# Patient Record
Sex: FEMALE | Race: WHITE | Employment: OTHER | URBAN - METROPOLITAN AREA
[De-identification: names, ages, dates, MRNs, and addresses within clinical notes are randomized per-mention and may not be internally consistent; named-entity substitution may affect disease eponyms.]

---

## 2017-01-03 ENCOUNTER — OP HISTORICAL/CONVERTED ENCOUNTER (OUTPATIENT)
Dept: OTHER | Age: 77
End: 2017-01-03

## 2017-03-06 ENCOUNTER — OP HISTORICAL/CONVERTED ENCOUNTER (OUTPATIENT)
Dept: OTHER | Age: 77
End: 2017-03-06

## 2017-03-07 ENCOUNTER — OP HISTORICAL/CONVERTED ENCOUNTER (OUTPATIENT)
Dept: OTHER | Age: 77
End: 2017-03-07

## 2017-03-08 ENCOUNTER — OP HISTORICAL/CONVERTED ENCOUNTER (OUTPATIENT)
Dept: OTHER | Age: 77
End: 2017-03-08

## 2017-03-14 ENCOUNTER — OP HISTORICAL/CONVERTED ENCOUNTER (OUTPATIENT)
Dept: OTHER | Age: 77
End: 2017-03-14

## 2017-04-02 ENCOUNTER — IP HISTORICAL/CONVERTED ENCOUNTER (OUTPATIENT)
Dept: OTHER | Age: 77
End: 2017-04-02

## 2017-05-19 ENCOUNTER — IP HISTORICAL/CONVERTED ENCOUNTER (OUTPATIENT)
Dept: OTHER | Age: 77
End: 2017-05-19

## 2017-05-31 ENCOUNTER — OP HISTORICAL/CONVERTED ENCOUNTER (OUTPATIENT)
Dept: OTHER | Age: 77
End: 2017-05-31

## 2017-06-05 ENCOUNTER — OP HISTORICAL/CONVERTED ENCOUNTER (OUTPATIENT)
Dept: OTHER | Age: 77
End: 2017-06-05

## 2017-06-06 ENCOUNTER — OP HISTORICAL/CONVERTED ENCOUNTER (OUTPATIENT)
Dept: OTHER | Age: 77
End: 2017-06-06

## 2017-08-17 ENCOUNTER — OP HISTORICAL/CONVERTED ENCOUNTER (OUTPATIENT)
Dept: OTHER | Age: 77
End: 2017-08-17

## 2018-01-12 ENCOUNTER — ED HISTORICAL/CONVERTED ENCOUNTER (OUTPATIENT)
Dept: OTHER | Age: 78
End: 2018-01-12

## 2018-04-13 ENCOUNTER — ED HISTORICAL/CONVERTED ENCOUNTER (OUTPATIENT)
Dept: OTHER | Age: 78
End: 2018-04-13

## 2018-05-26 ENCOUNTER — IP HISTORICAL/CONVERTED ENCOUNTER (OUTPATIENT)
Dept: OTHER | Age: 78
End: 2018-05-26

## 2018-06-22 ENCOUNTER — IP HISTORICAL/CONVERTED ENCOUNTER (OUTPATIENT)
Dept: OTHER | Age: 78
End: 2018-06-22

## 2018-07-22 ENCOUNTER — IP HISTORICAL/CONVERTED ENCOUNTER (OUTPATIENT)
Dept: OTHER | Age: 78
End: 2018-07-22

## 2018-10-01 ENCOUNTER — IP HISTORICAL/CONVERTED ENCOUNTER (OUTPATIENT)
Dept: OTHER | Age: 78
End: 2018-10-01

## 2018-10-21 ENCOUNTER — IP HISTORICAL/CONVERTED ENCOUNTER (OUTPATIENT)
Dept: OTHER | Age: 78
End: 2018-10-21

## 2019-01-19 ENCOUNTER — IP HISTORICAL/CONVERTED ENCOUNTER (OUTPATIENT)
Dept: OTHER | Age: 79
End: 2019-01-19

## 2019-08-14 ENCOUNTER — ED HISTORICAL/CONVERTED ENCOUNTER (OUTPATIENT)
Dept: OTHER | Age: 79
End: 2019-08-14

## 2019-09-03 ENCOUNTER — ED HISTORICAL/CONVERTED ENCOUNTER (OUTPATIENT)
Dept: OTHER | Age: 79
End: 2019-09-03

## 2020-02-15 ENCOUNTER — IP HISTORICAL/CONVERTED ENCOUNTER (OUTPATIENT)
Dept: OTHER | Age: 80
End: 2020-02-15

## 2020-12-04 ENCOUNTER — HOSPITAL ENCOUNTER (EMERGENCY)
Age: 80
Discharge: HOME | End: 2020-12-04
Attending: EMERGENCY MEDICINE
Payer: MEDICARE

## 2020-12-04 ENCOUNTER — APPOINTMENT (OUTPATIENT)
Dept: GENERAL RADIOLOGY | Age: 80
End: 2020-12-04
Attending: EMERGENCY MEDICINE
Payer: MEDICARE

## 2020-12-04 VITALS
HEART RATE: 68 BPM | DIASTOLIC BLOOD PRESSURE: 79 MMHG | OXYGEN SATURATION: 96 % | HEIGHT: 62 IN | SYSTOLIC BLOOD PRESSURE: 173 MMHG | RESPIRATION RATE: 21 BRPM | BODY MASS INDEX: 39.93 KG/M2 | WEIGHT: 217 LBS | TEMPERATURE: 97.8 F

## 2020-12-04 DIAGNOSIS — R07.89 CHEST WALL PAIN: Primary | ICD-10-CM

## 2020-12-04 LAB
ALBUMIN SERPL-MCNC: 3 G/DL (ref 3.5–5)
ALBUMIN/GLOB SERPL: 0.7 {RATIO} (ref 1.1–2.2)
ALP SERPL-CCNC: 148 U/L (ref 45–117)
ALT SERPL-CCNC: 27 U/L (ref 12–78)
ANION GAP SERPL CALC-SCNC: 7 MMOL/L (ref 5–15)
AST SERPL W P-5'-P-CCNC: 16 U/L (ref 15–37)
ATRIAL RATE: 87 BPM
BASOPHILS # BLD: 0 K/UL (ref 0–0.1)
BASOPHILS NFR BLD: 0 % (ref 0–1)
BILIRUB SERPL-MCNC: 0.6 MG/DL (ref 0.2–1)
BUN SERPL-MCNC: 22 MG/DL (ref 6–20)
BUN/CREAT SERPL: 18 (ref 12–20)
CA-I BLD-MCNC: 8.5 MG/DL (ref 8.5–10.1)
CALCULATED P AXIS, ECG09: 58 DEGREES
CALCULATED R AXIS, ECG10: -3 DEGREES
CALCULATED T AXIS, ECG11: 79 DEGREES
CHLORIDE SERPL-SCNC: 109 MMOL/L (ref 97–108)
CK SERPL-CCNC: 74 NG/ML (ref 26–192)
CO2 SERPL-SCNC: 26 MMOL/L (ref 21–32)
CREAT SERPL-MCNC: 1.24 MG/DL (ref 0.55–1.02)
DIAGNOSIS, 93000: NORMAL
DIFFERENTIAL METHOD BLD: ABNORMAL
EOSINOPHIL # BLD: 0.2 K/UL (ref 0–0.4)
EOSINOPHIL NFR BLD: 2 % (ref 0–7)
ERYTHROCYTE [DISTWIDTH] IN BLOOD BY AUTOMATED COUNT: 14.6 % (ref 11.5–14.5)
GLOBULIN SER CALC-MCNC: 4.6 G/DL (ref 2–4)
GLUCOSE SERPL-MCNC: 142 MG/DL (ref 65–100)
HCT VFR BLD AUTO: 32.3 % (ref 35–47)
HGB BLD-MCNC: 10 G/DL (ref 11.5–16)
IMM GRANULOCYTES # BLD AUTO: 0 K/UL (ref 0–0.04)
IMM GRANULOCYTES NFR BLD AUTO: 0 % (ref 0–0.5)
LYMPHOCYTES # BLD: 1 K/UL (ref 0.8–3.5)
LYMPHOCYTES NFR BLD: 10 % (ref 12–49)
MCH RBC QN AUTO: 28.6 PG (ref 26–34)
MCHC RBC AUTO-ENTMCNC: 31 G/DL (ref 30–36.5)
MCV RBC AUTO: 92.3 FL (ref 80–99)
MONOCYTES # BLD: 0.6 K/UL (ref 0–1)
MONOCYTES NFR BLD: 6 % (ref 5–13)
NEUTS SEG # BLD: 8.5 K/UL (ref 1.8–8)
NEUTS SEG NFR BLD: 82 % (ref 32–75)
P-R INTERVAL, ECG05: 212 MS
PLATELET # BLD AUTO: 237 K/UL (ref 150–400)
PMV BLD AUTO: 10.3 FL (ref 8.9–12.9)
POTASSIUM SERPL-SCNC: 4.2 MMOL/L (ref 3.5–5.1)
PROT SERPL-MCNC: 7.6 G/DL (ref 6.4–8.2)
Q-T INTERVAL, ECG07: 392 MS
QRS DURATION, ECG06: 84 MS
QTC CALCULATION (BEZET), ECG08: 471 MS
RBC # BLD AUTO: 3.5 M/UL (ref 3.8–5.2)
SODIUM SERPL-SCNC: 142 MMOL/L (ref 136–145)
TROPONIN I SERPL-MCNC: <0.05 NG/ML
TROPONIN I SERPL-MCNC: <0.05 NG/ML
VENTRICULAR RATE, ECG03: 87 BPM
WBC # BLD AUTO: 10.3 K/UL (ref 3.6–11)

## 2020-12-04 PROCEDURE — 85025 COMPLETE CBC W/AUTO DIFF WBC: CPT

## 2020-12-04 PROCEDURE — 74011250637 HC RX REV CODE- 250/637: Performed by: EMERGENCY MEDICINE

## 2020-12-04 PROCEDURE — 84484 ASSAY OF TROPONIN QUANT: CPT

## 2020-12-04 PROCEDURE — 82550 ASSAY OF CK (CPK): CPT

## 2020-12-04 PROCEDURE — 80053 COMPREHEN METABOLIC PANEL: CPT

## 2020-12-04 PROCEDURE — 99284 EMERGENCY DEPT VISIT MOD MDM: CPT

## 2020-12-04 PROCEDURE — 71045 X-RAY EXAM CHEST 1 VIEW: CPT

## 2020-12-04 PROCEDURE — 36415 COLL VENOUS BLD VENIPUNCTURE: CPT

## 2020-12-04 PROCEDURE — 93005 ELECTROCARDIOGRAM TRACING: CPT

## 2020-12-04 RX ORDER — NAPROXEN 500 MG/1
500 TABLET ORAL
Status: DISCONTINUED | OUTPATIENT
Start: 2020-12-04 | End: 2020-12-04 | Stop reason: HOSPADM

## 2020-12-04 RX ADMIN — NAPROXEN 500 MG: 500 TABLET ORAL at 11:28

## 2020-12-04 NOTE — ED PROVIDER NOTES
EMERGENCY DEPARTMENT HISTORY AND PHYSICAL EXAM      Date: 12/4/2020  Patient Name: Elena Chaparro      History of Presenting Illness     Chief Complaint   Patient presents with    Chest Pain       History Provided By: Patient    HPI: Elena Chaparro, 68 y.o. female with a past medical history significant Congestive heart failure presents to the ED with cc of shortness of breath with activity and laying down over the past 24 to 48 hours. Patient states she has not treated with anything and her symptoms are mild to moderate at this point time. She is not maintained on oxygen at home. She specifically denies fever, chills, nausea, vomiting, chest pain, rash, diarrhea, headache, night sweats, weight loss. There are no other complaints, changes, or physical findings at this time. PCP: Ayala Zhu MD        Past History     Past Medical History:  Past Medical History:   Diagnosis Date    CHF (congestive heart failure) ([facility] [address]     Hypercholesterolemia     Hypertension     Migraine        Past Surgical History:  Past Surgical History:   Procedure Laterality Date    HX HERNIA REPAIR      HX TUBAL LIGATION         Family History:  History reviewed. No pertinent family history. Social History:  Social History     Tobacco Use    Smoking status: Never Smoker    Smokeless tobacco: Never Used   Substance Use Topics    Alcohol use: Not Currently    Drug use: Not Currently       Allergies: Allergies   Allergen Reactions    Antibiotic (Pnqtn-Qqnyr-Wvfdl) [Neomycin-Bacitracnzn-Polymyxnb] Rash and Swelling     Pt states she is allergic to ALL antibiotics     Contrast Agent [Iodine] Swelling         Review of Systems     Review of Systems   Constitutional: Negative. Negative for appetite change, chills, fatigue and fever. HENT: Negative. Negative for congestion and sinus pain. Eyes: Negative. Negative for pain and visual disturbance.    Respiratory: Positive for chest tightness and shortness of breath. Cardiovascular: Negative. Negative for chest pain. Gastrointestinal: Negative. Negative for abdominal pain, diarrhea, nausea and vomiting. Genitourinary: Negative. Negative for difficulty urinating. No discharge   Musculoskeletal: Negative. Negative for arthralgias. Skin: Negative. Negative for rash. Neurological: Negative. Negative for weakness and headaches. Hematological: Negative. Psychiatric/Behavioral: Negative. Negative for agitation. The patient is not nervous/anxious. All other systems reviewed and are negative. Physical Exam     Physical Exam  Vitals signs and nursing note reviewed. Constitutional:       General: She is not in acute distress. Appearance: She is well-developed. HENT:      Head: Normocephalic and atraumatic. Nose: Nose normal.      Mouth/Throat:      Mouth: Mucous membranes are moist.      Pharynx: Oropharynx is clear. No oropharyngeal exudate. Eyes:      General:         Right eye: No discharge. Left eye: No discharge. Conjunctiva/sclera: Conjunctivae normal.      Pupils: Pupils are equal, round, and reactive to light. Neck:      Musculoskeletal: Normal range of motion and neck supple. Cardiovascular:      Rate and Rhythm: Normal rate and regular rhythm. Chest Wall: PMI is not displaced. No thrill. Heart sounds: Normal heart sounds. No murmur. No friction rub. No gallop. Pulmonary:      Effort: Pulmonary effort is normal. Tachypnea present. No respiratory distress. Breath sounds: Normal breath sounds. No wheezing or rales. Chest:      Chest wall: No tenderness. Abdominal:      General: Bowel sounds are normal. There is no distension. Palpations: Abdomen is soft. There is no mass. Tenderness: There is no abdominal tenderness. There is no guarding or rebound. Musculoskeletal: Normal range of motion. Lymphadenopathy:      Cervical: No cervical adenopathy.    Skin:     General: Skin is warm and dry. Capillary Refill: Capillary refill takes less than 2 seconds. Findings: No erythema or rash. Neurological:      Mental Status: She is alert and oriented to person, place, and time. Cranial Nerves: No cranial nerve deficit. Coordination: Coordination normal.   Psychiatric:         Mood and Affect: Mood normal.         Behavior: Behavior normal.         Lab and Diagnostic Study Results     Labs -   No results found for this or any previous visit (from the past 12 hour(s)). Radiologic Studies -   [unfilled]  CT Results  (Last 48 hours)    None        CXR Results  (Last 48 hours)    None          Medical Decision Making and ED Course   - I am the first and primary provider for this patient. - I reviewed the vital signs, available nursing notes, past medical history, past surgical history, family history and social history. - Initial assessment performed. The patients presenting problems have been discussed, and the staff are in agreement with the care plan formulated and outlined with them. I have encouraged them to ask questions as they arise throughout their visit. Vital Signs-Reviewed the patient's vital signs. No data found. EKG interpretation: (Preliminary): Performed at 7:11 AM, and read at 7:17 AM  Ventricular rate 87 bpm, IA interval 212 ms, QRS duration 84 ms, QTC 4 and 71 ms. Interpretation: Sinus rhythm with first-degree AV block. Otherwise normal EKG. Records Reviewed: Nursing Notes and Old Medical Records    The patient presents with shortness of breath with a differential diagnosis of ACS, arrhythmia, congestive heart failure, pleurisy, pleural effusion, pneumonia, Covid. ED Course:   Assess with basic and cardiac labs, EKG and chest x-ray. ED Course as of Dec 07 0834   Fri Dec 04, 2020   1044 10:44 AM  I have just reevaluated the patient.  I have reviewed Her vital signs and determined there is currently no worsening in their condition or physical exam.  Results have been reviewed with them and their questions have been answered. We will continue to review further results as they come available. Will reassess second troponin. Reexamination of the patient reveals she does have anterior chest wall tenderness the replicates the pain she is been having.    [CS]      ED Course User Index  [CS] Rickey Zhou MD         Provider Notes (Medical Decision Making):   Patient second troponin is within normal limits and her symptoms are more symptomatic there would be consistent with anterior chest wall pain. MDM           Consultations:       Consultations: - NONE        Procedures and Critical Care       Performed by: Olive Nash MD  PROCEDURES:  Procedures       Disposition     Disposition: Condition stable, improved and resolved  DC- Adult Discharges: All of the diagnostic tests were reviewed and questions answered. Diagnosis, care plan and treatment options were discussed. The patient understands the instructions and will follow up as directed. The patients results have been reviewed with them. They have been counseled regarding their diagnosis. The patient verbally convey understanding and agreement of the signs, symptoms, diagnosis, treatment and prognosis and additionally agrees to follow up as recommended with their PCP in 24 - 48 hours. They also agree with the care-plan and convey that all of their questions have been answered. I have also put together some discharge instructions for them that include: 1) educational information regarding their diagnosis, 2) how to care for their diagnosis at home, as well a 3) list of reasons why they would want to return to the ED prior to their follow-up appointment, should their condition change. Discharged    Remove if not discharged  DISCHARGE PLAN:  1. There are no discharge medications for this patient.     2.   Follow-up Information     Follow up With Specialties Details Why Brenna Prado MD Family Medicine Call in 2 days  [address]  [phone]          3. Return to ED if worse   4. There are no discharge medications for this patient. Diagnosis     Clinical Impression:   1. Chest wall pain        Attestations:    Saumya Garza MD    Please note that this dictation was completed with [employer], the computer voice recognition software. Quite often unanticipated grammatical, syntax, homophones, and other interpretive errors are inadvertently transcribed by the computer software. Please disregard these errors. Please excuse any errors that have escaped final proofreading. Thank you.

## 2020-12-04 NOTE — ED TRIAGE NOTES
Pt arrived by ambulance with c/o 8/10 Chest pain radiating to left side. 4 aspirins and 2 nitros were given by ems provider. Pt sated at 90% ra and went to 97% on 4 L NC. Pt states her chest pain was unrelieved by asa and nitro.

## 2021-12-04 ENCOUNTER — HOSPITAL ENCOUNTER (INPATIENT)
Age: 81
LOS: 4 days | Discharge: HOME HEALTH | DRG: 291 | End: 2021-12-08
Attending: EMERGENCY MEDICINE | Admitting: INTERNAL MEDICINE
Payer: MEDICARE

## 2021-12-04 ENCOUNTER — APPOINTMENT (OUTPATIENT)
Dept: GENERAL RADIOLOGY | Age: 81
DRG: 291 | End: 2021-12-04
Attending: EMERGENCY MEDICINE
Payer: MEDICARE

## 2021-12-04 DIAGNOSIS — I50.43 ACUTE ON CHRONIC COMBINED SYSTOLIC AND DIASTOLIC CONGESTIVE HEART FAILURE (HCC): ICD-10-CM

## 2021-12-04 DIAGNOSIS — I50.1 PULMONARY EDEMA CARDIAC CAUSE (HCC): Primary | ICD-10-CM

## 2021-12-04 PROBLEM — I50.9 CHF (CONGESTIVE HEART FAILURE) (HCC): Status: ACTIVE | Noted: 2021-12-04

## 2021-12-04 LAB
ALBUMIN SERPL-MCNC: 3.2 G/DL (ref 3.5–5)
ALBUMIN/GLOB SERPL: 0.7 {RATIO} (ref 1.1–2.2)
ALP SERPL-CCNC: 117 U/L (ref 45–117)
ALT SERPL-CCNC: 24 U/L (ref 12–78)
ANION GAP SERPL CALC-SCNC: 6 MMOL/L (ref 5–15)
AST SERPL W P-5'-P-CCNC: 26 U/L (ref 15–37)
ATRIAL RATE: 86 BPM
BILIRUB SERPL-MCNC: 0.6 MG/DL (ref 0.2–1)
BNP SERPL-MCNC: 1072 PG/ML
BUN SERPL-MCNC: 24 MG/DL (ref 6–20)
BUN/CREAT SERPL: 19 (ref 12–20)
CA-I BLD-MCNC: 8.6 MG/DL (ref 8.5–10.1)
CALCULATED P AXIS, ECG09: 68 DEGREES
CALCULATED R AXIS, ECG10: 2 DEGREES
CALCULATED T AXIS, ECG11: 86 DEGREES
CHLORIDE SERPL-SCNC: 111 MMOL/L (ref 97–108)
CO2 SERPL-SCNC: 22 MMOL/L (ref 21–32)
CREAT SERPL-MCNC: 1.24 MG/DL (ref 0.55–1.02)
DIAGNOSIS, 93000: NORMAL
ERYTHROCYTE [DISTWIDTH] IN BLOOD BY AUTOMATED COUNT: 14.2 % (ref 11.5–14.5)
GLOBULIN SER CALC-MCNC: 4.5 G/DL (ref 2–4)
GLUCOSE BLD STRIP.AUTO-MCNC: 311 MG/DL (ref 65–117)
GLUCOSE SERPL-MCNC: 141 MG/DL (ref 65–100)
HCT VFR BLD AUTO: 32 % (ref 35–47)
HGB BLD-MCNC: 9.8 G/DL (ref 11.5–16)
MCH RBC QN AUTO: 28.7 PG (ref 26–34)
MCHC RBC AUTO-ENTMCNC: 30.6 G/DL (ref 30–36.5)
MCV RBC AUTO: 93.6 FL (ref 80–99)
NRBC # BLD: 0 K/UL (ref 0–0.01)
NRBC BLD-RTO: 0 PER 100 WBC
P-R INTERVAL, ECG05: 198 MS
PERFORMED BY, TECHID: ABNORMAL
PLATELET # BLD AUTO: 254 K/UL (ref 150–400)
PMV BLD AUTO: 10.1 FL (ref 8.9–12.9)
POTASSIUM SERPL-SCNC: 4.3 MMOL/L (ref 3.5–5.1)
PROT SERPL-MCNC: 7.7 G/DL (ref 6.4–8.2)
Q-T INTERVAL, ECG07: 400 MS
QRS DURATION, ECG06: 86 MS
QTC CALCULATION (BEZET), ECG08: 478 MS
RBC # BLD AUTO: 3.42 M/UL (ref 3.8–5.2)
SODIUM SERPL-SCNC: 139 MMOL/L (ref 136–145)
TROPONIN-HIGH SENSITIVITY: 21 NG/L (ref 0–51)
TROPONIN-HIGH SENSITIVITY: 23 NG/L (ref 0–51)
VENTRICULAR RATE, ECG03: 86 BPM
WBC # BLD AUTO: 11.1 K/UL (ref 3.6–11)

## 2021-12-04 PROCEDURE — 96375 TX/PRO/DX INJ NEW DRUG ADDON: CPT

## 2021-12-04 PROCEDURE — 83880 ASSAY OF NATRIURETIC PEPTIDE: CPT

## 2021-12-04 PROCEDURE — 99285 EMERGENCY DEPT VISIT HI MDM: CPT

## 2021-12-04 PROCEDURE — 93005 ELECTROCARDIOGRAM TRACING: CPT

## 2021-12-04 PROCEDURE — 84484 ASSAY OF TROPONIN QUANT: CPT

## 2021-12-04 PROCEDURE — 65270000029 HC RM PRIVATE

## 2021-12-04 PROCEDURE — 96374 THER/PROPH/DIAG INJ IV PUSH: CPT

## 2021-12-04 PROCEDURE — 74011250636 HC RX REV CODE- 250/636: Performed by: EMERGENCY MEDICINE

## 2021-12-04 PROCEDURE — 36415 COLL VENOUS BLD VENIPUNCTURE: CPT

## 2021-12-04 PROCEDURE — 71045 X-RAY EXAM CHEST 1 VIEW: CPT

## 2021-12-04 PROCEDURE — 74011250637 HC RX REV CODE- 250/637: Performed by: EMERGENCY MEDICINE

## 2021-12-04 PROCEDURE — 85027 COMPLETE CBC AUTOMATED: CPT

## 2021-12-04 PROCEDURE — 74011250637 HC RX REV CODE- 250/637: Performed by: INTERNAL MEDICINE

## 2021-12-04 PROCEDURE — 82962 GLUCOSE BLOOD TEST: CPT

## 2021-12-04 PROCEDURE — 94761 N-INVAS EAR/PLS OXIMETRY MLT: CPT

## 2021-12-04 PROCEDURE — 74011250636 HC RX REV CODE- 250/636: Performed by: INTERNAL MEDICINE

## 2021-12-04 PROCEDURE — 80053 COMPREHEN METABOLIC PANEL: CPT

## 2021-12-04 RX ORDER — FUROSEMIDE 10 MG/ML
40 INJECTION INTRAMUSCULAR; INTRAVENOUS DAILY
Status: DISCONTINUED | OUTPATIENT
Start: 2021-12-05 | End: 2021-12-07

## 2021-12-04 RX ORDER — HEPARIN SODIUM 5000 [USP'U]/ML
5000 INJECTION, SOLUTION INTRAVENOUS; SUBCUTANEOUS EVERY 12 HOURS
Status: DISCONTINUED | OUTPATIENT
Start: 2021-12-04 | End: 2021-12-08 | Stop reason: HOSPADM

## 2021-12-04 RX ORDER — ACETAMINOPHEN 500 MG
1000 TABLET ORAL ONCE
Status: ACTIVE | OUTPATIENT
Start: 2021-12-04 | End: 2021-12-04

## 2021-12-04 RX ORDER — DOCUSATE SODIUM 100 MG/1
100 CAPSULE, LIQUID FILLED ORAL AS NEEDED
Status: DISCONTINUED | OUTPATIENT
Start: 2021-12-04 | End: 2021-12-08 | Stop reason: HOSPADM

## 2021-12-04 RX ORDER — FUROSEMIDE 10 MG/ML
40 INJECTION INTRAMUSCULAR; INTRAVENOUS
Status: COMPLETED | OUTPATIENT
Start: 2021-12-04 | End: 2021-12-04

## 2021-12-04 RX ORDER — KETOROLAC TROMETHAMINE 15 MG/ML
15 INJECTION, SOLUTION INTRAMUSCULAR; INTRAVENOUS
Status: COMPLETED | OUTPATIENT
Start: 2021-12-04 | End: 2021-12-04

## 2021-12-04 RX ORDER — ONDANSETRON 2 MG/ML
4 INJECTION INTRAMUSCULAR; INTRAVENOUS
Status: DISCONTINUED | OUTPATIENT
Start: 2021-12-04 | End: 2021-12-08 | Stop reason: HOSPADM

## 2021-12-04 RX ORDER — ONDANSETRON 2 MG/ML
4 INJECTION INTRAMUSCULAR; INTRAVENOUS
Status: COMPLETED | OUTPATIENT
Start: 2021-12-04 | End: 2021-12-04

## 2021-12-04 RX ORDER — BUTALBITAL, ACETAMINOPHEN AND CAFFEINE 50; 325; 40 MG/1; MG/1; MG/1
1 TABLET ORAL
Status: DISCONTINUED | OUTPATIENT
Start: 2021-12-04 | End: 2021-12-08 | Stop reason: HOSPADM

## 2021-12-04 RX ORDER — CARVEDILOL 3.12 MG/1
3.12 TABLET ORAL 2 TIMES DAILY WITH MEALS
Status: DISCONTINUED | OUTPATIENT
Start: 2021-12-04 | End: 2021-12-07

## 2021-12-04 RX ORDER — ASPIRIN 81 MG/1
81 TABLET ORAL DAILY
Status: DISCONTINUED | OUTPATIENT
Start: 2021-12-05 | End: 2021-12-08 | Stop reason: HOSPADM

## 2021-12-04 RX ORDER — ACETAMINOPHEN 325 MG/1
650 TABLET ORAL
Status: DISCONTINUED | OUTPATIENT
Start: 2021-12-04 | End: 2021-12-04

## 2021-12-04 RX ADMIN — NITROGLYCERIN 1 INCH: 20 OINTMENT TOPICAL at 03:13

## 2021-12-04 RX ADMIN — KETOROLAC TROMETHAMINE 15 MG: 15 INJECTION, SOLUTION INTRAMUSCULAR; INTRAVENOUS at 16:36

## 2021-12-04 RX ADMIN — BUTALBITAL, ACETAMINOPHEN, AND CAFFEINE 1 TABLET: 50; 325; 40 TABLET ORAL at 20:53

## 2021-12-04 RX ADMIN — ONDANSETRON 4 MG: 2 INJECTION INTRAMUSCULAR; INTRAVENOUS at 03:13

## 2021-12-04 RX ADMIN — CARVEDILOL 3.12 MG: 3.12 TABLET, FILM COATED ORAL at 16:35

## 2021-12-04 RX ADMIN — ONDANSETRON 4 MG: 2 INJECTION INTRAMUSCULAR; INTRAVENOUS at 20:17

## 2021-12-04 RX ADMIN — FUROSEMIDE 40 MG: 40 INJECTION, SOLUTION INTRAMUSCULAR; INTRAVENOUS at 03:13

## 2021-12-04 NOTE — ED NOTES
TRANSFER - OUT REPORT:    Verbal report given to accepting admit nurse Elayne Reed (name) on Gaston Blancas  being transferred to [facility]note 3[facility] [address] (unit) for routine progression of care       Report consisted of patients Situation, Background, Assessment and   Recommendations(SBAR). Information from the following report(s) SBAR and OR Summary was reviewed with the receiving nurse & care transferred. Lines:   Peripheral IV 12/04/21 Anterior; Right Forearm (Active)       Peripheral IV 12/04/21 Right Antecubital (Active)   Site Assessment Clean, dry, & intact 12/04/21 0419   Phlebitis Assessment 0 12/04/21 0419   Infiltration Assessment 0 12/04/21 0419   Dressing Status Clean, dry, & intact 12/04/21 0419   Dressing Type Tape; Transparent 12/04/21 0419   Hub Color/Line Status Pink; Flushed; Patent 12/04/21 0419   Action Taken Blood drawn 12/04/21 0419        Opportunity for questions and clarification was provided. Patient transported to floor by ARISTIDES Hare on hospital bed with:   Monitor  O2 @ 2 liters     All belongings accompanied patient to floor in labeled belongings bag.

## 2021-12-04 NOTE — ED TRIAGE NOTES
Ems was called for difficulty breathing and cp  hx of CHF. Pt took aspirin prior to ems arrival ems gave nitro with relief of  Pain and htn. Pain went from a 8 to a 4. Upon arrival pt pain now 7/10.

## 2021-12-04 NOTE — Clinical Note
Status[de-identified] INPATIENT [101]   Type of Bed: Telemetry [19]   Cardiac Monitoring Required?: Yes   Inpatient Hospitalization Certified Necessary for the Following Reasons: 3.  Patient receiving treatment that can only be provided in an inpatient setting (further clarification in H&P documentation)   Admitting Diagnosis: CHF (congestive heart failure) [facility]) [[id number]]   Admitting Physician: Patric Buerger [[id number]]   Attending Physician: Patric Buerger [[id number]]   Estimated Length of Stay: 2 Midnights   Discharge Plan[de-identified] Home with Office Follow-up

## 2021-12-04 NOTE — ED NOTES
Called 4W to attempt to give report for pt to go to assigned admit Rm [id number]. Accepting admit nurse unable to give report at this time. Awaiting call back.

## 2021-12-04 NOTE — H&P
History and Physical    Patient: Yue Antunez MRN: [id number]  SSN: [id number]    YOB: 1940  Age: 80 y.o. Sex: female      Subjective:      Yue Antunez is a 80 y.o. female who has a history of severe CHF hypertension migraine hypercholesterolemia presented with a complaint of shortness of breath. She denies any cough no chills no fever no malaise department BNP was elevated. Past Medical History:   Diagnosis Date    CHF (congestive heart failure) (HCC)     Hypercholesterolemia     Hypertension     Migraine      Past Surgical History:   Procedure Laterality Date    HX HERNIA REPAIR      HX TUBAL LIGATION        History reviewed. No pertinent family history. Social History     Tobacco Use    Smoking status: Never Smoker    Smokeless tobacco: Never Used   Substance Use Topics    Alcohol use: Not Currently      Prior to Admission medications    Not on File        Allergies   Allergen Reactions    Antibiotic (Oktou-Sqsda-Dqqcc) [Neomycin-Bacitracnzn-Polymyxnb] Rash and Swelling     Pt states she is allergic to ALL antibiotics     Contrast Agent [Iodine] Swelling       Review of Systems:  A comprehensive review of systems was negative except for that written in the History of Present Illness. Objective:     Vitals:    12/04/21 0639 12/04/21 0800 12/04/21 1000 12/04/21 1232   BP: (!) 141/68 (!) 168/75 (!) 156/70 (!) 165/68   Pulse:  79 76 73   Resp:  18 18 22   Temp:       SpO2:  95% 96% 95%   Weight:       Height:            Physical Exam:  General:  Alert, cooperative, no distress, appears stated age. Eyes:  Conjunctivae/corneas clear. PERRL, EOMs intact. Fundi benign   Ears:  Normal TMs and external ear canals both ears. Nose: Nares normal. Septum midline. Mucosa normal. No drainage or sinus tenderness.    Mouth/Throat: Lips, mucosa, and tongue normal. Teeth and gums normal.   Neck: Supple, symmetrical, trachea midline, no adenopathy, thyroid: no enlargment/tenderness/nodules, no carotid bruit and no JVD. Back:   Symmetric, no curvature. ROM normal. No CVA tenderness. Lungs:    Rales and rhonchi to auscultation bilaterally. Heart:  Regular rate and rhythm, S1, S2 normal, no murmur, click, rub or gallop. Abdomen:   Soft, non-tender. Bowel sounds normal. No masses,  No organomegaly. Extremities: Extremities normal, atraumatic, no cyanosis or edema. Pulses: 2+ and symmetric all extremities. Skin: Skin color, texture, turgor normal. No rashes or lesions   Lymph nodes: Cervical, supraclavicular, and axillary nodes normal.   Neurologic: CNII-XII intact. Normal strength, sensation and reflexes throughout. Recent Results (from the past 24 hour(s))   EKG, 12 LEAD, INITIAL    Collection Time: 12/04/21  1:55 AM   Result Value Ref Range    Ventricular Rate 86 BPM    Atrial Rate 86 BPM    P-R Interval 198 ms    QRS Duration 86 ms    Q-T Interval 400 ms    QTC Calculation (Bezet) 478 ms    Calculated P Axis 68 degrees    Calculated R Axis 2 degrees    Calculated T Axis 86 degrees    Diagnosis       Sinus rhythm with Premature supraventricular complexes  Poor R wave progression  Abnormal ECG  No previous ECGs available  Confirmed by Kenneth De Leon ([id number]) on 12/4/2021 0:96:79 PM     METABOLIC PANEL, COMPREHENSIVE    Collection Time: 12/04/21  2:15 AM   Result Value Ref Range    Sodium 139 136 - 145 mmol/L    Potassium 4.3 3.5 - 5.1 mmol/L    Chloride 111 (H) 97 - 108 mmol/L    CO2 22 21 - 32 mmol/L    Anion gap 6 5 - 15 mmol/L    Glucose 141 (H) 65 - 100 mg/dL    BUN 24 (H) 6 - 20 mg/dL    Creatinine 1.24 (H) 0.55 - 1.02 mg/dL    BUN/Creatinine ratio 19 12 - 20      GFR est AA 51 (L) >60 ml/min/1.73m2    GFR est non-AA 42 (L) >60 ml/min/1.73m2    Calcium 8.6 8.5 - 10.1 mg/dL    Bilirubin, total 0.6 0.2 - 1.0 mg/dL    AST (SGOT) 26 15 - 37 U/L    ALT (SGPT) 24 12 - 78 U/L    Alk.  phosphatase 117 45 - 117 U/L    Protein, total 7.7 6.4 - 8.2 g/dL    Albumin 3.2 (L) 3.5 - 5.0 g/dL    Globulin 4.5 (H) 2.0 - 4.0 g/dL    A-G Ratio 0.7 (L) 1.1 - 2.2     TROPONIN-HIGH SENSITIVITY    Collection Time: 12/04/21  2:15 AM   Result Value Ref Range    Troponin-High Sensitivity 21 0 - 51 ng/L   NT-PRO BNP    Collection Time: 12/04/21  2:15 AM   Result Value Ref Range    NT pro-BNP 1,072 (H) <450 pg/mL   CBC W/O DIFF    Collection Time: 12/04/21  4:16 AM   Result Value Ref Range    WBC 11.1 (H) 3.6 - 11.0 K/uL    RBC 3.42 (L) 3.80 - 5.20 M/uL    HGB 9.8 (L) 11.5 - 16.0 g/dL    HCT 32.0 (L) 35.0 - 47.0 %    MCV 93.6 80.0 - 99.0 FL    MCH 28.7 26.0 - 34.0 PG    MCHC 30.6 30.0 - 36.5 g/dL    RDW 14.2 11.5 - 14.5 %    PLATELET 743 181 - 015 K/uL    MPV 10.1 8.9 - 12.9 FL    NRBC 0.0 0.0  WBC    ABSOLUTE NRBC 0.00 0.00 - 0.01 K/uL   TROPONIN-HIGH SENSITIVITY    Collection Time: 12/04/21  4:16 AM   Result Value Ref Range    Troponin-High Sensitivity 23 0 - 51 ng/L         Assessment:     Hospital Problems  Never Reviewed          Codes Class Noted POA    [facility] ICD-10-CM: I50.9  ICD-9-CM: 428.0  12/4/2021 Unknown          Acute hypoxic respiratory failure  Acute severe congestive heart failure  Migraine      Severe headache  Morbid obesity    Plan:   Continue with diuretics ACE inhibitor ARB and beta-blocker consult cardiology      Signed By: Fernando Madrid MD     December 4, 2021

## 2021-12-04 NOTE — ED PROVIDER NOTES
EMERGENCY DEPARTMENT HISTORY AND PHYSICAL EXAM      Date: 12/4/2021  Patient Name: Sandie Quintero      History of Presenting Illness     Chief Complaint   Patient presents with    Shortness of Breath    Chest Pain       History Provided By: Patient    HPI: Sandie Quintero, 80 y.o. female with a past medical history significant CHF, hypertension, migraines presents to the ED with cc of shortness of breath and chest pain. Pain is sharp and midsternal.  Patient states that started acutely this evening while she is sleeping. It woke her from sleep. Of note, she states she has been out of her Lasix for the last few days because her pharmacy was waiting on a authorization. Has been hospitalized for CHF exacerbations in the past and states that it felt like this. She has otherwise been in her normal state of health. No associated cough, runny nose, sore throat or any other infectious symptoms. There are no other complaints, changes, or physical findings at this time. PCP: Mellisa Zacarias MD        Past History     Past Medical History:  Past Medical History:   Diagnosis Date    CHF (congestive heart failure) ([address]     Hypercholesterolemia     Hypertension     Migraine        Past Surgical History:  Past Surgical History:   Procedure Laterality Date    HX HERNIA REPAIR      HX TUBAL LIGATION         Family History:  History reviewed. No pertinent family history. Social History:  Social History     Tobacco Use    Smoking status: Never Smoker    Smokeless tobacco: Never Used   Substance Use Topics    Alcohol use: Not Currently    Drug use: Not Currently       Allergies: Allergies   Allergen Reactions    Antibiotic (Ntrka-Lqltk-Qaove) [Neomycin-Bacitracnzn-Polymyxnb] Rash and Swelling     Pt states she is allergic to ALL antibiotics     Contrast Agent [Iodine] Swelling         Review of Systems     Review of Systems   Constitutional: Negative for activity change and fever.    HENT: Negative for rhinorrhea and sore throat. Eyes: Negative for visual disturbance. Respiratory: Positive for cough. Cardiovascular: Negative for chest pain. Gastrointestinal: Negative for abdominal pain, diarrhea, nausea and vomiting. Genitourinary: Negative for dysuria. Musculoskeletal: Negative for arthralgias and myalgias. Skin: Negative for rash and wound. Neurological: Negative for syncope and headaches. Psychiatric/Behavioral: Negative for confusion. All other systems reviewed and are negative. Physical Exam     Physical Exam  Vitals and nursing note reviewed. Constitutional:       Appearance: Normal appearance. She is normal weight. HENT:      Head: Normocephalic and atraumatic. Nose: Nose normal.      Mouth/Throat:      Mouth: Mucous membranes are moist.   Eyes:      Conjunctiva/sclera: Conjunctivae normal.   Cardiovascular:      Rate and Rhythm: Normal rate. Pulses: Normal pulses. Pulmonary:      Effort: Pulmonary effort is normal. No accessory muscle usage or respiratory distress. Chest:      Chest wall: No tenderness. Abdominal:      Tenderness: There is no abdominal tenderness. Musculoskeletal:         General: No swelling or deformity. Normal range of motion. Right lower leg: Edema present. Left lower leg: Edema present. Skin:     General: Skin is warm and dry. Findings: No rash. Neurological:      General: No focal deficit present. Mental Status: She is alert.    Psychiatric:         Mood and Affect: Mood normal.         Behavior: Behavior normal.         Lab and Diagnostic Study Results     Labs -     Recent Results (from the past 12 hour(s))   METABOLIC PANEL, COMPREHENSIVE    Collection Time: 12/04/21  2:15 AM   Result Value Ref Range    Sodium 139 136 - 145 mmol/L    Potassium 4.3 3.5 - 5.1 mmol/L    Chloride 111 (H) 97 - 108 mmol/L    CO2 22 21 - 32 mmol/L    Anion gap 6 5 - 15 mmol/L    Glucose 141 (H) 65 - 100 mg/dL    BUN 24 (H) 6 - 20 mg/dL    Creatinine 1.24 (H) 0.55 - 1.02 mg/dL    BUN/Creatinine ratio 19 12 - 20      GFR est AA 51 (L) >60 ml/min/1.73m2    GFR est non-AA 42 (L) >60 ml/min/1.73m2    Calcium 8.6 8.5 - 10.1 mg/dL    Bilirubin, total 0.6 0.2 - 1.0 mg/dL    AST (SGOT) 26 15 - 37 U/L    ALT (SGPT) 24 12 - 78 U/L    Alk. phosphatase 117 45 - 117 U/L    Protein, total 7.7 6.4 - 8.2 g/dL    Albumin 3.2 (L) 3.5 - 5.0 g/dL    Globulin 4.5 (H) 2.0 - 4.0 g/dL    A-G Ratio 0.7 (L) 1.1 - 2.2     TROPONIN-HIGH SENSITIVITY    Collection Time: 12/04/21  2:15 AM   Result Value Ref Range    Troponin-High Sensitivity 21 0 - 51 ng/L   NT-PRO BNP    Collection Time: 12/04/21  2:15 AM   Result Value Ref Range    NT pro-BNP 1,072 (H) <450 pg/mL       Radiologic Studies -   [unfilled]  CT Results  (Last 48 hours)    None        CXR Results  (Last 48 hours)               12/04/21 0210  XR CHEST SNGL V Final result    Impression:  Cardiomegaly with bilateral pulmonary parenchymal density, more   extensive on the right. Differential considerations include multifocal pneumonia   and developing CHF with pulmonary edema. Narrative:  PROCEDURE: XR CHEST SNGL V       HISTORY:Chest pain, short of breath       COMPARISON:None           TECHNIQUE: AP portable chest       LIMITATIONS: None       TUBES/LINES: None       LUNG PARENCHYMA/PLEURA: Hazy alveolar infiltrate is seen in the right perihilar   and basilar regions. Left lower lobe is somewhat indistinct but there is some   motion artifact. TRACHEA/BRONCHI: Normal   PULMONARY VESSELS: Pulmonary vessels are prominent   HEART: Heart appears mildly enlarged   MEDIASTINUM: Normal   BONE/SOFT TISSUES: No acute process       OTHER: None                 Medical Decision Making and ED Course   - I am the first and primary provider for this patient AND AM THE PRIMARY PROVIDER OF RECORD.     - I reviewed the vital signs, available nursing notes, past medical history, past surgical history, family history and social history. - Initial assessment performed. The patients presenting problems have been discussed, and the staff are in agreement with the care plan formulated and outlined with them. I have encouraged them to ask questions as they arise throughout their visit. Vital Signs-Reviewed the patient's vital signs. Patient Vitals for the past 12 hrs:   Temp Pulse Resp BP SpO2   12/04/21 0301 -- -- -- (!) 194/84 97 %   12/04/21 0200 97.8 °F (36.6 °C) 86 28 (!) 156/123 93 %         Records Reviewed: Nursing Notes and Old Medical Records        ED Course:       ED Course as of 12/04/21 0412   Sat Dec 04, 2021   656 30-year-old female presents for evaluation of chest pain shortness of breath that started acutely prior to arrival.  Patient has history of CHF. She states she has been out of her Lasix for the last few days due to issues with the pharmacy. States that this feels like past episodes of CHF. She has otherwise been in her normal state prior to the chest pain shortness of breath that started this evening. Differential diagnosis includes ACS versus CHF versus less likely GERD or electrolyte disarray. Labs including CBC, CMP, troponin, BNP and a chest x-ray. Disposition as per clinical course. [LW]   9805 Patient given 40 of IV Lasix and 1 inch of Nitropaste. Chest x-ray is consistent with likely pulmonary edema. Patient will require admission for IV diuresis. Dr. Valencia Villalta paged. [LW]      ED Course User Index  [LW] Mati Gregory MD           Consultations:       Consultations: -  Hospitalist Consultant: Dr. Valencia Villalta: We have asked for emergent assistance with regard to this patient. We have discussed the patients HPI, ROS, PE and results this far. They will come and evaluate the patient for admission. Disposition     Disposition: Admitted to Floor Medical Floor the case was discussed with the admitting physician Rubens.     Admitted    Diagnosis     Clinical Impression: 1. Pulmonary edema cardiac cause ([address]    2. Acute on chronic combined systolic and diastolic congestive heart failure ([address]        Attestations:    Travis Vidal MD    Please note that this dictation was completed with [employer] the computer voice recognition software. Quite often unanticipated grammatical, syntax, homophones, and other interpretive errors are inadvertently transcribed by the computer software. Please disregard these errors. Please excuse any errors that have escaped final proofreading. Thank you.

## 2021-12-04 NOTE — ED NOTES
Pt arrived to ED via EMS with complaints of CP and SOB. Pt stated that she has not been able to take her lasix as she was unable to get the prescription refilled. Pt received nitroglycerin on ambulance prior to arrival. Pt came in on 2L of O2, per the EMS she was saturating at 90% on room air. The patient is alert and oriented x4, pt is a 2 person assist. Pt has diabetes and a hx of CHF. Pt has edema to feet bilaterally. Pt is cooperative with care.  Pt family available by telephone

## 2021-12-04 NOTE — ED NOTES
Pt's daughter, Iram Guadalupe [phone], provided w/ update on pt status & plan of care at pt request.

## 2021-12-05 ENCOUNTER — APPOINTMENT (OUTPATIENT)
Dept: NON INVASIVE DIAGNOSTICS | Age: 81
DRG: 291 | End: 2021-12-05
Attending: INTERNAL MEDICINE
Payer: MEDICARE

## 2021-12-05 LAB
ECHO AV AREA PEAK VELOCITY: 0.94 CM2
ECHO AV AREA VTI: 1.08 CM2
ECHO AV AREA/BSA PEAK VELOCITY: 0.5 CM2/M2
ECHO AV AREA/BSA VTI: 0.6 CM2/M2
ECHO AV MEAN GRADIENT: 14 MMHG
ECHO AV MEAN VELOCITY: 173 CM/S
ECHO AV PEAK GRADIENT: 29 MMHG
ECHO AV PEAK VELOCITY: 267 CM/S
ECHO AV VTI: 63 CM
ECHO LA AREA 4C: 17.45 CM2
ECHO LV E' SEPTAL VELOCITY: 5.07 CM/S
ECHO LV EDV A2C: 174 CM3
ECHO LV EJECTION FRACTION BIPLANE: 75.3 % (ref 55–100)
ECHO LV ESV A2C: 29.5 CM3
ECHO LV INTERNAL DIMENSION DIASTOLIC: 5.58 CM (ref 3.9–5.3)
ECHO LV INTERNAL DIMENSION SYSTOLIC: 3.09 CM
ECHO LV IVSD: 0.94 CM (ref 0.6–0.9)
ECHO LV MASS 2D: 209.9 G (ref 67–162)
ECHO LV MASS INDEX 2D: 107.6 G/M2 (ref 43–95)
ECHO LV POSTERIOR WALL DIASTOLIC: 1 CM (ref 0.6–0.9)
ECHO LVOT DIAM: 1.8 CM
ECHO LVOT PEAK GRADIENT: 4 MMHG
ECHO LVOT PEAK VELOCITY: 98.3 CM/S
ECHO LVOT SV: 68 CM3
ECHO LVOT VTI: 26.8 CM
ECHO LVOT VTI: 26.8 CM
ECHO MV A VELOCITY: 120 CM/S
ECHO MV AREA PHT: 3.79 CM2
ECHO MV E DECELERATION TIME (DT): 236 MS
ECHO MV E VELOCITY: 173 CM/S
ECHO MV E/A RATIO: 1.44
ECHO MV E/E' SEPTAL: 34.12
ECHO MV MAX VELOCITY: 199 CM/S
ECHO MV MEAN GRADIENT: 5 MMHG
ECHO MV PEAK GRADIENT: 16 MMHG
ECHO MV PRESSURE HALF TIME (PHT): 58 MS
ECHO MV VTI: 56.5 CM
ECHO PV MAX VELOCITY: 109 CM/S
ECHO PV PEAK INSTANTANEOUS GRADIENT SYSTOLIC: 5 MMHG
ECHO RA AREA 4C: 17.41 CM2
ECHO RV INTERNAL DIMENSION: 3.21 CM
ECHO RV TAPSE: 2.4 CM (ref 1.5–2)
ECHO TV MAX VELOCITY: 282 CM/S
ECHO TV REGURGITANT PEAK GRADIENT: 32 MMHG
GLUCOSE BLD STRIP.AUTO-MCNC: 127 MG/DL (ref 65–117)
GLUCOSE BLD STRIP.AUTO-MCNC: 130 MG/DL (ref 65–117)
GLUCOSE BLD STRIP.AUTO-MCNC: 200 MG/DL (ref 65–117)
LVOT MG: 2 MMHG
MV DEC SLOPE: ABNORMAL MM/S2
MV DEC SLOPE: ABNORMAL MM/S2
PERFORMED BY, TECHID: ABNORMAL

## 2021-12-05 PROCEDURE — 82962 GLUCOSE BLOOD TEST: CPT

## 2021-12-05 PROCEDURE — 97535 SELF CARE MNGMENT TRAINING: CPT

## 2021-12-05 PROCEDURE — 74011250637 HC RX REV CODE- 250/637: Performed by: INTERNAL MEDICINE

## 2021-12-05 PROCEDURE — 97165 OT EVAL LOW COMPLEX 30 MIN: CPT

## 2021-12-05 PROCEDURE — C8929 TTE W OR WO FOL WCON,DOPPLER: HCPCS

## 2021-12-05 PROCEDURE — 65270000029 HC RM PRIVATE

## 2021-12-05 PROCEDURE — 74011250636 HC RX REV CODE- 250/636: Performed by: INTERNAL MEDICINE

## 2021-12-05 RX ORDER — MAGNESIUM SULFATE 100 %
4 CRYSTALS MISCELLANEOUS AS NEEDED
Status: DISCONTINUED | OUTPATIENT
Start: 2021-12-05 | End: 2021-12-08 | Stop reason: HOSPADM

## 2021-12-05 RX ORDER — ADHESIVE BANDAGE
30 BANDAGE TOPICAL ONCE
Status: ACTIVE | OUTPATIENT
Start: 2021-12-05 | End: 2021-12-05

## 2021-12-05 RX ORDER — DEXTROSE 50 % IN WATER (D50W) INTRAVENOUS SYRINGE
25-50 AS NEEDED
Status: DISCONTINUED | OUTPATIENT
Start: 2021-12-05 | End: 2021-12-08 | Stop reason: HOSPADM

## 2021-12-05 RX ORDER — INSULIN LISPRO 100 [IU]/ML
INJECTION, SOLUTION INTRAVENOUS; SUBCUTANEOUS
Status: DISCONTINUED | OUTPATIENT
Start: 2021-12-05 | End: 2021-12-08 | Stop reason: HOSPADM

## 2021-12-05 RX ADMIN — ASPIRIN 81 MG: 81 TABLET, COATED ORAL at 08:00

## 2021-12-05 RX ADMIN — SACUBITRIL AND VALSARTAN 1 TABLET: 24; 26 TABLET, FILM COATED ORAL at 07:51

## 2021-12-05 RX ADMIN — PERFLUTREN 2 ML: 6.52 INJECTION, SUSPENSION INTRAVENOUS at 09:15

## 2021-12-05 RX ADMIN — CARVEDILOL 3.12 MG: 3.12 TABLET, FILM COATED ORAL at 07:51

## 2021-12-05 RX ADMIN — ONDANSETRON 4 MG: 2 INJECTION INTRAMUSCULAR; INTRAVENOUS at 19:35

## 2021-12-05 RX ADMIN — HEPARIN SODIUM 5000 UNITS: 5000 INJECTION INTRAVENOUS; SUBCUTANEOUS at 14:12

## 2021-12-05 RX ADMIN — FUROSEMIDE 40 MG: 10 INJECTION INTRAMUSCULAR; INTRAVENOUS at 08:00

## 2021-12-05 RX ADMIN — ONDANSETRON 4 MG: 2 INJECTION INTRAMUSCULAR; INTRAVENOUS at 10:24

## 2021-12-05 RX ADMIN — SACUBITRIL AND VALSARTAN 1 TABLET: 24; 26 TABLET, FILM COATED ORAL at 20:06

## 2021-12-05 RX ADMIN — CARVEDILOL 3.12 MG: 3.12 TABLET, FILM COATED ORAL at 16:29

## 2021-12-05 NOTE — PROGRESS NOTES
She had complaints of chest discomfort anytime she goes to bathroom. She was provided with purewick. She remains monitored for chest discomfort. Encouraged to reduce activity to avoid exacerbation. Her vital signs are Within normal limits.

## 2021-12-05 NOTE — PROGRESS NOTES
Skin assessment shows it's all intact without any breakdown.   @ IV lines on the R. AC and Forearm witnessed by Nicko Sutton RN.

## 2021-12-05 NOTE — PROGRESS NOTES
Reason for Admission:                       RUR Score:                     Plan for utilizing home health:          PCP: First and Last name:  Mellisa Zacarias MD     Name of Practice:    Are you a current patient: Yes/No:  Yes   Approximate date of last visit: In August   Can you participate in a virtual visit with your PCP:                     Current Advanced Directive/Advance Care Plan: No Order      Healthcare Decision Maker:   Click here to complete [place] including selection of the Healthcare Decision Maker Relationship (ie \"Primary\")                             Transition of Care Plan:                    CM met with patient at beside to complete and assessment. Patient is alert and oriented lives with her son in a home. Patient has a nurse that comes to the home M-F for 6 hours a day provided by [facility]. Patient has a walker, a commode, and a shower chair. Patient has her son for support. No hx of SNF or rehab. CM will continue to follow for any dc needs moving forward.

## 2021-12-05 NOTE — PROGRESS NOTES
Problem: Self Care Deficits Care Plan (Adult)  Goal: *Acute Goals and Plan of Care (Insert Text)  Description: 1. Patient will perform self-bathing with independence within 7 day(s). 2.  Patient will perform lower body dressing with independence within 7 day(s). 3.  Patient will perform toilet transfers with modified independence within 7 day(s). Outcome: Not Met     Problem: Patient Education: Go to Patient Education Activity  Goal: Patient/Family Education  Outcome: Not Met   OCCUPATIONAL THERAPY EVALUATION  Patient: Maday Cleaning (22 y.o. female)  Date: 12/5/2021  Primary Diagnosis: CHF (congestive heart failure) ([facility] [address] [facility] [I50.9]        Precautions: Fall Risk       ASSESSMENT  Based on the objective data described below, the patient presents with  PER EMR:  Maday Cleaning, 80 y.o. female with a past medical history significant CHF, hypertension, migraines presents to the ED with cc of shortness of breath and chest pain. Pain is sharp and midsternal.  Patient states that started acutely this evening while she is sleeping. It woke her from sleep. Of note, she states she has been out of her Lasix for the last few days because her pharmacy was waiting on a authorization. Has been hospitalized for CHF exacerbations in the past and states that it felt like this. She has otherwise been in her normal state of health. No associated cough, runny nose, sore throat or any other infectious symptoms. There are no other complaints, changes, or physical findings at this time. Current Level of Function Impacting Discharge (ADLs/self-care): Pt is currently functioning slightly below baseline with self-care and functional mobility at this time due to decreased activity tolerance, balance, strength, and back pain. Pt also reporting decreased vision that negatively affects ability to complete functional tasks. Functional Outcome Measure:   The patient scored 22/24 on the [facility] outcome measure which is indicative of very slight delay in ability to complete self-care and functional mobility tasks. Other factors to consider for discharge: Pt lives with family who can provide supervision/assistance. Patient will benefit from skilled therapy intervention to address the above noted impairments. PLAN :  Recommendations and Planned Interventions: self care training, functional mobility training, balance training, therapeutic activities, and endurance activities    Frequency/Duration: Patient will be followed by occupational therapy 2-3x/week to address goals. Recommendation for discharge: (in order for the patient to meet his/her long term goals)  Home with [address] [place] [address]    This discharge recommendation:  Has not yet been discussed the attending provider and/or case management    IF patient discharges home will need the following DME: patient owns DME required for discharge       SUBJECTIVE:   Patient stated Orvis Osbaldo you so much for your help.     OBJECTIVE DATA SUMMARY:   HISTORY:   Past Medical History:   Diagnosis Date    [address] (congestive heart failure) ([facility] [address]     Hypercholesterolemia     Hypertension     Migraine      Past Surgical History:   Procedure Laterality Date    HX HERNIA REPAIR      HX TUBAL LIGATION         Expanded or extensive additional review of patient history:     Home Situation  Home Environment: Private residence  Living Alone: No  Support Systems: Child(jitendra)  Patient Expects to be Discharged to[de-identified] [place]    PLOF: Pt was independent for ADLS/IADLS, modified independent with mobility prior to admission. Hand dominance: Right    EXAMINATION OF PERFORMANCE DEFICITS:  Cognitive/Behavioral Status:  Neurologic State: Alert  Orientation Level: Oriented X4    Skin: All visible areas intact    Edema: None noted (all visible areas)    Hearing:   Auditory  Auditory Impairment: None    Vision/Perceptual:    Tracking: Able to track stimulus in all quadrants w/o difficulty        Range of Motion:  BUE AROM WFL      Strength:  BUE WFL    Coordination:     Fine Motor Skills-Upper: Left Intact; Right Intact    Gross Motor Skills-Upper: Left Intact; Right Intact    Tone & Sensation:  BUE Normal & Intact      Balance:  Sitting: Intact  Standing: Intact    Functional Mobility and Transfers for ADLs:  Bed Mobility:   Independent    Transfers:  Sit to Stand: Contact guard assistance  Stand to Sit: Contact guard assistance  Bed to Chair: Contact guard assistance  Bathroom Mobility: Minimum assistance  Toilet Transfer : Minimum assistance    ADL Assessment:  Feeding: Independent    Oral Facial Hygiene/Grooming: Independent    Upper Body Dressing: Independent    Lower Body Dressing: Minimum assistance    Toileting: Independent    ADL Intervention and task modifications:  Feeding  Feeding Assistance: Independent  Cutting Food: Independent  Food to Mouth: Independent  Drink to Mouth: Independent    Grooming  Grooming Assistance: Independent    Upper Body Bathing  Bathing Assistance: Independent    Lower Body Bathing  Bathing Assistance: Minimum assistance    Upper Body Dressing Assistance  Dressing Assistance: Independent    Lower Body Dressing Assistance  Dressing Assistance: Minimum assistance    [address] [facility] \"6 Clicks\"                                                       Daily Activity Inpatient Short Form  How much help from another person does the patient currently need. .. Total; A Lot A Little None   1. Putting on and taking off regular lower body clothing? []  1 []  2 [x]  3 []  4   2. Bathing (including washing, rinsing, drying)? []  1 []  2 [x]  3 []  4   3. Toileting, which includes using toilet, bedpan or urinal? [] 1 []  2 []  3 [x]  4   4. Putting on and taking off regular upper body clothing? []  1 []  2 []  3 [x]  4   5. Taking care of personal grooming such as brushing teeth? []  1 []  2 []  3 [x]  4   6. Eating meals? []  1 []  2 []  3 [x]  4   © , Trustees of [address] [place] [address] [address] [address], under license to [employer]. All rights reserved     Score: 22/24     Interpretation of Tool:  Represents clinically-significant functional categories (i.e. Activities of daily living). Percentage of Impairment CH    0%   CI    1-19% CJ    20-39% CK    40-59% CL    60-79% CM    80-99% CN     100%   AMPA  Score 6-24 24 23 20-22 15-19 10-14 7-9 6        Occupational Therapy Evaluation Charge Determination   History Examination Decision-Making   LOW Complexity : Brief history review  LOW Complexity : 1-3 performance deficits relating to physical, cognitive , or psychosocial skils that result in activity limitations and / or participation restrictions  LOW Complexity : No comorbidities that affect functional and no verbal or physical assistance needed to complete eval tasks       Based on the above components, the patient evaluation is determined to be of the following complexity level: LOW   Pain Ratin/10 back pain    Activity Tolerance:   Good  Please refer to the flowsheet for vital signs taken during this treatment. After treatment patient left in no apparent distress:    Supine in bed, Call bell within reach, and Side rails x 3    COMMUNICATION/EDUCATION:   The patients plan of care was discussed with: Registered nurse. Patient/family have participated as able in goal setting and plan of care. This patients plan of care is appropriate for delegation to [facility].     Thank you for this referral.  Francisco Pass  Time Calculation: 26 mins

## 2021-12-05 NOTE — PROGRESS NOTES
Progress Note    Patient: Krystle Enriquez MRN: [id number]  SSN: [id number]    YOB: 1940  Age: 80 y.o. Sex: female      Admit Date: 12/4/2021    LOS: 1 day     Subjective:     80years old admitted for CHF, hypertension feels better today    Objective:     Vitals:    12/05/21 0720 12/05/21 0800 12/05/21 1133 12/05/21 1200   BP: (!) 163/72  (!) 145/73    Pulse: 73 75 73 74   Resp: 19  19    Temp: 97.6 °F (36.4 °C)  97.4 °F (36.3 °C)    SpO2: 97%  95%    Weight:       Height:            Intake and Output:  Current Shift: 12/05 0701 - 12/05 1900  In: -   Out: 50 [Urine:50]  Last three shifts: 12/03 1901 - 12/05 0700  In: -   Out: 1900 [Urine:1900]    Physical Exam:   General:  Alert, cooperative, no distress, appears stated age. Eyes:  Conjunctivae/corneas clear. PERRL, EOMs intact. Fundi benign   Ears:  Normal TMs and external ear canals both ears. Nose: Nares normal. Septum midline. Mucosa normal. No drainage or sinus tenderness. Mouth/Throat: Lips, mucosa, and tongue normal. Teeth and gums normal.   Neck: Supple, symmetrical, trachea midline, no adenopathy, thyroid: no enlargment/tenderness/nodules, no carotid bruit and no JVD. Back:   Symmetric, no curvature. ROM normal. No CVA tenderness. Lungs:   Clear to auscultation bilaterally. Heart:  Regular rate and rhythm, S1, S2 normal, no murmur, click, rub or gallop. Abdomen:   Soft, non-tender. Bowel sounds normal. No masses,  No organomegaly. Extremities: Extremities normal, atraumatic, no cyanosis or edema. Pulses: 2+ and symmetric all extremities. Skin: Skin color, texture, turgor normal. No rashes or lesions   Lymph nodes: Cervical, supraclavicular, and axillary nodes normal.   Neurologic: CNII-XII intact. Normal strength, sensation and reflexes throughout. Lab/Data Review: All lab results for the last 24 hours reviewed.      Recent Results (from the past 24 hour(s))   GLUCOSE, POC    Collection Time: 12/04/21  8:43 PM   Result Value Ref Range    Glucose (POC) 311 (H) 65 - 117 mg/dL    Performed by Candy Cruz    ECHO ADULT COMPLETE    Collection Time: 12/05/21  9:30 AM   Result Value Ref Range    Aortic Valve Systolic Mean Gradient 79.48 mmHg    AoV VTI 63.00 cm    Aortic valve mean velocity 173.00 cm/s    Aortic Valve Systolic Peak Velocity 579.88 cm/s    AoV PG 29.00 mmHg    Aortic Valve Area by Continuity of VTI 1.08 cm2    Aortic Valve Area by Continuity of Peak Velocity 0.94 cm2    IVSd 0.94 (A) 0.6 - 0.9 cm    LVIDd 5.58 (A) 3.9 - 5.3 cm    LVIDs 3.09 cm    LVOT d 1.80 cm    Left Ventricular Outflow Tract Mean Gradient 2.00 mmHg    LVOT VTI 26.80 cm    LVOT VTI 26.80 cm    LVOT Peak Velocity 98.30 cm/s    LVOT Peak Gradient 4.00 mmHg    LVPWd 1.00 (A) 0.6 - 0.9 cm    LV E' Septal Velocity 5.07 cm/s    LV ED Vol A2C 174.00 cm3    LV ES Vol A2C 29.50 cm3    E/E' septal 34.12     LVOT SV 68.0 cm3    Mitral Valve Deceleration Greene 10,870.00 mm/s2    Mitral Valve Deceleration Greene 10,870.00 mm/s2    Mitral Valve E Wave Deceleration Time 236.00 ms    Mitral Valve Pressure Half-time 58.00 ms    MV A Robert 120.00 cm/s    MV E Robert 173.00 cm/s    MV Mean Gradient 5.00 mmHg    Mitral Valve Annulus Velocity Time Integral 56.50 cm    Mitral Valve Max Velocity 199.00 cm/s    MV Peak Gradient 16.00 mmHg    MVA (PHT) 3.79 cm2    MV E/A 1.44     Pulmonic Valve Max Velocity 109.00 cm/s    Pulmonic Valve Systolic Peak Instantaneous Gradient 5.00 mmHg    RVIDd 3.21 cm    Tricuspid Valve Max Velocity 282.00 cm/s    Triscuspid Valve Regurgitation Peak Gradient 32.00 mmHg    Tapse 2.40 (A) 1.5 - 2.0 cm    Right Atrial Area 4C 17.41 cm2    LA Area 4C 17.45 cm2    BP EF 75.3 55 - 100 %    LV Mass .9 67 - 162 g    LV Mass AL Index 107.6 43 - 95 g/m2    JUSTYN/BSA Pk Robert 0.5 cm2/m2    JUSTYN/BSA VTI 0.6 cm2/m2   GLUCOSE, POC    Collection Time: 12/05/21 12:36 PM   Result Value Ref Range    Glucose (POC) 200 (H) 65 - 117 mg/dL    Performed by [facility] [place])          Assessment:     Active Problems:    CHF (congestive heart failure) ([facility] [address] (12/4/2021)    Acute hypoxic respiratory failure  Diabetes mellitus type 2  Hypertension  Obesity    Plan:     Continue present treatment    Signed By: Laron Magana MD     December 5, 2021

## 2021-12-05 NOTE — CONSULTS
2021, 11:48 AM        [facility] at [facility]  Phone: [phone]      Cardiology Consultation        Jessica Zimmerman  80 y.o. female  1940      Admit Date:  2021    Primary Cardiologist:  Dr. Addi Grey  Reason for consult:  CHF/Chest pain  Requesting provider:  Dr. Valeria Alarcon:      1. Congestive heart failure: BNP level was 1072. Echocardiogram pending. 2. Chest pain (pleuritic): Negative troponin levels despite at least 1 whole day of constant chest pain. No evidence of ST elevation or pericarditis on EKG. 3. Hypertension  4. Hyperlipidemia  5. Migraine headache     Plan:      1. We will review the echocardiogram and make further recommendations. 2. Maintain euvolemia. 3. Low-sodium diet. 4. We will try NSAIDs for the chest discomfort for the time being until further information has been obtained. 5. I am in agreement with her outpatient cardiac regimen at this time. 6. I do not see an obvious indication to take this patient emergently to the Cath Lab at this time while awaiting further information. Subjective       Jessica Zimmerman is a 80 y.o. female who is being admitted for shortness of breath and chest discomfort. .  The patient presented to the emergency department with complaints of shortness of breath and chest pain which she stated was somewhat sharp in nature and worse with deep inspiration, coughing and position changes. Patient had apparently been out of her Lasix for a few days prior to admission and was awaiting authorization. She is being admitted to the hospital for CHF exacerbation. She has been followed by Dr. Addi Grey as her primary cardiologist.  She reports no worsening edema. No syncope. No fever or chills. The serial troponin levels have been normal despite at least 1 day of constant chest discomfort. EKG does not show any evidence for ST elevation. Echocardiogram is pending.     Past medical, Family & Social History   The following medical history was reviewed    Past Medical History:   Past Medical History:   Diagnosis Date    CHF (congestive heart failure) ([facility]     Hypercholesterolemia     Hypertension     Migraine       Past Surgical History:   Procedure Laterality Date    HX HERNIA REPAIR      HX TUBAL LIGATION         Social History:  Social History     Socioeconomic History    Marital status:      Spouse name: Not on file    Number of children: Not on file    Years of education: Not on file    Highest education level: Not on file   Occupational History    Not on file   Tobacco Use    Smoking status: Never Smoker    Smokeless tobacco: Never Used   Substance and Sexual Activity    Alcohol use: Not Currently    Drug use: Not Currently    Sexual activity: Not on file   Other Topics Concern    Not on file   Social History Narrative    Not on file     Social Determinants of Health     Financial Resource Strain:     Difficulty of Paying Living Expenses: Not on file   Food Insecurity:     Worried About Running Out of Food in the Last Year: Not on file    Aries of Food in the Last Year: Not on file   Transportation Needs:     Lack of Transportation (Medical): Not on file    Lack of Transportation (Non-Medical):  Not on file   Physical Activity:     Days of Exercise per Week: Not on file    Minutes of Exercise per Session: Not on file   Stress:     Feeling of Stress : Not on file   Social Connections:     Frequency of Communication with Friends and Family: Not on file    Frequency of Social Gatherings with Friends and Family: Not on file    Attends Scientology Services: Not on file    Active Member of Clubs or Organizations: Not on file    Attends Club or Organization Meetings: Not on file    Marital Status: Not on file   Intimate Partner Violence:     Fear of Current or Ex-Partner: Not on file    Emotionally Abused: Not on file    Physically Abused: Not on file   [employer] Sexually Abused: Not on file   Housing Stability:     Unable to Pay for Housing in the Last Year: Not on file    Number of [place] in the Last Year: Not on file    Unstable Housing in the Last Year: Not on file       Family History:   History reviewed. No pertinent family history. Medications & Allergies     Allergies: Allergies   Allergen Reactions    Antibiotic (Aqieg-Unrcp-Pobta) [Neomycin-Bacitracnzn-Polymyxnb] Rash and Swelling     Pt states she is allergic to ALL antibiotics     Contrast Agent [Iodine] Swelling       Home Medications:  Prior to Admission medications    Not on File         REVIEW OF SYSTEMS    Review of Systems   Constitutional: Negative for chills, diaphoresis and fever. HENT: Negative for congestion and nosebleeds. Eyes: Negative for double vision. Respiratory: Positive for cough and shortness of breath. Negative for hemoptysis, sputum production and wheezing. Cardiovascular: Positive for chest pain. Negative for palpitations, orthopnea and claudication. Gastrointestinal: Negative for abdominal pain, blood in stool, diarrhea, melena, nausea and vomiting. Genitourinary: Negative for hematuria. Musculoskeletal: Negative for falls and myalgias. Skin: Negative for rash. Neurological: Negative for dizziness, sensory change, focal weakness and loss of consciousness. Endo/Heme/Allergies: Does not bruise/bleed easily. Psychiatric/Behavioral: Negative for memory loss and suicidal ideas. The patient is not nervous/anxious. Objective       Vitals, I/O's, Weight     24 hour vital signs  BP  Min: 140/75  Max: 180/70  Temp  Av.9 °F (36.6 °C)  Min: 97.4 °F (36.3 °C)  Max: 98.2 °F (36.8 °C)  Capillary Refill could not be evaluated.  This SmartLink does not work with rows of the type: Custom List  Resp  Av.7  Min: 19  Max: 24  SpO2  Av.3 %  Min: 95 %  Max: 98 %  Systolic (56GNX), WYL:396 , Min:140 , ZVN:513   Diastolic (40LLM), PYH:63, Min:68, Max:91    Body mass index is 38.41 kg/m². Intake/Output Summary (Last 24 hours) at 12/5/2021 1148  Last data filed at 12/5/2021 0759  Gross per 24 hour   Intake --   Output 850 ml   Net -850 ml     Patient Vitals for the past 120 hrs:   Weight   12/04/21 0301 95.3 kg (210 lb)           Admit weight: Weight: 95.3 kg (210 lb)      Exam       Constitutional NAD   HEENT Atraumatic, normocephalic. supple, no adenopathy and carotids normal upstroke, no bruits      Cardiovascular regular rate and rhythm, S1, S2 normal, no murmur, click, rub or gallop. Radial pulses normal, pedal pulses normal both DP's and Pt's,  LE edema none   Pulmonary Fair air entry bilaterally with bilateral rails normal respiratory effort   Abnominal soft, non-tender, without masses or organomegaly, normal bowel sounds, no masses palpated   Genitourinary Deferred   Muskuloskeletal No obvious joint deformities.   No clubbing   Neuro Alert and oriented   Psychiatric Demonstrates good judgement and insight into his or her medical condition   Extremities warm, well perfused   Skin Warm and dry       Tele:        Labs     Lab Results   Component Value Date/Time    WBC 11.1 (H) 12/04/2021 04:16 AM    HGB 9.8 (L) 12/04/2021 04:16 AM    HCT 32.0 (L) 12/04/2021 04:16 AM    PLATELET 021 73/70/3542 04:16 AM    MCV 93.6 12/04/2021 04:16 AM     Lab Results   Component Value Date/Time    Sodium 139 12/04/2021 02:15 AM    Potassium 4.3 12/04/2021 02:15 AM    Chloride 111 (H) 12/04/2021 02:15 AM    CO2 22 12/04/2021 02:15 AM    Anion gap 6 12/04/2021 02:15 AM    Glucose 141 (H) 12/04/2021 02:15 AM    BUN 24 (H) 12/04/2021 02:15 AM    Creatinine 1.24 (H) 12/04/2021 02:15 AM    BUN/Creatinine ratio 19 12/04/2021 02:15 AM    GFR est AA 51 (L) 12/04/2021 02:15 AM    GFR est non-AA 42 (L) 12/04/2021 02:15 AM    Calcium 8.6 12/04/2021 02:15 AM      Last Lipid:  No results found for: CHOL, CHOLX, CHLST, CHOLV, HDL, HDLP, LDL, LDLC, DLDLP, TGLX, TRIGL, TRIGP, CHHD, [facility]  Lab Results   Component Value Date/Time    Troponin-I, Qt. <0.05 12/04/2020 10:45 AM      No results found for: TSH, TSH2, TSH3, TSHP, TSHELE, TSHEXT, TT3, T3U, T3UP, FRT3, FT3, FT4, FT4P, T4, T4P, FT4T, TT7, TSHEXT       Imaging & Acillary Studies     EKG: Normal sinus rhythm without evidence of ST elevation. CXR:    XR CHEST SNGL V   Final Result   Cardiomegaly with bilateral pulmonary parenchymal density, more   extensive on the right. Differential considerations include multifocal pneumonia   and developing CHF with pulmonary edema.                      Echo:  Pending      Dr. Jone Enamorado MD, Carroll Peña

## 2021-12-05 NOTE — PROGRESS NOTES
Patient reporting nausea and chest pain 8 of 10 immediately after eating breakfast. Patient describes it as the same pain that she has had only worse. Dr Arlen Gordillo and Demarco Degroot notified.

## 2021-12-06 LAB
GLUCOSE BLD STRIP.AUTO-MCNC: 104 MG/DL (ref 65–117)
GLUCOSE BLD STRIP.AUTO-MCNC: 106 MG/DL (ref 65–117)
GLUCOSE BLD STRIP.AUTO-MCNC: 164 MG/DL (ref 65–117)
GLUCOSE BLD STRIP.AUTO-MCNC: 198 MG/DL (ref 65–117)
GLUCOSE BLD STRIP.AUTO-MCNC: 281 MG/DL (ref 65–117)
GLUCOSE BLD STRIP.AUTO-MCNC: 282 MG/DL (ref 65–117)
PERFORMED BY, TECHID: ABNORMAL
PERFORMED BY, TECHID: NORMAL
PERFORMED BY, TECHID: NORMAL

## 2021-12-06 PROCEDURE — 77010033678 HC OXYGEN DAILY

## 2021-12-06 PROCEDURE — 97161 PT EVAL LOW COMPLEX 20 MIN: CPT

## 2021-12-06 PROCEDURE — 65270000029 HC RM PRIVATE

## 2021-12-06 PROCEDURE — 74011250636 HC RX REV CODE- 250/636: Performed by: INTERNAL MEDICINE

## 2021-12-06 PROCEDURE — 74011636637 HC RX REV CODE- 636/637: Performed by: INTERNAL MEDICINE

## 2021-12-06 PROCEDURE — 94760 N-INVAS EAR/PLS OXIMETRY 1: CPT

## 2021-12-06 PROCEDURE — 97530 THERAPEUTIC ACTIVITIES: CPT

## 2021-12-06 PROCEDURE — 74011250637 HC RX REV CODE- 250/637: Performed by: INTERNAL MEDICINE

## 2021-12-06 PROCEDURE — 82962 GLUCOSE BLOOD TEST: CPT

## 2021-12-06 RX ADMIN — SACUBITRIL AND VALSARTAN 1 TABLET: 24; 26 TABLET, FILM COATED ORAL at 20:30

## 2021-12-06 RX ADMIN — CARVEDILOL 3.12 MG: 3.12 TABLET, FILM COATED ORAL at 09:16

## 2021-12-06 RX ADMIN — SACUBITRIL AND VALSARTAN 1 TABLET: 24; 26 TABLET, FILM COATED ORAL at 14:52

## 2021-12-06 RX ADMIN — HEPARIN SODIUM 5000 UNITS: 5000 INJECTION INTRAVENOUS; SUBCUTANEOUS at 06:07

## 2021-12-06 RX ADMIN — INSULIN LISPRO 2 UNITS: 100 INJECTION, SOLUTION INTRAVENOUS; SUBCUTANEOUS at 12:40

## 2021-12-06 RX ADMIN — ASPIRIN 81 MG: 81 TABLET, COATED ORAL at 15:55

## 2021-12-06 RX ADMIN — CARVEDILOL 3.12 MG: 3.12 TABLET, FILM COATED ORAL at 16:00

## 2021-12-06 RX ADMIN — FUROSEMIDE 40 MG: 10 INJECTION INTRAMUSCULAR; INTRAVENOUS at 12:36

## 2021-12-06 RX ADMIN — HEPARIN SODIUM 5000 UNITS: 5000 INJECTION INTRAVENOUS; SUBCUTANEOUS at 17:16

## 2021-12-06 NOTE — PROGRESS NOTES
1040 am  Patient discharge disposition will be home with her son. Patient currently receives personal care for 6 hours per day. PT recommending [place] therapy. Will discuss with patient.

## 2021-12-06 NOTE — PROGRESS NOTES
PHYSICAL THERAPY EVALUATION  Patient: Lukas Euceda (55 y.o. female)  Date: 12/6/2021  Primary Diagnosis: CHF (congestive heart failure) ([facility] [I50.9]        Precautions: O2  ASSESSMENT  Lukas Euceda is a 80 y.o. female who has a history of severe CHF hypertension migraine hypercholesterolemia presented with a complaint of shortness of breath. She denies any cough no chills no fever no malaise department BNP was elevated. Camelia Alvarado Pt A&O x 4. Per patient  pt resides with son in a 1 level home with 2 REGINO in front and 7 in back,  pt requires assist for ADLS/IADLS(has aide everyday 9-2, son alone until 5 and from 2-630 Pm), Rollator/walker AD with mobility prior to admission. NO O2. Based on the objective data described below, the patient presents with generalized weakness, impaired functional mobility, impaired amb, impaired balance, and endurance to activities. Pt semisupine in bed  upon PT arrival, agreeable to PT evaluation. Pt required sba to cg for bed mobility, cg  supine <> sit, cg sit <> stand transfers. Pt amb 20 feet with gt belt, RW, and cg ; demonstrating slow gt pattern with no lob noted. Pt did good with session today with PT. Pt will benefit from continued skilled PT to address above deficits and return to PLOF. Current PT DC recommendation HHPT due to above deficits. may need energy conservation education     Current Level of Function Impacting Discharge (mobility/balance): required walker to walk, new o2 at this time  Other factors to consider for discharge: HHPT     PLAN :  Recommendations and Planned Interventions: bed mobility training, transfer training, gait training, therapeutic exercises, patient and family training/education, and therapeutic activities      Recommend with staff: can amb to bathroom with nursing    Frequency/Duration: Patient will be followed by physical therapy:  2-3x/week to address goals.     Recommendation for discharge: (in order for the patient to meet his/her long term goals)  Home with Home Health Therapy    This discharge recommendation:  Has been made in collaboration with the attending provider and/or case management    IF patient discharges home will need the following DME: patient owns DME required for discharge         SUBJECTIVE:   Patient stated I am doing ok.     OBJECTIVE DATA SUMMARY:   HISTORY:    Past Medical History:   Diagnosis Date    CHF (congestive heart failure) ([facility]     Hypercholesterolemia     Hypertension     Migraine      Past Surgical History:   Procedure Laterality Date    HX HERNIA REPAIR      HX TUBAL LIGATION         Home Situation  Home Environment: Private residence  # Steps to Enter: 2  Rails to Enter: Yes  One/Two Story Residence: One story  Living Alone: No  Support Systems: Child(jitendra)  Patient Expects to be Discharged to[de-identified] [employer]  Current DME Used/Available at Home: [employer], rolling, Wheelchair, [employer], rollator, Shower chair    EXAMINATION/PRESENTATION/DECISION MAKING:   Critical Behavior:  Neurologic State: Alert  Orientation Level: Oriented X4  Cognition: Follows commands     Hearing: Auditory  Auditory Impairment: None    Range Of Motion:  AROM: Generally decreased, functional                       Strength:    Strength: Generally decreased, functional                    Tone & Sensation:   Tone: Normal                          :      Functional Mobility:  Bed Mobility:  Rolling: Stand-by assistance; Contact guard assistance  Supine to Sit: Stand-by assistance; Contact guard assistance  Sit to Supine: Stand-by assistance; Contact guard assistance  Scooting: Stand-by assistance; Contact guard assistance  Transfers:  Sit to Stand: Contact guard assistance  Stand to Sit: Contact guard assistance                       Balance:   Sitting: Intact  Standing: Impaired;  With support  Standing - Static: Constant support; Good  Standing - Dynamic : Fair; Constant support  Ambulation/Gait Training:  Distance (ft): 20 Feet (ft)  Assistive Device: Gait belt; Walker, rolling  Ambulation - Level of Assistance: Contact guard assistance     Gait Description (WDL): Exceptions to WDL           Base of Support: Widened     Speed/Ioana: Slow  Step Length: Right shortened; Left shortened                    Functional Measure:  [address] [facility] [address] [address] AM-PAC 6 Clicks         Basic Mobility Inpatient Short Form  How much difficulty does the patient currently have. .. Unable A Lot A Little None   1. Turning over in bed (including adjusting bedclothes, sheets and blankets)? [] 1   [] 2   [x] 3   [] 4   2. Sitting down on and standing up from a chair with arms ( e.g., wheelchair, bedside commode, etc.)   [] 1   [] 2   [x] 3   [] 4   3. Moving from lying on back to sitting on the side of the bed? [] 1   [] 2   [x] 3   [] 4          How much help from another person does the patient currently need. .. Total A Lot A Little None   4. Moving to and from a bed to a chair (including a wheelchair)? [] 1   [] 2   [x] 3   [] 4   5. Need to walk in hospital room? [] 1   [] 2   [x] 3   [] 4   6. Climbing 3-5 steps with a railing? [] 1   [] 2   [x] 3   [] 4   © 2007, Trustees of [address] [place] [address] [address] [address], under license to [employer]. All rights reserved     Score:  Initial: 18/24 Most Recent: X (Date: 12/06/2021 )   Interpretation of Tool:  Represents activities that are increasingly more difficult (i.e. Bed mobility, Transfers, Gait).   Score 24 23 22-20 19-15 14-10 9-7 6   Modifier CH CI CJ CK CL CM CN         Physical Therapy Evaluation Charge Determination   History Examination Presentation Decision-Making   LOW Complexity : Zero comorbidities / personal factors that will impact the outcome / POC LOW Complexity : 1-2 Standardized tests and measures addressing body structure, function, activity limitation and / or participation in recreation  LOW Complexity : Stable, uncomplicated  Other outcome measures [facility] 6  18/24      Based on the above components, the patient evaluation is determined to be of the following complexity level: LOW     Pain Ratin/10     Activity Tolerance:   Good    After treatment patient left in no apparent distress:   Supine in bed and Call bell within reach and board  updated. GOALS:    Problem: Mobility Impaired (Adult and Pediatric)  Goal: *Acute Goals and Plan of Care (Insert Text)  Description: Patient will move from supine to sit and sit to supine , scoot up and down, and roll side to side in bed with independence within 7 day(s). Patient will transfer from bed to chair and chair to bed with independence using the least restrictive device within 7 day(s). Patient will improve static standing balance to independence within 1 week(s). Patient will ambulate 50 feet with independence with least restrictive device within 1 weeks. Outcome: Progressing Towards Goal       COMMUNICATION/EDUCATION:   The patients plan of care was discussed with: Registered nurse. Fall prevention education was provided and the patient/caregiver indicated understanding., Patient/family have participated as able in goal setting and plan of care. , and Patient/family agree to work toward stated goals and plan of care.          Thank you for this referral.  Nuha Castellanos, PT   Time Calculation: 20 mins

## 2021-12-06 NOTE — PROGRESS NOTES
12/6/2021, 10:32 AM        [facility] at [facility]  Phone: [phone]      Daily Progress Note:      Patient identification:     Fabiola Wood  80 y. o.female  1940      Primary Cardiologist:  Dr. Kyrie Esteves    Chief Complaint:  Chest pain    Assessment:     1. HFpEF  : BNP level was 1,072. Echocardiogram December 5, 2021 showed ejection fraction  75%. There was mild to moderate mitral valve stenosis and moderate mitral regurgitation. Moderate tricuspid regurgitation. 2. Chest pain (pleuritic): Negative troponin levels despite at least 1 whole day of constant chest pain. No evidence of ST elevation or pericarditis on EKG. 3. Hypertension  4. Hyperlipidemia  5. Migraine headache      Recommendation:     1. Continue current cardiac medical regimen. 2. Echo results reviewed with patient. 3. Maintain euvolemia. 4. Low-sodium diet. 5. Continue risk factor modification and therapeutic lifestyle changes. 6. Follow-up with her primary cardiologist as outpatient. Interval History:  No new interval cardiac developments. Subjective:  Pt. States chest discomfort is improving. Review of Systems:   No fever or chills. No worsening shortness of breath. No worsening edema. No TIA or strokelike symptoms.     Medications:     Current Facility-Administered Medications   Medication Dose Route Frequency    influenza vaccine 2021-22 (6 mos+)(PF) (FLUARIX/FLULAVAL/FLUZONE QUAD) injection 0.5 mL  1 Each IntraMUSCular PRIOR TO DISCHARGE    insulin lispro (HUMALOG) injection   SubCUTAneous TIDAC    glucose chewable tablet 16 g  4 Tablet Oral PRN    glucagon (GLUCAGEN) injection 1 mg  1 mg IntraMUSCular PRN    dextrose (D50W) injection syrg 12.5-25 g  25-50 mL IntraVENous PRN    sacubitriL-valsartan (ENTRESTO) 24-26 mg tablet 1 Tablet  1 Tablet Oral Q12H    carvediloL (COREG) tablet 3.125 mg  3.125 mg Oral BID WITH MEALS    ondansetron (ZOFRAN) injection 4 mg  4 mg IntraVENous Q6H PRN    furosemide (LASIX) injection 40 mg  40 mg IntraVENous DAILY    docusate sodium (COLACE) capsule 100 mg  100 mg Oral PRN    aspirin delayed-release tablet 81 mg  81 mg Oral DAILY    heparin (porcine) injection 5,000 Units  5,000 Units SubCUTAneous Q12H    butalbital-acetaminophen-caffeine (FIORICET, ESGIC) -40 mg per tablet 1 Tablet  1 Tablet Oral Q6H PRN       Allergies: Allergies   Allergen Reactions    Antibiotic (Mnqic-Evcjv-Aebtz) [Neomycin-Bacitracnzn-Polymyxnb] Rash and Swelling     Pt states she is allergic to ALL antibiotics     Contrast Agent [Iodine] Swelling       Physical Exam:   Visit Vitals  BP (!) 163/78 (BP Patient Position: At rest;Lying)   Pulse 72   Temp 97.6 °F (36.4 °C)   Resp 20   Ht 5' 2\" (1.575 m)   Wt 95.3 kg (210 lb)   SpO2 96%   BMI 38.41 kg/m²       General:  NAD, calm, cooperative  CVS:  Regular rate and rhythm, normal S1S2, no new murmurs, rubs or gallops  Pulm:  Normal effort, clear to auscultation bilaterally  Abd:  Soft, non-tender, non-distended  Ext:  Warm and well perfused without edema  Neuro:  Alert and oriented, answering questions appropriately    Telemetry reviewed:      Labs:    CBC  Lab Results   Component Value Date/Time    WBC 11.1 (H) 12/04/2021 04:16 AM    RBC 3.42 (L) 12/04/2021 04:16 AM    HCT 32.0 (L) 12/04/2021 04:16 AM    MCV 93.6 12/04/2021 04:16 AM    MCH 28.7 12/04/2021 04:16 AM    RDW 14.2 12/04/2021 04:16 AM    MONOS 6 12/04/2020 07:50 AM    EOS 2 12/04/2020 07:50 AM    BASOS 0 12/04/2020 07:50 AM       Basic Metabolic Profile  Lab Results   Component Value Date     12/04/2021    CO2 22 12/04/2021    BUN 24 (H) 12/04/2021       Diagnostic Studies:    Echo Results  (Last 48 hours)    None        XR CHEST SNGL V   Final Result   Cardiomegaly with bilateral pulmonary parenchymal density, more   extensive on the right.  Differential considerations include multifocal pneumonia   and developing CHF with pulmonary edema.                     Dr. Sandra Kilpatrick MD, Catherine Keller

## 2021-12-06 NOTE — PROGRESS NOTES
Dr. Magalys Cohen notified to put in code status on patient. 1240: pharmacy notified multiple times of [facility] being out of aspirin and entresto. Still waiting for them to restock or send it up.     [phone]: sent message to pharmacy again to send aspirin. Entresto was sent up but still no aspirin.

## 2021-12-06 NOTE — PROGRESS NOTES
Progress Note    Patient: Loras Eisenmenger MRN: [id number]  SSN: [id number]    YOB: 1940  Age: 80 y.o. Sex: female      Admit Date: 12/4/2021    LOS: 2 days     Subjective:     80years old white male CHF. Or shortness of breath with minimal exertion    Objective:     Vitals:    12/06/21 1234 12/06/21 1518 12/06/21 1552 12/06/21 1555   BP: (!) 141/71 (!) 151/72     Pulse: 67 69 69    Resp: 20 20     Temp: 97.9 °F (36.6 °C) 98.5 °F (36.9 °C)     SpO2: 95% 95%     Weight:    94.9 kg (209 lb 3.5 oz)   Height:            Intake and Output:  Current Shift: No intake/output data recorded. Last three shifts: 12/04 1901 - 12/06 0700  In: -   Out: 800 [Urine:800]    Physical Exam:   General:  Alert, cooperative, no distress, appears stated age. Eyes:  Conjunctivae/corneas clear. PERRL, EOMs intact. Fundi benign   Ears:  Normal TMs and external ear canals both ears. Nose: Nares normal. Septum midline. Mucosa normal. No drainage or sinus tenderness. Mouth/Throat: Lips, mucosa, and tongue normal. Teeth and gums normal.   Neck: Supple, symmetrical, trachea midline, no adenopathy, thyroid: no enlargment/tenderness/nodules, no carotid bruit and no JVD. Back:   Symmetric, no curvature. ROM normal. No CVA tenderness. Lungs:   Clear to auscultation bilaterally. Heart:  Regular rate and rhythm, S1, S2 normal, no murmur, click, rub or gallop. Abdomen:   Soft, non-tender. Bowel sounds normal. No masses,  No organomegaly. Extremities: Extremities normal, atraumatic, no cyanosis or edema. Pulses: 2+ and symmetric all extremities. Skin: Skin color, texture, turgor normal. No rashes or lesions   Lymph nodes: Cervical, supraclavicular, and axillary nodes normal.   Neurologic: CNII-XII intact. Normal strength, sensation and reflexes throughout. Lab/Data Review: All lab results for the last 24 hours reviewed.      Recent Results (from the past 24 hour(s))   GLUCOSE, POC    Collection Time: 12/05/21  7:53 PM   Result Value Ref Range    Glucose (POC) 130 (H) 65 - 117 mg/dL    Performed by Leticia Lozano, POC    Collection Time: 12/06/21  8:26 AM   Result Value Ref Range    Glucose (POC) 104 65 - 117 mg/dL    Performed by [address], POC    Collection Time: 12/06/21 12:36 PM   Result Value Ref Range    Glucose (POC) 164 (H) 65 - 117 mg/dL    Performed by Charleen Bence GLUCOSE, POC    Collection Time: 12/06/21  3:58 PM   Result Value Ref Range    Glucose (POC) 106 65 - 117 mg/dL    Performed by Jessica Betts          Assessment:     Active Problems:    CHF (congestive heart failure) ([facility] (12/4/2021)      History of TAVR    Plan:     Continue with present treatment    Signed By: Alana Trujillo MD     December 6, 2021

## 2021-12-07 LAB
GLUCOSE BLD STRIP.AUTO-MCNC: 115 MG/DL (ref 65–117)
GLUCOSE BLD STRIP.AUTO-MCNC: 139 MG/DL (ref 65–117)
GLUCOSE BLD STRIP.AUTO-MCNC: 147 MG/DL (ref 65–117)
GLUCOSE BLD STRIP.AUTO-MCNC: 180 MG/DL (ref 65–117)
PERFORMED BY, TECHID: ABNORMAL
PERFORMED BY, TECHID: NORMAL

## 2021-12-07 PROCEDURE — 77010033678 HC OXYGEN DAILY

## 2021-12-07 PROCEDURE — 74011250636 HC RX REV CODE- 250/636: Performed by: INTERNAL MEDICINE

## 2021-12-07 PROCEDURE — 82962 GLUCOSE BLOOD TEST: CPT

## 2021-12-07 PROCEDURE — 97530 THERAPEUTIC ACTIVITIES: CPT

## 2021-12-07 PROCEDURE — 65270000029 HC RM PRIVATE

## 2021-12-07 PROCEDURE — 74011250637 HC RX REV CODE- 250/637: Performed by: INTERNAL MEDICINE

## 2021-12-07 PROCEDURE — 94760 N-INVAS EAR/PLS OXIMETRY 1: CPT

## 2021-12-07 RX ORDER — FUROSEMIDE 40 MG/1
40 TABLET ORAL DAILY
Status: DISCONTINUED | OUTPATIENT
Start: 2021-12-08 | End: 2021-12-08 | Stop reason: HOSPADM

## 2021-12-07 RX ORDER — CARVEDILOL 12.5 MG/1
12.5 TABLET ORAL 2 TIMES DAILY WITH MEALS
Status: DISCONTINUED | OUTPATIENT
Start: 2021-12-08 | End: 2021-12-08 | Stop reason: HOSPADM

## 2021-12-07 RX ADMIN — SACUBITRIL AND VALSARTAN 1 TABLET: 24; 26 TABLET, FILM COATED ORAL at 22:37

## 2021-12-07 RX ADMIN — ASPIRIN 81 MG: 81 TABLET, COATED ORAL at 09:35

## 2021-12-07 RX ADMIN — ONDANSETRON 4 MG: 2 INJECTION INTRAMUSCULAR; INTRAVENOUS at 15:57

## 2021-12-07 RX ADMIN — CARVEDILOL 3.12 MG: 3.12 TABLET, FILM COATED ORAL at 17:00

## 2021-12-07 RX ADMIN — HEPARIN SODIUM 5000 UNITS: 5000 INJECTION INTRAVENOUS; SUBCUTANEOUS at 18:00

## 2021-12-07 RX ADMIN — CARVEDILOL 3.12 MG: 3.12 TABLET, FILM COATED ORAL at 09:35

## 2021-12-07 RX ADMIN — BUTALBITAL, ACETAMINOPHEN, AND CAFFEINE 1 TABLET: 50; 325; 40 TABLET ORAL at 15:58

## 2021-12-07 RX ADMIN — HEPARIN SODIUM 5000 UNITS: 5000 INJECTION INTRAVENOUS; SUBCUTANEOUS at 05:42

## 2021-12-07 RX ADMIN — SACUBITRIL AND VALSARTAN 1 TABLET: 24; 26 TABLET, FILM COATED ORAL at 11:48

## 2021-12-07 RX ADMIN — FUROSEMIDE 40 MG: 10 INJECTION INTRAMUSCULAR; INTRAVENOUS at 16:34

## 2021-12-07 NOTE — PROGRESS NOTES
Progress Note      12/7/2021 11:22 AM  NAME: Mayi Rangel   MRN:  [id number]   Admit Diagnosis: CHF (congestive heart failure) ([facility] [I50.9]      Problem List:   Acute diastolic heart failure with EF 75%  Moderate mitral valve stenosis  Moderate mitral regurgitation  Hypertension  Obesity     Assessment/Plan:   Continue medical management of valvular heart disease  Not a good candidate for any invasive evaluation  Low-salt diet and risk factor modifications  Outpatient follow-up with Dr. Marylene Schlatter         []       High complexity decision making was performed in this patient at high risk for decompensation with multiple organ involvement. Subjective:     Mayi Rangel denies chest pain but still has exertional dyspnea  Discussed with RN events overnight. Review of Systems:   Negative except for as noted above. Objective:      Physical Exam:    Last 24hrs VS reviewed since prior progress note. Most recent are:    Visit Vitals  BP (!) 167/74 (BP Patient Position: At rest;Semi fowlers)   Pulse 66   Temp 97.7 °F (36.5 °C)   Resp 20   Ht 5' 2\" (1.575 m)   Wt 94.9 kg (209 lb 3.5 oz)   SpO2 96%   BMI 38.27 kg/m²       Intake/Output Summary (Last 24 hours) at 12/7/2021 1122  Last data filed at 12/7/2021 0544  Gross per 24 hour   Intake --   Output 1350 ml   Net -1350 ml        General Appearance: Alert; no acute distress. Ears/Nose/Mouth/Throat: moist mucous membranes  Neck: Supple. Chest: Lungs clear to auscultation bilaterally. Cardiovascular: Regular rate and rhythm, S1S2 normal  Abdomen: Soft, non-tender, bowel sounds are active. Extremities: Mild edema bilaterally. Skin: Warm and dry. []         Post-cath site without hematoma, bruit, tenderness, or thrill. Distal pulses intact.     PMH/SH reviewed - no change compared to H&P    Telemetry: Sinus rhythm with heart rate in the 60s    EKG: Sinus rhythm with LVH    12/04/21    ECHO ADULT COMPLETE 12/05/2021 12/5/2021    Interpretation Summary  · LV: Calculated LVEF is 75%. Normal cavity size, wall thickness, systolic function (ejection fraction normal) and diastolic function. · MV: Mitral valve thickening. Mitral valve leaflet calcification. Mitral annular calcification. Mild-to-moderate mitral valve stenosis is present. Moderate mitral valve regurgitation is present. · AV: Aortic valve leaflet calcification present. Moderate aortic valve stenosis is present. · TV: Moderate tricuspid valve regurgitation is present. · Image quality for this study was technically difficult. · Contrast used: DEFINITY. · LA: Mildly dilated left atrium. · RA: Mildly dilated right atrium. []  No new EKG for review    Lab Data Personally Reviewed:    No results for input(s): WBC, HGB, HCT, PLT, HGBEXT, HCTEXT, PLTEXT in the last 72 hours. No results for input(s): INR, PTP, APTT, INREXT in the last 72 hours. No results for input(s): NA, K, CL, CO2, BUN, CREA, GLU, CA, MG in the last 72 hours. No results for input(s): CPK, CKNDX, TROIQ in the last 72 hours. No lab exists for component: CPKMB  No results found for: CHOL, CHOLX, CHLST, CHOLV, HDL, HDLP, LDL, LDLC, DLDLP, TGLX, TRIGL, TRIGP, CHHD, CHHDX    No results for input(s): AP, TBIL, TP, ALB, GLOB, GGT, AML, LPSE in the last 72 hours. No lab exists for component: SGOT, GPT, AMYP, HLPSE  No results for input(s): PH, PCO2, PO2 in the last 72 hours.     Medications Personally Reviewed:    Current Facility-Administered Medications   Medication Dose Route Frequency    influenza vaccine 2021-22 (6 mos+)(PF) (FLUARIX/FLULAVAL/FLUZONE QUAD) injection 0.5 mL  1 Each IntraMUSCular PRIOR TO DISCHARGE    insulin lispro (HUMALOG) injection   SubCUTAneous TIDAC    glucose chewable tablet 16 g  4 Tablet Oral PRN    glucagon (GLUCAGEN) injection 1 mg  1 mg IntraMUSCular PRN    dextrose (D50W) injection syrg 12.5-25 g  25-50 mL IntraVENous PRN    sacubitriL-valsartan (ENTRESTO) 24-26 mg tablet 1 Tablet  1 Tablet Oral Q12H    carvediloL (COREG) tablet 3.125 mg  3.125 mg Oral BID WITH MEALS    ondansetron (ZOFRAN) injection 4 mg  4 mg IntraVENous Q6H PRN    furosemide (LASIX) injection 40 mg  40 mg IntraVENous DAILY    docusate sodium (COLACE) capsule 100 mg  100 mg Oral PRN    aspirin delayed-release tablet 81 mg  81 mg Oral DAILY    heparin (porcine) injection 5,000 Units  5,000 Units SubCUTAneous Q12H    butalbital-acetaminophen-caffeine (FIORICET, ESGIC) -40 mg per tablet 1 Tablet  1 Tablet Oral Q6H PRN         David Macdonald MD

## 2021-12-07 NOTE — PROGRESS NOTES
Problem: Self Care Deficits Care Plan (Adult)  Goal: *Acute Goals and Plan of Care (Insert Text)  Description: 1. Patient will perform self-bathing with independence within 7 day(s). 2.  Patient will perform lower body dressing with independence within 7 day(s). 3.  Patient will perform toilet transfers with modified independence within 7 day(s). Outcome: Progressing Towards Goal   OCCUPATIONAL THERAPY TREATMENT  Patient: Ernestina Moraes (89 y.o. female)  Date: 12/7/2021  Diagnosis: CHF (congestive heart failure) ([facility] [I50.9]   <principal problem not specified>       Precautions: FALL  Chart, occupational therapy assessment, plan of care, and goals were reviewed. ASSESSMENT  Patient continues with skilled OT services and is progressing towards goals. Patient received semi supine in bed upon QUEVEDO'S arrival, on  2L of oxygen via NC and agreeable to work with therapist. Patient required mod I for bed mobility, supine to sit EOB and scooting using bed rail with additional time 2/2 weakness. STS using Rw/gait belt with CGA and bed to chair tf with CGA with vc's for correct technique for safety. Seated in chair, pt required set-up for grooming (facial care and washed hands) and UB dressing (new gown). Patient educated on and completed bilateral UE HEP to increase strength/endurance needed for ADL'S and functional transfers, see grid below. HR with activity ranging in the 70's to 80's with activity. No SOB noted. Pt left resting in chair with call bell and nursing present. Patient would benefit from continued skilled Occupational services while at [facility] in order to increase safety and independence with self care and functional tranfers/mobility. Recommend at discharge to [facility] when medically appropriate.      Current Level of Function Impacting Discharge (ADLs): Set-up  for grooming and UB dressing    Other factors to consider for discharge: Time of onset, medical prognosis/diagnosis, severity of deficits, PLOF, home environment, and family support          PLAN :  Patient continues to benefit from skilled intervention to address the above impairments. Continue treatment per established plan of care. to address goals. Recommend with staff: seated in chair for grooming    Recommend next OT session: toileting    Recommendation for discharge: (in order for the patient to meet his/her long term goals)  HHOT    This discharge recommendation:  Has been made in collaboration with the attending provider and/or case management    IF patient discharges home will need the following DME: TBD       SUBJECTIVE:   Patient stated i'm not feeling too well    OBJECTIVE DATA SUMMARY:   Cognitive/Behavioral Status:  Neurologic State: Drowsy  Orientation Level: Oriented X4  Cognition: Appropriate for age attention/concentration; Follows commands             Functional Mobility and Transfers for ADLs:  Bed Mobility:  Rolling: Modified independent  Supine to Sit: Modified independent  Scooting: Modified independent    Transfers:  Sit to Stand: Contact guard assistance     Bed to Chair: Contact guard assistance    Balance:  Sitting: Intact  Standing: Impaired; With support  Standing - Static: Good; Constant support  Standing - Dynamic : Fair; Constant support    ADL Intervention:       Grooming  Grooming Assistance: Set-up  Position Performed: Seated in chair  Washing Face: Set-up  Washing Hands: Set-up              Upper Body Dressing Assistance  Dressing Assistance: [facility]: Set-up         Therapeutic Exercises:   Exercise Sets Reps AROM AAROM PROM Self PROM Comments   Ceiling punches 3 10 [x] [] [] [] Seated in chair, vc's to pace self   Chest press 3 10 [x] [] [] []         Pain:  0/10    Activity Tolerance:   Fair and requires rest breaks  Please refer to the flowsheet for vital signs taken during this treatment.     After treatment patient left in no apparent distress:   Sitting in chair and Call bell within reach    COMMUNICATION/COLLABORATION:   The patients plan of care was discussed with: Registered nurse.      EMY Bernard  Time Calculation: 24 mins

## 2021-12-08 VITALS
WEIGHT: 199.74 LBS | OXYGEN SATURATION: 95 % | RESPIRATION RATE: 18 BRPM | HEIGHT: 62 IN | SYSTOLIC BLOOD PRESSURE: 153 MMHG | HEART RATE: 72 BPM | BODY MASS INDEX: 36.76 KG/M2 | TEMPERATURE: 98 F | DIASTOLIC BLOOD PRESSURE: 72 MMHG

## 2021-12-08 LAB
GLUCOSE BLD STRIP.AUTO-MCNC: 132 MG/DL (ref 65–117)
GLUCOSE BLD STRIP.AUTO-MCNC: 177 MG/DL (ref 65–117)
PERFORMED BY, TECHID: ABNORMAL
PERFORMED BY, TECHID: ABNORMAL

## 2021-12-08 PROCEDURE — 74011250637 HC RX REV CODE- 250/637: Performed by: INTERNAL MEDICINE

## 2021-12-08 PROCEDURE — 74011250636 HC RX REV CODE- 250/636: Performed by: INTERNAL MEDICINE

## 2021-12-08 PROCEDURE — 74011636637 HC RX REV CODE- 636/637: Performed by: INTERNAL MEDICINE

## 2021-12-08 PROCEDURE — 82962 GLUCOSE BLOOD TEST: CPT

## 2021-12-08 RX ORDER — BUTALBITAL, ACETAMINOPHEN AND CAFFEINE 50; 325; 40 MG/1; MG/1; MG/1
1 TABLET ORAL
Qty: 30 TABLET | Refills: 0 | Status: SHIPPED | OUTPATIENT
Start: 2021-12-08

## 2021-12-08 RX ORDER — FUROSEMIDE 40 MG/1
40 TABLET ORAL DAILY
Qty: 30 TABLET | Refills: 0 | Status: SHIPPED | OUTPATIENT
Start: 2021-12-08

## 2021-12-08 RX ORDER — ASPIRIN 81 MG/1
81 TABLET ORAL DAILY
Qty: 30 TABLET | Refills: 0 | Status: SHIPPED | OUTPATIENT
Start: 2021-12-09

## 2021-12-08 RX ORDER — CARVEDILOL 12.5 MG/1
12.5 TABLET ORAL 2 TIMES DAILY WITH MEALS
Qty: 60 TABLET | Refills: 0 | Status: SHIPPED | OUTPATIENT
Start: 2021-12-08

## 2021-12-08 RX ADMIN — INSULIN LISPRO 2 UNITS: 100 INJECTION, SOLUTION INTRAVENOUS; SUBCUTANEOUS at 13:01

## 2021-12-08 RX ADMIN — CARVEDILOL 12.5 MG: 12.5 TABLET, FILM COATED ORAL at 09:42

## 2021-12-08 RX ADMIN — SACUBITRIL AND VALSARTAN 1 TABLET: 24; 26 TABLET, FILM COATED ORAL at 11:10

## 2021-12-08 RX ADMIN — ASPIRIN 81 MG: 81 TABLET, COATED ORAL at 09:41

## 2021-12-08 RX ADMIN — HEPARIN SODIUM 5000 UNITS: 5000 INJECTION INTRAVENOUS; SUBCUTANEOUS at 05:23

## 2021-12-08 RX ADMIN — FUROSEMIDE 40 MG: 40 TABLET ORAL at 09:41

## 2021-12-08 NOTE — PROGRESS NOTES
Problem: Falls - Risk of  Goal: *Absence of Falls  Description: Document Kvng Mccloud Fall Risk and appropriate interventions in the flowsheet. Outcome: Progressing Towards Goal  Note: Fall Risk Interventions:  Mobility Interventions: Bed/chair exit alarm, Patient to call before getting OOB         Medication Interventions: Patient to call before getting OOB, Teach patient to arise slowly    Elimination Interventions: Call light in reach, Bed/chair exit alarm              Problem: Pressure Injury - Risk of  Goal: *Prevention of pressure injury  Description: Document Narciso Scale and appropriate interventions in the flowsheet.   Outcome: Progressing Towards Goal  Note: Pressure Injury Interventions:  Sensory Interventions: Assess changes in LOC, Float heels, Keep linens dry and wrinkle-free, Maintain/enhance activity level, Minimize linen layers    Moisture Interventions: Assess need for specialty bed, Absorbent underpads, Apply protective barrier, creams and emollients    Activity Interventions: Increase time out of bed, PT/OT evaluation    Mobility Interventions: PT/OT evaluation, HOB 30 degrees or less    Nutrition Interventions: Document food/fluid/supplement intake    Friction and Shear Interventions: Apply protective barrier, creams and emollients

## 2021-12-08 NOTE — PROGRESS NOTES
Progress Note    Patient: Mayi Rangel MRN: [id number]  SSN: [id number]    YOB: 1940  Age: 80 y.o. Sex: female      Admit Date: 12/4/2021    LOS: 3 days     Subjective:     80years old admitted for CHF hypertension, high cholesterol feels better she was also admitted for acute hypoxic respiratory failure try to wean patient off oxygen    Objective:     Vitals:    12/07/21 1114 12/07/21 1200 12/07/21 1534 12/07/21 1552   BP: (!) 167/74  (!) 174/73    Pulse: 66 66 65 65   Resp: 20  18    Temp: 97.7 °F (36.5 °C)  97.8 °F (36.6 °C)    SpO2: 96%  97%    Weight:       Height:            Intake and Output:  Current Shift: No intake/output data recorded. Last three shifts: 12/06 0701 - 12/07 1900  In: -   Out: 6349 [Urine:1750]    Physical Exam:   General:  Alert, cooperative, no distress, appears stated age. Eyes:  Conjunctivae/corneas clear. PERRL, EOMs intact. Fundi benign   Ears:  Normal TMs and external ear canals both ears. Nose: Nares normal. Septum midline. Mucosa normal. No drainage or sinus tenderness. Mouth/Throat: Lips, mucosa, and tongue normal. Teeth and gums normal.   Neck: Supple, symmetrical, trachea midline, no adenopathy, thyroid: no enlargment/tenderness/nodules, no carotid bruit and no JVD. Back:   Symmetric, no curvature. ROM normal. No CVA tenderness. Lungs:   Clear to auscultation bilaterally. Heart:  Regular rate and rhythm, S1, S2 normal, no murmur, click, rub or gallop. Abdomen:   Soft, non-tender. Bowel sounds normal. No masses,  No organomegaly. Extremities: Extremities normal, atraumatic, no cyanosis or edema. Pulses: 2+ and symmetric all extremities. Skin: Skin color, texture, turgor normal. No rashes or lesions   Lymph nodes: Cervical, supraclavicular, and axillary nodes normal.   Neurologic: CNII-XII intact. Normal strength, sensation and reflexes throughout. Lab/Data Review: All lab results for the last 24 hours reviewed.      Recent Results (from the past 24 hour(s))   GLUCOSE, POC    Collection Time: 12/06/21  8:44 PM   Result Value Ref Range    Glucose (POC) 198 (H) 65 - 117 mg/dL    Performed by Tanya Rocher, POC    Collection Time: 12/06/21  8:47 PM   Result Value Ref Range    Glucose (POC) 281 (H) 65 - 117 mg/dL    Performed by [employer]    GLUCOSE, POC    Collection Time: 12/06/21  8:52 PM   Result Value Ref Range    Glucose (POC) 282 (H) 65 - 117 mg/dL    Performed by Tanya Rocher, POC    Collection Time: 12/07/21  7:50 AM   Result Value Ref Range    Glucose (POC) 115 65 - 117 mg/dL    Performed by [employer]    GLUCOSE, POC    Collection Time: 12/07/21 11:18 AM   Result Value Ref Range    Glucose (POC) 139 (H) 65 - 117 mg/dL    Performed by [employer]    GLUCOSE, POC    Collection Time: 12/07/21  3:37 PM   Result Value Ref Range    Glucose (POC) 147 (H) 65 - 117 mg/dL    Performed by [employer]      Result status: Final result  · LV: Calculated LVEF is 75%. Normal cavity size, wall thickness, systolic function (ejection fraction normal) and diastolic function. · MV: Mitral valve thickening. Mitral valve leaflet calcification. Mitral annular calcification. Mild-to-moderate mitral valve stenosis is present. Moderate mitral valve regurgitation is present. · AV: Aortic valve leaflet calcification present. Moderate aortic valve stenosis is present. · TV: Moderate tricuspid VALVE REGURGITATION is present. · Image quality for this study was technically difficult. Assessment:     Active Problems:    CHF (congestive heart failure) ([address] (12/4/2021)      Valve regurgitation  Mitral valve stenosis  Plan:     Patient feels better possible discharge in a.m.     Signed By: Patrica Samaniego MD     December 7, 2021

## 2021-12-08 NOTE — PROGRESS NOTES
Problem: Falls - Risk of  Goal: *Absence of Falls  Description: Document Carlton Cease Fall Risk and appropriate interventions in the flowsheet. 12/8/2021 1015 by Gerhardt Jaffe, RN  Outcome: Resolved/Met  Note: Fall Risk Interventions:  Mobility Interventions: Bed/chair exit alarm         Medication Interventions: Patient to call before getting OOB    Elimination Interventions: Call light in reach           12/8/2021 0819 by Gerhardt Jaffe, RN  Outcome: Progressing Towards Goal  Note: Fall Risk Interventions:  Mobility Interventions: Bed/chair exit alarm, Patient to call before getting OOB         Medication Interventions: Patient to call before getting OOB, Teach patient to arise slowly    Elimination Interventions: Call light in reach, Bed/chair exit alarm              Problem: Patient Education: Go to Patient Education Activity  Goal: Patient/Family Education  12/8/2021 1015 by Gerhardt Jaffe, RN  Outcome: Resolved/Met  12/8/2021 0819 by Gerhardt Jaffe, RN  Outcome: Progressing Towards Goal     Problem: Patient Education: Go to Patient Education Activity  Goal: Patient/Family Education  12/8/2021 1015 by Gerhardt Jaffe, RN  Outcome: Resolved/Met  12/8/2021 0819 by Gerhardt Jaffe, RN  Outcome: Progressing Towards Goal     Problem: Pressure Injury - Risk of  Goal: *Prevention of pressure injury  Description: Document Narciso Scale and appropriate interventions in the flowsheet.   12/8/2021 1015 by Gerhardt Jaffe, RN  Outcome: Resolved/Met  Note: Pressure Injury Interventions:  Sensory Interventions: Assess changes in LOC    Moisture Interventions: Apply protective barrier, creams and emollients, Absorbent underpads    Activity Interventions: Increase time out of bed    Mobility Interventions: PT/OT evaluation    Nutrition Interventions: Document food/fluid/supplement intake    Friction and Shear Interventions: Apply protective barrier, creams and emollients             12/8/2021 0819 by Gerhardt Jaffe, RN  Outcome: Progressing Towards Goal  Note: Pressure Injury Interventions:  Sensory Interventions: Assess changes in LOC, Float heels, Keep linens dry and wrinkle-free, Maintain/enhance activity level, Minimize linen layers    Moisture Interventions: Assess need for specialty bed, Absorbent underpads, Apply protective barrier, creams and emollients    Activity Interventions: Increase time out of bed, PT/OT evaluation    Mobility Interventions: PT/OT evaluation, HOB 30 degrees or less    Nutrition Interventions: Document food/fluid/supplement intake    Friction and Shear Interventions: Apply protective barrier, creams and emollients                Problem: Patient Education: Go to Patient Education Activity  Goal: Patient/Family Education  12/8/2021 1015 by Radha Haq RN  Outcome: Resolved/Met  12/8/2021 0819 by Radha Haq RN  Outcome: Progressing Towards Goal     Problem: Patient Education: Go to Patient Education Activity  Goal: Patient/Family Education  12/8/2021 1015 by Radha Haq RN  Outcome: Resolved/Met  12/8/2021 0819 by Radha Haq RN  Outcome: Progressing Towards Goal     Problem: Patient Education: Go to Patient Education Activity  Goal: Patient/Family Education  12/8/2021 1015 by Radha Haq RN  Outcome: Resolved/Met  12/8/2021 0819 by Radha Haq RN  Outcome: Progressing Towards Goal

## 2021-12-08 NOTE — PROGRESS NOTES
1300 pm  Patient accepted with [facility]. Anticipated start of care 12/9/21. Patient notified. 1050 am  CM discussed with patient not being accepted with her first choice of HH due to agency not accepting her insurance. CM obtained additional choices and sent referral via [employer].

## 2021-12-08 NOTE — PROGRESS NOTES
Problem: Falls - Risk of  Goal: *Absence of Falls  Description: Document Thu Coello Fall Risk and appropriate interventions in the flowsheet. Outcome: Progressing Towards Goal  Note: Fall Risk Interventions:  Mobility Interventions: Bed/chair exit alarm, Patient to call before getting OOB         Medication Interventions: Patient to call before getting OOB, Teach patient to arise slowly    Elimination Interventions: Call light in reach, Bed/chair exit alarm              Problem: Patient Education: Go to Patient Education Activity  Goal: Patient/Family Education  Outcome: Progressing Towards Goal     Problem: Patient Education: Go to Patient Education Activity  Goal: Patient/Family Education  Outcome: Progressing Towards Goal     Problem: Pressure Injury - Risk of  Goal: *Prevention of pressure injury  Description: Document Narciso Scale and appropriate interventions in the flowsheet.   Outcome: Progressing Towards Goal  Note: Pressure Injury Interventions:  Sensory Interventions: Assess changes in LOC, Float heels, Keep linens dry and wrinkle-free, Maintain/enhance activity level, Minimize linen layers    Moisture Interventions: Assess need for specialty bed, Absorbent underpads, Apply protective barrier, creams and emollients    Activity Interventions: Increase time out of bed, PT/OT evaluation    Mobility Interventions: PT/OT evaluation, HOB 30 degrees or less    Nutrition Interventions: Document food/fluid/supplement intake    Friction and Shear Interventions: Apply protective barrier, creams and emollients                Problem: Patient Education: Go to Patient Education Activity  Goal: Patient/Family Education  Outcome: Progressing Towards Goal     Problem: Patient Education: Go to Patient Education Activity  Goal: Patient/Family Education  Outcome: Progressing Towards Goal     Problem: Patient Education: Go to Patient Education Activity  Goal: Patient/Family Education  Outcome: Progressing Towards Goal

## 2021-12-08 NOTE — PROGRESS NOTES
Dual skin assessment done with Cayla Flores RN. Skin concerns noted include redness to her groin, multiple old surgical scars to her abdomen. Pressure points are WDL.

## 2021-12-08 NOTE — PROGRESS NOTES
1430 Patient given discharge instructions. Follow up appointments and medications reviewed. Iv removed. Telemetry removed and returned. Provider and primary RN aware of discharge. Patient is ready for discharge and awaiting pickup at this time. Discharge plan of care/case management plan validated with provider discharge order. [address] [facility] with Dr Arlen Gordillo and informed him that the med rec must be done prior 1500 because the patient is being picked up by a medicaid cab. Dr. Arlen Gordillo stated that he will complete the med rec prior. Nurse manager notified. 1950 Dr. Arlen Gordillo paged  [phone] Transportation will  the patient at [phone] Discharge RN called Dr. Arlen Gordillo because the patient was not prescribed any discharge medications. Dr Arlen Gordillo stated that he is in office and put in the medications once he's done. Primary RN notified.

## 2021-12-14 NOTE — DISCHARGE SUMMARY
Admit date: 12/4/2021   Admitting Provider: Elizabeth Ward MD    Discharge date: 12/13/2021  Discharging Provider: Elizabeth Ward MD      * Admission Diagnoses: CHF (congestive heart failure) ([facility] [I50.9]    * Discharge Diagnoses:    Hospital Problems as of 12/8/2021 Never Reviewed          Codes Class Noted - Resolved POA    CHF (congestive heart failure) ([facility] ICD-10-CM: I50.9  ICD-9-CM: 428.0  12/4/2021 - Present Unknown              * Hospital Course: 80years old female history of severe CHF hypertension came in with complaint of shortness of breath BNP was elevated  Patient was admitted back to the congestive heart failure Possible particular monitoring morbid obesity patient was treated with IV diuretics and home medications were continued patient did well and was discharged home  * Procedures:   * No surgery found *      Consults: None    Significant Diagnostic Studies: labs:     Discharge Exam:  Head: atraumatic  Eyes: negative  Ears: normal TM's and external ear canals AU  Nose: Nares normal. Septum midline. Mucosa normal. No drainage or sinus tenderness. Throat: Lips, mucosa, and tongue normal. Teeth and gums normal  Neck: supple, symmetrical, trachea midline, no adenopathy, thyroid: not enlarged, symmetric, no tenderness/mass/nodules, no carotid bruit and no JVD  Back: symmetric, no curvature. ROM normal. No CVA tenderness. Lungs: clear to auscultation bilaterally  Heart: regular rate and rhythm, S1, S2 normal, no murmur, click, rub or gallop  Abdomen: soft, non-tender. Bowel sounds normal. No masses,  no organomegaly  Extremities: extremities normal, atraumatic, no cyanosis or edema  Pulses: 2+ and symmetric  Neurologic: Grossly normal    * Discharge Condition: good  * Disposition: Home    Discharge Medications:  Discharge Medication List as of 12/8/2021  2:35 PM      START taking these medications    Details   aspirin delayed-release 81 mg tablet Take 1 Tablet by mouth daily. , Normal, Disp-30 Tablet, R-0      butalbital-acetaminophen-caffeine (FIORICET, ESGIC) -40 mg per tablet Take 1 Tablet by mouth every six (6) hours as needed for Headache., Normal, Disp-30 Tablet, R-0      carvediloL (COREG) 12.5 mg tablet Take 1 Tablet by mouth two (2) times daily (with meals). , Normal, Disp-60 Tablet, R-0      furosemide (LASIX) 40 mg tablet Take 1 Tablet by mouth daily. , Normal, Disp-30 Tablet, R-0      influenza vaccine 2021-22, 6 mos+,,PF, (FLUARIX/FLULAVAL/FLUZONE QUAD) syrg injection 0.5 mL by IntraMUSCular route PRIOR TO DISCHARGE for 1 dose., Normal, Disp-0.5 mL, R-0      sacubitril-valsartan (ENTRESTO) 24 mg/26 mg tablet Take 1 Tablet by mouth every twelve (12) hours. , Normal, Disp-60 Tablet, R-0             * Follow-up Care/Patient Instructions:   Activity: Activity as tolerated  Diet: Cardiac Diet  Wound Care: None needed    Follow-up Information     Follow up With Specialties Details Why Contact Info    Valdo Duncan MD Family Medicine On 12/15/2021 @1:30pm with [place] [id number]  [phone]          30 minutes discharge  Signed:  Frederic Jenkins MD  12/13/2021  11:43 PM

## 2022-03-19 PROBLEM — I50.9 CHF (CONGESTIVE HEART FAILURE) (HCC): Status: ACTIVE | Noted: 2021-12-04

## 2022-07-28 ENCOUNTER — HOSPITAL ENCOUNTER (EMERGENCY)
Age: 82
Discharge: HOME | End: 2022-07-28
Attending: EMERGENCY MEDICINE
Payer: MEDICARE

## 2022-07-28 ENCOUNTER — APPOINTMENT (OUTPATIENT)
Dept: GENERAL RADIOLOGY | Age: 82
End: 2022-07-28
Attending: EMERGENCY MEDICINE
Payer: MEDICARE

## 2022-07-28 VITALS
BODY MASS INDEX: 35.51 KG/M2 | OXYGEN SATURATION: 94 % | DIASTOLIC BLOOD PRESSURE: 55 MMHG | RESPIRATION RATE: 20 BRPM | TEMPERATURE: 98.3 F | SYSTOLIC BLOOD PRESSURE: 108 MMHG | WEIGHT: 193 LBS | HEART RATE: 65 BPM | HEIGHT: 62 IN

## 2022-07-28 DIAGNOSIS — D64.9 ANEMIA, UNSPECIFIED TYPE: ICD-10-CM

## 2022-07-28 DIAGNOSIS — U07.1 COVID-19 VIRUS INFECTION: Primary | ICD-10-CM

## 2022-07-28 DIAGNOSIS — I50.9 OTHER CONGESTIVE HEART FAILURE (HCC): ICD-10-CM

## 2022-07-28 LAB
ALBUMIN SERPL-MCNC: 3 G/DL (ref 3.5–5)
ALBUMIN/GLOB SERPL: 0.7 {RATIO} (ref 1.1–2.2)
ALP SERPL-CCNC: 76 U/L (ref 45–117)
ALT SERPL-CCNC: 33 U/L (ref 12–78)
ANION GAP SERPL CALC-SCNC: 7 MMOL/L (ref 5–15)
AST SERPL W P-5'-P-CCNC: 25 U/L (ref 15–37)
ATRIAL RATE: 63 BPM
BASOPHILS # BLD: 0 K/UL (ref 0–0.1)
BASOPHILS NFR BLD: 0 % (ref 0–1)
BILIRUB SERPL-MCNC: 0.3 MG/DL (ref 0.2–1)
BNP SERPL-MCNC: 786 PG/ML
BUN SERPL-MCNC: 26 MG/DL (ref 6–20)
BUN/CREAT SERPL: 16 (ref 12–20)
CA-I BLD-MCNC: 8.6 MG/DL (ref 8.5–10.1)
CALCULATED P AXIS, ECG09: 70 DEGREES
CALCULATED R AXIS, ECG10: -5 DEGREES
CALCULATED T AXIS, ECG11: 62 DEGREES
CHLORIDE SERPL-SCNC: 108 MMOL/L (ref 97–108)
CO2 SERPL-SCNC: 25 MMOL/L (ref 21–32)
CREAT SERPL-MCNC: 1.6 MG/DL (ref 0.55–1.02)
DIAGNOSIS, 93000: NORMAL
DIFFERENTIAL METHOD BLD: ABNORMAL
EOSINOPHIL # BLD: 0 K/UL (ref 0–0.4)
EOSINOPHIL NFR BLD: 1 % (ref 0–7)
ERYTHROCYTE [DISTWIDTH] IN BLOOD BY AUTOMATED COUNT: 13.5 % (ref 11.5–14.5)
GLOBULIN SER CALC-MCNC: 4.5 G/DL (ref 2–4)
GLUCOSE SERPL-MCNC: 169 MG/DL (ref 65–100)
HCT VFR BLD AUTO: 31.9 % (ref 35–47)
HGB BLD-MCNC: 9.9 G/DL (ref 11.5–16)
IMM GRANULOCYTES # BLD AUTO: 0 K/UL (ref 0–0.04)
IMM GRANULOCYTES NFR BLD AUTO: 0 % (ref 0–0.5)
LYMPHOCYTES # BLD: 0.9 K/UL (ref 0.8–3.5)
LYMPHOCYTES NFR BLD: 27 % (ref 12–49)
MCH RBC QN AUTO: 29.6 PG (ref 26–34)
MCHC RBC AUTO-ENTMCNC: 31 G/DL (ref 30–36.5)
MCV RBC AUTO: 95.5 FL (ref 80–99)
MONOCYTES # BLD: 0.2 K/UL (ref 0–1)
MONOCYTES NFR BLD: 7 % (ref 5–13)
NEUTS SEG # BLD: 2.1 K/UL (ref 1.8–8)
NEUTS SEG NFR BLD: 65 % (ref 32–75)
NRBC # BLD: 0 K/UL (ref 0–0.01)
NRBC BLD-RTO: 0 PER 100 WBC
P-R INTERVAL, ECG05: 206 MS
PLATELET # BLD AUTO: 148 K/UL (ref 150–400)
PMV BLD AUTO: 11 FL (ref 8.9–12.9)
POTASSIUM SERPL-SCNC: 4.5 MMOL/L (ref 3.5–5.1)
PROT SERPL-MCNC: 7.5 G/DL (ref 6.4–8.2)
Q-T INTERVAL, ECG07: 448 MS
QRS DURATION, ECG06: 84 MS
QTC CALCULATION (BEZET), ECG08: 458 MS
RBC # BLD AUTO: 3.34 M/UL (ref 3.8–5.2)
SODIUM SERPL-SCNC: 140 MMOL/L (ref 136–145)
TROPONIN-HIGH SENSITIVITY: 9 NG/L (ref 0–51)
VENTRICULAR RATE, ECG03: 63 BPM
WBC # BLD AUTO: 3.3 K/UL (ref 3.6–11)

## 2022-07-28 PROCEDURE — 80053 COMPREHEN METABOLIC PANEL: CPT

## 2022-07-28 PROCEDURE — 99285 EMERGENCY DEPT VISIT HI MDM: CPT

## 2022-07-28 PROCEDURE — 74011250637 HC RX REV CODE- 250/637: Performed by: EMERGENCY MEDICINE

## 2022-07-28 PROCEDURE — 71045 X-RAY EXAM CHEST 1 VIEW: CPT

## 2022-07-28 PROCEDURE — 83880 ASSAY OF NATRIURETIC PEPTIDE: CPT

## 2022-07-28 PROCEDURE — 96374 THER/PROPH/DIAG INJ IV PUSH: CPT

## 2022-07-28 PROCEDURE — 36415 COLL VENOUS BLD VENIPUNCTURE: CPT

## 2022-07-28 PROCEDURE — 74011250636 HC RX REV CODE- 250/636: Performed by: EMERGENCY MEDICINE

## 2022-07-28 PROCEDURE — 93005 ELECTROCARDIOGRAM TRACING: CPT

## 2022-07-28 PROCEDURE — 84484 ASSAY OF TROPONIN QUANT: CPT

## 2022-07-28 PROCEDURE — 85025 COMPLETE CBC W/AUTO DIFF WBC: CPT

## 2022-07-28 RX ORDER — PREDNISONE 50 MG/1
50 TABLET ORAL DAILY
Qty: 5 TABLET | Refills: 0 | Status: SHIPPED | OUTPATIENT
Start: 2022-07-28 | End: 2022-08-02

## 2022-07-28 RX ORDER — DEXTROMETHORPHAN HYDROBROMIDE, GUAIFENESIN 20; 400 MG/20ML; MG/20ML
5 SOLUTION ORAL EVERY 6 HOURS
Qty: 200 ML | Refills: 0 | Status: ON HOLD | OUTPATIENT
Start: 2022-07-28 | End: 2022-08-09

## 2022-07-28 RX ORDER — FUROSEMIDE 10 MG/ML
40 INJECTION INTRAMUSCULAR; INTRAVENOUS
Status: DISCONTINUED | OUTPATIENT
Start: 2022-07-28 | End: 2022-07-28 | Stop reason: HOSPADM

## 2022-07-28 RX ORDER — FUROSEMIDE 40 MG/1
80 TABLET ORAL ONCE
Status: COMPLETED | OUTPATIENT
Start: 2022-07-28 | End: 2022-07-28

## 2022-07-28 RX ORDER — ALBUTEROL SULFATE 90 UG/1
2 AEROSOL, METERED RESPIRATORY (INHALATION)
Qty: 18 G | Refills: 0 | Status: SHIPPED | OUTPATIENT
Start: 2022-07-28

## 2022-07-28 RX ORDER — AZITHROMYCIN 250 MG/1
TABLET, FILM COATED ORAL
Qty: 6 TABLET | Refills: 0 | Status: SHIPPED | OUTPATIENT
Start: 2022-07-28 | End: 2022-07-28 | Stop reason: SINTOL

## 2022-07-28 RX ORDER — DOXYCYCLINE HYCLATE 100 MG
100 TABLET ORAL 2 TIMES DAILY
Qty: 20 TABLET | Refills: 0 | Status: ON HOLD | OUTPATIENT
Start: 2022-07-28 | End: 2022-08-09

## 2022-07-28 RX ADMIN — METHYLPREDNISOLONE SODIUM SUCCINATE 125 MG: 125 INJECTION, POWDER, FOR SOLUTION INTRAMUSCULAR; INTRAVENOUS at 13:59

## 2022-07-28 RX ADMIN — FUROSEMIDE 80 MG: 40 TABLET ORAL at 15:56

## 2022-07-28 NOTE — Clinical Note
[facility] EMERGENCY DEPT  [address] [address]  [phone]    Work/School Note    Date: 7/28/2022     To Whom It May concern:    Alfredo Sandhu was evaluated by the following provider(s):  Attending Provider: Landon Burns [address] [place] [address] virus is suspected. Per the CDC guidelines we recommend home isolation until the following conditions are all met:    1. At least five days have passed since symptoms first appeared and/or had a close exposure,   2. After home isolation for five days, wearing a mask around others for the next five days,  3. At least 24 have passed since last fever without the use of fever-reducing medications and  4.  Symptoms (eg cough, shortness of breath) have improved      Sincerely,          Kym Majano MD

## 2022-07-28 NOTE — DISCHARGE INSTRUCTIONS
Thank you! Thank you for allowing me to care for you in the emergency department. It is my goal to provide you with excellent care. If you have not received excellent quality care, please ask to speak to the nurse manager. Please fill out the survey that will come to you by mail or email since we listen to your feedback! Below you will find a list of your tests from today's visit. Should you have any questions, please do not hesitate to call the emergency department. Labs  Recent Results (from the past 12 hour(s))   CBC WITH AUTOMATED DIFF    Collection Time: 07/28/22  1:57 PM   Result Value Ref Range    WBC 3.3 (L) 3.6 - 11.0 K/uL    RBC 3.34 (L) 3.80 - 5.20 M/uL    HGB 9.9 (L) 11.5 - 16.0 g/dL    HCT 31.9 (L) 35.0 - 47.0 %    MCV 95.5 80.0 - 99.0 FL    MCH 29.6 26.0 - 34.0 PG    MCHC 31.0 30.0 - 36.5 g/dL    RDW 13.5 11.5 - 14.5 %    PLATELET 902 (L) 549 - 400 K/uL    MPV 11.0 8.9 - 12.9 FL    NRBC 0.0 0.0  WBC    ABSOLUTE NRBC 0.00 0.00 - 0.01 K/uL    NEUTROPHILS 65 32 - 75 %    LYMPHOCYTES 27 12 - 49 %    MONOCYTES 7 5 - 13 %    EOSINOPHILS 1 0 - 7 %    BASOPHILS 0 0 - 1 %    IMMATURE GRANULOCYTES 0 0 - 0.5 %    ABS. NEUTROPHILS 2.1 1.8 - 8.0 K/UL    ABS. LYMPHOCYTES 0.9 0.8 - 3.5 K/UL    ABS. MONOCYTES 0.2 0.0 - 1.0 K/UL    ABS. EOSINOPHILS 0.0 0.0 - 0.4 K/UL    ABS. BASOPHILS 0.0 0.0 - 0.1 K/UL    ABS. IMM.  GRANS. 0.0 0.00 - 0.04 K/UL    DF AUTOMATED     METABOLIC PANEL, COMPREHENSIVE    Collection Time: 07/28/22  1:57 PM   Result Value Ref Range    Sodium 140 136 - 145 mmol/L    Potassium 4.5 3.5 - 5.1 mmol/L    Chloride 108 97 - 108 mmol/L    CO2 25 21 - 32 mmol/L    Anion gap 7 5 - 15 mmol/L    Glucose 169 (H) 65 - 100 mg/dL    BUN 26 (H) 6 - 20 mg/dL    Creatinine 1.60 (H) 0.55 - 1.02 mg/dL    BUN/Creatinine ratio 16 12 - 20      GFR est AA 38 (L) >60 ml/min/1.73m2    GFR est non-AA 31 (L) >60 ml/min/1.73m2    Calcium 8.6 8.5 - 10.1 mg/dL    Bilirubin, total 0.3 0.2 - 1.0 mg/dL    AST (SGOT) 25 15 - 37 U/L    ALT (SGPT) 33 12 - 78 U/L    Alk. phosphatase 76 45 - 117 U/L    Protein, total 7.5 6.4 - 8.2 g/dL    Albumin 3.0 (L) 3.5 - 5.0 g/dL    Globulin 4.5 (H) 2.0 - 4.0 g/dL    A-G Ratio 0.7 (L) 1.1 - 2.2     NT-PRO BNP    Collection Time: 07/28/22  1:57 PM   Result Value Ref Range    NT pro- (H) <450 pg/mL   TROPONIN-HIGH SENSITIVITY    Collection Time: 07/28/22  1:57 PM   Result Value Ref Range    Troponin-High Sensitivity 9 0 - 51 ng/L       Radiologic Studies  XR CHEST PORT   Final Result   Mild interstitial prominence. Correlate for developing interstitial edema. .  . CT Results  (Last 48 hours)      None          CXR Results  (Last 48 hours)                 07/28/22 1347  XR CHEST PORT Final result    Impression:  Mild interstitial prominence. Correlate for developing interstitial edema. .  . Narrative:  INDICATION:  sob        EXAM: Chest single view. COMPARISON: 12/4/2021. FINDINGS: A single frontal view of the chest at 1344 hours shows diffuse mild   interstitial prominence with left basilar atelectasis, slightly improved since   the prior study. .  The heart, mediastinum and pulmonary vasculature are stable   with heart size upper normal to mildly enlarged . The bony thorax is   unremarkable for age. .                 ------------------------------------------------------------------------------------------------------------  The exam and treatment you received in the Emergency Department were for an urgent problem and are not intended as complete care. It is important that you follow-up with a doctor, nurse practitioner, or physician assistant to:  (1) confirm your diagnosis,  (2) re-evaluation of changes in your illness and treatment, and  (3) for ongoing care. Please take your discharge instructions with you when you go to your follow-up appointment. If you have any problem arranging a follow-up appointment, contact the Emergency Department. If your symptoms become worse or you do not improve as expected and you are unable to reach your health care provider, please return to the Emergency Department. We are available 24 hours a day. If a prescription has been provided, please have it filled as soon as possible to prevent a delay in treatment. If you have any questions or reservations about taking the medication due to side effects or interactions with other medications, please call your primary care provider or contact the ER.

## 2022-07-28 NOTE — Clinical Note
[facility] EMERGENCY DEPT  [address] [place] [address] [place] [address]  [phone]    Work/School Note    Date: 7/28/2022    To Whom It May concern:    Sadaf Haynes was seen and treated today in the emergency room by the following provider(s):  Attending Provider: Marisabel Otero MD.      Sadaf Haynes is excused from work/school on 07/28/22 and 07/29/22. She is medically clear to return to work/school on 7/30/2022.        Sincerely,          Barb Payton

## 2022-07-28 NOTE — Clinical Note
[facility] EMERGENCY DEPT  [address] [address]  [phone]    Work/School Note    Date: 7/28/2022     To Whom It May concern:    Alexa Flowers was evaluated by the following provider(s):  Attending Provider: Mili Mota [place] virus is suspected. Per the CDC guidelines we recommend home isolation until the following conditions are all met:    1. At least five days have passed since symptoms first appeared and/or had a close exposure,   2. After home isolation for five days, wearing a mask around others for the next five days,  3. At least 24 have passed since last fever without the use of fever-reducing medications and  4.  Symptoms (eg cough, shortness of breath) have improved      Sincerely,          Koki Teran

## 2022-07-28 NOTE — ED PROVIDER NOTES
EMERGENCY DEPARTMENT HISTORY AND PHYSICAL EXAM      Date: 7/28/2022  Patient Name: Angela Porter    History of Presenting Illness     Chief Complaint   Patient presents with    Positive For Covid-19       History Provided By: Patient    HPI: Angela Porter, [de-identified] y.o. female with a past medical history significant  chf  presents to the ED with chief complaint of Positive For Covid-19  . Old presents with shortness of breath. Known COVID-19 infection from a home swab that was positive. She also has heart failure. Compliant on her medications. Shortness of breath is not related to position or exertion. She has been coughing a lot unsure about any fevers. Generalized body aches are present. No nausea or vomiting. LOCATION - sob  QUALITY-  achy  SEVERITY - mod  TIMING - few days  SETTING-  recent covid  RADIATION - none  MODIFYING FACTORS- taking Rx meds as prescribed        There are no other complaints, changes, or physical findings at this time. PCP: Rukhsana Topete MD    Current Facility-Administered Medications   Medication Dose Route Frequency Provider Last Rate Last Admin    furosemide (LASIX) injection 40 mg  40 mg IntraVENous NOW PaoMaddie MD         Current Outpatient Medications   Medication Sig Dispense Refill    azithromycin (Zithromax Z-Channing) 250 mg tablet Take 2 tablet p.o. day 1 then 1 tablet p.o. day 2 through 5 6 Tablet 0    albuterol (PROVENTIL HFA, VENTOLIN HFA, PROAIR HFA) 90 mcg/actuation inhaler Take 2 Puffs by inhalation every four (4) hours as needed for Wheezing. 18 g 0    dextromethorphan-guaiFENesin (Robitussin Cough-Chest Mark DM) 5-100 mg/5 mL liqd Take 5 mL by mouth every six (6) hours. 200 mL 0    predniSONE (DELTASONE) 50 mg tablet Take 1 Tablet by mouth in the morning for 5 days. 5 Tablet 0    aspirin delayed-release 81 mg tablet Take 1 Tablet by mouth daily.  30 Tablet 0    butalbital-acetaminophen-caffeine (FIORICET, ESGIC) -40 mg per tablet Take 1 Tablet by mouth every six (6) hours as needed for Headache. 30 Tablet 0    carvediloL (COREG) 12.5 mg tablet Take 1 Tablet by mouth two (2) times daily (with meals). 60 Tablet 0    furosemide (LASIX) 40 mg tablet Take 1 Tablet by mouth daily. 30 Tablet 0    influenza vaccine 2021-22, 6 mos+,,PF, (FLUARIX/FLULAVAL/FLUZONE QUAD) syrg injection 0.5 mL by IntraMUSCular route PRIOR TO DISCHARGE for 1 dose. 0.5 mL 0    sacubitril-valsartan (ENTRESTO) 24 mg/26 mg tablet Take 1 Tablet by mouth every twelve (12) hours. 60 Tablet 0       Past History     Past Medical History:  Past Medical History:   Diagnosis Date    CHF (congestive heart failure) ([facility] [address]     Hypercholesterolemia     Hypertension     Migraine        Past Surgical History:  Past Surgical History:   Procedure Laterality Date    HX HERNIA REPAIR      HX TUBAL LIGATION         Family History:  No family history on file. Social History:  Social History     Tobacco Use    Smoking status: Never    Smokeless tobacco: Never   Substance Use Topics    Alcohol use: Not Currently    Drug use: Not Currently       Allergies: Allergies   Allergen Reactions    Antibiotic (Vcnvd-Joobf-Ytvvm) [Neomycin-Bacitracnzn-Polymyxnb] Rash and Swelling     Pt states she is allergic to ALL antibiotics     Contrast Agent [Iodine] Swelling         Review of Systems   Review of Systems   Constitutional: Negative. Negative for chills, fatigue and fever. HENT: Negative. Negative for congestion, nosebleeds and sore throat. Eyes: Negative. Negative for pain, discharge and visual disturbance. Respiratory:  Positive for cough and shortness of breath. Negative for chest tightness. Cardiovascular:  Negative for chest pain, palpitations and leg swelling. Gastrointestinal:  Negative for abdominal pain, blood in stool, constipation, diarrhea, nausea and vomiting. Endocrine: Negative. Genitourinary: Negative.   Negative for difficulty urinating, dysuria, pelvic pain and vaginal bleeding. Musculoskeletal: Negative. Negative for arthralgias, back pain and myalgias. Skin: Negative. Negative for rash and wound. Allergic/Immunologic: Negative. Neurological: Negative. Negative for dizziness, syncope, weakness, numbness and headaches. Hematological: Negative. Psychiatric/Behavioral: Negative. Negative for agitation, confusion and suicidal ideas. All other systems reviewed and are negative. Physical Exam   Physical Exam  Vitals and nursing note reviewed. Exam conducted with a chaperone present. Constitutional:       Appearance: Normal appearance. She is normal weight. HENT:      Head: Normocephalic and atraumatic. Nose: Nose normal.      Mouth/Throat:      Mouth: Mucous membranes are moist.      Pharynx: Oropharynx is clear. Eyes:      Extraocular Movements: Extraocular movements intact. Conjunctiva/sclera: Conjunctivae normal.      Pupils: Pupils are equal, round, and reactive to light. Cardiovascular:      Rate and Rhythm: Normal rate and regular rhythm. Pulses: Normal pulses. Heart sounds: Normal heart sounds. Pulmonary:      Effort: Respiratory distress present. Breath sounds: Normal breath sounds. Abdominal:      General: Abdomen is flat. Bowel sounds are normal. There is no distension. Palpations: Abdomen is soft. Tenderness: There is no abdominal tenderness. There is no guarding. Musculoskeletal:         General: No swelling, tenderness, deformity or signs of injury. Normal range of motion. Cervical back: Normal range of motion and neck supple. Right lower leg: No edema. Left lower leg: No edema. Skin:     General: Skin is warm and dry. Capillary Refill: Capillary refill takes less than 2 seconds. Findings: No lesion or rash. Neurological:      General: No focal deficit present. Mental Status: She is alert and oriented to person, place, and time. Mental status is at baseline. Cranial Nerves: No cranial nerve deficit. Psychiatric:         Mood and Affect: Mood normal.         Behavior: Behavior normal.         Thought Content: Thought content normal.         Judgment: Judgment normal.       Diagnostic Study Results     Labs -     Recent Results (from the past 12 hour(s))   CBC WITH AUTOMATED DIFF    Collection Time: 07/28/22  1:57 PM   Result Value Ref Range    WBC 3.3 (L) 3.6 - 11.0 K/uL    RBC 3.34 (L) 3.80 - 5.20 M/uL    HGB 9.9 (L) 11.5 - 16.0 g/dL    HCT 31.9 (L) 35.0 - 47.0 %    MCV 95.5 80.0 - 99.0 FL    MCH 29.6 26.0 - 34.0 PG    MCHC 31.0 30.0 - 36.5 g/dL    RDW 13.5 11.5 - 14.5 %    PLATELET 933 (L) 983 - 400 K/uL    MPV 11.0 8.9 - 12.9 FL    NRBC 0.0 0.0  WBC    ABSOLUTE NRBC 0.00 0.00 - 0.01 K/uL    NEUTROPHILS 65 32 - 75 %    LYMPHOCYTES 27 12 - 49 %    MONOCYTES 7 5 - 13 %    EOSINOPHILS 1 0 - 7 %    BASOPHILS 0 0 - 1 %    IMMATURE GRANULOCYTES 0 0 - 0.5 %    ABS. NEUTROPHILS 2.1 1.8 - 8.0 K/UL    ABS. LYMPHOCYTES 0.9 0.8 - 3.5 K/UL    ABS. MONOCYTES 0.2 0.0 - 1.0 K/UL    ABS. EOSINOPHILS 0.0 0.0 - 0.4 K/UL    ABS. BASOPHILS 0.0 0.0 - 0.1 K/UL    ABS. IMM. GRANS. 0.0 0.00 - 0.04 K/UL    DF AUTOMATED     METABOLIC PANEL, COMPREHENSIVE    Collection Time: 07/28/22  1:57 PM   Result Value Ref Range    Sodium 140 136 - 145 mmol/L    Potassium 4.5 3.5 - 5.1 mmol/L    Chloride 108 97 - 108 mmol/L    CO2 25 21 - 32 mmol/L    Anion gap 7 5 - 15 mmol/L    Glucose 169 (H) 65 - 100 mg/dL    BUN 26 (H) 6 - 20 mg/dL    Creatinine 1.60 (H) 0.55 - 1.02 mg/dL    BUN/Creatinine ratio 16 12 - 20      GFR est AA 38 (L) >60 ml/min/1.73m2    GFR est non-AA 31 (L) >60 ml/min/1.73m2    Calcium 8.6 8.5 - 10.1 mg/dL    Bilirubin, total 0.3 0.2 - 1.0 mg/dL    AST (SGOT) 25 15 - 37 U/L    ALT (SGPT) 33 12 - 78 U/L    Alk.  phosphatase 76 45 - 117 U/L    Protein, total 7.5 6.4 - 8.2 g/dL    Albumin 3.0 (L) 3.5 - 5.0 g/dL    Globulin 4.5 (H) 2.0 - 4.0 g/dL    A-G Ratio 0.7 (L) 1.1 - 2.2 NT-PRO BNP    Collection Time: 07/28/22  1:57 PM   Result Value Ref Range    NT pro- (H) <450 pg/mL   TROPONIN-HIGH SENSITIVITY    Collection Time: 07/28/22  1:57 PM   Result Value Ref Range    Troponin-High Sensitivity 9 0 - 51 ng/L         Radiologic Studies -   XR CHEST PORT   Final Result   Mild interstitial prominence. Correlate for developing interstitial edema. .  . CT Results  (Last 48 hours)      None          CXR Results  (Last 48 hours)                 07/28/22 1347  XR CHEST PORT Final result    Impression:  Mild interstitial prominence. Correlate for developing interstitial edema. .  . Narrative:  INDICATION:  sob        EXAM: Chest single view. COMPARISON: 12/4/2021. FINDINGS: A single frontal view of the chest at 1344 hours shows diffuse mild   interstitial prominence with left basilar atelectasis, slightly improved since   the prior study. .  The heart, mediastinum and pulmonary vasculature are stable   with heart size upper normal to mildly enlarged . The bony thorax is   unremarkable for age. .                   Medical Decision Making and ED Course   I am the first provider for this patient. I reviewed the vital signs, available nursing notes, past medical history, past surgical history, family history and social history. Vital Signs-Reviewed the patient's vital signs. Patient Vitals for the past 12 hrs:   Temp Pulse Resp BP SpO2   07/28/22 1320 98.3 °F (36.8 °C) 67 (!) 48 (!) 110/56 94 %       EKG interpretation:         Records Reviewed: Previous Hospital chart. EMS run report      ED Course:   Initial assessment performed. The patients presenting problems have been discussed, and they are in agreement with the care plan formulated and outlined with them. I have encouraged them to ask questions as they arise throughout their visit.     Orders Placed This Encounter    XR CHEST PORT     Standing Status:   Standing     Number of Occurrences:   1 Order Specific Question:   Reason for Exam     Answer:   sob    CBC WITH AUTOMATED DIFF     Standing Status:   Standing     Number of Occurrences:   1    COMPREHENSIVE METABOLIC PANEL     Standing Status:   Standing     Number of Occurrences:   1    PRO-BNP     Standing Status:   Standing     Number of Occurrences:   1    TROPONIN-HIGH SENSITIVITY     Standing Status:   Standing     Number of Occurrences:   1    URINALYSIS W/ RFLX MICROSCOPIC     Standing Status:   Standing     Number of Occurrences:   1    EKG, 12 LEAD, INITIAL     Standing Status:   Standing     Number of Occurrences:   1     Order Specific Question:   Reason for Exam:     Answer:   sob    methylPREDNISolone (PF) (Solu-MEDROL) injection 125 mg    furosemide (LASIX) injection 40 mg    azithromycin (Zithromax Z-Channing) 250 mg tablet     Sig: Take 2 tablet p.o. day 1 then 1 tablet p.o. day 2 through 5     Dispense:  6 Tablet     Refill:  0    albuterol (PROVENTIL HFA, VENTOLIN HFA, PROAIR HFA) 90 mcg/actuation inhaler     Sig: Take 2 Puffs by inhalation every four (4) hours as needed for Wheezing. Dispense:  18 g     Refill:  0    dextromethorphan-guaiFENesin (Robitussin Cough-Chest Mark DM) 5-100 mg/5 mL liqd     Sig: Take 5 mL by mouth every six (6) hours. Dispense:  200 mL     Refill:  0    predniSONE (DELTASONE) 50 mg tablet     Sig: Take 1 Tablet by mouth in the morning for 5 days. Dispense:  5 Tablet     Refill:  0                 Provider Notes (Medical Decision Making):   82-year-old presents with shortness of breath known COVID-19 infection however she is not requiring oxygen. Respiratory rate has improved while resting. No evidence of pneumonia. Also no evidence of gross fluid overload. We will give 1 extra dose of diuresis here in the ER encourage Lasix use will discharge home with symptomatic relief and PCP follow-up    ED Course as of 07/28/22 1502   Thu Jul 28, 2022   1420 KG at 1418. Normal sinus rhythm rate of 63.   No ST changes. No T wave inversions. Normal intervals. Reason rule out ACS. Interpreted by ER physician. [HP]   1458 Creatinine(!): 1.60 [HP]   1458 NT pro-BNP(!): 786 [HP]   1458 WBC(!): 3.3 [HP]   1458 HGB(!): 9.9 [HP]   1458 IMPRESSION  Mild interstitial prominence. Correlate for developing interstitial edema. . [HP]      ED Course User Index  [HP] Stephanie Swenson MD         -------------------------------------------------------------------------------------  COVID-19 Risk of decompensation within 24 hours:    Clinical risk score:   CHF,COPD, age >57 (+2 points each)   4  CXR (moderate nonlobar +1, 1 lobe +2, bilat severe +2) 0  HR > 100 at disposition (+2 points)    0  O2 sats <92% RA (+4 points)     0  RR >20 (+2 points)      0    TOTAL SCORE 4  - SCORE 0-2: Discharged home with routine follow-up  - SCORE 3-4: Discharged home with pulse oximeter or 24-hour follow-up  - SCORE 0-4: Consider aspirin 81 mg p.o. daily for 2 weeks if not contraindicated or allergy  - SCORE 5-8: Consider chest x-ray CBC CMP troponin and lactate. If troponin and lactate are normal go to low risk. If troponin or D-dimer are lactate are elevated go to high risk  - SCORE 9+: Consider admission. Decadron 6 mg IV. VTE prophylaxis, antibiotics for pneumonia. Check vitamin D levels. Limit fluids and choose pressors early. Proning. High flow nasal cannula. [Includes respiratory distress. Unstable, oxygen need for sats greater than 90% or acute delirium.]    Consults              Discharged    Procedures               Disposition       Emergency Department Disposition:  Discharged      Diagnosis     Clinical Impression:   1. COVID-19 virus infection    2. Anemia, unspecified type    3. Other congestive heart failure ([address]        Attestations:    Henry Pederson MD    Please note that this dictation was completed with josefina [employer] voice recognition software.   Quite often unanticipated grammatical, syntax, homophones, and other interpretive errors are inadvertently transcribed by the computer software. Please disregard these errors. Please excuse any errors that have escaped final proofreading. Thank you.

## 2022-07-28 NOTE — Clinical Note
[facility] EMERGENCY DEPT  [address] [place] [id number] [phone]    Work/School Note    Date: 7/28/2022    To Whom It May concern:    Sharolyn Felty was seen and treated today in the emergency room by the following provider(s):  Attending Provider: Ethan Rooney MD.      Sharolyn Felty is excused from work/school on 07/28/22 and 07/29/22. She is medically clear to return to work/school on 7/30/2022.        Sincerely,          Robert Esparza MD

## 2022-07-29 ENCOUNTER — PATIENT OUTREACH (OUTPATIENT)
Dept: CASE MANAGEMENT | Age: 82
End: 2022-07-29

## 2022-07-29 NOTE — PROGRESS NOTES
Patient resolved from Transition of Care episode on 7/29/22. ACM/CTN was unsuccessful at contacting this patient today. Patient/family was provided the following resources and education related to COVID-19 during the initial call:                         Signs, symptoms and red flags related to COVID-19            CDC exposure and quarantine guidelines            [employer] exposure contact - [phone]            Contact for their local Department of Health                 Patient has not had any additional ED or hospital visits. No further outreach scheduled with this CTN/ACM. Episode of Care resolved. Patient has this CTN/ACM contact information if future needs arise.

## 2022-08-05 ENCOUNTER — APPOINTMENT (OUTPATIENT)
Dept: CT IMAGING | Age: 82
DRG: 178 | End: 2022-08-05
Attending: STUDENT IN AN ORGANIZED HEALTH CARE EDUCATION/TRAINING PROGRAM
Payer: MEDICARE

## 2022-08-05 ENCOUNTER — HOSPITAL ENCOUNTER (INPATIENT)
Age: 82
LOS: 5 days | Discharge: NURSING, REHAB OR LONG-TERM CARE | DRG: 178 | End: 2022-08-11
Attending: STUDENT IN AN ORGANIZED HEALTH CARE EDUCATION/TRAINING PROGRAM | Admitting: STUDENT IN AN ORGANIZED HEALTH CARE EDUCATION/TRAINING PROGRAM
Payer: MEDICARE

## 2022-08-05 ENCOUNTER — APPOINTMENT (OUTPATIENT)
Dept: GENERAL RADIOLOGY | Age: 82
DRG: 178 | End: 2022-08-05
Attending: STUDENT IN AN ORGANIZED HEALTH CARE EDUCATION/TRAINING PROGRAM
Payer: MEDICARE

## 2022-08-05 DIAGNOSIS — E87.5 ACUTE HYPERKALEMIA: ICD-10-CM

## 2022-08-05 DIAGNOSIS — R07.9 CHEST PAIN, UNSPECIFIED TYPE: Primary | ICD-10-CM

## 2022-08-05 DIAGNOSIS — U07.1 COVID-19: ICD-10-CM

## 2022-08-05 LAB
ALBUMIN SERPL-MCNC: 3.5 G/DL (ref 3.5–5.2)
ALBUMIN/GLOB SERPL: 0.9 {RATIO} (ref 1.1–2.2)
ALP SERPL-CCNC: 93 U/L (ref 35–104)
ALT SERPL-CCNC: 26 U/L (ref 10–35)
ANION GAP SERPL CALC-SCNC: 14 MMOL/L (ref 5–15)
AST SERPL-CCNC: 32 U/L (ref 10–35)
BASOPHILS # BLD: 0 K/UL (ref 0–0.1)
BASOPHILS NFR BLD: 0 % (ref 0–1)
BILIRUB SERPL-MCNC: 0.5 MG/DL (ref 0.2–1)
BNP SERPL-MCNC: 412 PG/ML
BUN SERPL-MCNC: 35 MG/DL (ref 8–23)
BUN/CREAT SERPL: 22 (ref 12–20)
CALCIUM SERPL-MCNC: 9.1 MG/DL (ref 8.8–10.2)
CHLORIDE SERPL-SCNC: 105 MMOL/L (ref 98–107)
CO2 SERPL-SCNC: 22 MMOL/L (ref 22–29)
COMMENT, HOLDF: NORMAL
COVID-19 RAPID TEST, COVR: DETECTED
CREAT SERPL-MCNC: 1.61 MG/DL (ref 0.5–0.9)
DIFFERENTIAL METHOD BLD: ABNORMAL
EOSINOPHIL # BLD: 0.1 K/UL (ref 0–0.4)
EOSINOPHIL NFR BLD: 1 % (ref 0–7)
ERYTHROCYTE [DISTWIDTH] IN BLOOD BY AUTOMATED COUNT: 13.2 % (ref 11.5–14.5)
GLOBULIN SER CALC-MCNC: 4 G/DL (ref 2–4)
GLUCOSE SERPL-MCNC: 117 MG/DL (ref 65–100)
HCT VFR BLD AUTO: 32.2 % (ref 35–47)
HGB BLD-MCNC: 10.5 G/DL (ref 11.5–16)
IMM GRANULOCYTES # BLD AUTO: 0 K/UL (ref 0–0.04)
IMM GRANULOCYTES NFR BLD AUTO: 1 % (ref 0–0.5)
LIPASE SERPL-CCNC: 59 U/L (ref 13–60)
LYMPHOCYTES # BLD: 1.4 K/UL (ref 0.8–3.5)
LYMPHOCYTES NFR BLD: 38 % (ref 12–49)
MCH RBC QN AUTO: 29.7 PG (ref 26–34)
MCHC RBC AUTO-ENTMCNC: 32.6 G/DL (ref 30–36.5)
MCV RBC AUTO: 91 FL (ref 80–99)
MONOCYTES # BLD: 0.4 K/UL (ref 0–1)
MONOCYTES NFR BLD: 10 % (ref 5–13)
NEUTS SEG # BLD: 1.9 K/UL (ref 1.8–8)
NEUTS SEG NFR BLD: 50 % (ref 32–75)
NRBC # BLD: 0 K/UL (ref 0–0.01)
NRBC BLD-RTO: 0 PER 100 WBC
PLATELET # BLD AUTO: 181 K/UL (ref 150–400)
PMV BLD AUTO: 11.1 FL (ref 8.9–12.9)
POTASSIUM SERPL-SCNC: 5.4 MMOL/L (ref 3.5–5.1)
PROT SERPL-MCNC: 7.5 G/DL (ref 6.4–8.3)
RBC # BLD AUTO: 3.54 M/UL (ref 3.8–5.2)
SAMPLES BEING HELD,HOLD: NORMAL
SODIUM SERPL-SCNC: 141 MMOL/L (ref 136–145)
SOURCE, COVRS: ABNORMAL
TROPONIN I BLD-MCNC: <0.04 NG/ML (ref 0–0.08)
TROPONIN I BLD-MCNC: <0.04 NG/ML (ref 0–0.08)
WBC # BLD AUTO: 3.8 K/UL (ref 3.6–11)

## 2022-08-05 PROCEDURE — 74176 CT ABD & PELVIS W/O CONTRAST: CPT

## 2022-08-05 PROCEDURE — 74011250636 HC RX REV CODE- 250/636: Performed by: STUDENT IN AN ORGANIZED HEALTH CARE EDUCATION/TRAINING PROGRAM

## 2022-08-05 PROCEDURE — 83880 ASSAY OF NATRIURETIC PEPTIDE: CPT

## 2022-08-05 PROCEDURE — 80053 COMPREHEN METABOLIC PANEL: CPT

## 2022-08-05 PROCEDURE — 85025 COMPLETE CBC W/AUTO DIFF WBC: CPT

## 2022-08-05 PROCEDURE — 93005 ELECTROCARDIOGRAM TRACING: CPT

## 2022-08-05 PROCEDURE — 96376 TX/PRO/DX INJ SAME DRUG ADON: CPT

## 2022-08-05 PROCEDURE — 74011250637 HC RX REV CODE- 250/637: Performed by: STUDENT IN AN ORGANIZED HEALTH CARE EDUCATION/TRAINING PROGRAM

## 2022-08-05 PROCEDURE — 87635 SARS-COV-2 COVID-19 AMP PRB: CPT

## 2022-08-05 PROCEDURE — 99285 EMERGENCY DEPT VISIT HI MDM: CPT

## 2022-08-05 PROCEDURE — 84484 ASSAY OF TROPONIN QUANT: CPT

## 2022-08-05 PROCEDURE — 83690 ASSAY OF LIPASE: CPT

## 2022-08-05 PROCEDURE — 96374 THER/PROPH/DIAG INJ IV PUSH: CPT

## 2022-08-05 PROCEDURE — 74011250636 HC RX REV CODE- 250/636: Performed by: EMERGENCY MEDICINE

## 2022-08-05 PROCEDURE — 36415 COLL VENOUS BLD VENIPUNCTURE: CPT

## 2022-08-05 PROCEDURE — 96375 TX/PRO/DX INJ NEW DRUG ADDON: CPT

## 2022-08-05 PROCEDURE — 71045 X-RAY EXAM CHEST 1 VIEW: CPT

## 2022-08-05 RX ORDER — NITROGLYCERIN 0.4 MG/1
0.4 TABLET SUBLINGUAL
Status: COMPLETED | OUTPATIENT
Start: 2022-08-05 | End: 2022-08-05

## 2022-08-05 RX ORDER — MORPHINE SULFATE 4 MG/ML
4 INJECTION INTRAVENOUS
Status: COMPLETED | OUTPATIENT
Start: 2022-08-05 | End: 2022-08-05

## 2022-08-05 RX ORDER — ACETAMINOPHEN 325 MG/1
650 TABLET ORAL ONCE
Status: COMPLETED | OUTPATIENT
Start: 2022-08-05 | End: 2022-08-05

## 2022-08-05 RX ORDER — MORPHINE SULFATE 4 MG/ML
4 INJECTION INTRAVENOUS ONCE
Status: COMPLETED | OUTPATIENT
Start: 2022-08-05 | End: 2022-08-05

## 2022-08-05 RX ORDER — ONDANSETRON 2 MG/ML
4 INJECTION INTRAMUSCULAR; INTRAVENOUS
Status: COMPLETED | OUTPATIENT
Start: 2022-08-05 | End: 2022-08-05

## 2022-08-05 RX ADMIN — ACETAMINOPHEN 650 MG: 325 TABLET ORAL at 18:57

## 2022-08-05 RX ADMIN — NITROGLYCERIN 0.4 MG: 0.4 TABLET, ORALLY DISINTEGRATING SUBLINGUAL at 18:57

## 2022-08-05 RX ADMIN — NITROGLYCERIN 0.4 MG: 0.4 TABLET, ORALLY DISINTEGRATING SUBLINGUAL at 16:36

## 2022-08-05 RX ADMIN — SODIUM CHLORIDE 500 ML: 9 INJECTION, SOLUTION INTRAVENOUS at 16:52

## 2022-08-05 RX ADMIN — MORPHINE SULFATE 4 MG: 4 INJECTION INTRAVENOUS at 21:02

## 2022-08-05 RX ADMIN — MORPHINE SULFATE 4 MG: 4 INJECTION INTRAVENOUS at 23:22

## 2022-08-05 RX ADMIN — ONDANSETRON 4 MG: 2 INJECTION INTRAMUSCULAR; INTRAVENOUS at 16:52

## 2022-08-05 NOTE — ED NOTES
This RN speaking with patient's son on telephone. Pt's son reports diarrhea x 2 weeks and reports pt has not been taking medications as prescribed. Pt's son reports patient tested positive for covid on 7/26. Son wishes for mother to be admitted to hospital for care.     Patient's son: Edvin Standard  [phone]

## 2022-08-05 NOTE — ED PROVIDER NOTES
72-year-old female past medical history of congestive heart failure, hypercholesterolemia, hypertension who presents the emergency department for evaluation of left-sided chest pain that started earlier today while eating breakfast this AM.  Patient reports that she has severe chest pressure which radiates down her left upper extremity, is associated with shortness of breath. She reports having some nausea and vomiting over the last day or so. She reports having increased fatigue as well. Chest Pain (Angina)   Associated symptoms include nausea and shortness of breath. Shortness of Breath  Associated symptoms include chest pain. Nausea   Associated symptoms include diarrhea. Diarrhea   Associated symptoms include diarrhea, nausea and chest pain. Pertinent negatives include no dysuria. Past Medical History:   Diagnosis Date    CHF (congestive heart failure) (HCC)     Hypercholesterolemia     Hypertension     Migraine        Past Surgical History:   Procedure Laterality Date    HX HERNIA REPAIR      HX TUBAL LIGATION           History reviewed. No pertinent family history.     Social History     Socioeconomic History    Marital status:      Spouse name: Not on file    Number of children: Not on file    Years of education: Not on file    Highest education level: Not on file   Occupational History    Not on file   Tobacco Use    Smoking status: Never    Smokeless tobacco: Never   Substance and Sexual Activity    Alcohol use: Not Currently    Drug use: Never    Sexual activity: Not on file   Other Topics Concern    Not on file   Social History Narrative    Not on file     Social Determinants of Health     Financial Resource Strain: Not on file   Food Insecurity: Not on file   Transportation Needs: Not on file   Physical Activity: Not on file   Stress: Not on file   Social Connections: Not on file   Intimate Partner Violence: Not on file   Housing Stability: Not on file         ALLERGIES: Antibiotic (yqxjy-ikysp-ipwxy) [neomycin-bacitracnzn-polymyxnb] and Contrast agent [iodine]    Review of Systems   Respiratory:  Positive for shortness of breath. Cardiovascular:  Positive for chest pain. Gastrointestinal:  Positive for diarrhea and nausea. Genitourinary:  Negative for dysuria. All other systems reviewed and are negative. Vitals:    08/05/22 1609   BP: (!) 136/59   Pulse: 74   Resp: 22   Temp: 98.5 °F (36.9 °C)   SpO2: 100%   Weight: 86.2 kg (190 lb)   Height: 5' 2\" (1.575 m)            Physical Exam  Vitals and nursing note reviewed. Constitutional:       General: She is in acute distress. Appearance: Normal appearance. She is normal weight. HENT:      Head: Normocephalic and atraumatic. Right Ear: External ear normal.      Left Ear: External ear normal.      Nose: Nose normal.      Mouth/Throat:      Mouth: Mucous membranes are moist.   Eyes:      Extraocular Movements: Extraocular movements intact. Cardiovascular:      Rate and Rhythm: Normal rate and regular rhythm. Pulses: Normal pulses. Heart sounds: Normal heart sounds. Pulmonary:      Effort: Pulmonary effort is normal.      Breath sounds: Normal breath sounds. Abdominal:      General: Abdomen is flat. Tenderness: There is abdominal tenderness. There is no guarding. Musculoskeletal:         General: Swelling present. Normal range of motion. Skin:     General: Skin is warm and dry. Capillary Refill: Capillary refill takes less than 2 seconds. Neurological:      General: No focal deficit present. Mental Status: She is alert and oriented to person, place, and time. Psychiatric:         Mood and Affect: Mood normal.         Behavior: Behavior normal.        MDM  Number of Diagnoses or Management Options  Acute hyperkalemia  Chest pain, unspecified type  COVID-19  Diagnosis management comments: 53-year-old female with chest pain, shortness of breath, positive cardiac history. Differential diagnosis includes acute coronary syndrome, pneumonia, heart failure, Intra-abdominal pathology. The patient was given sublingual nitroglycerin without improvement of her chest pain. Patient received full dose aspirin, laboratory work-up included CBC showing stable anemia, no leukocytosis, cardiac troponin less than 0.04. Mild hyperkalemia with a potassium of 5.4 without hyperkalemic changes on ECG. Patient was given a 500 cc of normal saline. proBNP actually improved from previous and 7/28/2022. COVID-19 testing is positive. CT abdomen pelvis without IV contrast performed given patient's elevated serum creatinine from previous labs. This shows no acute findings to correlate with abdominal pain nausea and vomiting. Chest x-ray performed shows no acute cardiopulmonary disease. Patient has a heart score 6. ECG shows normal sinus rhythm, rate of 73, does not show ST elevations. Patient will be admitted to the hospital medicine team for chest pain, COVID-19, mild hyperkalemia. Amount and/or Complexity of Data Reviewed  Decide to obtain previous medical records or to obtain history from someone other than the patient: yes      Pt handed over at shift change to Dr. Mateo Mata pending Cardiology consult        ED Course as of 08/08/22 2005   Fri Aug 05, 2022   2042 Discussed case with hospitalist who requested cardiology consult. Contacting cardiology. Pt pain had no relief with nitro. Will give morphine. Repeat ECG [WG]   Sat Aug 06, 2022   0754 Potassium of 5.4.  1 L of fluid given. No peaked T waves on EKG. No signs of symptomatic hyperkalemia. Likely improved after fluids. [AL]      ED Course User Index  [AL] Madelin De Oliveira MD  [WG] DO Roderick Villegas    Perfect Serve Consult for Admission  7:46 PM    ED Room Number: C11/C11  Patient Name and age:  Lukas Euceda [de-identified] y.o.  female  Working Diagnosis:   1. Chest pain, unspecified type    2. COVID-19    3.  Acute hyperkalemia        COVID-19 Suspicion:  no  Sepsis present:  no  Reassessment needed: no  Code Status:  Full Code  Readmission: no  Isolation Requirements:  yes  Recommended Level of Care:  telemetry  Department: [facility]  Other:  [de-identified] female with chest pain, COVID +, hyperkalemia w/o ECG changes

## 2022-08-05 NOTE — ED TRIAGE NOTES
Pt arrives via EMS-[facility] from private residence with cc of left sided CP that radiates down LUE with SOB that began today while eating breakfast along with n/v/d that began yesterday. Pt able to stand and pivot upon arrival from EMS stretcher to ER stretcher. Pt reports living with son in private residence. Pt reports generalized fatigue.

## 2022-08-05 NOTE — ED NOTES
This RN attempting to reach patient's son, emergency contact, via telephone per patient request. No answer, voicemail left.

## 2022-08-06 PROBLEM — R19.7 DIARRHEA: Status: ACTIVE | Noted: 2022-08-06

## 2022-08-06 PROBLEM — R07.9 CHEST PAIN: Status: ACTIVE | Noted: 2022-08-06

## 2022-08-06 PROBLEM — N17.9 AKI (ACUTE KIDNEY INJURY) (HCC): Status: ACTIVE | Noted: 2022-08-06

## 2022-08-06 PROBLEM — U07.1 COVID-19 VIRUS INFECTION: Status: ACTIVE | Noted: 2022-08-06

## 2022-08-06 LAB
ALBUMIN SERPL-MCNC: 3.1 G/DL (ref 3.5–5)
ALBUMIN/GLOB SERPL: 0.7 {RATIO} (ref 1.1–2.2)
ALP SERPL-CCNC: 95 U/L (ref 45–117)
ALT SERPL-CCNC: 36 U/L (ref 12–78)
ANION GAP SERPL CALC-SCNC: 5 MMOL/L (ref 5–15)
AST SERPL-CCNC: 23 U/L (ref 15–37)
B PERT DNA SPEC QL NAA+PROBE: NOT DETECTED
BILIRUB SERPL-MCNC: 0.4 MG/DL (ref 0.2–1)
BORDETELLA PARAPERTUSSIS PCR, BORPAR: NOT DETECTED
BUN SERPL-MCNC: 36 MG/DL (ref 6–20)
BUN/CREAT SERPL: 21 (ref 12–20)
C PNEUM DNA SPEC QL NAA+PROBE: NOT DETECTED
CALCIUM SERPL-MCNC: 8.7 MG/DL (ref 8.5–10.1)
CHLORIDE SERPL-SCNC: 111 MMOL/L (ref 97–108)
CO2 SERPL-SCNC: 26 MMOL/L (ref 21–32)
CREAT SERPL-MCNC: 1.69 MG/DL (ref 0.55–1.02)
CRP SERPL-MCNC: <0.29 MG/DL (ref 0–0.6)
D DIMER PPP FEU-MCNC: 1.8 MG/L FEU (ref 0–0.65)
ERYTHROCYTE [DISTWIDTH] IN BLOOD BY AUTOMATED COUNT: 13.3 % (ref 11.5–14.5)
EST. AVERAGE GLUCOSE BLD GHB EST-MCNC: 131 MG/DL
FLUAV H1 2009 PAND RNA SPEC QL NAA+PROBE: NOT DETECTED
FLUAV H1 RNA SPEC QL NAA+PROBE: NOT DETECTED
FLUAV H3 RNA SPEC QL NAA+PROBE: NOT DETECTED
FLUAV SUBTYP SPEC NAA+PROBE: NOT DETECTED
FLUBV RNA SPEC QL NAA+PROBE: NOT DETECTED
GLOBULIN SER CALC-MCNC: 4.6 G/DL (ref 2–4)
GLUCOSE BLD STRIP.AUTO-MCNC: 138 MG/DL (ref 65–117)
GLUCOSE BLD STRIP.AUTO-MCNC: 192 MG/DL (ref 65–117)
GLUCOSE BLD STRIP.AUTO-MCNC: 195 MG/DL (ref 65–117)
GLUCOSE SERPL-MCNC: 111 MG/DL (ref 65–100)
HADV DNA SPEC QL NAA+PROBE: NOT DETECTED
HBA1C MFR BLD: 6.2 % (ref 4–5.6)
HCOV 229E RNA SPEC QL NAA+PROBE: NOT DETECTED
HCOV HKU1 RNA SPEC QL NAA+PROBE: NOT DETECTED
HCOV NL63 RNA SPEC QL NAA+PROBE: NOT DETECTED
HCOV OC43 RNA SPEC QL NAA+PROBE: NOT DETECTED
HCT VFR BLD AUTO: 33.9 % (ref 35–47)
HGB BLD-MCNC: 10.8 G/DL (ref 11.5–16)
HMPV RNA SPEC QL NAA+PROBE: NOT DETECTED
HPIV1 RNA SPEC QL NAA+PROBE: NOT DETECTED
HPIV2 RNA SPEC QL NAA+PROBE: NOT DETECTED
HPIV3 RNA SPEC QL NAA+PROBE: NOT DETECTED
HPIV4 RNA SPEC QL NAA+PROBE: NOT DETECTED
M PNEUMO DNA SPEC QL NAA+PROBE: NOT DETECTED
MAGNESIUM SERPL-MCNC: 1.7 MG/DL (ref 1.6–2.4)
MCH RBC QN AUTO: 29.8 PG (ref 26–34)
MCHC RBC AUTO-ENTMCNC: 31.9 G/DL (ref 30–36.5)
MCV RBC AUTO: 93.6 FL (ref 80–99)
NRBC # BLD: 0 K/UL (ref 0–0.01)
NRBC BLD-RTO: 0 PER 100 WBC
PHOSPHATE SERPL-MCNC: 3.8 MG/DL (ref 2.6–4.7)
PLATELET # BLD AUTO: 166 K/UL (ref 150–400)
PMV BLD AUTO: 10.7 FL (ref 8.9–12.9)
POTASSIUM SERPL-SCNC: 4.2 MMOL/L (ref 3.5–5.1)
PROCALCITONIN SERPL-MCNC: <0.05 NG/ML
PROT SERPL-MCNC: 7.7 G/DL (ref 6.4–8.2)
RBC # BLD AUTO: 3.62 M/UL (ref 3.8–5.2)
RSV RNA SPEC QL NAA+PROBE: NOT DETECTED
RV+EV RNA SPEC QL NAA+PROBE: NOT DETECTED
SARS-COV-2 PCR, COVPCR: DETECTED
SERVICE CMNT-IMP: ABNORMAL
SODIUM SERPL-SCNC: 142 MMOL/L (ref 136–145)
TROPONIN-HIGH SENSITIVITY: 18 NG/L (ref 0–51)
TSH SERPL DL<=0.05 MIU/L-ACNC: 2.13 UIU/ML (ref 0.36–3.74)
WBC # BLD AUTO: 4.3 K/UL (ref 3.6–11)

## 2022-08-06 PROCEDURE — 83036 HEMOGLOBIN GLYCOSYLATED A1C: CPT

## 2022-08-06 PROCEDURE — 36415 COLL VENOUS BLD VENIPUNCTURE: CPT

## 2022-08-06 PROCEDURE — 74011250637 HC RX REV CODE- 250/637: Performed by: INTERNAL MEDICINE

## 2022-08-06 PROCEDURE — 85027 COMPLETE CBC AUTOMATED: CPT

## 2022-08-06 PROCEDURE — 74011250636 HC RX REV CODE- 250/636: Performed by: STUDENT IN AN ORGANIZED HEALTH CARE EDUCATION/TRAINING PROGRAM

## 2022-08-06 PROCEDURE — 74011636637 HC RX REV CODE- 636/637: Performed by: INTERNAL MEDICINE

## 2022-08-06 PROCEDURE — 85379 FIBRIN DEGRADATION QUANT: CPT

## 2022-08-06 PROCEDURE — 84100 ASSAY OF PHOSPHORUS: CPT

## 2022-08-06 PROCEDURE — 84484 ASSAY OF TROPONIN QUANT: CPT

## 2022-08-06 PROCEDURE — 83735 ASSAY OF MAGNESIUM: CPT

## 2022-08-06 PROCEDURE — 0202U NFCT DS 22 TRGT SARS-COV-2: CPT

## 2022-08-06 PROCEDURE — 84443 ASSAY THYROID STIM HORMONE: CPT

## 2022-08-06 PROCEDURE — 74011000250 HC RX REV CODE- 250: Performed by: INTERNAL MEDICINE

## 2022-08-06 PROCEDURE — 74011250636 HC RX REV CODE- 250/636: Performed by: INTERNAL MEDICINE

## 2022-08-06 PROCEDURE — 86140 C-REACTIVE PROTEIN: CPT

## 2022-08-06 PROCEDURE — 82962 GLUCOSE BLOOD TEST: CPT

## 2022-08-06 PROCEDURE — 80053 COMPREHEN METABOLIC PANEL: CPT

## 2022-08-06 PROCEDURE — 84145 PROCALCITONIN (PCT): CPT

## 2022-08-06 PROCEDURE — 99223 1ST HOSP IP/OBS HIGH 75: CPT | Performed by: INTERNAL MEDICINE

## 2022-08-06 PROCEDURE — 65270000046 HC RM TELEMETRY

## 2022-08-06 RX ORDER — DEXAMETHASONE 6 MG/1
6 TABLET ORAL DAILY
Status: DISCONTINUED | OUTPATIENT
Start: 2022-08-06 | End: 2022-08-11 | Stop reason: HOSPADM

## 2022-08-06 RX ORDER — SODIUM CHLORIDE 0.9 % (FLUSH) 0.9 %
5-40 SYRINGE (ML) INJECTION AS NEEDED
Status: DISCONTINUED | OUTPATIENT
Start: 2022-08-06 | End: 2022-08-11 | Stop reason: HOSPADM

## 2022-08-06 RX ORDER — ONDANSETRON 2 MG/ML
4 INJECTION INTRAMUSCULAR; INTRAVENOUS
Status: COMPLETED | OUTPATIENT
Start: 2022-08-06 | End: 2022-08-06

## 2022-08-06 RX ORDER — MAGNESIUM SULFATE 100 %
4 CRYSTALS MISCELLANEOUS AS NEEDED
Status: DISCONTINUED | OUTPATIENT
Start: 2022-08-06 | End: 2022-08-11 | Stop reason: HOSPADM

## 2022-08-06 RX ORDER — FACIAL-BODY WIPES
10 EACH TOPICAL DAILY PRN
Status: DISCONTINUED | OUTPATIENT
Start: 2022-08-06 | End: 2022-08-11 | Stop reason: HOSPADM

## 2022-08-06 RX ORDER — ASCORBIC ACID 500 MG
500 TABLET ORAL DAILY
Status: DISCONTINUED | OUTPATIENT
Start: 2022-08-06 | End: 2022-08-11 | Stop reason: HOSPADM

## 2022-08-06 RX ORDER — CARVEDILOL 12.5 MG/1
12.5 TABLET ORAL 2 TIMES DAILY WITH MEALS
Status: DISCONTINUED | OUTPATIENT
Start: 2022-08-06 | End: 2022-08-11 | Stop reason: HOSPADM

## 2022-08-06 RX ORDER — CARVEDILOL 12.5 MG/1
12.5 TABLET ORAL 2 TIMES DAILY WITH MEALS
Status: DISCONTINUED | OUTPATIENT
Start: 2022-08-06 | End: 2022-08-06 | Stop reason: SDUPTHER

## 2022-08-06 RX ORDER — SODIUM CHLORIDE 0.9 % (FLUSH) 0.9 %
5-40 SYRINGE (ML) INJECTION EVERY 8 HOURS
Status: DISCONTINUED | OUTPATIENT
Start: 2022-08-06 | End: 2022-08-11 | Stop reason: HOSPADM

## 2022-08-06 RX ORDER — GUAIFENESIN 600 MG/1
600 TABLET, EXTENDED RELEASE ORAL EVERY 12 HOURS
Status: DISCONTINUED | OUTPATIENT
Start: 2022-08-06 | End: 2022-08-11 | Stop reason: HOSPADM

## 2022-08-06 RX ORDER — PROMETHAZINE HYDROCHLORIDE 25 MG/1
12.5 TABLET ORAL
Status: DISCONTINUED | OUTPATIENT
Start: 2022-08-06 | End: 2022-08-11 | Stop reason: HOSPADM

## 2022-08-06 RX ORDER — OXYCODONE HYDROCHLORIDE 5 MG/1
5 TABLET ORAL
Status: DISCONTINUED | OUTPATIENT
Start: 2022-08-06 | End: 2022-08-11 | Stop reason: HOSPADM

## 2022-08-06 RX ORDER — ACETAMINOPHEN 325 MG/1
650 TABLET ORAL
Status: DISCONTINUED | OUTPATIENT
Start: 2022-08-06 | End: 2022-08-10

## 2022-08-06 RX ORDER — HEPARIN SODIUM 5000 [USP'U]/ML
5000 INJECTION, SOLUTION INTRAVENOUS; SUBCUTANEOUS EVERY 8 HOURS
Status: DISCONTINUED | OUTPATIENT
Start: 2022-08-06 | End: 2022-08-09

## 2022-08-06 RX ORDER — SODIUM CHLORIDE 9 MG/ML
25 INJECTION, SOLUTION INTRAVENOUS AS NEEDED
Status: DISCONTINUED | OUTPATIENT
Start: 2022-08-06 | End: 2022-08-11 | Stop reason: HOSPADM

## 2022-08-06 RX ORDER — MORPHINE SULFATE 4 MG/ML
4 INJECTION INTRAVENOUS
Status: COMPLETED | OUTPATIENT
Start: 2022-08-06 | End: 2022-08-06

## 2022-08-06 RX ORDER — SODIUM CHLORIDE 9 MG/ML
50 INJECTION, SOLUTION INTRAVENOUS CONTINUOUS
Status: DISCONTINUED | OUTPATIENT
Start: 2022-08-06 | End: 2022-08-08

## 2022-08-06 RX ORDER — ASPIRIN 81 MG/1
81 TABLET ORAL DAILY
Status: DISCONTINUED | OUTPATIENT
Start: 2022-08-07 | End: 2022-08-11 | Stop reason: HOSPADM

## 2022-08-06 RX ORDER — MORPHINE SULFATE 2 MG/ML
2 INJECTION, SOLUTION INTRAMUSCULAR; INTRAVENOUS
Status: DISCONTINUED | OUTPATIENT
Start: 2022-08-06 | End: 2022-08-11 | Stop reason: HOSPADM

## 2022-08-06 RX ORDER — ZINC SULFATE 50(220)MG
1 CAPSULE ORAL DAILY
Status: DISCONTINUED | OUTPATIENT
Start: 2022-08-06 | End: 2022-08-11 | Stop reason: HOSPADM

## 2022-08-06 RX ORDER — ONDANSETRON 2 MG/ML
4 INJECTION INTRAMUSCULAR; INTRAVENOUS
Status: DISCONTINUED | OUTPATIENT
Start: 2022-08-06 | End: 2022-08-11 | Stop reason: HOSPADM

## 2022-08-06 RX ORDER — INSULIN LISPRO 100 [IU]/ML
INJECTION, SOLUTION INTRAVENOUS; SUBCUTANEOUS
Status: DISCONTINUED | OUTPATIENT
Start: 2022-08-06 | End: 2022-08-11 | Stop reason: HOSPADM

## 2022-08-06 RX ORDER — PROCHLORPERAZINE EDISYLATE 5 MG/ML
10 INJECTION INTRAMUSCULAR; INTRAVENOUS
Status: DISCONTINUED | OUTPATIENT
Start: 2022-08-06 | End: 2022-08-11 | Stop reason: HOSPADM

## 2022-08-06 RX ORDER — BENZONATATE 100 MG/1
100 CAPSULE ORAL
Status: DISCONTINUED | OUTPATIENT
Start: 2022-08-06 | End: 2022-08-11 | Stop reason: HOSPADM

## 2022-08-06 RX ORDER — ACETAMINOPHEN 650 MG/1
650 SUPPOSITORY RECTAL
Status: DISCONTINUED | OUTPATIENT
Start: 2022-08-06 | End: 2022-08-10

## 2022-08-06 RX ORDER — IPRATROPIUM BROMIDE AND ALBUTEROL SULFATE 2.5; .5 MG/3ML; MG/3ML
3 SOLUTION RESPIRATORY (INHALATION)
Status: DISCONTINUED | OUTPATIENT
Start: 2022-08-06 | End: 2022-08-11 | Stop reason: HOSPADM

## 2022-08-06 RX ORDER — HYDRALAZINE HYDROCHLORIDE 20 MG/ML
20 INJECTION INTRAMUSCULAR; INTRAVENOUS
Status: DISCONTINUED | OUTPATIENT
Start: 2022-08-06 | End: 2022-08-11 | Stop reason: HOSPADM

## 2022-08-06 RX ADMIN — SODIUM CHLORIDE, PRESERVATIVE FREE 10 ML: 5 INJECTION INTRAVENOUS at 20:09

## 2022-08-06 RX ADMIN — INSULIN LISPRO 2 UNITS: 100 INJECTION, SOLUTION INTRAVENOUS; SUBCUTANEOUS at 17:13

## 2022-08-06 RX ADMIN — GUAIFENESIN 600 MG: 600 TABLET ORAL at 20:08

## 2022-08-06 RX ADMIN — MORPHINE SULFATE 2 MG: 2 INJECTION, SOLUTION INTRAMUSCULAR; INTRAVENOUS at 20:05

## 2022-08-06 RX ADMIN — HYDRALAZINE HYDROCHLORIDE 20 MG: 20 INJECTION INTRAMUSCULAR; INTRAVENOUS at 15:43

## 2022-08-06 RX ADMIN — DEXAMETHASONE 6 MG: 6 TABLET ORAL at 10:07

## 2022-08-06 RX ADMIN — OXYCODONE HYDROCHLORIDE AND ACETAMINOPHEN 500 MG: 500 TABLET ORAL at 10:08

## 2022-08-06 RX ADMIN — HEPARIN SODIUM 5000 UNITS: 5000 INJECTION INTRAVENOUS; SUBCUTANEOUS at 20:07

## 2022-08-06 RX ADMIN — MORPHINE SULFATE 2 MG: 2 INJECTION, SOLUTION INTRAMUSCULAR; INTRAVENOUS at 10:10

## 2022-08-06 RX ADMIN — SODIUM CHLORIDE 75 ML/HR: 9 INJECTION, SOLUTION INTRAVENOUS at 11:22

## 2022-08-06 RX ADMIN — BENZONATATE 100 MG: 100 CAPSULE ORAL at 10:08

## 2022-08-06 RX ADMIN — SODIUM CHLORIDE, PRESERVATIVE FREE 10 ML: 5 INJECTION INTRAVENOUS at 17:17

## 2022-08-06 RX ADMIN — MORPHINE SULFATE 2 MG: 2 INJECTION, SOLUTION INTRAMUSCULAR; INTRAVENOUS at 14:45

## 2022-08-06 RX ADMIN — GUAIFENESIN 600 MG: 600 TABLET ORAL at 10:08

## 2022-08-06 RX ADMIN — CARVEDILOL 12.5 MG: 12.5 TABLET, FILM COATED ORAL at 17:14

## 2022-08-06 RX ADMIN — Medication 1 CAPSULE: at 10:08

## 2022-08-06 RX ADMIN — MORPHINE SULFATE 4 MG: 4 INJECTION INTRAVENOUS at 07:39

## 2022-08-06 RX ADMIN — ONDANSETRON 4 MG: 2 INJECTION INTRAMUSCULAR; INTRAVENOUS at 07:58

## 2022-08-06 NOTE — ED NOTES
Verbal shift change report given to Raysa Hernández RN (oncoming nurse) by Ade Hicks RN (offgoing nurse). Report included the following information SBAR, ED Summary, MAR and Recent Results.

## 2022-08-06 NOTE — ED NOTES
Per nursing supervisor request, holding patient at [place] [facility] until approx 6-7am to facilitate immediate bedding on [facility] vs. ED to ED transfer. [facility] notified of change. New ETA 0800.

## 2022-08-06 NOTE — PROGRESS NOTES
Reason to see patient: CHest pain. HPI: Arnulfo Blizzard is a [de-identified] y.o. female with past medical history significant for hypertension, diabetes, CKD, TAVR, presented with symptoms of chest pain and shortness of breath. She was found to be COVID-positive. Symptoms of chest pain described as having aching pain in the left anterior chest wall radiating to the left arm present at rest as well as at activities. No associated symptoms of palpitations, lightheadedness, dizziness, presyncope syncope. She has been having symptoms of nausea vomiting and diarrhea. Troponin was 18, proBNP was 412, creatinine 1.68. EKG demonstrated sinus rhythm with first-degree AV block with normal axis with normal intervals, normal ST segment. Plan:    1. Chest pain: Symptoms appear to be atypical.  She regularly follows with cardiology and had a stress test per her account about a year ago. She also has been seen in [facility] and had a TAVR performed 5 years ago. Troponin and EKG are normal.  proBNP is normal.  Will check echocardiogram and if no significant findings then may consider getting a stress test as an outpatient once she recovers from Blanka Scarlet. Continue aspirin and consider statins. 2.  History of TAVR per patient. Check echocardiogram.    3.  History of mild mitral stenosis. 4.  Hypertension: Resume home carvedilol 12.5 mg twice daily. Holding Lasix to monitor creatinine. ATTENTION:   This medical record was transcribed using an electronic medical records/speech recognition system. Although proofread, it may and can contain electronic, spelling and other errors. Corrections may be executed at a later time. Please feel free to contact us for any clarifications as needed.       Past Medical History:   Diagnosis Date    CHF (congestive heart failure) (HCC)     Hypercholesterolemia     Hypertension     Migraine             Past Surgical History:   Procedure Laterality Date    HX HERNIA REPAIR      HX TUBAL LIGATION               Family History   Problem Relation Age of Onset    Hypertension Father            Social History     Socioeconomic History    Marital status:      Spouse name: Not on file    Number of children: Not on file    Years of education: Not on file    Highest education level: Not on file   Occupational History    Not on file   Tobacco Use    Smoking status: Never    Smokeless tobacco: Never   Substance and Sexual Activity    Alcohol use: Not Currently    Drug use: Never    Sexual activity: Not on file   Other Topics Concern    Not on file   Social History Narrative    Not on file     Social Determinants of Health     Financial Resource Strain: Not on file   Food Insecurity: Not on file   Transportation Needs: Not on file   Physical Activity: Not on file   Stress: Not on file   Social Connections: Not on file   Intimate Partner Violence: Not on file   Housing Stability: Not on file         Allergies   Allergen Reactions    Antibiotic (Wvufo-Nbuyf-Onikf) [Neomycin-Bacitracnzn-Polymyxnb] Rash and Swelling     Pt states she is allergic to ALL antibiotics     Contrast Agent [Iodine] Swelling    Pcn [Penicillins] Rash and Swelling     Airway swelling            Current Facility-Administered Medications   Medication Dose Route Frequency Provider Last Rate Last Admin    L.acidophilus-paracasei-S.thermophil-bifidobacter (RISAQUAD) 8 billion cell capsule  1 Capsule Oral DAILY Parag Cortes MD   1 Capsule at 08/06/22 1008    morphine injection 2 mg  2 mg IntraVENous Q4H PRN Josie Cortes MD   2 mg at 08/06/22 1010    oxyCODONE IR (ROXICODONE) tablet 5 mg  5 mg Oral Q4H PRN Parag Cortes MD        prochlorperazine (COMPAZINE) injection 10 mg  10 mg IntraVENous Q6H PRN Josie Cortes MD        0.9% sodium chloride infusion  75 mL/hr IntraVENous CONTINUOUS Parag Cortes MD 75 mL/hr at 08/06/22 1122 75 mL/hr at 08/06/22 1122    ascorbic acid (vitamin C) (VITAMIN C) tablet 500 mg  500 mg Oral DAILY Josie Cortes MD 500 mg at 08/06/22 1008    zinc sulfate (ZINCATE) 50 mg zinc (220 mg) capsule 1 Capsule  1 Capsule Oral DAILY Parag Cortes MD   1 Capsule at 08/06/22 1008    dexAMETHasone (DECADRON) tablet 6 mg  6 mg Oral DAILY Parag Cortes MD   6 mg at 08/06/22 1007    guaiFENesin ER (MUCINEX) tablet 600 mg  600 mg Oral Q12H Parag Cortes MD   600 mg at 08/06/22 1008    benzonatate (TESSALON) capsule 100 mg  100 mg Oral TID PRN Parag Cortes MD   100 mg at 08/06/22 1008    insulin lispro (HUMALOG) injection   SubCUTAneous AC&HS Parvin Cortes MD        glucose chewable tablet 16 g  4 Tablet Oral PRN Parag Cortes MD        glucagon (GLUCAGEN) injection 1 mg  1 mg IntraMUSCular PRN Parag Cortes MD        hydrALAZINE (APRESOLINE) 20 mg/mL injection 20 mg  20 mg IntraVENous Q6H PRN Parvin Cortes MD            ROS:  12 point review of systems was performed.  All negative except for HPI     Physical Exam:  Visit Vitals  BP (!) 157/77 (BP 1 Location: Right upper arm, BP Patient Position: Semi fowlers)   Pulse 78   Temp 97.5 °F (36.4 °C)   Resp 16   Ht 5' 2\" (1.575 m)   Wt 190 lb (86.2 kg)   SpO2 99%   BMI 34.75 kg/m²       Gen:  Well-developed, well-nourished, in no acute distress  HEENT:  Pink conjunctivae, PERRL, hearing intact to voice, moist mucous membranes  Neck:  Supple, without masses, thyroid non-tender  Resp:  No accessory muscle use, clear breath sounds without wheezes rales or rhonchi  Card:  No murmurs, normal S1, S2 without thrills, bruits or peripheral edema  Abd:  Soft, non-tender, non-distended, normoactive bowel sounds are present, no palpable organomegaly and no detectable hernias  Lymph:  No cervical or inguinal adenopathy  Musc:  No cyanosis or clubbing  Skin:  No rashes or ulcers, skin turgor is good  Neuro:  Cranial nerves are grossly intact, no focal motor weakness, follows commands appropriately  Psych:  Good insight, oriented to person, place and time, alert     Labs:     Lab Results   Component Value Date/Time    WBC 4.3 08/06/2022 09:39 AM    HGB 10.8 (L) 08/06/2022 09:39 AM    HCT 33.9 (L) 08/06/2022 09:39 AM    PLATELET 508 92/47/4604 09:39 AM    MCV 93.6 08/06/2022 09:39 AM     Lab Results   Component Value Date/Time    Hemoglobin A1c 6.2 (H) 08/06/2022 09:39 AM    Glucose 111 (H) 08/06/2022 09:39 AM    Glucose (POC) 138 (H) 08/06/2022 12:00 PM    Creatinine 1.69 (H) 08/06/2022 09:39 AM      No results found for: CHOL, CHOLPOCT, HDL, LDL, LDLC, LDLCPOC, LDLCEXT, TRIGL, TGLPOCT, CHHD, CHHDX  Lab Results   Component Value Date/Time    ALT (SGPT) 36 08/06/2022 09:39 AM    Alk.  phosphatase 95 08/06/2022 09:39 AM    Bilirubin, total 0.4 08/06/2022 09:39 AM    Albumin 3.1 (L) 08/06/2022 09:39 AM    Protein, total 7.7 08/06/2022 09:39 AM    PLATELET 406 87/25/6365 09:39 AM     No results found for: INR, INREXT, PTMR, PTP, PT1, PT2, INREXT   Lab Results   Component Value Date/Time    GFR est non-AA 29 (L) 08/06/2022 09:39 AM    GFR est AA 35 (L) 08/06/2022 09:39 AM    Creatinine 1.69 (H) 08/06/2022 09:39 AM    BUN 36 (H) 08/06/2022 09:39 AM    Sodium 142 08/06/2022 09:39 AM    Potassium 4.2 08/06/2022 09:39 AM    Chloride 111 (H) 08/06/2022 09:39 AM    CO2 26 08/06/2022 09:39 AM    Magnesium 1.7 08/06/2022 09:39 AM    Phosphorus 3.8 08/06/2022 09:39 AM     No results found for: PSA, Elihu Colla, [id number], [id number]  Lab Results   Component Value Date/Time    TSH 2.13 08/06/2022 09:39 AM      Lab Results   Component Value Date/Time    Glucose 111 (H) 08/06/2022 09:39 AM    Glucose (POC) 138 (H) 08/06/2022 12:00 PM      Lab Results   Component Value Date/Time    Troponin-I, Qt. <0.05 12/04/2020 10:45 AM      Lab Results   Component Value Date/Time    NT pro- 08/05/2022 04:21 PM    NT pro- (H) 07/28/2022 01:57 PM    NT pro-BNP 1,072 (H) 12/04/2021 02:15 AM      Lab Results   Component Value Date/Time    Sodium 142 08/06/2022 09:39 AM    Potassium 4.2 08/06/2022 09:39 AM    Chloride 111 (H) 08/06/2022 09:39 AM    CO2 26 08/06/2022 09:39 AM    Anion gap 5 08/06/2022 09:39 AM    Glucose 111 (H) 08/06/2022 09:39 AM    BUN 36 (H) 08/06/2022 09:39 AM    Creatinine 1.69 (H) 08/06/2022 09:39 AM    BUN/Creatinine ratio 21 (H) 08/06/2022 09:39 AM    GFR est AA 35 (L) 08/06/2022 09:39 AM    GFR est non-AA 29 (L) 08/06/2022 09:39 AM    Calcium 8.7 08/06/2022 09:39 AM      Lab Results   Component Value Date/Time    Sodium 142 08/06/2022 09:39 AM    Potassium 4.2 08/06/2022 09:39 AM    Chloride 111 (H) 08/06/2022 09:39 AM    CO2 26 08/06/2022 09:39 AM    Anion gap 5 08/06/2022 09:39 AM    Glucose 111 (H) 08/06/2022 09:39 AM    BUN 36 (H) 08/06/2022 09:39 AM    Creatinine 1.69 (H) 08/06/2022 09:39 AM    BUN/Creatinine ratio 21 (H) 08/06/2022 09:39 AM    GFR est AA 35 (L) 08/06/2022 09:39 AM    GFR est non-AA 29 (L) 08/06/2022 09:39 AM    Calcium 8.7 08/06/2022 09:39 AM    Bilirubin, total 0.4 08/06/2022 09:39 AM    ALT (SGPT) 36 08/06/2022 09:39 AM    Alk. phosphatase 95 08/06/2022 09:39 AM    Protein, total 7.7 08/06/2022 09:39 AM    Albumin 3.1 (L) 08/06/2022 09:39 AM    Globulin 4.6 (H) 08/06/2022 09:39 AM    A-G Ratio 0.7 (L) 08/06/2022 09:39 AM      Lab Results   Component Value Date/Time    Hemoglobin A1c 6.2 (H) 08/06/2022 09:39 AM         No results for input(s): CPK, CKMB, TROIQ in the last 72 hours. No lab exists for component: CKQMB, CPKMB        Problem List:     Problem List  Date Reviewed: 8/6/2022            Codes Class Noted    * (Principal) Chest pain ICD-10-CM: R07.9  ICD-9-CM: 786.50  8/6/2022        COVID-19 virus infection ICD-10-CM: U07.1  ICD-9-CM: 079.89  8/6/2022        VALERIA (acute kidney injury) ([facility] ICD-10-CM: N17.9  ICD-9-CM: 584.9  8/6/2022        Diarrhea ICD-10-CM: R19.7  ICD-9-CM: 787.91  8/6/2022        CHF (congestive heart failure) ([facility] ICD-10-CM: I50.9  ICD-9-CM: 428.0  12/4/2021             Juan Ladd MD, [facility] [place]

## 2022-08-06 NOTE — ED NOTES
TRANSFER - OUT REPORT:    Verbal report given to Linda Santiago RN (name) on Lukas Euceda  being transferred to [phone] (unit) for routine progression of care       Report consisted of patients Situation, Background, Assessment and   Recommendations(SBAR). Information from the following report(s) SBAR, Kardex and ED Summary was reviewed with the receiving nurse. Lines:   Peripheral IV 08/05/22 Right; Anterior Antecubital (Active)   Site Assessment Clean, dry, & intact 08/05/22 1651   Phlebitis Assessment 0 08/05/22 1651   Infiltration Assessment 0 08/05/22 1651   Dressing Status Clean, dry, & intact 08/05/22 1651   Dressing Type Tape;Transparent 08/05/22 1651   Hub Color/Line Status Pink 08/05/22 1651        Opportunity for questions and clarification was provided.       Patient transported with:   Monitor   DAXA

## 2022-08-06 NOTE — ED NOTES
Verbal shift change report given to [facility]MANNY LEY  (oncoming nurse) by Jamarcus Taylor RN (offgoing nurse). Report included the following information SBAR, Kardex and ED Summary.

## 2022-08-06 NOTE — H&P
[facility] [facility]  [address] [facility], [address] [place] [address]  [phone]    Admission History and Physical      NAME:  Alexa Flowers   :   1940   MRN:  [id number]     PCP:  Khadijah Mcintosh MD     Date/Time of service:  2022  9:38 AM        Subjective:     CHIEF COMPLAINT: chest pain, N/V, diarrhea     HISTORY OF PRESENT ILLNESS:     The patient is a [de-identified] yo hx of HTN, DM, CHF, CKD, presented w/ chest pain, N/V/D, COVID infection, VALERIA. The patient c/o fatigue, weakness, and dyspnea for 1 week. She and her family tested positive for COVID. Over the past few days, she c/o a left sided chest pain, associated with nausea, vomiting, diarrhea. Denied cough, fevers, chills. In the ED, EKG and Trop were negative for ACS. CXR neg for pneumonia. WBC 3.8. Allergies   Allergen Reactions    Antibiotic (Swgvw-Kmapd-Zdoms) [Neomycin-Bacitracnzn-Polymyxnb] Rash and Swelling     Pt states she is allergic to ALL antibiotics     Contrast Agent [Iodine] Swelling    Pcn [Penicillins] Rash and Swelling     Airway swelling       Prior to Admission medications    Medication Sig Start Date End Date Taking? Authorizing Provider   albuterol (PROVENTIL HFA, VENTOLIN HFA, PROAIR HFA) 90 mcg/actuation inhaler Take 2 Puffs by inhalation every four (4) hours as needed for Wheezing. 22   Deonte Esparza MD   dextromethorphan-guaiFENesin (Robitussin Cough-Chest Mark DM) 5-100 mg/5 mL liqd Take 5 mL by mouth every six (6) hours. 22   Angela Cedeno MD   doxycycline (VIBRA-TABS) 100 mg tablet Take 1 Tablet by mouth two (2) times a day for 10 days. 22  Angela Cedeno MD   aspirin delayed-release 81 mg tablet Take 1 Tablet by mouth daily. 21   Rajiv Singh MD   butalbital-acetaminophen-caffeine (FIORICET, ESGIC) -40 mg per tablet Take 1 Tablet by mouth every six (6) hours as needed for Headache.  21   Rajiv Singh MD   carvediloL (COREG) 12.5 mg tablet Take 1 Tablet by mouth two (2) times daily (with meals). 12/8/21   Nahun Buenrostro MD   furosemide (LASIX) 40 mg tablet Take 1 Tablet by mouth daily. 12/8/21   Nahun Buenrostro MD   influenza vaccine 2021-22, 6 mos+,,PF, (FLUARIX/FLULAVAL/FLUZONE QUAD) syrg injection 0.5 mL by IntraMUSCular route PRIOR TO DISCHARGE for 1 dose. 12/8/21   Nahun Buenrostro MD   sacubitril-valsartan (ENTRESTO) 24 mg/26 mg tablet Take 1 Tablet by mouth every twelve (12) hours.  12/8/21   Nahun Buenrostro MD       Past Medical History:   Diagnosis Date    CHF (congestive heart failure) (HCC)     Hypercholesterolemia     Hypertension     Migraine         Past Surgical History:   Procedure Laterality Date    HX HERNIA REPAIR      HX TUBAL LIGATION         Social History     Tobacco Use    Smoking status: Never    Smokeless tobacco: Never   Substance Use Topics    Alcohol use: Not Currently        Family History   Problem Relation Age of Onset    Hypertension Father         Review of Systems:  (bold if positive, if negative)    Gen:  Eyes:  ENT:  CVS:   chest pain,Pulm:  GI:   nausea, emesis, diarrheaGU:  MS:  Skin:  Psych:  Endo:  Hem:  Renal:  Neuro:          Objective:      VITALS:    Vital signs reviewed; most recent are:    Visit Vitals  BP (!) 170/82 (BP 1 Location: Left upper arm, BP Patient Position: Semi fowlers)   Pulse 75   Temp 97.6 °F (36.4 °C)   Resp 18   Ht 5' 2\" (1.575 m)   Wt 86.2 kg (190 lb)   SpO2 100%   BMI 34.75 kg/m²     SpO2 Readings from Last 6 Encounters:   08/06/22 100%   07/28/22 94%   12/08/21 95%   12/04/20 96%    O2 Flow Rate (L/min): 2 l/min (b/c of morphine)     Intake/Output Summary (Last 24 hours) at 8/6/2022 4116  Last data filed at 8/5/2022 1752  Gross per 24 hour   Intake 500 ml   Output --   Net 500 ml        Exam:     Physical Exam:    Gen:  disheveled, frail, ill-appearing, NAD  HEENT:  Pink conjunctivae, PERRL, hearing intact to voice, dry mucous membranes  Neck:  Supple, without masses, thyroid non-tender  Resp:  No accessory muscle use, scattered rales and rhonchi   Card:  No murmurs, normal S1, S2 without thrills, trace edema  Abd:  Soft, non-tender, non-distended, normoactive bowel sounds are present  Lymph:  No cervical adenopathy  Musc:  No cyanosis or clubbing  Skin:  No rashes  Neuro:  Cranial nerves 3-12 are grossly intact, follows commands appropriately  Psych:  Alert with good insight. Oriented to person, place, and time    Labs:    Recent Labs     08/05/22  1621   WBC 3.8   HGB 10.5*   HCT 32.2*        Recent Labs     08/05/22  1621      K 5.4*      CO2 22   *   BUN 35*   CREA 1.61*   CA 9.1   ALB 3.5   TBILI 0.5   ALT 26     Lab Results   Component Value Date/Time    Glucose (POC) 177 (H) 12/08/2021 11:46 AM    Glucose (POC) 132 (H) 12/08/2021 08:23 AM     No results for input(s): PH, PCO2, PO2, HCO3, FIO2 in the last 72 hours. No results for input(s): INR, INREXT in the last 72 hours. Radiology and EKG reviewed:   CXR reviewed    **[facility]**       Assessment/Plan:       Principal Problem:    [de-identified] yo hx of HTN, DM, CHF, CKD, presented w/ chest pain, N/V/D, COVID infection, VALERIA    1) COVID-19 infection: unvaccinated. No pneumonia on CXR. Will check inflammatory labs. Start oral dexamethasone, Vit C/Zinc, incentive spirometry, supportive care    2) Chest pain: vague, likely related to COVID rather than ACS. Will check cardiac enzymes, echo. Given cardiac history, will consult Cards    3) VALERIA/CKD 3: due to poor PO intake, dehydration. Will hold entresto, lasix. Start low dose IVF, monitor BMP    4) N/V/D: due to COVID. Cont pro-biotics, IV antiemetics    5) HTN/CHF: last echo with EF 75%. Holding entresto and lasix for now. Monitor volume status    6) DM type 2: check A1C. Hold oral meds.   Start SSI    Risk of deterioration: high      Total time spent with patient care: 79 Minutes **I personally saw and examined the patient during this time period**                 Care Plan discussed with: Patient, nursing     Discussed:  Care Plan    Prophylaxis:  Hep SQ    Probable Disposition:  Home w/Family           ___________________________________________________    Attending Physician: Waddell Spatz, MD

## 2022-08-06 NOTE — ED PROVIDER NOTES
Patient is being admitted to the hospital.  The results of their tests and reasons for their admission have been discussed with them and/or available family. They convey agreement and understanding for the need to be admitted and for their admission diagnosis.

## 2022-08-07 ENCOUNTER — APPOINTMENT (OUTPATIENT)
Dept: NON INVASIVE DIAGNOSTICS | Age: 82
DRG: 178 | End: 2022-08-07
Attending: INTERNAL MEDICINE
Payer: MEDICARE

## 2022-08-07 LAB
ALBUMIN SERPL-MCNC: 2.8 G/DL (ref 3.5–5)
ALBUMIN/GLOB SERPL: 0.6 {RATIO} (ref 1.1–2.2)
ALP SERPL-CCNC: 85 U/L (ref 45–117)
ALT SERPL-CCNC: 31 U/L (ref 12–78)
ANION GAP SERPL CALC-SCNC: 7 MMOL/L (ref 5–15)
AST SERPL-CCNC: 16 U/L (ref 15–37)
ATRIAL RATE: 73 BPM
BILIRUB DIRECT SERPL-MCNC: 0.1 MG/DL (ref 0–0.2)
BILIRUB SERPL-MCNC: 0.4 MG/DL (ref 0.2–1)
BUN SERPL-MCNC: 33 MG/DL (ref 6–20)
BUN/CREAT SERPL: 23 (ref 12–20)
CALCIUM SERPL-MCNC: 8.5 MG/DL (ref 8.5–10.1)
CALCULATED R AXIS, ECG10: -173 DEGREES
CALCULATED T AXIS, ECG11: 102 DEGREES
CHLORIDE SERPL-SCNC: 113 MMOL/L (ref 97–108)
CO2 SERPL-SCNC: 22 MMOL/L (ref 21–32)
CREAT SERPL-MCNC: 1.43 MG/DL (ref 0.55–1.02)
CRP SERPL-MCNC: <0.29 MG/DL (ref 0–0.6)
D DIMER PPP FEU-MCNC: 1.94 MG/L FEU (ref 0–0.65)
DIAGNOSIS, 93000: NORMAL
ECHO AO ROOT DIAM: 2.1 CM
ECHO AO ROOT INDEX: 1.11 CM/M2
ECHO AV AREA PEAK VELOCITY: 0.6 CM2
ECHO AV AREA VTI: 0.7 CM2
ECHO AV AREA/BSA PEAK VELOCITY: 0.3 CM2/M2
ECHO AV AREA/BSA VTI: 0.4 CM2/M2
ECHO AV MEAN GRADIENT: 17 MMHG
ECHO AV MEAN VELOCITY: 1.9 M/S
ECHO AV PEAK GRADIENT: 29 MMHG
ECHO AV PEAK VELOCITY: 2.7 M/S
ECHO AV VELOCITY RATIO: 0.37
ECHO AV VTI: 60.7 CM
ECHO EST RA PRESSURE: 8 MMHG
ECHO LA DIAMETER INDEX: 2.16 CM/M2
ECHO LA DIAMETER: 4.1 CM
ECHO LA TO AORTIC ROOT RATIO: 1.95
ECHO LA VOL 4C: 61 ML (ref 22–52)
ECHO LA VOLUME INDEX A4C: 32 ML/M2 (ref 16–34)
ECHO LV E' LATERAL VELOCITY: 5 CM/S
ECHO LV E' SEPTAL VELOCITY: 5 CM/S
ECHO LV EDV A4C: 51 ML
ECHO LV EDV INDEX A4C: 27 ML/M2
ECHO LV EJECTION FRACTION A4C: 66 %
ECHO LV ESV A4C: 17 ML
ECHO LV ESV INDEX A4C: 9 ML/M2
ECHO LV FRACTIONAL SHORTENING: 29 % (ref 28–44)
ECHO LV INTERNAL DIMENSION DIASTOLE INDEX: 2.53 CM/M2
ECHO LV INTERNAL DIMENSION DIASTOLIC: 4.8 CM (ref 3.9–5.3)
ECHO LV INTERNAL DIMENSION SYSTOLIC INDEX: 1.79 CM/M2
ECHO LV INTERNAL DIMENSION SYSTOLIC: 3.4 CM
ECHO LV IVSD: 0.6 CM (ref 0.6–0.9)
ECHO LV MASS 2D: 116.6 G (ref 67–162)
ECHO LV MASS INDEX 2D: 61.4 G/M2 (ref 43–95)
ECHO LV POSTERIOR WALL DIASTOLIC: 0.9 CM (ref 0.6–0.9)
ECHO LV RELATIVE WALL THICKNESS RATIO: 0.38
ECHO LVOT AREA: 1.5 CM2
ECHO LVOT AV VTI INDEX: 0.46
ECHO LVOT DIAM: 1.4 CM
ECHO LVOT MEAN GRADIENT: 2 MMHG
ECHO LVOT PEAK GRADIENT: 4 MMHG
ECHO LVOT PEAK VELOCITY: 1 M/S
ECHO LVOT STROKE VOLUME INDEX: 22.8 ML/M2
ECHO LVOT SV: 43.2 ML
ECHO LVOT VTI: 28.1 CM
ECHO MV A VELOCITY: 1.13 M/S
ECHO MV AREA PHT: 2.5 CM2
ECHO MV AREA VTI: 1 CM2
ECHO MV E DECELERATION TIME (DT): 244.9 MS
ECHO MV E VELOCITY: 1.3 M/S
ECHO MV E/A RATIO: 1.15
ECHO MV E/E' LATERAL: 26
ECHO MV E/E' RATIO (AVERAGED): 26
ECHO MV E/E' SEPTAL: 26
ECHO MV LVOT VTI INDEX: 1.56
ECHO MV MAX VELOCITY: 1.4 M/S
ECHO MV MEAN GRADIENT: 4 MMHG
ECHO MV MEAN VELOCITY: 0.9 M/S
ECHO MV PEAK GRADIENT: 8 MMHG
ECHO MV PRESSURE HALF TIME (PHT): 89 MS
ECHO MV REGURGITANT PEAK GRADIENT: 139 MMHG
ECHO MV REGURGITANT PEAK VELOCITY: 5.9 M/S
ECHO MV VTI: 43.8 CM
ECHO PV MAX VELOCITY: 1.2 M/S
ECHO PV PEAK GRADIENT: 6 MMHG
ECHO RIGHT VENTRICULAR SYSTOLIC PRESSURE (RVSP): 42 MMHG
ECHO RV FREE WALL PEAK S': 16 CM/S
ECHO RV TAPSE: 1.3 CM (ref 1.7–?)
ECHO TV REGURGITANT MAX VELOCITY: 2.92 M/S
ECHO TV REGURGITANT PEAK GRADIENT: 34 MMHG
ERYTHROCYTE [DISTWIDTH] IN BLOOD BY AUTOMATED COUNT: 13.3 % (ref 11.5–14.5)
GLOBULIN SER CALC-MCNC: 4.4 G/DL (ref 2–4)
GLUCOSE BLD STRIP.AUTO-MCNC: 144 MG/DL (ref 65–117)
GLUCOSE BLD STRIP.AUTO-MCNC: 154 MG/DL (ref 65–117)
GLUCOSE BLD STRIP.AUTO-MCNC: 174 MG/DL (ref 65–117)
GLUCOSE BLD STRIP.AUTO-MCNC: 227 MG/DL (ref 65–117)
GLUCOSE SERPL-MCNC: 178 MG/DL (ref 65–100)
HCT VFR BLD AUTO: 32.3 % (ref 35–47)
HGB BLD-MCNC: 10.2 G/DL (ref 11.5–16)
MAGNESIUM SERPL-MCNC: 1.7 MG/DL (ref 1.6–2.4)
MCH RBC QN AUTO: 29.1 PG (ref 26–34)
MCHC RBC AUTO-ENTMCNC: 31.6 G/DL (ref 30–36.5)
MCV RBC AUTO: 92 FL (ref 80–99)
NRBC # BLD: 0 K/UL (ref 0–0.01)
NRBC BLD-RTO: 0 PER 100 WBC
P-R INTERVAL, ECG05: 204 MS
PHOSPHATE SERPL-MCNC: 3.2 MG/DL (ref 2.6–4.7)
PLATELET # BLD AUTO: 177 K/UL (ref 150–400)
PMV BLD AUTO: 11.3 FL (ref 8.9–12.9)
POTASSIUM SERPL-SCNC: 5.1 MMOL/L (ref 3.5–5.1)
PROT SERPL-MCNC: 7.2 G/DL (ref 6.4–8.2)
Q-T INTERVAL, ECG07: 416 MS
QRS DURATION, ECG06: 80 MS
QTC CALCULATION (BEZET), ECG08: 458 MS
RBC # BLD AUTO: 3.51 M/UL (ref 3.8–5.2)
SERVICE CMNT-IMP: ABNORMAL
SODIUM SERPL-SCNC: 142 MMOL/L (ref 136–145)
VENTRICULAR RATE, ECG03: 73 BPM
WBC # BLD AUTO: 4.1 K/UL (ref 3.6–11)

## 2022-08-07 PROCEDURE — 84100 ASSAY OF PHOSPHORUS: CPT

## 2022-08-07 PROCEDURE — 80048 BASIC METABOLIC PNL TOTAL CA: CPT

## 2022-08-07 PROCEDURE — 97530 THERAPEUTIC ACTIVITIES: CPT

## 2022-08-07 PROCEDURE — 83735 ASSAY OF MAGNESIUM: CPT

## 2022-08-07 PROCEDURE — 97161 PT EVAL LOW COMPLEX 20 MIN: CPT

## 2022-08-07 PROCEDURE — 65270000046 HC RM TELEMETRY

## 2022-08-07 PROCEDURE — 74011250636 HC RX REV CODE- 250/636: Performed by: INTERNAL MEDICINE

## 2022-08-07 PROCEDURE — 93306 TTE W/DOPPLER COMPLETE: CPT | Performed by: INTERNAL MEDICINE

## 2022-08-07 PROCEDURE — 86140 C-REACTIVE PROTEIN: CPT

## 2022-08-07 PROCEDURE — 74011250637 HC RX REV CODE- 250/637: Performed by: INTERNAL MEDICINE

## 2022-08-07 PROCEDURE — 80076 HEPATIC FUNCTION PANEL: CPT

## 2022-08-07 PROCEDURE — 74011000250 HC RX REV CODE- 250: Performed by: INTERNAL MEDICINE

## 2022-08-07 PROCEDURE — 36415 COLL VENOUS BLD VENIPUNCTURE: CPT

## 2022-08-07 PROCEDURE — 85379 FIBRIN DEGRADATION QUANT: CPT

## 2022-08-07 PROCEDURE — 82962 GLUCOSE BLOOD TEST: CPT

## 2022-08-07 PROCEDURE — 97535 SELF CARE MNGMENT TRAINING: CPT

## 2022-08-07 PROCEDURE — 93306 TTE W/DOPPLER COMPLETE: CPT

## 2022-08-07 PROCEDURE — 74011636637 HC RX REV CODE- 636/637: Performed by: INTERNAL MEDICINE

## 2022-08-07 PROCEDURE — 97165 OT EVAL LOW COMPLEX 30 MIN: CPT

## 2022-08-07 PROCEDURE — 77010033678 HC OXYGEN DAILY

## 2022-08-07 PROCEDURE — 85027 COMPLETE CBC AUTOMATED: CPT

## 2022-08-07 RX ADMIN — OXYCODONE 5 MG: 5 TABLET ORAL at 06:08

## 2022-08-07 RX ADMIN — SODIUM CHLORIDE, PRESERVATIVE FREE 10 ML: 5 INJECTION INTRAVENOUS at 13:43

## 2022-08-07 RX ADMIN — OXYCODONE HYDROCHLORIDE AND ACETAMINOPHEN 500 MG: 500 TABLET ORAL at 09:21

## 2022-08-07 RX ADMIN — INSULIN LISPRO 3 UNITS: 100 INJECTION, SOLUTION INTRAVENOUS; SUBCUTANEOUS at 15:50

## 2022-08-07 RX ADMIN — HEPARIN SODIUM 5000 UNITS: 5000 INJECTION INTRAVENOUS; SUBCUTANEOUS at 13:43

## 2022-08-07 RX ADMIN — DEXAMETHASONE 6 MG: 6 TABLET ORAL at 09:19

## 2022-08-07 RX ADMIN — GUAIFENESIN 600 MG: 600 TABLET ORAL at 23:46

## 2022-08-07 RX ADMIN — ASPIRIN 81 MG: 81 TABLET, COATED ORAL at 09:21

## 2022-08-07 RX ADMIN — MORPHINE SULFATE 2 MG: 2 INJECTION, SOLUTION INTRAMUSCULAR; INTRAVENOUS at 15:03

## 2022-08-07 RX ADMIN — HEPARIN SODIUM 5000 UNITS: 5000 INJECTION INTRAVENOUS; SUBCUTANEOUS at 06:08

## 2022-08-07 RX ADMIN — HEPARIN SODIUM 5000 UNITS: 5000 INJECTION INTRAVENOUS; SUBCUTANEOUS at 23:46

## 2022-08-07 RX ADMIN — MORPHINE SULFATE 2 MG: 2 INJECTION, SOLUTION INTRAMUSCULAR; INTRAVENOUS at 19:10

## 2022-08-07 RX ADMIN — CARVEDILOL 12.5 MG: 12.5 TABLET, FILM COATED ORAL at 16:12

## 2022-08-07 RX ADMIN — INSULIN LISPRO 2 UNITS: 100 INJECTION, SOLUTION INTRAVENOUS; SUBCUTANEOUS at 07:35

## 2022-08-07 RX ADMIN — CARVEDILOL 12.5 MG: 12.5 TABLET, FILM COATED ORAL at 07:35

## 2022-08-07 RX ADMIN — MORPHINE SULFATE 2 MG: 2 INJECTION, SOLUTION INTRAMUSCULAR; INTRAVENOUS at 03:53

## 2022-08-07 RX ADMIN — HYDRALAZINE HYDROCHLORIDE 20 MG: 20 INJECTION INTRAMUSCULAR; INTRAVENOUS at 15:50

## 2022-08-07 RX ADMIN — Medication 1 CAPSULE: at 09:20

## 2022-08-07 RX ADMIN — GUAIFENESIN 600 MG: 600 TABLET ORAL at 09:21

## 2022-08-07 RX ADMIN — OXYCODONE 5 MG: 5 TABLET ORAL at 16:12

## 2022-08-07 NOTE — PROGRESS NOTES
Problem: Self Care Deficits Care Plan (Adult)  Goal: *Acute Goals and Plan of Care (Insert Text)  Description: FUNCTIONAL STATUS PRIOR TO ADMISSION: Patient was modified independent with mobility using a rollator. She reports able to perform ADLs, but utilizes assist of caregiver for tasks at baseline. HOME SUPPORT: The patient lived with son, and has paid caregiver 5 days per week, 6hrs per day. Occupational Therapy Goals  Initiated 8/7/2022  1. Patient will perform lower body dressing with minimal assistance/contact guard assist within 7 day(s). 2.  Patient will perform grooming with supervision/set-up within 7 day(s). 4.  Patient will perform toilet transfers with supervision/set-up within 7 day(s). 5.  Patient will perform all aspects of toileting with supervision/set-up within 7 day(s). 6.  Patient will participate in upper extremity therapeutic exercise/activities with supervision/set-up for 10 minutes within 7 day(s). Outcome: Progressing Towards Goal   OCCUPATIONAL THERAPY EVALUATION  Patient: Sharolyn Felty (89 y.o. female)  Date: 8/7/2022  Primary Diagnosis: Chest pain [R07.9]       Precautions:   Fall (droplet +), legally blind    ASSESSMENT  Based on the objective data described below, the patient presents with hospital admission secondary to chest pain, SOB and positive for COVID 19. Patient received in bed, agreeable to activity. She reports feeling poorly at home x 2 weeks and returned to hospital due to SOB. She reports baseline very poor vision at baseline. Patient reports inability to self feed, perform bed mobility at this time. However when asked to transfer, patient requires mod I for bed mobility, and CGA for standing. Verbal cues to slow mobility. Patient reports SOB when seatedEOB and BP continues to increase limiting additional activity. Patient BP reading 212/97. Patient RN notified and patient returned to bed. HR and O2 sats stable on 4L.  Patient left in NAD in bed at end of session. Current Level of Function Impacting Discharge (ADLs/self-care): Mod A for ADLs     Functional Outcome Measure: The patient scored 50/100 on the Barthel Index, outcome measure . Other factors to consider for discharge: lives with son, has caregiver     Patient will benefit from skilled therapy intervention to address the above noted impairments. PLAN :  Recommendations and Planned Interventions: self care training, functional mobility training, therapeutic exercise, balance training, therapeutic activities, endurance activities, patient education, home safety training, and family training/education    Frequency/Duration: Patient will be followed by occupational therapy 5 times a week to address goals. Recommendation for discharge: (in order for the patient to meet his/her long term goals)  Occupational therapy at least 2 days/week in the home AND ensure assist and/or supervision for safety with ADLs and transfers     This discharge recommendation:  Has not yet been discussed the attending provider and/or case management    IF patient discharges home will need the following DME: none       SUBJECTIVE:   Patient stated I can't do anything.     OBJECTIVE DATA SUMMARY:   HISTORY:   Past Medical History:   Diagnosis Date    CHF (congestive heart failure) ([facility] [address]     Hypercholesterolemia     Hypertension     Migraine      Past Surgical History:   Procedure Laterality Date    HX HERNIA REPAIR      HX TUBAL LIGATION         Expanded or extensive additional review of patient history:     Home Situation  Home Environment: Private residence  # Steps to Enter: 8  One/Two Story Residence: One story  Living Alone: No  Support Systems: Child(jitendra), Caregiver/Home Care Staff (caregiver 5 days a week, 6hrs a day)  Patient Expects to be Discharged to[de-identified] Home  Current DME Used/Available at Home: Shower chair, Walker, rollator, Walker, rolling, Commode, bedside  Tub or Shower Type: Shower    Hand dominance: Right    EXAMINATION OF PERFORMANCE DEFICITS:  Cognitive/Behavioral Status:  Neurologic State: Alert  Orientation Level: Oriented X4  Cognition: Appropriate decision making; Follows commands             Skin: intact as seen    Edema: none noted     Hearing: Auditory  Auditory Impairment: None    Vision/Perceptual:                                     Range of Motion:  AROM: Generally decreased, functional                         Strength:  Strength: Within functional limits                Coordination:  Coordination: Within functional limits            Tone & Sensation:  Tone: Normal                         Balance:  Sitting: Intact; Without support  Standing: Impaired; Without support  Standing - Static: Good  Standing - Dynamic : Fair    Functional Mobility and Transfers for ADLs:  Bed Mobility:  Supine to Sit: Modified independent  Sit to Supine: Modified independent  Scooting: Modified independent    Transfers:  Sit to Stand: Contact guard assistance  Stand to Sit: Contact guard assistance  Toilet Transfer : Contact guard assistance (BSC)    ADL Assessment:  Feeding: Setup (limited vision)    Oral Facial Hygiene/Grooming: Setup    Bathing: Minimum assistance; Additional time         Upper Body Dressing: Contact guard assistance    Lower Body Dressing: Moderate assistance    Toileting: Minimum assistance                  ADL Intervention and task modifications:                                                 Therapeutic Exercise:     Functional Measure:    Barthel Index:  Bathin  Bladder: 5  Bowels: 10  Groomin  Dressin  Feeding: 10  Mobility: 0  Stairs: 0  Toilet Use: 5  Transfer (Bed to Chair and Back): 10  Total: 50/100      The Barthel ADL Index: Guidelines  1. The index should be used as a record of what a patient does, not as a record of what a patient could do.   2. The main aim is to establish degree of independence from any help, physical or verbal, however minor and for whatever reason. 3. The need for supervision renders the patient not independent. 4. A patient's performance should be established using the best available evidence. Asking the patient, friends/relatives and nurses are the usual sources, but direct observation and common sense are also important. However direct testing is not needed. 5. Usually the patient's performance over the preceding 24-48 hours is important, but occasionally longer periods will be relevant. 6. Middle categories imply that the patient supplies over 50 per cent of the effort. 7. Use of aids to be independent is allowed. Score Interpretation (from [address])    Independent   60-79 Minimally independent   40-59 Partially dependent   20-39 Very dependent   <20 Totally dependent     -Sonya Barahona., Barthel, DAMBER. (1965). Functional evaluation: the Barthel Index. [facility] ([address] (1997). The Barthel activities of daily living index: self-reporting versus actual performance in the old (> or = 75 years). Journal [address] 45(7), 14 [facility], EDMAR, Susi Ch, Lidia Sandhu. (1999). Measuring the change in disability after inpatient rehabilitation; comparison of the responsiveness of the Barthel Index and Functional [place] Measure. Journal of Neurology, Neurosurgery, and Psychiatry, 66(4), 065-093. Micheal Mckinney, N.J.A, COSME Westbrook, & Tonya Nix MJOSÉ (2004) Assessment of post-stroke quality of life in cost-effectiveness studies: The usefulness of the Barthel Index and the EuroQoL-5D.  Quality of Life Research, 15, 154-21         Occupational Therapy Evaluation Charge Determination   History Examination Decision-Making   LOW Complexity : Brief history review  LOW Complexity : 1-3 performance deficits relating to physical, cognitive , or psychosocial skils that result in activity limitations and / or participation restrictions  LOW Complexity : No comorbidities that affect functional and no verbal or physical assistance needed to complete eval tasks       Based on the above components, the patient evaluation is determined to be of the following complexity level: LOW   Pain Rating:  None reported     Activity Tolerance:   Fair, requires rest breaks, and observed SOB with activity    After treatment patient left in no apparent distress:    Supine in bed and Call bell within reach    COMMUNICATION/EDUCATION:   The patients plan of care was discussed with: Physical therapist and Registered nurse. Home safety education was provided and the patient/caregiver indicated understanding., Patient/family have participated as able in goal setting and plan of care. , and Patient/family agree to work toward stated goals and plan of care. This patients plan of care is appropriate for delegation to [facility].     Thank you for this referral.  Maximiliano Stacy OTR/L  Time Calculation: 27 mins

## 2022-08-07 NOTE — ACP (ADVANCE CARE PLANNING)
Advance Care Planning   Advance Care Planning Inpatient Note  [facility]    Today's Date: 8/7/2022  Unit: SFM 3 PROG CARE TELE 1    Received request from  In Basket . Upon review of chart and communication with care team, patient's decision making abilities are not in question. Patient was/were present in the room during visit. Goals of ACP Conversation:  Facilitate a discussion related to patient's goals of care as they align with the patient's values and beliefs    Health Care Decision Makers:    No healthcare decision makers have been documented. Click here to complete [address] including selection of the Healthcare Decision Maker Relationship (ie \"Primary\")  Summary:  No Decision Maker named by patient at this time    Advance Care Planning Documents (Patient Wishes) on file:  None obtained by [facility]       Assessment:    In [facility] [address] request for Advance Medical Directive (AMD) with Ms. Nader Chisholm on [facility] unit. Pt is under contact restriction.  had attending nurse bring her the AMD form.  came back with attending nurse and was able to communicate through the door - nurse told her that [address] [place] [address] Road will be calling the room telephone in a couple minutes. She responded okay. She has the AMD form by her.  called the room again (called twice earlier), she did not answer. Advised nurse to contact [facility] for any further referrals. Interventions:  Was not able to speak with Ms. Nader Chisholm.      Care Preferences Communicated:  No    Outcomes/Plan:  ACP Discussion Postponed    Chaplain Joshua on 8/7/2022 at 5:04 PM

## 2022-08-07 NOTE — PROGRESS NOTES
0700  Bedside and Verbal shift change report given to Gerald Porras RN (oncoming nurse) by Lucy Sanchez RN (offgoing nurse). Report included the following information SBAR, Kardex, Procedure Summary, Intake/Output, MAR, Recent Results, and Med Rec Status. 1900  Bedside and Verbal shift change report given to Lucy Sanchez RN (oncoming nurse) by Gerald Porras RN (offgoing nurse). Report included the following information SBAR, Kardex, Procedure Summary, Intake/Output, MAR, Recent Results, and Med Rec Status.

## 2022-08-07 NOTE — PROGRESS NOTES
[facility] Williamson [facility]  [address] [facility], [address] [place] [address]  [phone]      Medical Progress Note      NAME: Suzanne Carlson   :  1940  MRM:  [id number]    Date/Time of service: 2022  9:15 AM       Subjective:     Chief Complaint:  Patient was personally seen and examined by me during this time period. Chart reviewed. No further N/V/D       Objective:       Vitals:       Last 24hrs VS reviewed since prior progress note. Most recent are:    Visit Vitals  BP (!) 162/80 (BP 1 Location: Left upper arm, BP Patient Position: At rest)   Pulse 70   Temp 97.8 °F (36.6 °C)   Resp 18   Ht 5' 2\" (1.575 m)   Wt 89.5 kg (197 lb 6.4 oz)   SpO2 98%   BMI 36.10 kg/m²     SpO2 Readings from Last 6 Encounters:   22 98%   22 94%   21 95%   20 96%    O2 Flow Rate (L/min): 2 l/min     Intake/Output Summary (Last 24 hours) at 2022 0915  Last data filed at 2022 9205  Gross per 24 hour   Intake --   Output 450 ml   Net -450 ml        Exam:     Physical Exam:    Gen:  disheveled, frail, ill-appearing, NAD  HEENT:  Pink conjunctivae, PERRL, hearing intact to voice, dry mucous membranes  Neck:  Supple, without masses, thyroid non-tender  Resp:  No accessory muscle use, scattered rales and rhonchi   Card:  No murmurs, normal S1, S2 without thrills, trace edema  Abd:  Soft, non-tender, non-distended, normoactive bowel sounds are present  Lymph:  No cervical adenopathy  Musc:  No cyanosis or clubbing  Skin:  No rashes  Neuro:  Cranial nerves 3-12 are grossly intact, follows commands appropriately  Psych:  Alert with poor insight.   Oriented to person, place    Medications Reviewed: (see below)    Lab Data Reviewed: (see below)    ______________________________________________________________________    Medications:     Current Facility-Administered Medications   Medication Dose Route Frequency    L.acidophilus-paracasei-S.thermophil-bifidobacter (RISAQUAD) 8 billion cell capsule  1 Capsule Oral DAILY    morphine injection 2 mg  2 mg IntraVENous Q4H PRN    oxyCODONE IR (ROXICODONE) tablet 5 mg  5 mg Oral Q4H PRN    prochlorperazine (COMPAZINE) injection 10 mg  10 mg IntraVENous Q6H PRN    0.9% sodium chloride infusion  75 mL/hr IntraVENous CONTINUOUS    ascorbic acid (vitamin C) (VITAMIN C) tablet 500 mg  500 mg Oral DAILY    zinc sulfate (ZINCATE) 50 mg zinc (220 mg) capsule 1 Capsule  1 Capsule Oral DAILY    dexAMETHasone (DECADRON) tablet 6 mg  6 mg Oral DAILY    guaiFENesin ER (MUCINEX) tablet 600 mg  600 mg Oral Q12H    benzonatate (TESSALON) capsule 100 mg  100 mg Oral TID PRN    insulin lispro (HUMALOG) injection   SubCUTAneous AC&HS    glucose chewable tablet 16 g  4 Tablet Oral PRN    glucagon (GLUCAGEN) injection 1 mg  1 mg IntraMUSCular PRN    hydrALAZINE (APRESOLINE) 20 mg/mL injection 20 mg  20 mg IntraVENous Q6H PRN    aspirin delayed-release tablet 81 mg  81 mg Oral DAILY    albuterol-ipratropium (DUO-NEB) 2.5 MG-0.5 MG/3 ML  3 mL Nebulization Q4H PRN    sodium chloride (NS) flush 5-40 mL  5-40 mL IntraVENous Q8H    sodium chloride (NS) flush 5-40 mL  5-40 mL IntraVENous PRN    0.9% sodium chloride infusion 25 mL  25 mL IntraVENous PRN    acetaminophen (TYLENOL) tablet 650 mg  650 mg Oral Q6H PRN    Or    acetaminophen (TYLENOL) suppository 650 mg  650 mg Rectal Q6H PRN    bisacodyL (DULCOLAX) suppository 10 mg  10 mg Rectal DAILY PRN    promethazine (PHENERGAN) tablet 12.5 mg  12.5 mg Oral Q6H PRN    Or    ondansetron (ZOFRAN) injection 4 mg  4 mg IntraVENous Q6H PRN    heparin (porcine) injection 5,000 Units  5,000 Units SubCUTAneous Q8H    carvediloL (COREG) tablet 12.5 mg  12.5 mg Oral BID WITH MEALS          Lab Review:     Recent Labs     08/07/22  0110 08/06/22  0939 08/05/22  1621   WBC 4.1 4.3 3.8   HGB 10.2* 10.8* 10.5*   HCT 32.3* 33.9* 32.2*    166 181     Recent Labs     08/07/22  0110 08/06/22  0939 08/05/22  1621    142 141   K 5.1 4.2 5.4* * 111* 105   CO2 22 26 22   * 111* 117*   BUN 33* 36* 35*   CREA 1.43* 1.69* 1.61*   CA 8.5 8.7 9.1   MG 1.7 1.7  --    PHOS 3.2 3.8  --    ALB 2.8* 3.1* 3.5   TBILI 0.4 0.4 0.5   ALT 31 36 26     Lab Results   Component Value Date/Time    Glucose (POC) 144 (H) 08/07/2022 07:23 AM    Glucose (POC) 192 (H) 08/06/2022 09:11 PM    Glucose (POC) 195 (H) 08/06/2022 04:41 PM    Glucose (POC) 138 (H) 08/06/2022 12:00 PM    Glucose (POC) 177 (H) 12/08/2021 11:46 AM          Assessment / Plan:     [de-identified] yo hx of HTN, DM, CHF, CKD, presented w/ chest pain, N/V/D, COVID infection, VALERIA     1) COVID-19 infection/Hypoxia: unvaccinated. No pneumonia on CXR. CRP low. Out of window for Paxlovid. Will cont oral dexamethasone, Vit C/Zinc, incentive spirometry, wean O2. Monitor inflammatory labs     2) Chest pain: vague, likely related to COVID rather than ACS. Cardiac enzymes neg. Echo pending. Cards following     3) VALERIA/CKD 3: slowly improving. Due to poor PO intake, dehydration. Will hold entresto, lasix. Cont low dose IVF, monitor BMP     4) N/V/D: now resolved. Due to COVID. Cont pro-biotics, IV antiemetics     5) HTN/CHF: last echo with EF 75%. Holding entresto and lasix for now. Cont Coreg. Monitor volume status     6) DM type 2: A1C 6.2%. Hold oral meds.   Cont SSI    Total time spent with patient care: 45 Minutes **I personally saw and examined the patient during this time period**                 Care Plan discussed with: Patient, nursing     Discussed:  Care Plan    Prophylaxis:  Hep SQ    Disposition:   PT, OT, RN           ___________________________________________________    Attending Physician: Junaid Herrera MD

## 2022-08-07 NOTE — PROGRESS NOTES
Bedside and Verbal shift change report given to Jodi Antunez (oncoming nurse) by Damien (offgoing nurse). Report included the following information SBAR, Kardex, ED Summary, Procedure Summary, Intake/Output, MAR, Recent Results, and Med Rec Status.

## 2022-08-07 NOTE — PROGRESS NOTES
Pt not seen today. Chart reviewed. See my consult note from admission. history significant for hypertension, diabetes, CKD, TAVR, presented with symptoms of chest pain and shortness of breath. She was found to be COVID-positive. Chest pain likely not cardiac origin. Echo LVEF normal. Normal Trop and normal EKG. Will sign off. She can continue to f/up with Dr. Debbie Islas and [facility] cardiology. Juan Rolle MD, [facility]

## 2022-08-07 NOTE — PROGRESS NOTES
Spiritual Care Assessment/Progress Note  [address]      NAME: Fidencio Taylor      MRN: [id number]  AGE: [de-identified] y.o.  SEX: female  Congregational Affiliation: Confucianism   Language: English     8/7/2022     Total Time (in minutes): 15     Spiritual Assessment begun in [facility] 3 PRO CARE TELE 1 through conversation with:         []Patient        [] Family    [] Friend(s)        Reason for Consult: Advance medical directive consult     Spiritual beliefs: (Please include comment if needed)     [] Identifies with a julisa tradition:    Confucianism on record      [] Supported by a julisa community:            [] Claims no spiritual orientation:           [] Seeking spiritual identity:                [] Adheres to an individual form of spirituality:           [x] Not able to assess:                           Identified resources for coping:      [] Prayer                               [] Music                  [] Guided Imagery     [] Family/friends                 [] Pet visits     [] Devotional reading                         [x] Unknown     [] Other:                                               Interventions offered during this visit: (See comments for more details)    Patient Interventions: Advance medical directive consult           Plan of Care:     [] Support spiritual and/or cultural needs    [] Support AMD and/or advance care planning process      [] Support grieving process   [] Coordinate Rites and/or Rituals    [] Coordination with community clergy   [] No spiritual needs identified at this time   [] Detailed Plan of Care below (See Comments)  [] Make referral to Music Therapy  [] Make referral to Pet Therapy     [] Make referral to Addiction services  [] Make referral to [facility]  [] Make referral to Spiritual Care Partner  [] No future visits requested        [x] Contact Spiritual Care for further referrals     Comments:   Attempted Spiritual care assessment and Advance Medical Directive (AMD) discussion with Ms. Nick Angelo on [facility] unit. Pt is under contact restriction/isolation due to illness.  consulted with attending RN who shared that Pt is able to speak over the phone, and is able to make her own decisions on AMD.  tried calling into room twice to no avail. Chart consulted and also reached out to her son by phone and no answer. AMD form placed on her door for RN to deliver to her when they go in. Advised staff to contact [facility] for any further referrals.     TORY Oreilly.Div.  [phone]

## 2022-08-07 NOTE — PROGRESS NOTES
Spiritual Care Assessment/Progress Note  [place]      NAME: Maday Cleaning      MRN: [id number]  AGE: [de-identified] y.o. SEX: female  Buddhism Affiliation: Anabaptist   Language: English     8/7/2022     Total Time (in minutes): 15     Spiritual Assessment begun in [facility] 3 PRO CARE TELE 1 through conversation with:         []Patient        [] Family    [] Friend(s)        Reason for Consult: Advance medical directive consult     Spiritual beliefs: (Please include comment if needed)     [] Identifies with a julisa tradition:    Anabaptist on file      [] Supported by a julisa community:            [] Claims no spiritual orientation:           [] Seeking spiritual identity:                [] Adheres to an individual form of spirituality:           [x] Not able to assess:                           Identified resources for coping:      [] Prayer                               [] Music                  [] Guided Imagery     [] Family/friends                 [] Pet visits     [] Devotional reading                         [x] Unknown     [] Other:                                               Interventions offered during this visit: (See comments for more details)    Patient Interventions: Advance medical directive consult           Plan of Care:     [] Support spiritual and/or cultural needs    [] Support AMD and/or advance care planning process      [] Support grieving process   [] Coordinate Rites and/or Rituals    [] Coordination with community clergy   [] No spiritual needs identified at this time   [] Detailed Plan of Care below (See Comments)  [] Make referral to Music Therapy  [] Make referral to Pet Therapy     [] Make referral to Addiction services  [] Make referral to [facility]  [] Make referral to Spiritual Care Partner  [] No future visits requested        [x] Contact Spiritual Care for further referrals     Comments:    Following up from this mornings attempts for Advance Medical Directive (AMD) discussion with Ms. Sid Bourgeois on [facility] unit. Consulted with attending nurse outside her door. Pt has AMD form by her currently. In [facility] [phone] request for Advance Medical Directive (AMD) with Ms. Sid Bourgeois on [facility] unit. Pt is under contact restriction.  had attending nurse bring her the AMD form.  came back with attending nurse and was able to communicate through the door - nurse told her that [address] [place] [address] will be calling the room telephone in a couple minutes. She responded okay.  called the room again (called twice earlier), she did not answer. Advised nurse to contact [facility] for any further referrals.     BRIAN LaurentDiv.  [phone]

## 2022-08-07 NOTE — PROGRESS NOTES
Problem: Mobility Impaired (Adult and Pediatric)  Goal: *Acute Goals and Plan of Care (Insert Text)  Description: FUNCTIONAL STATUS PRIOR TO ADMISSION: Patient was modified independent using a rollator for functional mobility. HOME SUPPORT PRIOR TO ADMISSION: The patient lived with her son who works out of the home and 5day/week (6 hours/day) caretaker and required assistance for lower body dressing, bathing, cooking. Physical Therapy Goals  Initiated 8/7/2022  All goals to be met with least restrictive supplemental oxygen or room air with spo2 >90% with activity. 1.  Patient will move from supine to sit and sit to supine , scoot up and down, and roll side to side in bed with independence within 7 day(s). 2.  Patient will transfer from bed to chair and chair to bed with modified independence using the least restrictive device within 7 day(s). 3.  Patient will perform sit to stand with modified independence within 7 day(s). 4.  Patient will ambulate with modified independence for 200 feet with the least restrictive device within 7 day(s). Outcome: Not Met   PHYSICAL THERAPY EVALUATION  Patient: Fabiola Wood (89 y.o. female)  Date: 8/7/2022  Primary Diagnosis: Chest pain [R07.9]       Precautions:   Fall (droplet +)      ASSESSMENT  Based on the objective data described below, the patient presents with good strength, coordination, decreased activity tolerance, hypertension before and during activity, increased supplemental oxygen needs from baseline (none) and overall reduced functional mobility in the setting of hospital admission for chest pain with + COVID-19 infection. Troponin -. Patient today with elevated blood pressure, 204/86 at start of session. Patient denies chest pain, endorses shortness of breath and denies vision changes. 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  Patient tolerates limited activity secondary to hypertension, however she moves well transferring to seated edge of bed with Vero and taking a few steps laterally along edge of bed (CGAx2) prior to return to supine. Spo2 stable on 2L NC which she is received on. Recommend HHPT pending medical and therapy progress. Alerted RN to hypertension. Vitals:    08/07/22 1300 08/07/22 1447 08/07/22 1452   BP: (!) 204/86 (!) 212/97 (!) 208/98   BP 2: 197/87     BP 1 Location: Left upper arm Left upper arm Left upper arm   BP Patient Position: At rest Sitting Semi fowlers   Pulse: 79 83 79   Pulse 2: 75     Temp:      Resp:      Height:      Weight:      SpO2: 98% 95% 100%         Current Level of Function Impacting Discharge (mobility/balance): CGAx2; hypertension    Functional Outcome Measure: The patient scored Total Score: 23/28 on the Tinetti outcome measure which is indicative of moderate fall risk. Other factors to consider for discharge: patient notes that her son sometimes works 12 hours a day and will likely not be able to help her much upon d/c. Patient will benefit from skilled therapy intervention to address the above noted impairments. PLAN :  Recommendations and Planned Interventions: bed mobility training, transfer training, gait training, therapeutic exercises, edema management/control, patient and family training/education, and therapeutic activities      Frequency/Duration: Patient will be followed by physical therapy:  5 times a week to address goals. Recommendation for discharge: (in order for the patient to meet his/her long term goals)  To be determined: HHPT pending medical and physical therapy progress    This discharge recommendation:  Has not yet been discussed the attending provider and/or case management    IF patient discharges home will need the following DME: patient owns DME required for discharge         SUBJECTIVE:   Patient stated it just comes out of me.  re: urine     OBJECTIVE DATA SUMMARY:   Patient received supine in bed and was agreeable to participate in PT session. Patient was cleared by nursing to participate in PT session. HISTORY:    Past Medical History:   Diagnosis Date    CHF (congestive heart failure) (HCC)     Hypercholesterolemia     Hypertension     Migraine      Past Surgical History:   Procedure Laterality Date    HX HERNIA REPAIR      HX TUBAL LIGATION         Personal factors and/or comorbidities impacting plan of care: none additional    Home Situation  Home Environment: Private residence  # Steps to Enter: 8  One/Two Story Residence: One story  Living Alone: No  Support Systems: Child(jitendra), Caregiver/Home Care Staff (caregiver 5 days a week, 6hrs a day)  Patient Expects to be Discharged to[de-identified] Home  Current DME Used/Available at Home: Shower chair, Walker, rollator, Walker, rolling, Commode, bedside  Tub or Shower Type: Shower    EXAMINATION/PRESENTATION/DECISION MAKING:   Critical Behavior:  Neurologic State: Alert  Orientation Level: Oriented X4  Cognition: Appropriate decision making, Follows commands     Hearing: Auditory  Auditory Impairment: None  Skin:  all observed intact  Edema: defer to RN notes   Range Of Motion:  AROM: Generally decreased, functional                       Strength:    Strength: Within functional limits                    Tone & Sensation:   Tone: Normal                              Coordination:  Coordination: Within functional limits  Vision:      Functional Mobility:  Bed Mobility:     Supine to Sit: Modified independent  Sit to Supine: Modified independent  Scooting: Modified independent  Transfers:  Sit to Stand: Contact guard assistance  Stand to Sit: Contact guard assistance                       Balance:   Sitting: Intact; Without support  Standing: Impaired; Without support  Standing - Static: Good  Standing - Dynamic : Fair  Ambulation/Gait Training:                                                         Stairs:            Covid education:  - discussed proning and staying out of bed as much as possible for improved lung health    Functional Measure:  Tinetti test:    Sitting Balance: 1  Arises: 2  Attempts to Rise: 2  Immediate Standing Balance: 2  Standing Balance: 2  Nudged: 1  Eyes Closed: 1  Turn 360 Degrees - Continuous/Discontinuous: 1  Turn 360 Degrees - Steady/Unsteady: 0  Sitting Down: 1  Balance Score: 13 Balance total score  Indication of Gait: 1  R Step Length/Height: 1  L Step Length/Height: 1  R Foot Clearance: 1  L Foot Clearance: 1  Step Symmetry: 1  Step Continuity: 1  Path: 2  Trunk: 1  Walking Time: 0  Gait Score: 10 Gait total score  Total Score: 23/28 Overall total score         Tinetti Tool Score Risk of Falls  <19 = High Fall Risk  19-24 = Moderate Fall Risk  25-28 = Low Fall Risk  Tinetti ME. Performance-Oriented Assessment of Mobility Problems in Elderly Patients. High 66; [id number].  (Scoring Description: PT Bulletin Feb. 10, 1993)    Older adults: Roselia Salcedo et al, 2009; n = 1000 [facility] elderly evaluated with ABC, GIORGIO, ADL, and IADL)  · Mean GIORGIO score for males aged 69-68 years = 26.21(3.40)  · Mean GIORGIO score for females age 69-68 years = 25.16(4.30)  · Mean GIORGIO score for males over 80 years = 23.29(6.02)  · Mean GIORGIO score for females over 80 years = 17.20(8.32)        Physical Therapy Evaluation Charge Determination   History Examination Presentation Decision-Making   MEDIUM  Complexity : 1-2 comorbidities / personal factors will impact the outcome/ POC  MEDIUM Complexity : 3 Standardized tests and measures addressing body structure, function, activity limitation and / or participation in recreation  MEDIUM Complexity : Evolving with changing characteristics  MEDIUM Complexity : FOTO score of 26-74      Based on the above components, the patient evaluation is determined to be of the following complexity level: MEDIUM    Pain Rating:  Patient without reports of pain during therapy      Activity Tolerance:   Fair, desaturates with exertion and requires oxygen, requires rest breaks, and observed SOB with activity      After treatment patient left in no apparent distress:   Supine in bed, Call bell within reach, Bed / chair alarm activated, and Side rails x 3    COMMUNICATION/EDUCATION:   The patients plan of care was discussed with: Occupational therapist, Registered nurse, and Case management. Fall prevention education was provided and the patient/caregiver indicated understanding. and Patient/family have participated as able in goal setting and plan of care.     Thank you for this referral.  Destinee Jewell PT, DPT   Time Calculation: 28 mins

## 2022-08-08 LAB
ALBUMIN SERPL-MCNC: 3.1 G/DL (ref 3.5–5)
ALBUMIN/GLOB SERPL: 0.7 {RATIO} (ref 1.1–2.2)
ALP SERPL-CCNC: 84 U/L (ref 45–117)
ALT SERPL-CCNC: 27 U/L (ref 12–78)
ANION GAP SERPL CALC-SCNC: 6 MMOL/L (ref 5–15)
AST SERPL-CCNC: 17 U/L (ref 15–37)
BILIRUB SERPL-MCNC: 0.4 MG/DL (ref 0.2–1)
BUN SERPL-MCNC: 32 MG/DL (ref 6–20)
BUN/CREAT SERPL: 23 (ref 12–20)
CALCIUM SERPL-MCNC: 9.1 MG/DL (ref 8.5–10.1)
CHLORIDE SERPL-SCNC: 111 MMOL/L (ref 97–108)
CO2 SERPL-SCNC: 24 MMOL/L (ref 21–32)
CREAT SERPL-MCNC: 1.37 MG/DL (ref 0.55–1.02)
CRP SERPL-MCNC: <0.29 MG/DL (ref 0–0.6)
D DIMER PPP FEU-MCNC: 2.57 MG/L FEU (ref 0–0.65)
ERYTHROCYTE [DISTWIDTH] IN BLOOD BY AUTOMATED COUNT: 13.4 % (ref 11.5–14.5)
GLOBULIN SER CALC-MCNC: 4.7 G/DL (ref 2–4)
GLUCOSE BLD STRIP.AUTO-MCNC: 124 MG/DL (ref 65–117)
GLUCOSE BLD STRIP.AUTO-MCNC: 225 MG/DL (ref 65–117)
GLUCOSE BLD STRIP.AUTO-MCNC: 240 MG/DL (ref 65–117)
GLUCOSE BLD STRIP.AUTO-MCNC: 285 MG/DL (ref 65–117)
GLUCOSE SERPL-MCNC: 137 MG/DL (ref 65–100)
HCT VFR BLD AUTO: 35.2 % (ref 35–47)
HGB BLD-MCNC: 10.9 G/DL (ref 11.5–16)
MAGNESIUM SERPL-MCNC: 1.9 MG/DL (ref 1.6–2.4)
MCH RBC QN AUTO: 29.5 PG (ref 26–34)
MCHC RBC AUTO-ENTMCNC: 31 G/DL (ref 30–36.5)
MCV RBC AUTO: 95.1 FL (ref 80–99)
NRBC # BLD: 0 K/UL (ref 0–0.01)
NRBC BLD-RTO: 0 PER 100 WBC
PHOSPHATE SERPL-MCNC: 2.8 MG/DL (ref 2.6–4.7)
PLATELET # BLD AUTO: 219 K/UL (ref 150–400)
PMV BLD AUTO: 10.8 FL (ref 8.9–12.9)
POTASSIUM SERPL-SCNC: 4.9 MMOL/L (ref 3.5–5.1)
PROT SERPL-MCNC: 7.8 G/DL (ref 6.4–8.2)
RBC # BLD AUTO: 3.7 M/UL (ref 3.8–5.2)
SERVICE CMNT-IMP: ABNORMAL
SODIUM SERPL-SCNC: 141 MMOL/L (ref 136–145)
WBC # BLD AUTO: 10.5 K/UL (ref 3.6–11)

## 2022-08-08 PROCEDURE — 74011250636 HC RX REV CODE- 250/636: Performed by: INTERNAL MEDICINE

## 2022-08-08 PROCEDURE — 97530 THERAPEUTIC ACTIVITIES: CPT

## 2022-08-08 PROCEDURE — 74011000250 HC RX REV CODE- 250: Performed by: INTERNAL MEDICINE

## 2022-08-08 PROCEDURE — 82962 GLUCOSE BLOOD TEST: CPT

## 2022-08-08 PROCEDURE — 74011250637 HC RX REV CODE- 250/637: Performed by: INTERNAL MEDICINE

## 2022-08-08 PROCEDURE — 84100 ASSAY OF PHOSPHORUS: CPT

## 2022-08-08 PROCEDURE — 83735 ASSAY OF MAGNESIUM: CPT

## 2022-08-08 PROCEDURE — 36415 COLL VENOUS BLD VENIPUNCTURE: CPT

## 2022-08-08 PROCEDURE — 93005 ELECTROCARDIOGRAM TRACING: CPT

## 2022-08-08 PROCEDURE — 94761 N-INVAS EAR/PLS OXIMETRY MLT: CPT

## 2022-08-08 PROCEDURE — 74011636637 HC RX REV CODE- 636/637: Performed by: INTERNAL MEDICINE

## 2022-08-08 PROCEDURE — 77010033678 HC OXYGEN DAILY

## 2022-08-08 PROCEDURE — 65270000046 HC RM TELEMETRY

## 2022-08-08 PROCEDURE — 85027 COMPLETE CBC AUTOMATED: CPT

## 2022-08-08 PROCEDURE — 85379 FIBRIN DEGRADATION QUANT: CPT

## 2022-08-08 PROCEDURE — 97535 SELF CARE MNGMENT TRAINING: CPT

## 2022-08-08 PROCEDURE — 80053 COMPREHEN METABOLIC PANEL: CPT

## 2022-08-08 PROCEDURE — 86140 C-REACTIVE PROTEIN: CPT

## 2022-08-08 PROCEDURE — 97116 GAIT TRAINING THERAPY: CPT

## 2022-08-08 RX ADMIN — GUAIFENESIN 600 MG: 600 TABLET ORAL at 08:24

## 2022-08-08 RX ADMIN — SACUBITRIL AND VALSARTAN 1 TABLET: 24; 26 TABLET, FILM COATED ORAL at 09:22

## 2022-08-08 RX ADMIN — HEPARIN SODIUM 5000 UNITS: 5000 INJECTION INTRAVENOUS; SUBCUTANEOUS at 13:12

## 2022-08-08 RX ADMIN — SACUBITRIL AND VALSARTAN 1 TABLET: 24; 26 TABLET, FILM COATED ORAL at 22:40

## 2022-08-08 RX ADMIN — GUAIFENESIN 600 MG: 600 TABLET ORAL at 22:40

## 2022-08-08 RX ADMIN — INSULIN LISPRO 2 UNITS: 100 INJECTION, SOLUTION INTRAVENOUS; SUBCUTANEOUS at 22:40

## 2022-08-08 RX ADMIN — ASPIRIN 81 MG: 81 TABLET, COATED ORAL at 08:24

## 2022-08-08 RX ADMIN — MORPHINE SULFATE 2 MG: 2 INJECTION, SOLUTION INTRAMUSCULAR; INTRAVENOUS at 09:17

## 2022-08-08 RX ADMIN — INSULIN LISPRO 3 UNITS: 100 INJECTION, SOLUTION INTRAVENOUS; SUBCUTANEOUS at 11:07

## 2022-08-08 RX ADMIN — CARVEDILOL 12.5 MG: 12.5 TABLET, FILM COATED ORAL at 08:24

## 2022-08-08 RX ADMIN — SODIUM CHLORIDE, PRESERVATIVE FREE 10 ML: 5 INJECTION INTRAVENOUS at 13:13

## 2022-08-08 RX ADMIN — HYDRALAZINE HYDROCHLORIDE 20 MG: 20 INJECTION INTRAMUSCULAR; INTRAVENOUS at 05:28

## 2022-08-08 RX ADMIN — OXYCODONE HYDROCHLORIDE AND ACETAMINOPHEN 500 MG: 500 TABLET ORAL at 08:24

## 2022-08-08 RX ADMIN — CARVEDILOL 12.5 MG: 12.5 TABLET, FILM COATED ORAL at 16:46

## 2022-08-08 RX ADMIN — HEPARIN SODIUM 5000 UNITS: 5000 INJECTION INTRAVENOUS; SUBCUTANEOUS at 05:28

## 2022-08-08 RX ADMIN — Medication 1 CAPSULE: at 08:24

## 2022-08-08 RX ADMIN — INSULIN LISPRO 5 UNITS: 100 INJECTION, SOLUTION INTRAVENOUS; SUBCUTANEOUS at 16:45

## 2022-08-08 RX ADMIN — DEXAMETHASONE 6 MG: 6 TABLET ORAL at 08:24

## 2022-08-08 RX ADMIN — HEPARIN SODIUM 5000 UNITS: 5000 INJECTION INTRAVENOUS; SUBCUTANEOUS at 22:41

## 2022-08-08 NOTE — PROGRESS NOTES
CM follow up:    Voice message left for patient's son Elise Recio at [phone], requested return call to complete CM assessment.     Alexa Jerry RN, MSN/Care manager  [phone]

## 2022-08-08 NOTE — PROGRESS NOTES
[facility]  [address] [facility], [address] [place] [address]  [phone]      Medical Progress Note      NAME: Angela Porter   :  1940  MRM:  [id number]    Date/Time of service: 2022  9:10 AM       Subjective:     Chief Complaint:  Patient was personally seen and examined by me during this time period. Chart reviewed. C/o vague L sided chest pain       Objective:       Vitals:       Last 24hrs VS reviewed since prior progress note. Most recent are:    Visit Vitals  BP (!) 121/56 (BP 1 Location: Left upper arm, BP Patient Position: At rest)   Pulse 83   Temp 97.4 °F (36.3 °C)   Resp 20   Ht 5' 2\" (1.575 m)   Wt 88.2 kg (194 lb 8 oz)   SpO2 100%   BMI 35.57 kg/m²     SpO2 Readings from Last 6 Encounters:   22 100%   22 94%   21 95%   20 96%    O2 Flow Rate (L/min): 2 l/min     Intake/Output Summary (Last 24 hours) at 2022 0910  Last data filed at 2022 0353  Gross per 24 hour   Intake 1000 ml   Output 300 ml   Net 700 ml          Exam:     Physical Exam:    Gen:  disheveled, frail, ill-appearing, NAD  HEENT:  Pink conjunctivae, PERRL, hearing intact to voice, dry mucous membranes  Neck:  Supple, without masses, thyroid non-tender  Resp:  No accessory muscle use, scattered rales and rhonchi   Card:  No murmurs, normal S1, S2 without thrills, trace edema  Abd:  Soft, non-tender, non-distended, normoactive bowel sounds are present  Lymph:  No cervical adenopathy  Musc:  No cyanosis or clubbing  Skin:  No rashes  Neuro:  Cranial nerves 3-12 are grossly intact, follows commands appropriately  Psych:  Alert with poor insight.   Oriented to person, place    Medications Reviewed: (see below)    Lab Data Reviewed: (see below)    ______________________________________________________________________    Medications:     Current Facility-Administered Medications   Medication Dose Route Frequency    L.acidophilus-paracasei-S.thermophil-bifidobacter (RISAQUAD) 8 billion cell capsule  1 Capsule Oral DAILY    morphine injection 2 mg  2 mg IntraVENous Q4H PRN    oxyCODONE IR (ROXICODONE) tablet 5 mg  5 mg Oral Q4H PRN    prochlorperazine (COMPAZINE) injection 10 mg  10 mg IntraVENous Q6H PRN    ascorbic acid (vitamin C) (VITAMIN C) tablet 500 mg  500 mg Oral DAILY    zinc sulfate (ZINCATE) 50 mg zinc (220 mg) capsule 1 Capsule  1 Capsule Oral DAILY    dexAMETHasone (DECADRON) tablet 6 mg  6 mg Oral DAILY    guaiFENesin ER (MUCINEX) tablet 600 mg  600 mg Oral Q12H    benzonatate (TESSALON) capsule 100 mg  100 mg Oral TID PRN    insulin lispro (HUMALOG) injection   SubCUTAneous AC&HS    glucose chewable tablet 16 g  4 Tablet Oral PRN    glucagon (GLUCAGEN) injection 1 mg  1 mg IntraMUSCular PRN    hydrALAZINE (APRESOLINE) 20 mg/mL injection 20 mg  20 mg IntraVENous Q6H PRN    aspirin delayed-release tablet 81 mg  81 mg Oral DAILY    albuterol-ipratropium (DUO-NEB) 2.5 MG-0.5 MG/3 ML  3 mL Nebulization Q4H PRN    sodium chloride (NS) flush 5-40 mL  5-40 mL IntraVENous Q8H    sodium chloride (NS) flush 5-40 mL  5-40 mL IntraVENous PRN    0.9% sodium chloride infusion 25 mL  25 mL IntraVENous PRN    acetaminophen (TYLENOL) tablet 650 mg  650 mg Oral Q6H PRN    Or    acetaminophen (TYLENOL) suppository 650 mg  650 mg Rectal Q6H PRN    bisacodyL (DULCOLAX) suppository 10 mg  10 mg Rectal DAILY PRN    promethazine (PHENERGAN) tablet 12.5 mg  12.5 mg Oral Q6H PRN    Or    ondansetron (ZOFRAN) injection 4 mg  4 mg IntraVENous Q6H PRN    heparin (porcine) injection 5,000 Units  5,000 Units SubCUTAneous Q8H    carvediloL (COREG) tablet 12.5 mg  12.5 mg Oral BID WITH MEALS          Lab Review:     Recent Labs     08/08/22  0041 08/07/22  0110 08/06/22  0939   WBC 10.5 4.1 4.3   HGB 10.9* 10.2* 10.8*   HCT 35.2 32.3* 33.9*    177 166       Recent Labs     08/08/22  0041 08/07/22  0110 08/06/22  0939    142 142   K 4.9 5.1 4.2   * 113* 111*   CO2 24 22 26   * 178* 111*   BUN 32* 33* 36*   CREA 1.37* 1.43* 1.69*   CA 9.1 8.5 8.7   MG 1.9 1.7 1.7   PHOS 2.8 3.2 3.8   ALB 3.1* 2.8* 3.1*   TBILI 0.4 0.4 0.4   ALT 27 31 36       Lab Results   Component Value Date/Time    Glucose (POC) 124 (H) 08/08/2022 07:59 AM    Glucose (POC) 154 (H) 08/07/2022 08:54 PM    Glucose (POC) 227 (H) 08/07/2022 03:39 PM    Glucose (POC) 174 (H) 08/07/2022 11:14 AM    Glucose (POC) 144 (H) 08/07/2022 07:23 AM          Assessment / Plan:     [de-identified] yo hx of HTN, DM, CHF, CKD, presented w/ chest pain, N/V/D, COVID infection, VALERIA     1) COVID-19 infection/Hypoxia: improving. Unvaccinated. No pneumonia on CXR. CRP low. Out of window for Paxlovid. Will cont oral dexamethasone, Vit C/Zinc, incentive spirometry, wean O2. Monitor inflammatory labs. Will consult RT for home O2 eval     2) Chest pain: vague, likely related to COVID rather than ACS. Patient has a hx of TAVR. Cardiac enzymes and EKG neg. Echo w/ EF 60-65%. Cards following, no plan for further interventions. Follow up with [facility] cardiology, Dr. Baltazar Hi     3) VALERIA/CKD 3: slowly improving. Due to poor PO intake, dehydration. Holding entresto, lasix. Can resume slowly. Monitor BMP     4) N/V/D: now resolved. Due to COVID. Cont pro-biotics, IV antiemetics     5) HTN/CHF: last echo with EF 60-65%. Holding entresto, restart slowly. Cont Coreg, lasix. Monitor volume status     6) DM type 2: A1C 6.2%. Hold oral meds.   Cont SSI    Total time spent with patient care: 35 Minutes **I personally saw and examined the patient during this time period**                 Care Plan discussed with: Patient, nursing     Discussed:  Care Plan    Prophylaxis:  Hep SQ    Disposition:   PT, OT, RN           ___________________________________________________    Attending Physician: Ramiro Huynh MD

## 2022-08-08 NOTE — PROGRESS NOTES
NUTRITION     Nutrition screening referral was triggered based on results obtained during nursing admission assessment for Poor appetite and Unintentional wt loss: >33#    Wt Readings from Last 30 Encounters:   08/08/22 88.2 kg (194 lb 8 oz)   07/28/22 87.5 kg (193 lb)   12/08/21 90.6 kg (199 lb 11.8 oz)   12/04/20 98.4 kg (217 lb)      Pt down 5 lbs over last 6 months. Not significant for time frame. Please Document PO intake:     PO intake: No data found. Per RN notes, appetite appears to be intact. The patient's chart was reviewed and nutrition assessment is not indicated at this time. Plan to see patient for rescreen as indicated. Thank you.      Rubin Child RD

## 2022-08-08 NOTE — PROGRESS NOTES
Pt ordered for Home O2 evaluation. Pt is being followed by Physical Therapy. Spoke with Javed Ovalles PT to assess.

## 2022-08-08 NOTE — PROGRESS NOTES
Bedside and Verbal shift change report given to Rayshawn Mei (oncoming nurse) by Jaida Rodriguez (offgoing nurse). Report included the following information SBAR, Kardex, ED Summary, Procedure Summary, Intake/Output, MAR, Recent Results, and Med Rec Status. 2045  Responded to bed alarm. Patient was found sitting on the edge of the bed trying to stand up. She stated she was going to see her son. Advised patient that her son was at home. Patient answered all orientation questions correctly, and was agreeable to getting back in bed.     0230  Responded to bed alarm. Patient was found sitting on the edge of the bed trying to stand up. Patient stated, \"I had my blood work done, now I'm going to eat. \" Explained to patient that there was no where \"to go\" and eat, but offered to bring her a snack. She was agreeable to this and got back in bed.

## 2022-08-08 NOTE — PROGRESS NOTES
Problem: Self Care Deficits Care Plan (Adult)  Goal: *Acute Goals and Plan of Care (Insert Text)  Description: FUNCTIONAL STATUS PRIOR TO ADMISSION: Patient was modified independent with mobility using a rollator. She reports able to perform ADLs, but utilizes assist of caregiver for tasks at baseline. HOME SUPPORT: The patient lived with son, and has paid caregiver 5 days per week, 6hrs per day. Occupational Therapy Goals  Initiated 8/7/2022  1. Patient will perform lower body dressing with minimal assistance/contact guard assist within 7 day(s). 2.  Patient will perform grooming with supervision/set-up within 7 day(s). 4.  Patient will perform toilet transfers with supervision/set-up within 7 day(s). 5.  Patient will perform all aspects of toileting with supervision/set-up within 7 day(s). 6.  Patient will participate in upper extremity therapeutic exercise/activities with supervision/set-up for 10 minutes within 7 day(s). Outcome: Progressing Towards Goal   OCCUPATIONAL THERAPY TREATMENT  Patient: Rohini Barton (72 y.o. female)  Date: 8/8/2022  Diagnosis: Chest pain [R07.9] Chest pain      Precautions: Fall (droplet +)  Chart, occupational therapy assessment, plan of care, and goals were reviewed. ASSESSMENT  Patient continues with skilled OT services and is progressing towards goals. Patient received seated in bedside chair upon OT arrival. Patient reports attempt to call daughter, but asking for assistance with call bell. Patient oriented to phone, present nearby. Patient agreeable to activity prior to phone use. Patient performs grooming tasks with set up from seated position. Patient CGA for sit to stand and transfer to EOB with SBA using RW. Patient returned to supine with SBA and positioned for comfort. Patient assisted with making call to daughter at end of session. O2 stats reading % on room air with all activity today. Patient left in NAD.      Current Level of Function Impacting Discharge (ADLs): SBA- CGA    Other factors to consider for discharge:          PLAN :  Patient continues to benefit from skilled intervention to address the above impairments. Continue treatment per established plan of care to address goals. Recommend with staff: Davidson Munguia for meals, commode transfers     Recommend next OT session: progression of goals     Recommendation for discharge: (in order for the patient to meet his/her long term goals)  Occupational therapy at least 2 days/week in the home AND ensure assist and/or supervision for safety with ADLs and transfers     This discharge recommendation:  Has been made in collaboration with the attending provider and/or case management    IF patient discharges home will need the following DME: TBD       SUBJECTIVE:   Patient stated Id like to call my daughter.     OBJECTIVE DATA SUMMARY:   Cognitive/Behavioral Status:  Neurologic State: Alert        Perception: Appears intact  Perseveration: No perseveration noted  Safety/Judgement: Awareness of environment    Functional Mobility and Transfers for ADLs:  Bed Mobility:  Supine to Sit: Modified independent  Sit to Supine: Modified independent    Transfers:  Sit to Stand: Stand-by assistance     Bed to Chair: Stand-by assistance    Balance:  Sitting: Intact  Standing: Impaired; With support  Standing - Static: Good  Standing - Dynamic : Fair    ADL Intervention:       Grooming  Grooming Assistance: Stand-by assistance  Position Performed: Seated in chair  Washing Face: Set-up  Cues: Verbal cues provided                                  Cognitive Retraining  Safety/Judgement: Awareness of environment    Therapeutic Exercises:       Pain:  None reported     Activity Tolerance:   Good    After treatment patient left in no apparent distress:   Supine in bed, Call bell within reach, Bed / chair alarm activated, and Side rails x 3    COMMUNICATION/COLLABORATION:   The patients plan of care was discussed with: Physical therapist and Registered nurse.      Essence Almonte, OTR/L  Time Calculation: 23 mins

## 2022-08-08 NOTE — PROGRESS NOTES
Physician Progress Note      PATIENT:               Yessi Sanz  CSN #:                  [id number]  :                       1940  ADMIT DATE:       2022 3:53 PM  [address] [place] DATE:  RESPONDING  PROVIDER #:        Gaylan Kanner DO MD          QUERY TEXT:        This patient admitted for \"COVID  infection\". If possible, can you please further clarify the COVID infection and Please document in progress notes and discharge summary if you are evaluating or treating any of the following: The medical record reflects the following:  Risk Factors: Unvaccinated for COVID, COVID spread in the community, hx. oF CHF, DM, CKD  Clinical Indicators: [de-identified] yr old pt with chest pain, nausea, vomiting, diarrhea, CXR No definite acute cardiopulmonary disease--CT no acute findings or findings to correlate with abd. pain or nausea and vomiting. Treatment: Droplet plus isolation, Out of window for Paxlovid, Oral dexamethasone, Vit c Zinc, o2, now on 2 liters, RT consulted for home o2. Thank you  HIPOLITO ParrN,RN, CPHQ, CCDS, SMART  Options provided:  -- Acute bronchitis due to COVID-19  -- Acute lower respiratory infection due to COVID-19  -- COVID 19 infection without respiratory diagnosis  -- Other - I will add my own diagnosis  -- Disagree - Not applicable / Not valid  -- Disagree - Clinically unable to determine / Unknown  -- Refer to Clinical Documentation Reviewer    PROVIDER RESPONSE TEXT:    This patient has an acute lower respiratory infection due to COVID-19. Query created by: Dom Moreno on 2022 9:46 AM      QUERY TEXT:    Good Morning,    This patient presented with Chest pain. The patient has COVID infection. The patient has CHF. If possible, please document in progress notes and discharge summary further specificity regarding the type and acuity of CHF last echo EF 60-65% : The medical record reflects the following:  Risk Factors:  HTN, CKD 3, CHF.  hx of TAVR  Clinical Indicators: presented with CP, Echo with 60-65%. BPN @ 412, Cardiac enzymes and EKG negative. Treatment: Echo, Holding entresto, restart this slowly, Cont. Coreg, Lasix, Monitor volume status    Thank you  Tello Nina, CPHQ, CCDS, SMART  Options provided:  -- Chronic Systolic CHF/HFrEF  -- Chronic Diastolic CHF/HFpEF  -- Chronic Systolic and Diastolic CHF  -- Other - I will add my own diagnosis  -- Disagree - Not applicable / Not valid  -- Disagree - Clinically unable to determine / Unknown  -- Refer to Clinical Documentation Reviewer    PROVIDER RESPONSE TEXT:    This patient has chronic diastolic CHF/HFpEF.     Query created by: Nohemy Murdock on 8/8/2022 9:52 AM      Electronically signed by:  Sourav Nogueira MD 8/8/2022 10:03 AM

## 2022-08-08 NOTE — PROGRESS NOTES
0700  Bedside and Verbal shift change report given to Bill Ramsey RN (oncoming nurse) by Ellen Mcguire RN (offgoing nurse). Report included the following information SBAR, Kardex, Procedure Summary, Intake/Output, MAR, Recent Results, and Med Rec Status. 1900  Bedside and Verbal shift change report given to Ellen Mcguire RN (oncoming nurse) by Bill Ramsey RN (offgoing nurse). Report included the following information SBAR, Kardex, Procedure Summary, Intake/Output, MAR, Recent Results, and Med Rec Status.

## 2022-08-08 NOTE — PROGRESS NOTES
0915:  Patient reports having another episode of chest pain that brought her to the hospital.  Pain has been evaluated as noncardiac. MD at bedside and aware. EKG done and evaluated by MD -- no issues. Morphine administered for pain. 1900:  Bedside and Verbal shift change report given to Shania Solano (oncoming nurse) by Ruby Palacio (offgoing nurse). Report included the following information SBAR, Kardex, MAR, Accordion, and Recent Results.

## 2022-08-08 NOTE — PROGRESS NOTES
Problem: Airway Clearance - Ineffective  Goal: Achieve or maintain patent airway  Outcome: Progressing Towards Goal     Problem: Gas Exchange - Impaired  Goal: Absence of hypoxia  Outcome: Progressing Towards Goal  Goal: Promote optimal lung function  Outcome: Progressing Towards Goal     Problem: Breathing Pattern - Ineffective  Goal: Ability to achieve and maintain a regular respiratory rate  Outcome: Progressing Towards Goal     Problem: Body Temperature -  Risk of, Imbalanced  Goal: Ability to maintain a body temperature within defined limits  Outcome: Progressing Towards Goal  Goal: Will regain or maintain usual level of consciousness  Outcome: Progressing Towards Goal  Goal: Complications related to the disease process, condition or treatment will be avoided or minimized  Outcome: Progressing Towards Goal     Problem: Isolation Precautions - Risk of Spread of Infection  Goal: Prevent transmission of infectious organism to others  Outcome: Progressing Towards Goal     Problem: Nutrition Deficits  Goal: Optimize nutrtional status  Outcome: Progressing Towards Goal     Problem: Risk for Fluid Volume Deficit  Goal: Maintain normal heart rhythm  Outcome: Progressing Towards Goal  Goal: Maintain absence of muscle cramping  Outcome: Progressing Towards Goal  Goal: Maintain normal serum potassium, sodium, calcium, phosphorus, and pH  Outcome: Progressing Towards Goal     Problem: Loneliness or Risk for Loneliness  Goal: Demonstrate positive use of time alone when socialization is not possible  Outcome: Progressing Towards Goal     Problem: Fatigue  Goal: Verbalize increase energy and improved vitality  Outcome: Progressing Towards Goal     Problem: Falls - Risk of  Goal: *Absence of Falls  Description: Document Jazz Fall Risk and appropriate interventions in the flowsheet.   Outcome: Progressing Towards Goal  Note: Fall Risk Interventions:  Mobility Interventions: Bed/chair exit alarm, Communicate number of staff needed for ambulation/transfer, OT consult for ADLs, Patient to call before getting OOB, PT Consult for mobility concerns, Strengthening exercises (ROM-active/passive)         Medication Interventions: Bed/chair exit alarm, Patient to call before getting OOB    Elimination Interventions: Bed/chair exit alarm, Call light in reach, Toileting schedule/hourly rounds              Problem: Pressure Injury - Risk of  Goal: *Prevention of pressure injury  Description: Document Narciso Scale and appropriate interventions in the flowsheet.   Outcome: Progressing Towards Goal     Problem: Patient Education: Go to Patient Education Activity  Goal: Patient/Family Education  Outcome: Progressing Towards Goal     Problem: Patient Education: Go to Patient Education Activity  Goal: Patient/Family Education  Outcome: Progressing Towards Goal     Problem: Patient Education: Go to Patient Education Activity  Goal: Patient/Family Education  Outcome: Progressing Towards Goal

## 2022-08-08 NOTE — PROGRESS NOTES
Problem: Mobility Impaired (Adult and Pediatric)  Goal: *Acute Goals and Plan of Care (Insert Text)  Description: FUNCTIONAL STATUS PRIOR TO ADMISSION: Patient was modified independent using a rollator for functional mobility. HOME SUPPORT PRIOR TO ADMISSION: The patient lived with her son who works out of the home and 5day/week (6 hours/day) caretaker and required assistance for lower body dressing, bathing, cooking. Physical Therapy Goals  Initiated 8/7/2022  All goals to be met with least restrictive supplemental oxygen or room air with spo2 >90% with activity. 1.  Patient will move from supine to sit and sit to supine , scoot up and down, and roll side to side in bed with independence within 7 day(s). 2.  Patient will transfer from bed to chair and chair to bed with modified independence using the least restrictive device within 7 day(s). 3.  Patient will perform sit to stand with modified independence within 7 day(s). 4.  Patient will ambulate with modified independence for 200 feet with the least restrictive device within 7 day(s). Outcome: Progressing Towards Goal   PHYSICAL THERAPY TREATMENT  Patient: Kerry Whyte (43 y.o. female)  Date: 8/8/2022  Diagnosis: Chest pain [R07.9] Chest pain      Precautions: Fall (droplet +)  Chart, physical therapy assessment, plan of care and goals were reviewed. ASSESSMENT  Patient continues with skilled PT services and is progressing towards goals. Patient this afternoon with improved tolerance and participation in physical therapy session despite hypertension. Patient today tolerates increased activity today and denies symptoms of dizziness/shortness of breath/palpitations or chest pain. She tolerates ambulation to/from the restroom with SBA and RW. Patient requesting additional ambulation at conclusion of session and tolerates 25ft x 2 with RW and SBA. Spo2 in high 90s throughout activity as below.  Continue to recommend HHPT upon d/c.     Pulse oximetry assessment   100% at rest on room air (if 88% or less, skip next steps)  % while ambulating on room air  100% at rest on room air         Vitals:    08/08/22 1456 08/08/22 1506   BP: (!) 186/84    BP 2:     BP 1 Location: Left upper arm Left upper arm   BP Patient Position: Supine Sitting  Comment: post activity   Pulse: 85 91   Pulse 2:     Temp: 98 °F (36.7 °C)    Resp: 20    Height:     Weight:     SpO2: 100% 100%          Current Level of Function Impacting Discharge (mobility/balance): SBA with RW    Other factors to consider for discharge: none additional         PLAN :  Patient continues to benefit from skilled intervention to address the above impairments. Continue treatment per established plan of care. to address goals. Recommendation for discharge: (in order for the patient to meet his/her long term goals)  Physical therapy at least 2 days/week in the home     This discharge recommendation:  Has been made in collaboration with the attending provider and/or case management    IF patient discharges home will need the following DME: none       SUBJECTIVE:   Patient stated if I need it I can get it.  re: supplemental oxygen    OBJECTIVE DATA SUMMARY:   Patient received supine in bed and was agreeable to participate in PT session. Patient was cleared by nursing to participate in PT session. Critical Behavior:  Neurologic State: Alert  Orientation Level: Oriented X4  Cognition: Appropriate for age attention/concentration, Appropriate safety awareness, Impaired decision making     Functional Mobility Training:  Bed Mobility:     Supine to Sit: Modified independent  Sit to Supine: Modified independent           Transfers:  Sit to Stand: Stand-by assistance  Stand to Sit: Stand-by assistance        Bed to Chair: Stand-by assistance                    Balance:  Sitting: Intact  Standing: Impaired; With support  Standing - Static: Good  Standing - Dynamic : Fair  Ambulation/Gait Training:  Distance (ft): 25 Feet (ft) (x2)  Assistive Device: Gait belt;Walker, rolling  Ambulation - Level of Assistance: Contact guard assistance;Stand-by assistance                       Speed/Ioana: Pace decreased (<100 feet/min)  Step Length: Right shortened;Left shortened                    Stairs: Therapeutic Exercises:     Pain Rating:  Patient without reports of pain during therapy      Activity Tolerance:   Good, tolerates ADLs without rest breaks, and SpO2 stable on RA    After treatment patient left in no apparent distress:   Sitting in chair, Call bell within reach, and Bed / chair alarm activated    COMMUNICATION/COLLABORATION:   The patients plan of care was discussed with: Occupational therapist, Registered nurse, and Case management.      Tobi Rivero PT, DPT   Time Calculation: 24 mins

## 2022-08-09 LAB
ANION GAP SERPL CALC-SCNC: 6 MMOL/L (ref 5–15)
BUN SERPL-MCNC: 36 MG/DL (ref 6–20)
BUN/CREAT SERPL: 27 (ref 12–20)
CALCIUM SERPL-MCNC: 9.1 MG/DL (ref 8.5–10.1)
CHLORIDE SERPL-SCNC: 113 MMOL/L (ref 97–108)
CO2 SERPL-SCNC: 24 MMOL/L (ref 21–32)
CREAT SERPL-MCNC: 1.31 MG/DL (ref 0.55–1.02)
D DIMER PPP FEU-MCNC: 2.62 MG/L FEU (ref 0–0.65)
GLUCOSE BLD STRIP.AUTO-MCNC: 126 MG/DL (ref 65–117)
GLUCOSE BLD STRIP.AUTO-MCNC: 178 MG/DL (ref 65–117)
GLUCOSE BLD STRIP.AUTO-MCNC: 240 MG/DL (ref 65–117)
GLUCOSE BLD STRIP.AUTO-MCNC: 353 MG/DL (ref 65–117)
GLUCOSE SERPL-MCNC: 198 MG/DL (ref 65–100)
MAGNESIUM SERPL-MCNC: 1.8 MG/DL (ref 1.6–2.4)
PHOSPHATE SERPL-MCNC: 2.2 MG/DL (ref 2.6–4.7)
POTASSIUM SERPL-SCNC: 4.8 MMOL/L (ref 3.5–5.1)
SERVICE CMNT-IMP: ABNORMAL
SODIUM SERPL-SCNC: 143 MMOL/L (ref 136–145)

## 2022-08-09 PROCEDURE — 83735 ASSAY OF MAGNESIUM: CPT

## 2022-08-09 PROCEDURE — 65270000029 HC RM PRIVATE

## 2022-08-09 PROCEDURE — 74011250637 HC RX REV CODE- 250/637: Performed by: INTERNAL MEDICINE

## 2022-08-09 PROCEDURE — 97110 THERAPEUTIC EXERCISES: CPT

## 2022-08-09 PROCEDURE — 97535 SELF CARE MNGMENT TRAINING: CPT

## 2022-08-09 PROCEDURE — 85379 FIBRIN DEGRADATION QUANT: CPT

## 2022-08-09 PROCEDURE — 82962 GLUCOSE BLOOD TEST: CPT

## 2022-08-09 PROCEDURE — 84100 ASSAY OF PHOSPHORUS: CPT

## 2022-08-09 PROCEDURE — 74011250636 HC RX REV CODE- 250/636: Performed by: INTERNAL MEDICINE

## 2022-08-09 PROCEDURE — 80048 BASIC METABOLIC PNL TOTAL CA: CPT

## 2022-08-09 PROCEDURE — 74011636637 HC RX REV CODE- 636/637: Performed by: INTERNAL MEDICINE

## 2022-08-09 PROCEDURE — 97116 GAIT TRAINING THERAPY: CPT

## 2022-08-09 PROCEDURE — 36415 COLL VENOUS BLD VENIPUNCTURE: CPT

## 2022-08-09 RX ORDER — ENOXAPARIN SODIUM 100 MG/ML
40 INJECTION SUBCUTANEOUS EVERY 24 HOURS
Status: DISCONTINUED | OUTPATIENT
Start: 2022-08-09 | End: 2022-08-09

## 2022-08-09 RX ORDER — ENOXAPARIN SODIUM 100 MG/ML
40 INJECTION SUBCUTANEOUS DAILY
Status: DISCONTINUED | OUTPATIENT
Start: 2022-08-09 | End: 2022-08-09

## 2022-08-09 RX ORDER — METFORMIN HYDROCHLORIDE 850 MG/1
850 TABLET ORAL 2 TIMES DAILY WITH MEALS
COMMUNITY

## 2022-08-09 RX ORDER — PANTOPRAZOLE SODIUM 40 MG/1
40 TABLET, DELAYED RELEASE ORAL
COMMUNITY

## 2022-08-09 RX ORDER — HYDRALAZINE HYDROCHLORIDE 25 MG/1
25 TABLET, FILM COATED ORAL 2 TIMES DAILY
COMMUNITY

## 2022-08-09 RX ORDER — ATORVASTATIN CALCIUM 40 MG/1
40 TABLET, FILM COATED ORAL
COMMUNITY

## 2022-08-09 RX ORDER — ENOXAPARIN SODIUM 100 MG/ML
40 INJECTION SUBCUTANEOUS DAILY
Status: DISCONTINUED | OUTPATIENT
Start: 2022-08-09 | End: 2022-08-11 | Stop reason: HOSPADM

## 2022-08-09 RX ORDER — HYDRALAZINE HYDROCHLORIDE 25 MG/1
50 TABLET, FILM COATED ORAL EVERY 8 HOURS
Status: DISCONTINUED | OUTPATIENT
Start: 2022-08-09 | End: 2022-08-11 | Stop reason: HOSPADM

## 2022-08-09 RX ORDER — CLOPIDOGREL BISULFATE 75 MG/1
75 TABLET ORAL DAILY
COMMUNITY

## 2022-08-09 RX ADMIN — HEPARIN SODIUM 5000 UNITS: 5000 INJECTION INTRAVENOUS; SUBCUTANEOUS at 07:39

## 2022-08-09 RX ADMIN — SACUBITRIL AND VALSARTAN 1 TABLET: 24; 26 TABLET, FILM COATED ORAL at 21:10

## 2022-08-09 RX ADMIN — CARVEDILOL 12.5 MG: 12.5 TABLET, FILM COATED ORAL at 16:09

## 2022-08-09 RX ADMIN — GUAIFENESIN 600 MG: 600 TABLET ORAL at 08:06

## 2022-08-09 RX ADMIN — DEXAMETHASONE 6 MG: 6 TABLET ORAL at 08:06

## 2022-08-09 RX ADMIN — OXYCODONE HYDROCHLORIDE AND ACETAMINOPHEN 500 MG: 500 TABLET ORAL at 08:06

## 2022-08-09 RX ADMIN — Medication 1 CAPSULE: at 08:06

## 2022-08-09 RX ADMIN — HYDRALAZINE HYDROCHLORIDE 50 MG: 25 TABLET, FILM COATED ORAL at 08:06

## 2022-08-09 RX ADMIN — HYDRALAZINE HYDROCHLORIDE 50 MG: 25 TABLET, FILM COATED ORAL at 21:10

## 2022-08-09 RX ADMIN — INSULIN LISPRO 10 UNITS: 100 INJECTION, SOLUTION INTRAVENOUS; SUBCUTANEOUS at 16:09

## 2022-08-09 RX ADMIN — HYDRALAZINE HYDROCHLORIDE 50 MG: 25 TABLET, FILM COATED ORAL at 14:36

## 2022-08-09 RX ADMIN — ASPIRIN 81 MG: 81 TABLET, COATED ORAL at 08:06

## 2022-08-09 RX ADMIN — CARVEDILOL 12.5 MG: 12.5 TABLET, FILM COATED ORAL at 07:38

## 2022-08-09 RX ADMIN — INSULIN LISPRO 3 UNITS: 100 INJECTION, SOLUTION INTRAVENOUS; SUBCUTANEOUS at 11:47

## 2022-08-09 RX ADMIN — INSULIN LISPRO 2 UNITS: 100 INJECTION, SOLUTION INTRAVENOUS; SUBCUTANEOUS at 21:10

## 2022-08-09 RX ADMIN — ENOXAPARIN SODIUM 40 MG: 100 INJECTION SUBCUTANEOUS at 14:36

## 2022-08-09 RX ADMIN — SACUBITRIL AND VALSARTAN 1 TABLET: 24; 26 TABLET, FILM COATED ORAL at 08:06

## 2022-08-09 RX ADMIN — GUAIFENESIN 600 MG: 600 TABLET ORAL at 21:10

## 2022-08-09 NOTE — PROGRESS NOTES
0745:  Patient's BP elevated. She pulled out her IV overnight last night and night RNs unable to establish new access. This RN attempted unsuccessfully as well. MD notified and orders received for oral hydralazine. Will give am meds and hydralazine and reassess.

## 2022-08-09 NOTE — PROGRESS NOTES
[facility] [address]  [address] [facility], [address] [place] [address]  [phone]      Medical Progress Note      NAME: Ernestina Moraes   :  1940  MRM:  [id number]    Date/Time of service: 2022  9:43 AM       Subjective:     Chief Complaint:  Patient was personally seen and examined by me during this time period. Chart reviewed. No further chest pain. C/o generalized weakness       Objective:       Vitals:       Last 24hrs VS reviewed since prior progress note. Most recent are:    Visit Vitals  BP (!) 167/84   Pulse 68   Temp 97.5 °F (36.4 °C)   Resp 18   Ht 5' 2\" (1.575 m)   Wt 88.1 kg (194 lb 4.8 oz)   SpO2 97%   BMI 35.54 kg/m²     SpO2 Readings from Last 6 Encounters:   22 97%   22 94%   21 95%   20 96%    O2 Flow Rate (L/min): 2 l/min     Intake/Output Summary (Last 24 hours) at 2022 0943  Last data filed at 2022  Gross per 24 hour   Intake --   Output 0 ml   Net 0 ml          Exam:     Physical Exam:    Gen:  disheveled, frail, ill-appearing, NAD  HEENT:  Pink conjunctivae, PERRL, hearing intact to voice, dry mucous membranes  Neck:  Supple, without masses, thyroid non-tender  Resp:  No accessory muscle use, scattered rales and rhonchi   Card:  No murmurs, normal S1, S2 without thrills, trace edema  Abd:  Soft, non-tender, non-distended, normoactive bowel sounds are present  Lymph:  No cervical adenopathy  Musc:  No cyanosis or clubbing  Skin:  No rashes  Neuro:  Cranial nerves 3-12 are grossly intact, generalized weakness, follows commands appropriately  Psych:  Alert with poor insight.   Oriented to person, place    Medications Reviewed: (see below)    Lab Data Reviewed: (see below)    ______________________________________________________________________    Medications:     Current Facility-Administered Medications   Medication Dose Route Frequency    hydrALAZINE (APRESOLINE) tablet 50 mg  50 mg Oral Q8H    sacubitriL-valsartan (ENTRESTO) 24-26 mg tablet 1 Tablet  1 Tablet Oral Q12H    L.acidophilus-paracasei-S.thermophil-bifidobacter (RISAQUAD) 8 billion cell capsule  1 Capsule Oral DAILY    morphine injection 2 mg  2 mg IntraVENous Q4H PRN    oxyCODONE IR (ROXICODONE) tablet 5 mg  5 mg Oral Q4H PRN    prochlorperazine (COMPAZINE) injection 10 mg  10 mg IntraVENous Q6H PRN    ascorbic acid (vitamin C) (VITAMIN C) tablet 500 mg  500 mg Oral DAILY    zinc sulfate (ZINCATE) 50 mg zinc (220 mg) capsule 1 Capsule  1 Capsule Oral DAILY    dexAMETHasone (DECADRON) tablet 6 mg  6 mg Oral DAILY    guaiFENesin ER (MUCINEX) tablet 600 mg  600 mg Oral Q12H    benzonatate (TESSALON) capsule 100 mg  100 mg Oral TID PRN    insulin lispro (HUMALOG) injection   SubCUTAneous AC&HS    glucose chewable tablet 16 g  4 Tablet Oral PRN    glucagon (GLUCAGEN) injection 1 mg  1 mg IntraMUSCular PRN    hydrALAZINE (APRESOLINE) 20 mg/mL injection 20 mg  20 mg IntraVENous Q6H PRN    aspirin delayed-release tablet 81 mg  81 mg Oral DAILY    albuterol-ipratropium (DUO-NEB) 2.5 MG-0.5 MG/3 ML  3 mL Nebulization Q4H PRN    sodium chloride (NS) flush 5-40 mL  5-40 mL IntraVENous Q8H    sodium chloride (NS) flush 5-40 mL  5-40 mL IntraVENous PRN    0.9% sodium chloride infusion 25 mL  25 mL IntraVENous PRN    acetaminophen (TYLENOL) tablet 650 mg  650 mg Oral Q6H PRN    Or    acetaminophen (TYLENOL) suppository 650 mg  650 mg Rectal Q6H PRN    bisacodyL (DULCOLAX) suppository 10 mg  10 mg Rectal DAILY PRN    promethazine (PHENERGAN) tablet 12.5 mg  12.5 mg Oral Q6H PRN    Or    ondansetron (ZOFRAN) injection 4 mg  4 mg IntraVENous Q6H PRN    heparin (porcine) injection 5,000 Units  5,000 Units SubCUTAneous Q8H    carvediloL (COREG) tablet 12.5 mg  12.5 mg Oral BID WITH MEALS          Lab Review:     Recent Labs     08/08/22  0041 08/07/22  0110   WBC 10.5 4.1   HGB 10.9* 10.2*   HCT 35.2 32.3*    177       Recent Labs     08/09/22  0048 08/08/22  0041 08/07/22  0110    141 142   K 4.8 4.9 5.1   * 111* 113*   CO2 24 24 22   * 137* 178*   BUN 36* 32* 33*   CREA 1.31* 1.37* 1.43*   CA 9.1 9.1 8.5   MG 1.8 1.9 1.7   PHOS 2.2* 2.8 3.2   ALB  --  3.1* 2.8*   TBILI  --  0.4 0.4   ALT  --  27 31       Lab Results   Component Value Date/Time    Glucose (POC) 126 (H) 08/09/2022 07:28 AM    Glucose (POC) 240 (H) 08/08/2022 08:59 PM    Glucose (POC) 285 (H) 08/08/2022 04:12 PM    Glucose (POC) 225 (H) 08/08/2022 10:58 AM    Glucose (POC) 124 (H) 08/08/2022 07:59 AM          Assessment / Plan:     [de-identified] yo hx of HTN, DM, CHF, CKD, presented w/ chest pain, N/V/D, COVID infection, VALERIA     1) COVID-19 infection/Hypoxia: Off O2. Unvaccinated. No pneumonia on CXR. CRP low. Out of window for Paxlovid. Will cont oral dexamethasone, Vit C/Zinc, incentive spirometry. Monitor inflammatory labs. No need for O2 on discharge     2) Chest pain: vague, likely related to COVID rather than ACS. Patient has a hx of TAVR. Cardiac enzymes and EKG neg. Echo w/ EF 60-65%. Cards following, no plan for further interventions. Follow up with [facility] cardiology, Dr. Elzbieta Barry     3) VALERIA/CKD 3: slowly improving. Due to poor PO intake, dehydration. Holding lasix. Monitor BMP     4) N/V/D: now resolved. Due to COVID. Cont pro-biotics, IV antiemetics     5) HTN/CHF: last echo with EF 60-65%. Cont Coreg, entresto. Holding lasix. Monitor volume status     6) DM type 2: A1C 6.2%. Hold oral meds. Cont SSI    7) Weakness: cont PT/OT. Has poor family support.   Patient is interested in SNF    Total time spent with patient care: 28 Minutes **I personally saw and examined the patient during this time period**                 Care Plan discussed with: Patient, nursing, CM      Discussed:  Care Plan    Prophylaxis:  lovenox    Disposition:   PT, OT, RN           ___________________________________________________    Attending Physician: Joe Chauhan MD

## 2022-08-09 NOTE — PROGRESS NOTES
Problem: Mobility Impaired (Adult and Pediatric)  Goal: *Acute Goals and Plan of Care (Insert Text)  Description: FUNCTIONAL STATUS PRIOR TO ADMISSION: Patient was modified independent using a rollator for functional mobility. HOME SUPPORT PRIOR TO ADMISSION: The patient lived with her son who works out of the home and 5day/week (6 hours/day) caretaker and required assistance for lower body dressing, bathing, cooking. Physical Therapy Goals  Initiated 8/7/2022  All goals to be met with least restrictive supplemental oxygen or room air with spo2 >90% with activity. 1.  Patient will move from supine to sit and sit to supine , scoot up and down, and roll side to side in bed with independence within 7 day(s). 2.  Patient will transfer from bed to chair and chair to bed with modified independence using the least restrictive device within 7 day(s). 3.  Patient will perform sit to stand with modified independence within 7 day(s). 4.  Patient will ambulate with modified independence for 200 feet with the least restrictive device within 7 day(s). Outcome: Progressing Towards Goal   PHYSICAL THERAPY TREATMENT  Patient: Loras Eisenmenger (68 y.o. female)  Date: 8/9/2022  Diagnosis: Chest pain [R07.9] Chest pain      Precautions: Fall (droplet +)  Chart, physical therapy assessment, plan of care and goals were reviewed. ASSESSMENT  Patient continues with skilled PT services and is progressing towards goals. She ambulates increased distance, performs multiple sit to stand transfers, and engages in seated exercises as below. Tinetti Gait and Balance Assessment completed AFTER repeated gait trials and pt with significant decline in score associated with fatigue. Pt endorses significant weakness and fatigue. She discusses baseline activities including ambulating nearly a mile to/from mailbox, washing dishes and completing laundry at baseline.  Given pt at high risk of falling after minimal activity, endorsed decline in functional abilities, and safety concerns associated with pt spending periods of time alone at home, recommend continued rehab in SNF setting. Current Level of Function Impacting Discharge (mobility/balance): CGA, repeated safety cues    Other factors to consider for discharge:  pt with memory impairment         PLAN :  Patient continues to benefit from skilled intervention to address the above impairments. Continue treatment per established plan of care. to address goals. Recommendation for discharge: (in order for the patient to meet his/her long term goals)  Therapy up to 5 days/week in SNF setting if discharged at current functional level; vs. Home with HHPT and 24-hour assistance pending progress    This discharge recommendation:  Has been made in collaboration with the attending provider and case management    IF patient discharges home will need the following DME: to be determined (TBD)       SUBJECTIVE:   Patient stated I've gotten so weak.     Pt received ambulatory in room with PCT assist, agreeable to PT and cleared by RN.     OBJECTIVE DATA SUMMARY:   Critical Behavior:  Neurologic State: Alert  Orientation Level: Oriented X4  Cognition: memory impairment, Follows commands  Safety/Judgement: Awareness of environment  Functional Mobility Training:  Bed Mobility:                    Transfers:  Sit to Stand: Contact guard assistance  Stand to Sit: Contact guard assistance (cues for UE placement, full approach 3/3 trials)                             Balance:  Sitting: Intact  Standing: With support  Standing - Static: Good  Standing - Dynamic : Fair  Ambulation/Gait Training:  Distance (ft): 30 Feet (ft) (+ 50' with seated rest)  Assistive Device: Walker, rolling;Gait belt  Ambulation - Level of Assistance: Contact guard assistance                    Stance: Right decreased  Speed/Ioana: Pace decreased (<100 feet/min)  Step Length: Right shortened;Left shortened Frequent cues for RW management, approximation, attention to task. Therapeutic Exercises: Ankle pumps x 10  Seated marching x 10  LAQ x 10  Seated hip abduction x 10 (self-initiated)  Pain Rating:  Denies pain    Activity Tolerance:   Good  SpO2 93% after activity using room air  Requires rest breaks    After treatment patient left in no apparent distress:   Sitting in chair, Call bell within reach, and Bed / chair alarm activated    COMMUNICATION/COLLABORATION:   The patients plan of care was discussed with: Registered nurse, Physician, and Case management.      Jeremi Conway, PT, DPT   Time Calculation: 25 mins

## 2022-08-09 NOTE — PROGRESS NOTES
Admission Medication Reconciliation:     Information obtained from:   Patient via phone call to [phone] data available¹:  YES    Comments/Recommendations:   Patient able to confirm name, , allergies, and preferred pharmacy  Updated PTA medication list       ¹Rx[employer] pharmacy benefit data reflects medications filled and processed through the patient's insurance, however   this data does NOT capture whether the medication was picked up or is currently being taken by the patient. Prior to Admission Medications   Prescriptions Last Dose Informant Taking? SITagliptin (JANUVIA) 25 mg tablet 2022 Self Yes   Sig: Take 25 mg by mouth in the morning. albuterol (PROVENTIL HFA, VENTOLIN HFA, PROAIR HFA) 90 mcg/actuation inhaler  Self Yes   Sig: Take 2 Puffs by inhalation every four (4) hours as needed for Wheezing. aspirin delayed-release 81 mg tablet 2022 Self Yes   Sig: Take 1 Tablet by mouth daily. atorvastatin (LIPITOR) 40 mg tablet 2022 Self Yes   Sig: Take 40 mg by mouth nightly. butalbital-acetaminophen-caffeine (FIORICET, ESGIC) -40 mg per tablet  Self Yes   Sig: Take 1 Tablet by mouth every six (6) hours as needed for Headache.   carvediloL (COREG) 12.5 mg tablet 2022 Self Yes   Sig: Take 1 Tablet by mouth two (2) times daily (with meals). clopidogreL (PLAVIX) 75 mg tab 2022 Self Yes   Sig: Take 75 mg by mouth in the morning. furosemide (LASIX) 40 mg tablet 2022 Self Yes   Sig: Take 1 Tablet by mouth daily. hydrALAZINE (APRESOLINE) 25 mg tablet 2022 Self Yes   Sig: Take 25 mg by mouth two (2) times a day. metFORMIN (GLUCOPHAGE) 850 mg tablet 2022 Self Yes   Sig: Take 850 mg by mouth two (2) times daily (with meals). pantoprazole (PROTONIX) 40 mg tablet 2022 Self Yes   Sig: Take 40 mg by mouth Daily (before breakfast). sacubitril-valsartan (ENTRESTO) 24 mg/26 mg tablet 2022 Self Yes   Sig: Take 1 Tablet by mouth every twelve (12) hours. Facility-Administered Medications: None         Please contact the main inpatient pharmacy with any questions or concerns at [phone] and we will direct you to the clinical pharmacist covering this patient's care while in-house.    Dave Jaramillo, SabaD, BCPS

## 2022-08-09 NOTE — PROGRESS NOTES
Reason for Admission:  COVID19, chest pain, N/V/D, VALERIA. Hx - HTN, diabetes, CHF, CKD. Blindness. COVID19 Vaccine:  no, \"I don't want the vaccine\"                         RUR Score:   14%/ low risk                  Plan for utilizing home health:      [address] for skilled nursing, PT, and home care aide. DME - shower chair, rollator, walker, cane. Patient has aide 6 hours per day Monday-Friday to assist with ADLs. PCP: First and Last name:  Khadijah Mcintosh MD     Name of Practice:    Are you a current patient: Yes/No: yes   Approximate date of last visit:  unsure   Can you participate in a virtual visit with your PCP:   no                    Current Advanced Directive/Advance Care Plan: Full Code    Healthcare Decision Maker:   Click here to complete [address] including selection of the Healthcare Decision Maker Relationship (ie \"Primary\")             Primary Decision MakerMneva Zaid - [phone]                  Transition of Care Plan:        Chart reviewed, demographics verified. CM role and follow up discussed. Due to COVID19 precautions, CM assessment completed via EMR review and collaboration with nursing staff. Phone call with patient. No contact with patient for this assessment. Patient lives with her son, she states he works 12 hours per day but is home at night. Patient lives in a one story home with 1 steps to enter home. Patient has prescription drug coverage, uses [address]. Patient requires assistance with all care needs. Son provides assistance and transport when he is able. Patient performs ADLs with assist, has care aide at home. Current status:  Patient currently requiring medical management including ongoing assessment and monitoring. Monitoring hypertension. PT recommending SNF placement, spoke with patient and she is agreeable with rehab placement.   [facility] choices [facility], [address] [facility], and [facility]. SNF referrals sent, await response. PLAN:  1. Monitor patient response to treatment and recommendations. 2. Medical management continues. 3. CM needs identified:  SNF placement  4. Patient is appropriate for SNF placement, referrals sent. 5. Patient transport home per [address] at discharge. 6. CM to monitor clinical progress and disposition recommendations. Care Management Interventions  PCP Verified by CM:  Yes (Dr. Vita Paulson)  Mode of Transport at Discharge: [address] (CM Consult): Discharge Planning  Physical Therapy Consult: Yes  Occupational Therapy Consult: Yes  Support Systems: Child(jitendra)  Confirm Follow Up Transport: Family  The Plan for Transition of Care is Related to the Following Treatment Goals : Return home at DC  The Patient and/or Patient Representative was Provided with a Choice of Provider and Agrees with the Discharge Plan?: (S) Yes  Freedom of Choice List was Provided with Basic Dialogue that Supports the Patient's Individualized Plan of Care/Goals, Treatment Preferences and Shares the Quality Data Associated with the Providers?: (S) Yes  Discharge Location  Patient Expects to be Discharged to[de-identified] Skilled nursing facility    Quincy Cruz, RN, MSN, Care manager

## 2022-08-09 NOTE — PROGRESS NOTES
Bedside and Verbal shift change report given to [facility] (oncoming nurse) by Isela Tsang (offgoing nurse). Report included the following information SBAR, Kardex, MAR, Accordion, and Recent Results.

## 2022-08-09 NOTE — PROGRESS NOTES
Problem: Self Care Deficits Care Plan (Adult)  Goal: *Acute Goals and Plan of Care (Insert Text)  Description: FUNCTIONAL STATUS PRIOR TO ADMISSION: Patient was modified independent with mobility using a rollator. She reports able to perform ADLs, but utilizes assist of caregiver for tasks at baseline. HOME SUPPORT: The patient lived with son, and has paid caregiver 5 days per week, 6hrs per day. Occupational Therapy Goals  Initiated 8/7/2022  1. Patient will perform lower body dressing with minimal assistance/contact guard assist within 7 day(s). 2.  Patient will perform grooming with supervision/set-up within 7 day(s). 4.  Patient will perform toilet transfers with supervision/set-up within 7 day(s). 5.  Patient will perform all aspects of toileting with supervision/set-up within 7 day(s). 6.  Patient will participate in upper extremity therapeutic exercise/activities with supervision/set-up for 10 minutes within 7 day(s). Outcome: Progressing Towards Goal   OCCUPATIONAL THERAPY TREATMENT  Patient: Dary Snyder (19 y.o. female)  Date: 8/9/2022  Diagnosis: Chest pain [R07.9] Chest pain      Precautions: Fall (droplet +)  Chart, occupational therapy assessment, plan of care, and goals were reviewed. ASSESSMENT  Patient continues with skilled OT services and is progressing towards goals. Pt seated in chair, O2 sats 97% on room air, /66, HR 97. Pt than ambulated to restroom to use mouth wash standing at sink. Pt sat after a few minutes on commode as she had many gloria to untwist and to conserve her energy. Pt does need verbal cueing for safety during sit to stand and for ADL related transfers. Pt returned to sit in chair and with no verbalizations of pain.      Current Level of Function Impacting Discharge (ADLs): supervision grooming at sink    Other factors to consider for discharge:          PLAN :  Patient continues to benefit from skilled intervention to address the above impairments. Continue treatment per established plan of care to address goals. Recommend with staff: out of bed for ADL's, there ex, there act, meals    Recommend next OT session: cont towards goals    Recommendation for discharge: (in order for the patient to meet his/her long term goals)  Therapy up to 5 days/week in SNF setting    This discharge recommendation:  Has not yet been discussed the attending provider and/or case management    IF patient discharges home will need the following DME:        SUBJECTIVE:   Patient stated I have a vegetable garden.     OBJECTIVE DATA SUMMARY:   Cognitive/Behavioral Status:  Neurologic State: Alert  Orientation Level: Oriented X4  Cognition: Follows commands             Functional Mobility and Transfers for ADLs:  Bed Mobility:   Not tested as pt already in chair    Transfers:  Sit to Stand: Contact guard assistance  Functional Transfers  Toilet Transfer : Contact guard assistance (verbal cueing for safety)       Balance:  Sitting: Intact  Standing: With support  Standing - Static: Good  Standing - Dynamic : Fair    ADL Intervention:       Grooming  Grooming Assistance: Set-up  Position Performed: Standing  Brushing Teeth: Set-up (uses mouth wash)  Brushing/Combing Hair: Minimum assistance         Type of Bath:  (wipe down refused)         Activity Tolerance:   Fair    After treatment patient left in no apparent distress:   Sitting in chair    COMMUNICATION/COLLABORATION:   The patients plan of care was discussed with: Occupational therapist and Registered nurse.      EMY Epps/L  Time Calculation: 12 mins

## 2022-08-10 ENCOUNTER — APPOINTMENT (OUTPATIENT)
Dept: GENERAL RADIOLOGY | Age: 82
DRG: 178 | End: 2022-08-10
Attending: INTERNAL MEDICINE
Payer: MEDICARE

## 2022-08-10 LAB
GLUCOSE BLD STRIP.AUTO-MCNC: 126 MG/DL (ref 65–117)
GLUCOSE BLD STRIP.AUTO-MCNC: 179 MG/DL (ref 65–117)
GLUCOSE BLD STRIP.AUTO-MCNC: 225 MG/DL (ref 65–117)
GLUCOSE BLD STRIP.AUTO-MCNC: 231 MG/DL (ref 65–117)
SERVICE CMNT-IMP: ABNORMAL

## 2022-08-10 PROCEDURE — 74018 RADEX ABDOMEN 1 VIEW: CPT

## 2022-08-10 PROCEDURE — 74011636637 HC RX REV CODE- 636/637: Performed by: INTERNAL MEDICINE

## 2022-08-10 PROCEDURE — 74011250637 HC RX REV CODE- 250/637: Performed by: INTERNAL MEDICINE

## 2022-08-10 PROCEDURE — 65270000029 HC RM PRIVATE

## 2022-08-10 PROCEDURE — 82962 GLUCOSE BLOOD TEST: CPT

## 2022-08-10 PROCEDURE — 77030038269 HC DRN EXT URIN PURWCK BARD -A

## 2022-08-10 PROCEDURE — 97530 THERAPEUTIC ACTIVITIES: CPT

## 2022-08-10 PROCEDURE — 74011250636 HC RX REV CODE- 250/636: Performed by: INTERNAL MEDICINE

## 2022-08-10 RX ORDER — AMOXICILLIN 250 MG
1 CAPSULE ORAL 2 TIMES DAILY
Status: DISCONTINUED | OUTPATIENT
Start: 2022-08-10 | End: 2022-08-11 | Stop reason: HOSPADM

## 2022-08-10 RX ORDER — POLYETHYLENE GLYCOL 3350 17 G/17G
17 POWDER, FOR SOLUTION ORAL DAILY
Status: DISCONTINUED | OUTPATIENT
Start: 2022-08-10 | End: 2022-08-11 | Stop reason: HOSPADM

## 2022-08-10 RX ORDER — BUTALBITAL, ACETAMINOPHEN AND CAFFEINE 50; 325; 40 MG/1; MG/1; MG/1
1 TABLET ORAL
Status: DISCONTINUED | OUTPATIENT
Start: 2022-08-10 | End: 2022-08-11 | Stop reason: HOSPADM

## 2022-08-10 RX ADMIN — ASPIRIN 81 MG: 81 TABLET, COATED ORAL at 09:51

## 2022-08-10 RX ADMIN — GUAIFENESIN 600 MG: 600 TABLET ORAL at 09:51

## 2022-08-10 RX ADMIN — ENOXAPARIN SODIUM 40 MG: 100 INJECTION SUBCUTANEOUS at 09:51

## 2022-08-10 RX ADMIN — DOCUSATE SODIUM 50MG AND SENNOSIDES 8.6MG 1 TABLET: 8.6; 5 TABLET, FILM COATED ORAL at 11:53

## 2022-08-10 RX ADMIN — HYDRALAZINE HYDROCHLORIDE 50 MG: 25 TABLET, FILM COATED ORAL at 06:56

## 2022-08-10 RX ADMIN — Medication 1 CAPSULE: at 09:51

## 2022-08-10 RX ADMIN — OXYCODONE HYDROCHLORIDE AND ACETAMINOPHEN 500 MG: 500 TABLET ORAL at 09:51

## 2022-08-10 RX ADMIN — POLYETHYLENE GLYCOL 3350 17 G: 17 POWDER, FOR SOLUTION ORAL at 11:53

## 2022-08-10 RX ADMIN — OXYCODONE 5 MG: 5 TABLET ORAL at 18:38

## 2022-08-10 RX ADMIN — INSULIN LISPRO 3 UNITS: 100 INJECTION, SOLUTION INTRAVENOUS; SUBCUTANEOUS at 16:28

## 2022-08-10 RX ADMIN — CARVEDILOL 12.5 MG: 12.5 TABLET, FILM COATED ORAL at 16:10

## 2022-08-10 RX ADMIN — CARVEDILOL 12.5 MG: 12.5 TABLET, FILM COATED ORAL at 09:51

## 2022-08-10 RX ADMIN — SACUBITRIL AND VALSARTAN 1 TABLET: 24; 26 TABLET, FILM COATED ORAL at 10:00

## 2022-08-10 RX ADMIN — DOCUSATE SODIUM 50MG AND SENNOSIDES 8.6MG 1 TABLET: 8.6; 5 TABLET, FILM COATED ORAL at 16:13

## 2022-08-10 RX ADMIN — PROMETHAZINE HYDROCHLORIDE 12.5 MG: 25 TABLET ORAL at 09:49

## 2022-08-10 RX ADMIN — HYDRALAZINE HYDROCHLORIDE 50 MG: 25 TABLET, FILM COATED ORAL at 11:59

## 2022-08-10 RX ADMIN — INSULIN LISPRO 2 UNITS: 100 INJECTION, SOLUTION INTRAVENOUS; SUBCUTANEOUS at 11:53

## 2022-08-10 RX ADMIN — DEXAMETHASONE 6 MG: 6 TABLET ORAL at 10:00

## 2022-08-10 RX ADMIN — BUTALBITAL, ACETAMINOPHEN, AND CAFFEINE 1 TABLET: 50; 325; 40 TABLET ORAL at 13:38

## 2022-08-10 NOTE — PROGRESS NOTES
1900: Bedside shift change report given to Caitlin (oncoming nurse) by Theresa Espinoza (offgoing nurse). Report included the following information SBAR, Kardex, Intake/Output, MAR, and Recent Results.

## 2022-08-10 NOTE — PROGRESS NOTES
[facility] [address]  [address] [facility], [place], [address] [place] [address]  [phone]      Medical Progress Note      NAME: Adolfo Jenkins   :  1940  MRM:  [id number]    Date/Time of service: 8/10/2022  9:19 AM       Subjective:     Chief Complaint:  Patient was personally seen and examined by me during this time period. Chart reviewed. No SOB. C/o generalized abd pain, constipation        Objective:       Vitals:       Last 24hrs VS reviewed since prior progress note. Most recent are:    Visit Vitals  BP (!) 137/54 (BP 1 Location: Right upper arm, BP Patient Position: At rest)   Pulse 70   Temp 98 °F (36.7 °C)   Resp 16   Ht 5' 2\" (1.575 m)   Wt 88.7 kg (195 lb 8 oz)   SpO2 94%   BMI 35.76 kg/m²     SpO2 Readings from Last 6 Encounters:   08/10/22 94%   22 94%   21 95%   20 96%    O2 Flow Rate (L/min): 2 l/min     Intake/Output Summary (Last 24 hours) at 8/10/2022 0919  Last data filed at 2022 1019  Gross per 24 hour   Intake 250 ml   Output 300 ml   Net -50 ml          Exam:     Physical Exam:    Gen:  disheveled, frail, ill-appearing, NAD  HEENT:  Pink conjunctivae, PERRL, hearing intact to voice, dry mucous membranes  Neck:  Supple, without masses, thyroid non-tender  Resp:  No accessory muscle use, scattered rales and rhonchi   Card:  No murmurs, normal S1, S2 without thrills, trace edema  Abd:  Soft, mild generalized pain, non-distended, normoactive bowel sounds are present  Lymph:  No cervical adenopathy  Musc:  No cyanosis or clubbing  Skin:  No rashes  Neuro:  Cranial nerves 3-12 are grossly intact, generalized weakness, follows commands appropriately  Psych:  Alert with poor insight.   Oriented to person, place    Medications Reviewed: (see below)    Lab Data Reviewed: (see below)    ______________________________________________________________________    Medications:     Current Facility-Administered Medications   Medication Dose Route Frequency senna-docusate (PERICOLACE) 8.6-50 mg per tablet 1 Tablet  1 Tablet Oral BID    polyethylene glycol (MIRALAX) packet 17 g  17 g Oral DAILY    hydrALAZINE (APRESOLINE) tablet 50 mg  50 mg Oral Q8H    enoxaparin (LOVENOX) injection 40 mg  40 mg SubCUTAneous DAILY    sacubitriL-valsartan (ENTRESTO) 24-26 mg tablet 1 Tablet  1 Tablet Oral Q12H    L.acidophilus-paracasei-S.thermophil-bifidobacter (RISAQUAD) 8 billion cell capsule  1 Capsule Oral DAILY    morphine injection 2 mg  2 mg IntraVENous Q4H PRN    oxyCODONE IR (ROXICODONE) tablet 5 mg  5 mg Oral Q4H PRN    prochlorperazine (COMPAZINE) injection 10 mg  10 mg IntraVENous Q6H PRN    ascorbic acid (vitamin C) (VITAMIN C) tablet 500 mg  500 mg Oral DAILY    zinc sulfate (ZINCATE) 50 mg zinc (220 mg) capsule 1 Capsule  1 Capsule Oral DAILY    dexAMETHasone (DECADRON) tablet 6 mg  6 mg Oral DAILY    guaiFENesin ER (MUCINEX) tablet 600 mg  600 mg Oral Q12H    benzonatate (TESSALON) capsule 100 mg  100 mg Oral TID PRN    insulin lispro (HUMALOG) injection   SubCUTAneous AC&HS    glucose chewable tablet 16 g  4 Tablet Oral PRN    glucagon (GLUCAGEN) injection 1 mg  1 mg IntraMUSCular PRN    hydrALAZINE (APRESOLINE) 20 mg/mL injection 20 mg  20 mg IntraVENous Q6H PRN    aspirin delayed-release tablet 81 mg  81 mg Oral DAILY    albuterol-ipratropium (DUO-NEB) 2.5 MG-0.5 MG/3 ML  3 mL Nebulization Q4H PRN    sodium chloride (NS) flush 5-40 mL  5-40 mL IntraVENous Q8H    sodium chloride (NS) flush 5-40 mL  5-40 mL IntraVENous PRN    0.9% sodium chloride infusion 25 mL  25 mL IntraVENous PRN    acetaminophen (TYLENOL) tablet 650 mg  650 mg Oral Q6H PRN    Or    acetaminophen (TYLENOL) suppository 650 mg  650 mg Rectal Q6H PRN    bisacodyL (DULCOLAX) suppository 10 mg  10 mg Rectal DAILY PRN    promethazine (PHENERGAN) tablet 12.5 mg  12.5 mg Oral Q6H PRN    Or    ondansetron (ZOFRAN) injection 4 mg  4 mg IntraVENous Q6H PRN    carvediloL (COREG) tablet 12.5 mg  12.5 mg Oral BID WITH MEALS          Lab Review:     Recent Labs     08/08/22 0041   WBC 10.5   HGB 10.9*   HCT 35.2          Recent Labs     08/09/22 0048 08/08/22 0041    141   K 4.8 4.9   * 111*   CO2 24 24   * 137*   BUN 36* 32*   CREA 1.31* 1.37*   CA 9.1 9.1   MG 1.8 1.9   PHOS 2.2* 2.8   ALB  --  3.1*   TBILI  --  0.4   ALT  --  27       Lab Results   Component Value Date/Time    Glucose (POC) 126 (H) 08/10/2022 07:52 AM    Glucose (POC) 178 (H) 08/09/2022 09:02 PM    Glucose (POC) 353 (H) 08/09/2022 03:40 PM    Glucose (POC) 240 (H) 08/09/2022 11:28 AM    Glucose (POC) 126 (H) 08/09/2022 07:28 AM          Assessment / Plan:     [de-identified] yo hx of HTN, DM, CHF, CKD, presented w/ chest pain, N/V/D, COVID infection, VALERIA     1) COVID-19 infection/Hypoxia: Much improved. Off O2. Unvaccinated. No pneumonia on CXR. CRP low. Out of window for Paxlovid. Will finish a course of oral dexamethasone, Vit C/Zinc, incentive spirometry. Monitor inflammatory labs. No need for O2 on discharge     2) Chest pain: vague, likely related to COVID rather than ACS. Patient has a hx of TAVR. Cardiac enzymes and EKG neg. Echo w/ EF 60-65%. Cards following, no plan for further interventions. Follow up with [address] [place] [address] cardiology, Dr. Brutus Scheuermann     3) VALERIA/CKD 3: slowly improving. Due to poor PO intake, dehydration. Holding lasix. Monitor BMP     4) N/V/D: POA, now resolved. Due to COVID. Cont pro-biotics, IV antiemetics     5) HTN/CHF: last echo with EF 60-65%. Cont Coreg, entresto. Holding lasix. Monitor volume status     6) DM type 2: A1C 6.2%. Hold oral meds. Cont SSI    7) Abd pain/constipation: abd CT on 08/05 unremarkable. Will check KUB today. Start senna/docusate, miralax    8) Weakness: cont PT/OT. Has poor family support.   Awaiting SNF    Total time spent with patient care: 28 Minutes **I personally saw and examined the patient during this time period**                 Care Plan discussed with: Patient, nursing, ERIS      Discussed:  Care Plan    Prophylaxis:  lovenox    Disposition:  SNF pending            ___________________________________________________    Attending Physician: Ann Vernon MD

## 2022-08-10 NOTE — PROGRESS NOTES
Problem: Self Care Deficits Care Plan (Adult)  Goal: *Acute Goals and Plan of Care (Insert Text)  Description: FUNCTIONAL STATUS PRIOR TO ADMISSION: Patient was modified independent with mobility using a rollator. She reports able to perform ADLs, but utilizes assist of caregiver for tasks at baseline. HOME SUPPORT: The patient lived with son, and has paid caregiver 5 days per week, 6hrs per day. Occupational Therapy Goals  Initiated 8/7/2022  1. Patient will perform lower body dressing with minimal assistance/contact guard assist within 7 day(s). 2.  Patient will perform grooming with supervision/set-up within 7 day(s). 4.  Patient will perform toilet transfers with supervision/set-up within 7 day(s). 5.  Patient will perform all aspects of toileting with supervision/set-up within 7 day(s). 6.  Patient will participate in upper extremity therapeutic exercise/activities with supervision/set-up for 10 minutes within 7 day(s). Outcome: Progressing Towards Goal     OCCUPATIONAL THERAPY TREATMENT  Patient: Dary Snyder (60 y.o. female)  Date: 8/10/2022  Diagnosis: Chest pain [R07.9] Chest pain      Precautions: Fall (droplet +)  Chart, occupational therapy assessment, plan of care, and goals were reviewed. ASSESSMENT  Patient continues with skilled OT services and is minimally progressing towards goals. Patient today most limited by increased abdominal pain (described as \"around her belly button\"). She requires max encouragement to perform limited activity due to need for hygiene and linen change. Pt mobilizes in bed with supervision/SBA and stands at EOB for edson care with CGA with use of RW. She declines additional OOB activity and requests return to supine at end of session. SpO2 stable at 94% on RA throughout activity.      Current Level of Function Impacting Discharge (ADLs): up to min/mod A for ADLs    Other factors to consider for discharge: on RA; fall risk         PLAN :  Patient continues to benefit from skilled intervention to address the above impairments. Continue treatment per established plan of care to address goals. Recommend with staff: OOB to chair for meals; BSC for toileting vs mobility to bathroom     Recommend next OT session: bathroom ADLs    Recommendation for discharge: (in order for the patient to meet his/her long term goals)  Occupational therapy at least 2 days/week in the home AND ensure assist and/or supervision for safety with ADLs and mobility    This discharge recommendation:  Has not yet been discussed the attending provider and/or case management    IF patient discharges home will need the following DME: TBD       SUBJECTIVE:   Patient stated It hurts right around my belly button.     OBJECTIVE DATA SUMMARY:   Cognitive/Behavioral Status:  Neurologic State: Alert  Orientation Level: Oriented X4  Cognition: Follows commands  Perception: Appears intact  Perseveration: No perseveration noted  Safety/Judgement: Fall prevention    Functional Mobility and Transfers for ADLs:  Bed Mobility:  Supine to Sit: Modified independent  Sit to Supine: Modified independent    Transfers:  Sit to Stand: Contact guard assistance  Bed to Chair: Contact guard assistance    Balance:  Sitting: Intact  Standing: Impaired; With support  Standing - Static: Good  Standing - Dynamic : Fair    ADL Intervention:  Cognitive Retraining  Safety/Judgement: Fall prevention    Pain:  Pt reporting severe pain \"around belly button\". RN aware. Activity Tolerance:   Poor    After treatment patient left in no apparent distress:   Supine in bed, Call bell within reach, Bed / chair alarm activated, and Side rails x 3    COMMUNICATION/COLLABORATION:   The patients plan of care was discussed with: Physical therapist and Registered nurse.      Naina Grover OT  Time Calculation: 14 mins

## 2022-08-10 NOTE — PROGRESS NOTES
0730 Bedside and Verbal shift change report given to PAULA BRYANT  (oncoming nurse) by Colleen Campos RN  (offgoing nurse). Report included the following information SBAR, Kardex, Intake/Output, MAR, Accordion, Recent Results, and Med Rec Status. This patient was assisted with Intentional Toileting every 2 hours during this shift as appropriate. Documentation of ambulation and output reflected on Flowsheet as appropriate. Purposeful hourly rounding was completed using AIDET and 5Ps. Outcomes of PHR documented as they occurred. Bed alarm in use as appropriate. Dual Suction and ambubag in place. 56 Notified Dr. Crain Or of patient's elevated BP. Per MD, give 1400 dose of hydralazine now. 1518 TRANSFER - OUT REPORT:    Verbal report given to [facility] RN (name) on Ernestina Moraes  being transferred to [facility](unit) for routine progression of care       Report consisted of patients Situation, Background, Assessment and   Recommendations(SBAR). Information from the following report(s) SBAR, Kardex, Intake/Output, MAR, Accordion, Recent Results, and Med Rec Status was reviewed with the receiving nurse. Lines:       Opportunity for questions and clarification was provided.       Patient transported with:   Registered Nurse

## 2022-08-10 NOTE — PROGRESS NOTES
TRANSFER - OUT REPORT:    Verbal report given to Josh Palmer (name) on Thad Ton (patient name)  being transferred to [phone] (unit) for routine progression of care   Report consisted of patients Situation, Background, Assessment and   Recommendations(SBAR).      Severa Sil, RN, MSN/Care manager  [phone]

## 2022-08-10 NOTE — PROGRESS NOTES
Bedside shift change report given to [address] [place] [address],[address] (oncoming nurse) by Krishan Yates (offgoing nurse). Report included the following information SBAR, Kardex, MAR, Recent Results, and Cardiac Rhythm (off tele, medical orders) . Bedside shift change report given to Phuong BRYANT (oncoming nurse) by Tonie Ellis RN (offgoing nurse). Report included the following information SBAR, Kardex, Med Rec Status, and Cardiac Rhythm not on tele .

## 2022-08-10 NOTE — PROGRESS NOTES
Care Management follow up    Patient admitted for  COVID19, chest pain, N/V/D, VALERIA. Hx - HTN, diabetes, CHF, CKD. Blindness. COVID Vaccine:  no  t    RUR 14 (Score %) low   Is This a Readmission NO  Is this a Bundle NO    Current status  Patient discussed during interdisciplinary rounds. Patient continues to require medical management including ongoing assessment and monitoring. Patient continues with Droplet Plus isolation for COVID19. Patient accepted by [facility] for SNF care, spoke with patient and [facility] Facility informed via Letty Banda, they will start authorization today. Transition of Care Plan  Monitor patient status and response to treatment. Patient continues to require medical management. CM needs: SNF placement  Accepted by [facility], starting [address] today. Will need transport to SNF. CM to monitor progress and recommendations.     Mario Francis RN, MSN/Care manager

## 2022-08-10 NOTE — PROGRESS NOTES
Physical Therapy  8/10/2022    Chart reviewed and cleared by RN. Attempted to work with patient though she is reporting too much pain at this time. Pt rates pain as 9/10 surrounding belly button. RN made aware. Will follow up as time allows.     Thank Barbara Peña, PT, DPT

## 2022-08-11 VITALS
HEIGHT: 62 IN | HEART RATE: 66 BPM | WEIGHT: 188.6 LBS | RESPIRATION RATE: 17 BRPM | BODY MASS INDEX: 34.71 KG/M2 | SYSTOLIC BLOOD PRESSURE: 137 MMHG | DIASTOLIC BLOOD PRESSURE: 79 MMHG | OXYGEN SATURATION: 92 % | TEMPERATURE: 97.8 F

## 2022-08-11 LAB
ANION GAP SERPL CALC-SCNC: 6 MMOL/L (ref 5–15)
BUN SERPL-MCNC: 32 MG/DL (ref 6–20)
BUN/CREAT SERPL: 25 (ref 12–20)
CALCIUM SERPL-MCNC: 9.1 MG/DL (ref 8.5–10.1)
CHLORIDE SERPL-SCNC: 112 MMOL/L (ref 97–108)
CO2 SERPL-SCNC: 23 MMOL/L (ref 21–32)
CREAT SERPL-MCNC: 1.26 MG/DL (ref 0.55–1.02)
CRP SERPL-MCNC: <0.29 MG/DL (ref 0–0.6)
D DIMER PPP FEU-MCNC: 1.78 MG/L FEU (ref 0–0.65)
ERYTHROCYTE [DISTWIDTH] IN BLOOD BY AUTOMATED COUNT: 13.5 % (ref 11.5–14.5)
GLUCOSE BLD STRIP.AUTO-MCNC: 133 MG/DL (ref 65–117)
GLUCOSE BLD STRIP.AUTO-MCNC: 272 MG/DL (ref 65–117)
GLUCOSE BLD STRIP.AUTO-MCNC: 294 MG/DL (ref 65–117)
GLUCOSE SERPL-MCNC: 193 MG/DL (ref 65–100)
HCT VFR BLD AUTO: 32 % (ref 35–47)
HGB BLD-MCNC: 10 G/DL (ref 11.5–16)
MAGNESIUM SERPL-MCNC: 2.3 MG/DL (ref 1.6–2.4)
MCH RBC QN AUTO: 29.4 PG (ref 26–34)
MCHC RBC AUTO-ENTMCNC: 31.3 G/DL (ref 30–36.5)
MCV RBC AUTO: 94.1 FL (ref 80–99)
NRBC # BLD: 0 K/UL (ref 0–0.01)
NRBC BLD-RTO: 0 PER 100 WBC
PHOSPHATE SERPL-MCNC: 3.1 MG/DL (ref 2.6–4.7)
PLATELET # BLD AUTO: 189 K/UL (ref 150–400)
PMV BLD AUTO: 11 FL (ref 8.9–12.9)
POTASSIUM SERPL-SCNC: 4.6 MMOL/L (ref 3.5–5.1)
RBC # BLD AUTO: 3.4 M/UL (ref 3.8–5.2)
SERVICE CMNT-IMP: ABNORMAL
SODIUM SERPL-SCNC: 141 MMOL/L (ref 136–145)
WBC # BLD AUTO: 8.2 K/UL (ref 3.6–11)

## 2022-08-11 PROCEDURE — 74011250636 HC RX REV CODE- 250/636: Performed by: INTERNAL MEDICINE

## 2022-08-11 PROCEDURE — 85379 FIBRIN DEGRADATION QUANT: CPT

## 2022-08-11 PROCEDURE — 85027 COMPLETE CBC AUTOMATED: CPT

## 2022-08-11 PROCEDURE — 86140 C-REACTIVE PROTEIN: CPT

## 2022-08-11 PROCEDURE — 74011250637 HC RX REV CODE- 250/637: Performed by: INTERNAL MEDICINE

## 2022-08-11 PROCEDURE — 82962 GLUCOSE BLOOD TEST: CPT

## 2022-08-11 PROCEDURE — 80048 BASIC METABOLIC PNL TOTAL CA: CPT

## 2022-08-11 PROCEDURE — 83735 ASSAY OF MAGNESIUM: CPT

## 2022-08-11 PROCEDURE — 74011636637 HC RX REV CODE- 636/637: Performed by: INTERNAL MEDICINE

## 2022-08-11 PROCEDURE — 84100 ASSAY OF PHOSPHORUS: CPT

## 2022-08-11 PROCEDURE — 77030038269 HC DRN EXT URIN PURWCK BARD -A

## 2022-08-11 PROCEDURE — 36415 COLL VENOUS BLD VENIPUNCTURE: CPT

## 2022-08-11 RX ORDER — DEXAMETHASONE 2 MG/1
TABLET ORAL
Qty: 6 TABLET | Refills: 0 | Status: SHIPPED
Start: 2022-08-11 | End: 2022-08-15

## 2022-08-11 RX ADMIN — ASPIRIN 81 MG: 81 TABLET, COATED ORAL at 08:28

## 2022-08-11 RX ADMIN — INSULIN LISPRO 5 UNITS: 100 INJECTION, SOLUTION INTRAVENOUS; SUBCUTANEOUS at 17:19

## 2022-08-11 RX ADMIN — OXYCODONE HYDROCHLORIDE AND ACETAMINOPHEN 500 MG: 500 TABLET ORAL at 08:28

## 2022-08-11 RX ADMIN — HYDRALAZINE HYDROCHLORIDE 50 MG: 25 TABLET, FILM COATED ORAL at 00:46

## 2022-08-11 RX ADMIN — Medication 1 CAPSULE: at 08:28

## 2022-08-11 RX ADMIN — CARVEDILOL 12.5 MG: 12.5 TABLET, FILM COATED ORAL at 08:29

## 2022-08-11 RX ADMIN — DOCUSATE SODIUM 50MG AND SENNOSIDES 8.6MG 1 TABLET: 8.6; 5 TABLET, FILM COATED ORAL at 08:28

## 2022-08-11 RX ADMIN — INSULIN LISPRO 2 UNITS: 100 INJECTION, SOLUTION INTRAVENOUS; SUBCUTANEOUS at 00:46

## 2022-08-11 RX ADMIN — INSULIN LISPRO 3 UNITS: 100 INJECTION, SOLUTION INTRAVENOUS; SUBCUTANEOUS at 11:14

## 2022-08-11 RX ADMIN — DEXAMETHASONE 6 MG: 6 TABLET ORAL at 08:28

## 2022-08-11 RX ADMIN — DOCUSATE SODIUM 50MG AND SENNOSIDES 8.6MG 1 TABLET: 8.6; 5 TABLET, FILM COATED ORAL at 17:19

## 2022-08-11 RX ADMIN — HYDRALAZINE HYDROCHLORIDE 50 MG: 25 TABLET, FILM COATED ORAL at 07:00

## 2022-08-11 RX ADMIN — SACUBITRIL AND VALSARTAN 1 TABLET: 24; 26 TABLET, FILM COATED ORAL at 08:28

## 2022-08-11 RX ADMIN — CARVEDILOL 12.5 MG: 12.5 TABLET, FILM COATED ORAL at 17:19

## 2022-08-11 RX ADMIN — GUAIFENESIN 600 MG: 600 TABLET ORAL at 08:28

## 2022-08-11 RX ADMIN — HYDRALAZINE HYDROCHLORIDE 50 MG: 25 TABLET, FILM COATED ORAL at 14:37

## 2022-08-11 RX ADMIN — SACUBITRIL AND VALSARTAN 1 TABLET: 24; 26 TABLET, FILM COATED ORAL at 00:46

## 2022-08-11 RX ADMIN — POLYETHYLENE GLYCOL 3350 17 G: 17 POWDER, FOR SOLUTION ORAL at 08:27

## 2022-08-11 RX ADMIN — ENOXAPARIN SODIUM 40 MG: 100 INJECTION SUBCUTANEOUS at 08:28

## 2022-08-11 RX ADMIN — GUAIFENESIN 600 MG: 600 TABLET ORAL at 00:46

## 2022-08-11 NOTE — PROGRESS NOTES
TRANSFER - OUT REPORT:    Verbal report given to [facility] (name) on Alma Delia Vegas  being transferred to [facility] (unit) for routine progression of care       Report consisted of patients Situation, Background, Assessment and   Recommendations(SBAR). Information from the following report(s) SBAR, Kardex, Intake/Output, MAR, and Recent Results was reviewed with the receiving nurse. Lines: None. Opportunity for questions and clarification was provided.

## 2022-08-11 NOTE — PROGRESS NOTES
Pt was picked up for transport to [facility] by [facility]. Full report given to [facility] staff. Pt in no apparent distress at time of discharge and with no complaints.

## 2022-08-11 NOTE — DISCHARGE SUMMARY
Physician Discharge Summary     Patient ID:  Allison Manrique  [id number]  73 y.o.  1940    Admit date: 2022    Discharge date and time: 2022    Admission Diagnoses: Chest pain [R07.9]    Discharge Diagnoses:    Principal Problem:    Chest pain (2022)    Active Problems:    COVID-19 virus infection (2022)      VALERIA (acute kidney injury) ([facility] [address] (2022)      Diarrhea (2022)           Hospital Course: The patient is a [de-identified] yo hx of HTN, DM, CHF, CKD, presented w/ chest pain, N/V/D, COVID infection, VALERIA. The patient c/o fatigue, weakness, and dyspnea for 1 week. She and her family tested positive for COVID. Over the past few days, she c/o a left sided chest pain, associated with nausea, vomiting, diarrhea. Denied cough, fevers, chills. In the ED, EKG and Trop were negative for ACS. CXR neg for pneumonia. WBC 3.8. She was admitted for further evaluation and treatment of the followin) COVID-19 infection/Hypoxia: Much improved and discharges off oxygen. She responded well to dexamethasone and supportive care. She will complete short taper and discharges to rehab for recovery     2) Chest pain: vague, likely related to COVID rather than ACS. Patient has a hx of TAVR. Cardiac enzymes and EKG neg. Echo w/ EF 60-65%. Cards following, no plan for further interventions. Follow up with [facility] cardiology, Dr. Baltazar Hi     3) VALERIA/CKD 3: Stable     4) N/V/D: POA, now resolved. Due to COVID. Cont pro-biotics, IV antiemetics     5) HTN/CHF: last echo with EF 60-65%. Cont Coreg, entresto, lasix. Caution with the latter     6) DM type 2: A1C 6.2%. Resume home meds     7) Abd pain/constipation: abd CT on  unremarkable. Continue bowel regimen at SNF     8) Weakness: cont PT/OT.   SNF        PCP: Radha Villalta MD     Consults: None    Condition of patient at discharge: fair    Discharge Exam:    Physical Exam:    Gen:  disheveled, frail, ill-appearing, NAD  HEENT:  Pink conjunctivae, PERRL, hearing intact to voice, dry mucous membranes  Neck:  Supple, without masses, thyroid non-tender  Resp:  No accessory muscle use, scattered rales and rhonchi   Card:  No murmurs, normal S1, S2 without thrills, trace edema  Abd:  Soft, mild generalized pain, non-distended, normoactive bowel sounds are present  Lymph:  No cervical adenopathy  Musc:  No cyanosis or clubbing  Skin:  No rashes  Neuro:  Cranial nerves 3-12 are grossly intact, generalized weakness, follows commands appropriately  Psych:  Alert with poor insight. Oriented to person, place          Patient Instructions:   Current Discharge Medication List        START taking these medications    Details   dexAMETHasone (DECADRON) 2 mg tablet Take 2 Tablets by mouth Daily (before breakfast) for 2 days, THEN 1 Tablet Daily (before breakfast) for 2 days. Qty: 6 Tablet, Refills: 0           CONTINUE these medications which have NOT CHANGED    Details   atorvastatin (LIPITOR) 40 mg tablet Take 40 mg by mouth nightly. clopidogreL (PLAVIX) 75 mg tab Take 75 mg by mouth in the morning. hydrALAZINE (APRESOLINE) 25 mg tablet Take 25 mg by mouth two (2) times a day. metFORMIN (GLUCOPHAGE) 850 mg tablet Take 850 mg by mouth two (2) times daily (with meals). pantoprazole (PROTONIX) 40 mg tablet Take 40 mg by mouth Daily (before breakfast). SITagliptin (JANUVIA) 25 mg tablet Take 25 mg by mouth in the morning. albuterol (PROVENTIL HFA, VENTOLIN HFA, PROAIR HFA) 90 mcg/actuation inhaler Take 2 Puffs by inhalation every four (4) hours as needed for Wheezing. Qty: 18 g, Refills: 0      aspirin delayed-release 81 mg tablet Take 1 Tablet by mouth daily. Qty: 30 Tablet, Refills: 0      butalbital-acetaminophen-caffeine (FIORICET, ESGIC) -40 mg per tablet Take 1 Tablet by mouth every six (6) hours as needed for Headache.   Qty: 30 Tablet, Refills: 0      carvediloL (COREG) 12.5 mg tablet Take 1 Tablet by mouth two (2) times daily (with meals). Qty: 60 Tablet, Refills: 0      furosemide (LASIX) 40 mg tablet Take 1 Tablet by mouth daily. Qty: 30 Tablet, Refills: 0      sacubitril-valsartan (ENTRESTO) 24 mg/26 mg tablet Take 1 Tablet by mouth every twelve (12) hours. Qty: 60 Tablet, Refills: 0           Activity: Activity as tolerated  Diet: Regular Diet  Wound Care: None needed    Follow-up with Valdo Duncan MD in 1 week. Follow-up tests/labs None    Approximate time spent in patient care on day of discharge: 20 min    Signed:   Lalit Soriano MD  8/11/2022  12:51 PM

## 2022-08-11 NOTE — PROGRESS NOTES
Transition of Care Plan to SNF/Rehab    SNF/Rehab Transition:  Patient has been accepted to [facility] and Rehab and meets criteria for admission. Pt will be admitted to room 234A. Patient will transported by [facility] and expected to leave at 5:30PM. PCS has been uploaded in [address]. Packet on chart. CM received Medicaid auth #: [id number]. Auth provided to [facility] (placed on PCS and provided to [facility]). Communication to Patient/Family:  Pt did not answer room phone- CM unable to meet face to face due to isolation precautions. CM asked nurse to update Pt on discharge. Nurse agreeable. CM left a message for Pt's son, Angel Foley, x2 with no return call. Communication to SNF/Rehab:  Bedside RN, [employer], has been notified to update the transition plan to the facility and call report (phone number [phone]). Discharge information has been updated on the AVS.     Discharge instructions to be fax'd to facility at [facility] # [phone]). [] BCPI-A  Patient has NOT been identified as part of the BCPI-A Program.      Nursing Please include all hard scripts for controlled substances, med rec and dc summary, and AVS in packet.      Reviewed and confirmed with facility, [facility] Record, can manage the patient care needs for the following:     Nurse please discuss applicable information during report:     Medication:  []  Medications will be available at the facility  []  IV Antibiotics  []  Controlled Substance - hard copy to be sent with patient   []  Weekly Labs   Documents:  [] Hard RX  [] MAR  [] Kardex  [] AVS  []Transfer Summary  []Discharge   Equipment:  []  CPAP/BiPAP  []  Wound Vacuum  []  Torrez or Urinary Device  []  PICC/Central Line  []  Nebulizer  []  Ventilator   Treatment:  []Isolation (for MRSA, VRE, etc.)  []Surgical Drain Management  []Tracheostomy Care  []Dressing Changes  []Dialysis with transportation and chair time  []PEG Care  []Oxygen  []Daily Weights for Heart Failure   Dietary:  []Any diet limitations  []Tube Feedings   []Total Parenteral Management (TPN)   Eligible for Medicaid Long Term Services and Supports  Yes:  [] Eligible for medical assistance or will become eligible within 180 days and UAI completed. [] Provider/Patient and/or support system has requested screening. [] UAI copy provided to patient or responsible party,   [] UAI unavailable at discharge will send once processed to SNF provider. [] UAI unavailable at discharged mailed to patient  No:   [] Private pay and is not financially eligible for Medicaid within the next 180 days. [] Reside out-of-state. [] A residents of a state owned/operated facility that is licensed  by [facility] or [facility]  [] Enrollment in [facility] hospice services  [] [address]  [x] Patient /Family declines to have screening completed or provide financial information for screening     Financial Resources:  Medicaid    [] Initiated and application pending   [] Full coverage     Advanced Care Plan:  []Surrogate Decision Maker of Care  []POA  []Communicated Code Status (DDNR\", \"Full\")    Other       _____________________________________  Marvin Morgan, [address] [facility]  Available via [facility]  8/11/2022   4:54 PM    Care Management Interventions  PCP Verified by CM:  Yes (Dr. Florinda Ross)  Mode of Transport at Discharge: [address] (CM Consult): Discharge Planning  Physical Therapy Consult: Yes  Occupational Therapy Consult: Yes  Support Systems: Child(jitendra)  Confirm Follow Up Transport: Family  The Plan for Transition of Care is Related to the Following Treatment Goals : Return home at [place]  The Patient and/or Patient Representative was Provided with a Choice of Provider and Agrees with the Discharge Plan?: (S) Yes  Freedom of Choice List was Provided with Basic Dialogue that Supports the Patient's Individualized Plan of Care/Goals, Treatment Preferences and Shares the Quality Data Associated with the Providers?: (S) Yes  Discharge Location  Patient Expects to be Discharged to[de-identified] Skilled nursing facility

## 2022-08-11 NOTE — PROGRESS NOTES
Bedside and Verbal shift change report given to Sean Sevilla RN (oncoming nurse) by Gabriel Diaz RN (offgoing nurse). Report included the following information SBAR, Kardex, ED Summary, Intake/Output, MAR, and Recent Results.

## 2022-08-11 NOTE — PROGRESS NOTES
Problem: Isolation Precautions - Risk of Spread of Infection  Goal: Prevent transmission of infectious organism to others  Outcome: Progressing Towards Goal     Problem: Nutrition Deficits  Goal: Optimize nutrtional status  Outcome: Progressing Towards Goal     Problem: Risk for Fluid Volume Deficit  Goal: Maintain absence of muscle cramping  Outcome: Progressing Towards Goal     Problem: Risk for Fluid Volume Deficit  Goal: Maintain normal serum potassium, sodium, calcium, phosphorus, and pH  Outcome: Progressing Towards Goal     Problem: Fatigue  Goal: Verbalize increase energy and improved vitality  Outcome: Progressing Towards Goal     Problem: Patient Education: Go to Patient Education Activity  Goal: Patient/Family Education  Outcome: Progressing Towards Goal     Problem: Falls - Risk of  Goal: *Absence of Falls  Description: Document Jazz Fall Risk and appropriate interventions in the flowsheet. Outcome: Progressing Towards Goal  Note: Fall Risk Interventions:  Mobility Interventions: Bed/chair exit alarm, OT consult for ADLs, Patient to call before getting OOB, PT Consult for mobility concerns, Strengthening exercises (ROM-active/passive)         Medication Interventions: Bed/chair exit alarm, Evaluate medications/consider consulting pharmacy, Patient to call before getting OOB, Teach patient to arise slowly    Elimination Interventions: Bed/chair exit alarm, Call light in reach, Toileting schedule/hourly rounds              Problem: Pressure Injury - Risk of  Goal: *Prevention of pressure injury  Description: Document Narciso Scale and appropriate interventions in the flowsheet.   Outcome: Progressing Towards Goal

## 2022-08-11 NOTE — DISCHARGE INSTRUCTIONS
HOSPITALIST DISCHARGE INSTRUCTIONS  NAME: Rodriguez Daigle   :  1940   MRN:  [id number]     Date/Time:  2022 12:29 PM    ADMIT DATE: 2022     DISCHARGE DATE: 2022     ADMITTING DIAGNOSIS:  Acute Hypoxemic Respiratory Failure due to COVID-19 Pneumonia    DISCHARGE DIAGNOSIS:  You were admitted for evaluation and treatment of the above. You responded well to supplemental oxygen and steroids. You no longe rneed oxygen, but will need rehab to gain strength back. MEDICATIONS:    It is important that you take the medication exactly as they are prescribed. Keep your medication in the bottles provided by the pharmacist and keep a list of the medication names, dosages, and times to be taken in your wallet. Do not take other medications without consulting your doctor. If you experience any of the following symptoms then please call your primary care physician or return to the emergency room if you cannot get hold of your doctor:  Fever, chills, nausea, vomiting, diarrhea, change in mentation, falling, bleeding, shortness of breath    Follow Up:   call and set up an appointment to see them in 1 week. Anderson Cintron MD  [address] [place] [address]  [phone]              Information obtained by :  I understand that if any problems occur once I am at home I am to contact my physician. I understand and acknowledge receipt of the instructions indicated above.                                                                                                                                            Physician's or R.N.'s Signature                                                                  Date/Time                                                                                                                                              Patient or Representative Signature                                                          Date/Time

## 2022-08-11 NOTE — PROGRESS NOTES
Comprehensive Nutrition Assessment    Type and Reason for Visit: Initial, RD nutrition re-screen/LOS    Nutrition Recommendations/Plan:   Continue regular diet as tolerated  Provide Ensure High Protein once daily (160 kcal, 19 g carbs, 16 g protein)    No BM x 6 days per patient - bowel regimen started yesterday 8/10/22     Malnutrition Assessment:  Malnutrition Status:  Insufficient data (08/11/22 0807)    Context:  Acute illness     Findings of the 6 clinical characteristics of malnutrition:   Energy Intake:  No significant decrease in energy intake  Weight Loss:  No significant weight loss     Body Fat Loss:  Unable to assess,     Muscle Mass Loss:  Unable to assess,    Fluid Accumulation:  No significant fluid accumulation,     Strength:  Not performed     Nutrition Assessment:     Pt is an [de-identified]year old female admitted with Chest pain [R07.9]. She  has a past medical history of CHF (congestive heart failure) ([address] Hypercholesterolemia, Hypertension, and Migraine. RD screen for LOS. Patient on droplet+ precautions, did not answer phone when RD called into room. Chart reviewed. No clinically significant weight loss noted. PO intake averaging 50-75% of all meals per documentation. NKFA. Patient c/o abd pain and constipation yesterday - noted bowel regimen began 8/10/22. No noted N/V/D. If PO intake trend continues, patient likely meeting >/= 75% kcal needs and >/= 60% protein needs. RD to provide ONS once daily to aid in meeting greater % protein needs.     Wt Readings from Last 10 Encounters:   08/10/22 88.7 kg (195 lb 8 oz)   07/28/22 87.5 kg (193 lb)   12/08/21 90.6 kg (199 lb 11.8 oz)   12/04/20 98.4 kg (217 lb)     Nutrition Related Findings:      Wound Type: None  Last Bowel Movement Date: 08/05/22 (per patient)  Stool Appearance: Formed (per patient)  Abdominal Assessment: Intact, Soft  Appetite: Fair  Bowel Sounds: Hyperactive   Edema:LLE: Trace (8/10/2022  7:43 PM)  RLE: Trace (8/10/2022  7:43 PM)      Nutr. Labs:    Lab Results   Component Value Date/Time    GFR est AA 50 (L) 08/11/2022 03:01 AM    GFR est non-AA 41 (L) 08/11/2022 03:01 AM    Creatinine 1.26 (H) 08/11/2022 03:01 AM    BUN 32 (H) 08/11/2022 03:01 AM    Sodium 141 08/11/2022 03:01 AM    Potassium 4.6 08/11/2022 03:01 AM    Chloride 112 (H) 08/11/2022 03:01 AM    CO2 23 08/11/2022 03:01 AM       Lab Results   Component Value Date/Time    Glucose 193 (H) 08/11/2022 03:01 AM    Glucose (POC) 133 (H) 08/11/2022 08:03 AM       Lab Results   Component Value Date/Time    Hemoglobin A1c 6.2 (H) 08/06/2022 09:39 AM       Nutr. Meds:  Vit C, dulcolax, coreg, decadron, Lovenox, humalog, risaquad, zofran PRN, miralax, pericolace, zinc sulfate      Current Nutrition Intake & Therapies:  Average Meal Intake: 51-75%  Average Supplement Intake: None ordered  ADULT DIET Regular    Anthropometric Measures:  Height: 5' 2\" (157.5 cm)  Ideal Body Weight (IBW): 110 lbs (50 kg)     Current Body Wt:  88.7 kg (195 lb 8.8 oz), 177.8 % IBW. Bed scale  Current BMI (kg/m2): 35.8        Weight Adjustment: No adjustment                 BMI Category: Obese class 2 (BMI 35.0-39. 9)    Estimated Daily Nutrient Needs:  Energy Requirements Based On: Formula  Weight Used for Energy Requirements: Current  Energy (kcal/day): 1573 (MSJ x 1.2)  Weight Used for Protein Requirements: Current  Protein (g/day):  (1.0-1.2 g/kg)  Method Used for Fluid Requirements: 1 ml/kcal  Fluid (ml/day): 9566    Nutrition Diagnosis:   Increased nutrient needs related to catabolic illness as evidenced by  (COVID-19)    Nutrition Interventions:   Food and/or Nutrient Delivery: Continue current diet  Nutrition Education/Counseling: No recommendations at this time  Coordination of Nutrition Care: Continue to monitor while inpatient, Interdisciplinary rounds       Goals:     Goals: Meet at least 75% of estimated needs, by next RD assessment       Nutrition Monitoring and Evaluation: Behavioral-Environmental Outcomes: None identified  Food/Nutrient Intake Outcomes: Food and nutrient intake  Physical Signs/Symptoms Outcomes: Biochemical data, Weight, GI status    Discharge Planning:    No discharge needs at this time    Brendan Ramirez MS, RD  Contact: Ext: [phone], or via [employer]

## 2022-08-16 LAB
ATRIAL RATE: 76 BPM
CALCULATED P AXIS, ECG09: 53 DEGREES
CALCULATED R AXIS, ECG10: -5 DEGREES
CALCULATED T AXIS, ECG11: 83 DEGREES
DIAGNOSIS, 93000: NORMAL
P-R INTERVAL, ECG05: 210 MS
Q-T INTERVAL, ECG07: 426 MS
QRS DURATION, ECG06: 82 MS
QTC CALCULATION (BEZET), ECG08: 479 MS
VENTRICULAR RATE, ECG03: 76 BPM

## 2022-09-07 ENCOUNTER — TRANSCRIBE ORDER (OUTPATIENT)
Dept: SCHEDULING | Age: 82
End: 2022-09-07

## 2022-09-07 DIAGNOSIS — R51.9 GENERALIZED HEADACHES: Primary | ICD-10-CM

## 2023-04-21 DIAGNOSIS — R51.9 GENERALIZED HEADACHES: Primary | ICD-10-CM

## 2023-05-17 RX ORDER — HYDRALAZINE HYDROCHLORIDE 25 MG/1
25 TABLET, FILM COATED ORAL 2 TIMES DAILY
COMMUNITY

## 2023-05-17 RX ORDER — FUROSEMIDE 40 MG/1
40 TABLET ORAL DAILY
COMMUNITY
Start: 2021-12-08

## 2023-05-17 RX ORDER — ATORVASTATIN CALCIUM 40 MG/1
40 TABLET, FILM COATED ORAL NIGHTLY
COMMUNITY

## 2023-05-17 RX ORDER — BUTALBITAL, ACETAMINOPHEN AND CAFFEINE 50; 325; 40 MG/1; MG/1; MG/1
1 TABLET ORAL EVERY 6 HOURS PRN
COMMUNITY
Start: 2021-12-08

## 2023-05-17 RX ORDER — CARVEDILOL 12.5 MG/1
12.5 TABLET ORAL 2 TIMES DAILY WITH MEALS
COMMUNITY
Start: 2021-12-08

## 2023-05-17 RX ORDER — ALBUTEROL SULFATE 90 UG/1
2 AEROSOL, METERED RESPIRATORY (INHALATION) EVERY 4 HOURS PRN
COMMUNITY
Start: 2022-07-28

## 2023-05-17 RX ORDER — CLOPIDOGREL BISULFATE 75 MG/1
75 TABLET ORAL DAILY
COMMUNITY

## 2023-05-17 RX ORDER — ASPIRIN 81 MG/1
81 TABLET ORAL DAILY
COMMUNITY
Start: 2021-12-09

## 2023-05-17 RX ORDER — PANTOPRAZOLE SODIUM 40 MG/1
40 TABLET, DELAYED RELEASE ORAL
COMMUNITY

## 2023-11-19 ENCOUNTER — HOSPITAL ENCOUNTER (EMERGENCY)
Facility: HOSPITAL | Age: 83
Discharge: HOME | End: 2023-11-19
Attending: STUDENT IN AN ORGANIZED HEALTH CARE EDUCATION/TRAINING PROGRAM
Payer: MEDICARE

## 2023-11-19 ENCOUNTER — APPOINTMENT (OUTPATIENT)
Facility: HOSPITAL | Age: 83
End: 2023-11-19
Payer: MEDICARE

## 2023-11-19 VITALS
RESPIRATION RATE: 21 BRPM | OXYGEN SATURATION: 96 % | DIASTOLIC BLOOD PRESSURE: 85 MMHG | BODY MASS INDEX: 34.78 KG/M2 | HEART RATE: 89 BPM | TEMPERATURE: 98.2 F | WEIGHT: 189 LBS | HEIGHT: 62 IN | SYSTOLIC BLOOD PRESSURE: 152 MMHG

## 2023-11-19 DIAGNOSIS — R51.9 ACUTE INTRACTABLE HEADACHE, UNSPECIFIED HEADACHE TYPE: Primary | ICD-10-CM

## 2023-11-19 DIAGNOSIS — N28.9 RENAL IMPAIRMENT: ICD-10-CM

## 2023-11-19 LAB
ALBUMIN SERPL-MCNC: 2.8 G/DL (ref 3.5–5)
ALBUMIN/GLOB SERPL: 0.6 (ref 1.1–2.2)
ALP SERPL-CCNC: 121 U/L (ref 45–117)
ALT SERPL-CCNC: 18 U/L (ref 12–78)
ANION GAP SERPL CALC-SCNC: 7 MMOL/L (ref 5–15)
APTT PPP: 34.2 SEC (ref 21.2–34.1)
AST SERPL W P-5'-P-CCNC: 12 U/L (ref 15–37)
BASOPHILS # BLD: 0 K/UL (ref 0–0.1)
BASOPHILS NFR BLD: 0 % (ref 0–1)
BILIRUB SERPL-MCNC: 0.3 MG/DL (ref 0.2–1)
BUN SERPL-MCNC: 45 MG/DL (ref 6–20)
BUN/CREAT SERPL: 24 (ref 12–20)
CA-I BLD-MCNC: 8.9 MG/DL (ref 8.5–10.1)
CHLORIDE SERPL-SCNC: 111 MMOL/L (ref 97–108)
CO2 SERPL-SCNC: 22 MMOL/L (ref 21–32)
CREAT SERPL-MCNC: 1.91 MG/DL (ref 0.55–1.02)
CRP SERPL-MCNC: 4.68 MG/DL (ref 0–0.6)
DIFFERENTIAL METHOD BLD: ABNORMAL
EOSINOPHIL # BLD: 0.1 K/UL (ref 0–0.4)
EOSINOPHIL NFR BLD: 2 % (ref 0–7)
ERYTHROCYTE [DISTWIDTH] IN BLOOD BY AUTOMATED COUNT: 16.1 % (ref 11.5–14.5)
ERYTHROCYTE [SEDIMENTATION RATE] IN BLOOD: 65 MM/HR (ref 0–30)
GLOBULIN SER CALC-MCNC: 4.7 G/DL (ref 2–4)
GLUCOSE SERPL-MCNC: 121 MG/DL (ref 65–100)
HCT VFR BLD AUTO: 28.5 % (ref 35–47)
HGB BLD-MCNC: 8.8 G/DL (ref 11.5–16)
IMM GRANULOCYTES # BLD AUTO: 0 K/UL (ref 0–0.04)
IMM GRANULOCYTES NFR BLD AUTO: 0 % (ref 0–0.5)
INR PPP: 1.1 (ref 0.9–1.1)
LYMPHOCYTES # BLD: 1 K/UL (ref 0.8–3.5)
LYMPHOCYTES NFR BLD: 14 % (ref 12–49)
MCH RBC QN AUTO: 28 PG (ref 26–34)
MCHC RBC AUTO-ENTMCNC: 30.9 G/DL (ref 30–36.5)
MCV RBC AUTO: 90.8 FL (ref 80–99)
MONOCYTES # BLD: 0.5 K/UL (ref 0–1)
MONOCYTES NFR BLD: 7 % (ref 5–13)
NEUTS SEG # BLD: 5.5 K/UL (ref 1.8–8)
NEUTS SEG NFR BLD: 77 % (ref 32–75)
NRBC # BLD: 0 K/UL (ref 0–0.01)
NRBC BLD-RTO: 0 PER 100 WBC
PLATELET # BLD AUTO: 228 K/UL (ref 150–400)
PMV BLD AUTO: 10.2 FL (ref 8.9–12.9)
POTASSIUM SERPL-SCNC: 4.4 MMOL/L (ref 3.5–5.1)
PROT SERPL-MCNC: 7.5 G/DL (ref 6.4–8.2)
PROTHROMBIN TIME: 14.9 SEC (ref 11.9–14.6)
RBC # BLD AUTO: 3.14 M/UL (ref 3.8–5.2)
SODIUM SERPL-SCNC: 140 MMOL/L (ref 136–145)
THERAPEUTIC RANGE: ABNORMAL SEC (ref 82–109)
WBC # BLD AUTO: 7.2 K/UL (ref 3.6–11)

## 2023-11-19 PROCEDURE — 70450 CT HEAD/BRAIN W/O DYE: CPT

## 2023-11-19 PROCEDURE — 85025 COMPLETE CBC W/AUTO DIFF WBC: CPT

## 2023-11-19 PROCEDURE — 85730 THROMBOPLASTIN TIME PARTIAL: CPT

## 2023-11-19 PROCEDURE — 6360000002 HC RX W HCPCS: Performed by: STUDENT IN AN ORGANIZED HEALTH CARE EDUCATION/TRAINING PROGRAM

## 2023-11-19 PROCEDURE — 96375 TX/PRO/DX INJ NEW DRUG ADDON: CPT

## 2023-11-19 PROCEDURE — 80053 COMPREHEN METABOLIC PANEL: CPT

## 2023-11-19 PROCEDURE — 99284 EMERGENCY DEPT VISIT MOD MDM: CPT

## 2023-11-19 PROCEDURE — 96374 THER/PROPH/DIAG INJ IV PUSH: CPT

## 2023-11-19 PROCEDURE — 85652 RBC SED RATE AUTOMATED: CPT

## 2023-11-19 PROCEDURE — 6370000000 HC RX 637 (ALT 250 FOR IP): Performed by: STUDENT IN AN ORGANIZED HEALTH CARE EDUCATION/TRAINING PROGRAM

## 2023-11-19 PROCEDURE — 36415 COLL VENOUS BLD VENIPUNCTURE: CPT

## 2023-11-19 PROCEDURE — 85610 PROTHROMBIN TIME: CPT

## 2023-11-19 PROCEDURE — 86140 C-REACTIVE PROTEIN: CPT

## 2023-11-19 RX ORDER — DIPHENHYDRAMINE HYDROCHLORIDE 50 MG/ML
25 INJECTION INTRAMUSCULAR; INTRAVENOUS
Status: COMPLETED | OUTPATIENT
Start: 2023-11-19 | End: 2023-11-19

## 2023-11-19 RX ORDER — PROCHLORPERAZINE EDISYLATE 5 MG/ML
10 INJECTION INTRAMUSCULAR; INTRAVENOUS ONCE
Status: COMPLETED | OUTPATIENT
Start: 2023-11-19 | End: 2023-11-19

## 2023-11-19 RX ORDER — PREDNISONE 20 MG/1
60 TABLET ORAL ONCE
Status: COMPLETED | OUTPATIENT
Start: 2023-11-19 | End: 2023-11-19

## 2023-11-19 RX ORDER — PREDNISONE 20 MG/1
60 TABLET ORAL DAILY
Qty: 20 TABLET | Refills: 0 | Status: SHIPPED | OUTPATIENT
Start: 2023-11-19 | End: 2023-11-26

## 2023-11-19 RX ORDER — ACETAMINOPHEN 500 MG
1000 TABLET ORAL
Status: COMPLETED | OUTPATIENT
Start: 2023-11-19 | End: 2023-11-19

## 2023-11-19 RX ADMIN — DIPHENHYDRAMINE HYDROCHLORIDE 25 MG: 50 INJECTION INTRAMUSCULAR; INTRAVENOUS at 16:29

## 2023-11-19 RX ADMIN — PROCHLORPERAZINE EDISYLATE 10 MG: 5 INJECTION INTRAMUSCULAR; INTRAVENOUS at 16:29

## 2023-11-19 RX ADMIN — PREDNISONE 60 MG: 20 TABLET ORAL at 21:49

## 2023-11-19 RX ADMIN — ACETAMINOPHEN 1000 MG: 500 TABLET ORAL at 16:29

## 2023-11-19 ASSESSMENT — PAIN SCALES - GENERAL
PAINLEVEL_OUTOF10: 4
PAINLEVEL_OUTOF10: 10

## 2023-11-19 ASSESSMENT — LIFESTYLE VARIABLES
HOW MANY STANDARD DRINKS CONTAINING ALCOHOL DO YOU HAVE ON A TYPICAL DAY: PATIENT DOES NOT DRINK
HOW OFTEN DO YOU HAVE A DRINK CONTAINING ALCOHOL: NEVER

## 2023-11-19 ASSESSMENT — PAIN - FUNCTIONAL ASSESSMENT
PAIN_FUNCTIONAL_ASSESSMENT: 0-10
PAIN_FUNCTIONAL_ASSESSMENT: 0-10

## 2023-11-19 ASSESSMENT — PAIN DESCRIPTION - LOCATION
LOCATION: HEAD
LOCATION: HEAD

## 2023-11-19 NOTE — ED TRIAGE NOTES
EMS summoned for Migraine HA x1 day. Pt relates pain to her temporal regions and to back of head.  Also reports she has had n/v

## 2023-11-19 NOTE — ED PROVIDER NOTES
[facility] EMERGENCY DEPT  EMERGENCY DEPARTMENT HISTORY AND PHYSICAL EXAM      Date: 11/19/2023  Patient Name: Emmy Bruno  MRN: [id number]  [address] [facility] 1940  Date of evaluation: 11/19/2023  Provider: Sary Amador MD   Note Started: 4:24 PM EST 11/19/23    HISTORY OF PRESENT ILLNESS     Chief Complaint   Patient presents with    Headache       History Provided By: Patient    HPI: Emmy Bruno is a 82 y.o. female with PMH of HTN, HLD, migraines, CHF who comes to the ED with headache. Headache is left-sided, similar in pattern to before, denies any head trauma. She report that she is legally blind and is no change in pattern from before. No changes in hearing, speech, numbness, weakness, difficulty walking from baseline. Patient denies any fever, chills or sweats. Denies any difficulty with moving her neck. She denies any chest pain, shortness of breath, abdominal pain, or any changes in her bowel, dater, urinary habits from baseline. PAST MEDICAL HISTORY   Past Medical History:  Past Medical History:   Diagnosis Date    CHF (congestive heart failure) (HCC)     Hypercholesterolemia     Hypertension     Migraine        Past Surgical History:  Past Surgical History:   Procedure Laterality Date    HERNIA REPAIR      TUBAL LIGATION         Family History:  Family History   Problem Relation Age of Onset    Hypertension Father        Social History:  Social History     Tobacco Use    Smoking status: Never    Smokeless tobacco: Never   Substance Use Topics    Alcohol use: Not Currently    Drug use: Never       Allergies: Allergies   Allergen Reactions    Iodine Swelling    Penicillins Rash and Swelling     Airway swelling       PCP: Mona Thakkar MD    Current Meds:   No current facility-administered medications for this encounter.      Current Outpatient Medications   Medication Sig Dispense Refill    predniSONE (DELTASONE) 20 MG tablet Take 3 tablets by mouth daily for 7 days 20 tablet 0    albuterol

## 2023-11-20 NOTE — ED NOTES
Insurance transportation arranged. Trip confirmation [id number]. [address] notified.      Ronal Avendano LPN  07/29/90 9983

## 2024-01-04 NOTE — PROGRESS NOTES
0730  TRANSFER - IN REPORT:    Verbal report received from [facility] MANNY WATKINS (name) on Ernestina Moraes  being received from [facility] (unit) for routine progression of care      Report consisted of patients Situation, Background, Assessment and   Recommendations(SBAR). Information from the following report(s) SBAR, Kardex, ED Summary, and Recent Results was reviewed with the receiving nurse. Opportunity for questions and clarification was provided. Assessment completed upon patients arrival to unit and care assumed. 56  Pt arrived on unit via [facility]. 1900  Bedside and Verbal shift change report given to Gerardo Aj RN (oncoming nurse) by Austin Connolly RN  (offgoing nurse). Report included the following information SBAR, Kardex, ED Summary, Procedure Summary, Intake/Output, MAR, Accordion, Recent Results, and Med Rec Status. 97.9

## 2024-03-29 ENCOUNTER — APPOINTMENT (OUTPATIENT)
Facility: HOSPITAL | Age: 84
DRG: 871 | End: 2024-03-29
Payer: MEDICARE

## 2024-03-29 ENCOUNTER — APPOINTMENT (OUTPATIENT)
Facility: HOSPITAL | Age: 84
DRG: 871 | End: 2024-03-29
Attending: INTERNAL MEDICINE
Payer: MEDICARE

## 2024-03-29 ENCOUNTER — APPOINTMENT (OUTPATIENT)
Facility: HOSPITAL | Age: 84
DRG: 871 | End: 2024-03-29
Attending: FAMILY MEDICINE
Payer: MEDICARE

## 2024-03-29 ENCOUNTER — HOSPITAL ENCOUNTER (INPATIENT)
Facility: HOSPITAL | Age: 84
LOS: 10 days | Discharge: NURSING, REHAB OR LONG-TERM CARE | DRG: 871 | End: 2024-04-08
Attending: STUDENT IN AN ORGANIZED HEALTH CARE EDUCATION/TRAINING PROGRAM | Admitting: FAMILY MEDICINE
Payer: MEDICARE

## 2024-03-29 DIAGNOSIS — T68.XXXA HYPOTHERMIA, INITIAL ENCOUNTER: ICD-10-CM

## 2024-03-29 DIAGNOSIS — R41.0 DELIRIUM: ICD-10-CM

## 2024-03-29 DIAGNOSIS — R65.21 SEPTIC SHOCK (HCC): ICD-10-CM

## 2024-03-29 DIAGNOSIS — A41.9 SEPTIC SHOCK (HCC): ICD-10-CM

## 2024-03-29 DIAGNOSIS — J81.0 ACUTE PULMONARY EDEMA (HCC): ICD-10-CM

## 2024-03-29 DIAGNOSIS — I50.9 ACUTE ON CHRONIC CONGESTIVE HEART FAILURE, UNSPECIFIED HEART FAILURE TYPE (HCC): ICD-10-CM

## 2024-03-29 DIAGNOSIS — E83.42 HYPOMAGNESEMIA: ICD-10-CM

## 2024-03-29 DIAGNOSIS — N17.9 ACUTE RENAL FAILURE, UNSPECIFIED ACUTE RENAL FAILURE TYPE (HCC): Primary | ICD-10-CM

## 2024-03-29 DIAGNOSIS — E87.5 HYPERKALEMIA: ICD-10-CM

## 2024-03-29 DIAGNOSIS — E87.20 METABOLIC ACIDOSIS: ICD-10-CM

## 2024-03-29 PROBLEM — R57.9 SHOCK (HCC): Status: ACTIVE | Noted: 2024-03-29

## 2024-03-29 LAB
ALBUMIN SERPL-MCNC: 2.4 G/DL (ref 3.5–5)
ALBUMIN SERPL-MCNC: 2.5 G/DL (ref 3.5–5)
ALBUMIN SERPL-MCNC: 2.7 G/DL (ref 3.5–5)
ALBUMIN SERPL-MCNC: 2.9 G/DL (ref 3.5–5)
ALBUMIN SERPL-MCNC: 3 G/DL (ref 3.5–5)
ALBUMIN/GLOB SERPL: 0.6 (ref 1.1–2.2)
ALBUMIN/GLOB SERPL: 0.7 (ref 1.1–2.2)
ALBUMIN/GLOB SERPL: 0.8 (ref 1.1–2.2)
ALP SERPL-CCNC: 109 U/L (ref 45–117)
ALP SERPL-CCNC: 98 U/L (ref 45–117)
ALP SERPL-CCNC: 99 U/L (ref 45–117)
ALT SERPL-CCNC: 14 U/L (ref 12–78)
ALT SERPL-CCNC: 23 U/L (ref 12–78)
ALT SERPL-CCNC: 26 U/L (ref 12–78)
AMMONIA PLAS-SCNC: 170 UMOL/L
AMYLASE SERPL-CCNC: 72 U/L (ref 25–115)
ANION GAP SERPL CALC-SCNC: 16 MMOL/L (ref 5–15)
ANION GAP SERPL CALC-SCNC: 19 MMOL/L (ref 5–15)
ANION GAP SERPL CALC-SCNC: 22 MMOL/L (ref 5–15)
ANION GAP SERPL CALC-SCNC: 23 MMOL/L (ref 5–15)
ANION GAP SERPL CALC-SCNC: 28 MMOL/L (ref 5–15)
ANION GAP SERPL CALC-SCNC: 29 MMOL/L (ref 5–15)
APPEARANCE UR: ABNORMAL
ARTERIAL PATENCY WRIST A: ABNORMAL
AST SERPL W P-5'-P-CCNC: 13 U/L (ref 15–37)
AST SERPL W P-5'-P-CCNC: 24 U/L (ref 15–37)
AST SERPL W P-5'-P-CCNC: 37 U/L (ref 15–37)
BACTERIA URNS QL MICRO: NEGATIVE /HPF
BASE DEFICIT BLD-SCNC: 23.3 MMOL/L
BASE DEFICIT BLDA-SCNC: 19.8 MMOL/L
BASOPHILS # BLD: 0 K/UL (ref 0–0.1)
BASOPHILS NFR BLD: 0 % (ref 0–1)
BDY SITE: ABNORMAL
BILIRUB DIRECT SERPL-MCNC: 0.3 MG/DL (ref 0–0.2)
BILIRUB SERPL-MCNC: 0.7 MG/DL (ref 0.2–1)
BILIRUB SERPL-MCNC: 0.7 MG/DL (ref 0.2–1)
BILIRUB SERPL-MCNC: 0.8 MG/DL (ref 0.2–1)
BILIRUB UR QL: NEGATIVE
BNP SERPL-MCNC: ABNORMAL PG/ML
BODY TEMPERATURE: 93.6
BUN SERPL-MCNC: 51 MG/DL (ref 6–20)
BUN SERPL-MCNC: 60 MG/DL (ref 6–20)
BUN SERPL-MCNC: 70 MG/DL (ref 6–20)
BUN SERPL-MCNC: 81 MG/DL (ref 6–20)
BUN SERPL-MCNC: 81 MG/DL (ref 6–20)
BUN SERPL-MCNC: 85 MG/DL (ref 6–20)
BUN/CREAT SERPL: 12 (ref 12–20)
BUN/CREAT SERPL: 13 (ref 12–20)
CA-I BLD-MCNC: 7.6 MG/DL (ref 8.5–10.1)
CA-I BLD-MCNC: 7.7 MG/DL (ref 8.5–10.1)
CA-I BLD-MCNC: 7.7 MG/DL (ref 8.5–10.1)
CA-I BLD-MCNC: 8 MG/DL (ref 8.5–10.1)
CA-I BLD-MCNC: 8.4 MG/DL (ref 8.5–10.1)
CA-I BLD-MCNC: 8.4 MG/DL (ref 8.5–10.1)
CHLORIDE SERPL-SCNC: 108 MMOL/L (ref 97–108)
CHLORIDE SERPL-SCNC: 108 MMOL/L (ref 97–108)
CHLORIDE SERPL-SCNC: 109 MMOL/L (ref 97–108)
CHLORIDE SERPL-SCNC: 110 MMOL/L (ref 97–108)
CHLORIDE SERPL-SCNC: 114 MMOL/L (ref 97–108)
CHLORIDE SERPL-SCNC: 117 MMOL/L (ref 97–108)
CK SERPL-CCNC: 42 U/L (ref 26–192)
CO2 SERPL-SCNC: 11 MMOL/L (ref 21–32)
CO2 SERPL-SCNC: 12 MMOL/L (ref 21–32)
CO2 SERPL-SCNC: 18 MMOL/L (ref 21–32)
CO2 SERPL-SCNC: 20 MMOL/L (ref 21–32)
CO2 SERPL-SCNC: 7 MMOL/L (ref 21–32)
CO2 SERPL-SCNC: 8 MMOL/L (ref 21–32)
COHGB MFR BLD: 0.3 % (ref 1–2)
COLOR UR: ABNORMAL
CREAT SERPL-MCNC: 4.31 MG/DL (ref 0.55–1.02)
CREAT SERPL-MCNC: 4.86 MG/DL (ref 0.55–1.02)
CREAT SERPL-MCNC: 5.88 MG/DL (ref 0.55–1.02)
CREAT SERPL-MCNC: 6.57 MG/DL (ref 0.55–1.02)
CREAT SERPL-MCNC: 6.67 MG/DL (ref 0.55–1.02)
CREAT SERPL-MCNC: 6.74 MG/DL (ref 0.55–1.02)
D DIMER PPP FEU-MCNC: 1.64 UG/ML(FEU)
DIFFERENTIAL METHOD BLD: ABNORMAL
ECHO AV MEAN GRADIENT: 13 MMHG
ECHO AV MEAN VELOCITY: 1.6 M/S
ECHO AV PEAK GRADIENT: 22 MMHG
ECHO AV PEAK VELOCITY: 2.4 M/S
ECHO AV VELOCITY RATIO: 0.42
ECHO AV VTI: 28.1 CM
ECHO BSA: 1.9 M2
ECHO IVC PROX: 2.2 CM
ECHO LA AREA 2C: 21.6 CM2
ECHO LA AREA 4C: 20.9 CM2
ECHO LA MAJOR AXIS: 5.8 CM
ECHO LA MINOR AXIS: 5.9 CM
ECHO LA VOL BP: 64 ML (ref 22–52)
ECHO LA VOL MOD A2C: 65 ML (ref 22–52)
ECHO LA VOL MOD A4C: 62 ML (ref 22–52)
ECHO LA VOL/BSA BIPLANE: 35 ML/M2 (ref 16–34)
ECHO LA VOLUME INDEX MOD A2C: 36 ML/M2 (ref 16–34)
ECHO LA VOLUME INDEX MOD A4C: 34 ML/M2 (ref 16–34)
ECHO LV GLOBAL LONGITUDINAL STRAIN (GLS): -6.8 %
ECHO LV GLOBAL LONGITUDINAL STRAIN (GLS): -8.4 %
ECHO LV GLOBAL LONGITUDINAL STRAIN (GLS): -8.4 %
ECHO LV GLOBAL LONGITUDINAL STRAIN (GLS): -9.9 %
ECHO LVOT AV VTI INDEX: 0.53
ECHO LVOT MEAN GRADIENT: 2 MMHG
ECHO LVOT PEAK GRADIENT: 4 MMHG
ECHO LVOT PEAK VELOCITY: 1 M/S
ECHO LVOT VTI: 14.8 CM
ECHO PV MAX VELOCITY: 1.8 M/S
ECHO PV MEAN GRADIENT: 7 MMHG
ECHO PV MEAN VELOCITY: 1.2 M/S
ECHO PV PEAK GRADIENT: 12 MMHG
ECHO PV VTI: 20.6 CM
ECHO RA AREA 4C: 21.7 CM2
ECHO RA END SYSTOLIC VOLUME APICAL 4 CHAMBER INDEX BSA: 39 ML/M2
ECHO RA VOLUME: 70 ML
ECHO RV BASAL DIMENSION: 3.8 CM
ECHO RV LONGITUDINAL DIMENSION: 6.7 CM
ECHO RV MID DIMENSION: 3.6 CM
ECHO TV REGURGITANT MAX VELOCITY: 3.98 M/S
ECHO TV REGURGITANT PEAK GRADIENT: 63 MMHG
EKG ATRIAL RATE: 71 BPM
EKG DIAGNOSIS: NORMAL
EKG Q-T INTERVAL: 446 MS
EKG QRS DURATION: 96 MS
EKG QTC CALCULATION (BAZETT): 491 MS
EKG R AXIS: 7 DEGREES
EKG T AXIS: 86 DEGREES
EKG VENTRICULAR RATE: 73 BPM
EOSINOPHIL # BLD: 0 K/UL (ref 0–0.4)
EOSINOPHIL NFR BLD: 0 % (ref 0–7)
EPITH CASTS URNS QL MICRO: ABNORMAL /LPF
ERYTHROCYTE [DISTWIDTH] IN BLOOD BY AUTOMATED COUNT: 16.2 % (ref 11.5–14.5)
ERYTHROCYTE [DISTWIDTH] IN BLOOD BY AUTOMATED COUNT: 16.5 % (ref 11.5–14.5)
EST. AVERAGE GLUCOSE BLD GHB EST-MCNC: 108 MG/DL
FLUAV AG NPH QL IA: NEGATIVE
FLUBV AG NOSE QL IA: NEGATIVE
GLOBULIN SER CALC-MCNC: 3.6 G/DL (ref 2–4)
GLOBULIN SER CALC-MCNC: 3.9 G/DL (ref 2–4)
GLOBULIN SER CALC-MCNC: 4.3 G/DL (ref 2–4)
GLUCOSE BLD STRIP.AUTO-MCNC: 175 MG/DL (ref 65–100)
GLUCOSE BLD STRIP.AUTO-MCNC: 177 MG/DL (ref 65–100)
GLUCOSE SERPL-MCNC: 125 MG/DL (ref 65–100)
GLUCOSE SERPL-MCNC: 184 MG/DL (ref 65–100)
GLUCOSE SERPL-MCNC: 187 MG/DL (ref 65–100)
GLUCOSE SERPL-MCNC: 193 MG/DL (ref 65–100)
GLUCOSE SERPL-MCNC: 195 MG/DL (ref 65–100)
GLUCOSE SERPL-MCNC: 199 MG/DL (ref 65–100)
GLUCOSE UR STRIP.AUTO-MCNC: NEGATIVE MG/DL
HBA1C MFR BLD: 5.4 % (ref 4–5.6)
HCO3 BLD-SCNC: 5 MMOL/L (ref 19–28)
HCO3 BLDA-SCNC: 7 MMOL/L (ref 22–26)
HCT VFR BLD AUTO: 31.7 % (ref 35–47)
HCT VFR BLD AUTO: 32.5 % (ref 35–47)
HGB BLD-MCNC: 9.4 G/DL (ref 11.5–16)
HGB BLD-MCNC: 9.5 G/DL (ref 11.5–16)
HGB UR QL STRIP: ABNORMAL
IMM GRANULOCYTES # BLD AUTO: 0.1 K/UL (ref 0–0.04)
IMM GRANULOCYTES NFR BLD AUTO: 1 % (ref 0–0.5)
KETONES UR QL STRIP.AUTO: 20 MG/DL
LACTATE SERPL-SCNC: 5.5 MMOL/L (ref 0.4–2)
LACTATE SERPL-SCNC: 9.8 MMOL/L (ref 0.4–2)
LEUKOCYTE ESTERASE UR QL STRIP.AUTO: ABNORMAL
LIPASE SERPL-CCNC: 36 U/L (ref 13–75)
LIPASE SERPL-CCNC: 40 U/L (ref 13–75)
LIPASE SERPL-CCNC: 48 U/L (ref 13–75)
LYMPHOCYTES # BLD: 1.3 K/UL (ref 0.8–3.5)
LYMPHOCYTES NFR BLD: 11 % (ref 12–49)
MAGNESIUM SERPL-MCNC: 1.3 MG/DL (ref 1.6–2.4)
MAGNESIUM SERPL-MCNC: 1.5 MG/DL (ref 1.6–2.4)
MAGNESIUM SERPL-MCNC: 1.6 MG/DL (ref 1.6–2.4)
MAGNESIUM SERPL-MCNC: 1.7 MG/DL (ref 1.6–2.4)
MAGNESIUM SERPL-MCNC: 1.8 MG/DL (ref 1.6–2.4)
MAGNESIUM SERPL-MCNC: 1.8 MG/DL (ref 1.6–2.4)
MCH RBC QN AUTO: 27.9 PG (ref 26–34)
MCH RBC QN AUTO: 28.7 PG (ref 26–34)
MCHC RBC AUTO-ENTMCNC: 28.9 G/DL (ref 30–36.5)
MCHC RBC AUTO-ENTMCNC: 30 G/DL (ref 30–36.5)
MCV RBC AUTO: 95.8 FL (ref 80–99)
MCV RBC AUTO: 96.4 FL (ref 80–99)
METHGB MFR BLD: 0.3 % (ref 0–1.4)
MONOCYTES # BLD: 0.5 K/UL (ref 0–1)
MONOCYTES NFR BLD: 4 % (ref 5–13)
MRSA DNA SPEC QL NAA+PROBE: NOT DETECTED
MUCOUS THREADS URNS QL MICRO: ABNORMAL /LPF
NEUTS SEG # BLD: 9.5 K/UL (ref 1.8–8)
NEUTS SEG NFR BLD: 84 % (ref 32–75)
NITRITE UR QL STRIP.AUTO: NEGATIVE
NRBC # BLD: 0 K/UL (ref 0–0.01)
NRBC # BLD: 0 K/UL (ref 0–0.01)
NRBC BLD-RTO: 0 PER 100 WBC
NRBC BLD-RTO: 0 PER 100 WBC
OXYHGB MFR BLD: 94.6 % (ref 95–99)
PCO2 BLD: 17.3 MMHG (ref 35–45)
PCO2 BLDA: 18 MMHG (ref 35–45)
PERFORMED BY:: ABNORMAL
PH BLD: 7.07 (ref 7.35–7.45)
PH BLDA: 7.2 (ref 7.35–7.45)
PH UR STRIP: 5 (ref 5–8)
PHOSPHATE SERPL-MCNC: 3.9 MG/DL (ref 2.6–4.7)
PHOSPHATE SERPL-MCNC: 4.5 MG/DL (ref 2.6–4.7)
PHOSPHATE SERPL-MCNC: 4.6 MG/DL (ref 2.6–4.7)
PHOSPHATE SERPL-MCNC: 5.3 MG/DL (ref 2.6–4.7)
PHOSPHATE SERPL-MCNC: 5.4 MG/DL (ref 2.6–4.7)
PHOSPHATE SERPL-MCNC: 6.1 MG/DL (ref 2.6–4.7)
PHOSPHATE SERPL-MCNC: 6.1 MG/DL (ref 2.6–4.7)
PLATELET # BLD AUTO: 273 K/UL (ref 150–400)
PLATELET # BLD AUTO: 288 K/UL (ref 150–400)
PMV BLD AUTO: 11.2 FL (ref 8.9–12.9)
PMV BLD AUTO: 11.4 FL (ref 8.9–12.9)
PO2 BLD: 103 MMHG (ref 75–100)
PO2 BLDA: 77 MMHG (ref 80–100)
POTASSIUM SERPL-SCNC: 3.9 MMOL/L (ref 3.5–5.1)
POTASSIUM SERPL-SCNC: 4 MMOL/L (ref 3.5–5.1)
POTASSIUM SERPL-SCNC: 4.8 MMOL/L (ref 3.5–5.1)
POTASSIUM SERPL-SCNC: 5.5 MMOL/L (ref 3.5–5.1)
POTASSIUM SERPL-SCNC: 5.8 MMOL/L (ref 3.5–5.1)
POTASSIUM SERPL-SCNC: 6.3 MMOL/L (ref 3.5–5.1)
PROCALCITONIN SERPL-MCNC: 0.1 NG/ML
PROT SERPL-MCNC: 6.5 G/DL (ref 6.4–8.2)
PROT SERPL-MCNC: 6.6 G/DL (ref 6.4–8.2)
PROT SERPL-MCNC: 6.8 G/DL (ref 6.4–8.2)
PROT UR STRIP-MCNC: 100 MG/DL
RBC # BLD AUTO: 3.31 M/UL (ref 3.8–5.2)
RBC # BLD AUTO: 3.37 M/UL (ref 3.8–5.2)
RBC #/AREA URNS HPF: >100 /HPF (ref 0–5)
RBC MORPH BLD: ABNORMAL
SAO2 % BLD: 95 %
SAO2 % BLD: 95 % (ref 95–99)
SAO2% DEVICE SAO2% SENSOR NAME: ABNORMAL
SARS-COV-2 RDRP RESP QL NAA+PROBE: NOT DETECTED
SERVICE CMNT-IMP: ABNORMAL
SERVICE CMNT-IMP: ABNORMAL
SODIUM SERPL-SCNC: 141 MMOL/L (ref 136–145)
SODIUM SERPL-SCNC: 143 MMOL/L (ref 136–145)
SODIUM SERPL-SCNC: 147 MMOL/L (ref 136–145)
SODIUM SERPL-SCNC: 149 MMOL/L (ref 136–145)
SODIUM SERPL-SCNC: 149 MMOL/L (ref 136–145)
SODIUM SERPL-SCNC: 150 MMOL/L (ref 136–145)
SP GR UR REFRACTOMETRY: 1.01 (ref 1–1.03)
SPECIMEN SITE: ABNORMAL
SPECIMEN TYPE: ABNORMAL
TROPONIN I SERPL HS-MCNC: 15 NG/L (ref 0–51)
TROPONIN I SERPL HS-MCNC: 17 NG/L (ref 0–51)
TROPONIN I SERPL HS-MCNC: 26 NG/L (ref 0–51)
TSH SERPL DL<=0.05 MIU/L-ACNC: 1.6 UIU/ML (ref 0.36–3.74)
URINE CULTURE IF INDICATED: ABNORMAL
UROBILINOGEN UR QL STRIP.AUTO: 0.1 EU/DL (ref 0.1–1)
WBC # BLD AUTO: 11.4 K/UL (ref 3.6–11)
WBC # BLD AUTO: 13.8 K/UL (ref 3.6–11)
WBC URNS QL MICRO: >100 /HPF (ref 0–4)

## 2024-03-29 PROCEDURE — 71045 X-RAY EXAM CHEST 1 VIEW: CPT

## 2024-03-29 PROCEDURE — 96376 TX/PRO/DX INJ SAME DRUG ADON: CPT

## 2024-03-29 PROCEDURE — 2500000003 HC RX 250 WO HCPCS: Performed by: STUDENT IN AN ORGANIZED HEALTH CARE EDUCATION/TRAINING PROGRAM

## 2024-03-29 PROCEDURE — 84145 PROCALCITONIN (PCT): CPT

## 2024-03-29 PROCEDURE — 82330 ASSAY OF CALCIUM: CPT

## 2024-03-29 PROCEDURE — 80076 HEPATIC FUNCTION PANEL: CPT

## 2024-03-29 PROCEDURE — 76937 US GUIDE VASCULAR ACCESS: CPT

## 2024-03-29 PROCEDURE — 02H633Z INSERTION OF INFUSION DEVICE INTO RIGHT ATRIUM, PERCUTANEOUS APPROACH: ICD-10-PCS | Performed by: STUDENT IN AN ORGANIZED HEALTH CARE EDUCATION/TRAINING PROGRAM

## 2024-03-29 PROCEDURE — 6360000002 HC RX W HCPCS: Performed by: INTERNAL MEDICINE

## 2024-03-29 PROCEDURE — 84443 ASSAY THYROID STIM HORMONE: CPT

## 2024-03-29 PROCEDURE — 6360000002 HC RX W HCPCS: Performed by: STUDENT IN AN ORGANIZED HEALTH CARE EDUCATION/TRAINING PROGRAM

## 2024-03-29 PROCEDURE — 82150 ASSAY OF AMYLASE: CPT

## 2024-03-29 PROCEDURE — C1894 INTRO/SHEATH, NON-LASER: HCPCS

## 2024-03-29 PROCEDURE — 2500000003 HC RX 250 WO HCPCS

## 2024-03-29 PROCEDURE — 6360000002 HC RX W HCPCS

## 2024-03-29 PROCEDURE — 83036 HEMOGLOBIN GLYCOSYLATED A1C: CPT

## 2024-03-29 PROCEDURE — 82962 GLUCOSE BLOOD TEST: CPT

## 2024-03-29 PROCEDURE — P9047 ALBUMIN (HUMAN), 25%, 50ML: HCPCS | Performed by: INTERNAL MEDICINE

## 2024-03-29 PROCEDURE — 81001 URINALYSIS AUTO W/SCOPE: CPT

## 2024-03-29 PROCEDURE — 82803 BLOOD GASES ANY COMBINATION: CPT

## 2024-03-29 PROCEDURE — 99291 CRITICAL CARE FIRST HOUR: CPT

## 2024-03-29 PROCEDURE — 93306 TTE W/DOPPLER COMPLETE: CPT

## 2024-03-29 PROCEDURE — 85025 COMPLETE CBC W/AUTO DIFF WBC: CPT

## 2024-03-29 PROCEDURE — 6360000002 HC RX W HCPCS: Performed by: FAMILY MEDICINE

## 2024-03-29 PROCEDURE — 84132 ASSAY OF SERUM POTASSIUM: CPT

## 2024-03-29 PROCEDURE — 36415 COLL VENOUS BLD VENIPUNCTURE: CPT

## 2024-03-29 PROCEDURE — 83735 ASSAY OF MAGNESIUM: CPT

## 2024-03-29 PROCEDURE — 80069 RENAL FUNCTION PANEL: CPT

## 2024-03-29 PROCEDURE — 80053 COMPREHEN METABOLIC PANEL: CPT

## 2024-03-29 PROCEDURE — 84484 ASSAY OF TROPONIN QUANT: CPT

## 2024-03-29 PROCEDURE — 82947 ASSAY GLUCOSE BLOOD QUANT: CPT

## 2024-03-29 PROCEDURE — 85027 COMPLETE CBC AUTOMATED: CPT

## 2024-03-29 PROCEDURE — 87186 SC STD MICRODIL/AGAR DIL: CPT

## 2024-03-29 PROCEDURE — 87804 INFLUENZA ASSAY W/OPTIC: CPT

## 2024-03-29 PROCEDURE — 3E1M39Z IRRIGATION OF PERITONEAL CAVITY USING DIALYSATE, PERCUTANEOUS APPROACH: ICD-10-PCS | Performed by: FAMILY MEDICINE

## 2024-03-29 PROCEDURE — 51702 INSERT TEMP BLADDER CATH: CPT

## 2024-03-29 PROCEDURE — 96372 THER/PROPH/DIAG INJ SC/IM: CPT

## 2024-03-29 PROCEDURE — 94762 N-INVAS EAR/PLS OXIMTRY CONT: CPT

## 2024-03-29 PROCEDURE — 2580000003 HC RX 258: Performed by: STUDENT IN AN ORGANIZED HEALTH CARE EDUCATION/TRAINING PROGRAM

## 2024-03-29 PROCEDURE — 96374 THER/PROPH/DIAG INJ IV PUSH: CPT

## 2024-03-29 PROCEDURE — 2580000003 HC RX 258: Performed by: INTERNAL MEDICINE

## 2024-03-29 PROCEDURE — 87086 URINE CULTURE/COLONY COUNT: CPT

## 2024-03-29 PROCEDURE — 76770 US EXAM ABDO BACK WALL COMP: CPT

## 2024-03-29 PROCEDURE — 96375 TX/PRO/DX INJ NEW DRUG ADDON: CPT

## 2024-03-29 PROCEDURE — 2500000003 HC RX 250 WO HCPCS: Performed by: INTERNAL MEDICINE

## 2024-03-29 PROCEDURE — 85379 FIBRIN DEGRADATION QUANT: CPT

## 2024-03-29 PROCEDURE — 6370000000 HC RX 637 (ALT 250 FOR IP): Performed by: FAMILY MEDICINE

## 2024-03-29 PROCEDURE — 96365 THER/PROPH/DIAG IV INF INIT: CPT

## 2024-03-29 PROCEDURE — 36592 COLLECT BLOOD FROM PICC: CPT

## 2024-03-29 PROCEDURE — 83880 ASSAY OF NATRIURETIC PEPTIDE: CPT

## 2024-03-29 PROCEDURE — 2500000003 HC RX 250 WO HCPCS: Performed by: FAMILY MEDICINE

## 2024-03-29 PROCEDURE — 90945 DIALYSIS ONE EVALUATION: CPT

## 2024-03-29 PROCEDURE — 83605 ASSAY OF LACTIC ACID: CPT

## 2024-03-29 PROCEDURE — 99223 1ST HOSP IP/OBS HIGH 75: CPT | Performed by: INTERNAL MEDICINE

## 2024-03-29 PROCEDURE — 83690 ASSAY OF LIPASE: CPT

## 2024-03-29 PROCEDURE — 87641 MR-STAPH DNA AMP PROBE: CPT

## 2024-03-29 PROCEDURE — 2580000003 HC RX 258: Performed by: FAMILY MEDICINE

## 2024-03-29 PROCEDURE — 84100 ASSAY OF PHOSPHORUS: CPT

## 2024-03-29 PROCEDURE — 84295 ASSAY OF SERUM SODIUM: CPT

## 2024-03-29 PROCEDURE — 82550 ASSAY OF CK (CPK): CPT

## 2024-03-29 PROCEDURE — 93005 ELECTROCARDIOGRAM TRACING: CPT | Performed by: STUDENT IN AN ORGANIZED HEALTH CARE EDUCATION/TRAINING PROGRAM

## 2024-03-29 PROCEDURE — 87040 BLOOD CULTURE FOR BACTERIA: CPT

## 2024-03-29 PROCEDURE — 6370000000 HC RX 637 (ALT 250 FOR IP): Performed by: STUDENT IN AN ORGANIZED HEALTH CARE EDUCATION/TRAINING PROGRAM

## 2024-03-29 PROCEDURE — 74176 CT ABD & PELVIS W/O CONTRAST: CPT

## 2024-03-29 PROCEDURE — 82140 ASSAY OF AMMONIA: CPT

## 2024-03-29 PROCEDURE — 87635 SARS-COV-2 COVID-19 AMP PRB: CPT

## 2024-03-29 PROCEDURE — 70450 CT HEAD/BRAIN W/O DYE: CPT

## 2024-03-29 PROCEDURE — 87088 URINE BACTERIA CULTURE: CPT

## 2024-03-29 PROCEDURE — 36600 WITHDRAWAL OF ARTERIAL BLOOD: CPT

## 2024-03-29 PROCEDURE — 36556 INSERT NON-TUNNEL CV CATH: CPT

## 2024-03-29 PROCEDURE — 96361 HYDRATE IV INFUSION ADD-ON: CPT

## 2024-03-29 PROCEDURE — 2000000000 HC ICU R&B

## 2024-03-29 PROCEDURE — 99285 EMERGENCY DEPT VISIT HI MDM: CPT

## 2024-03-29 RX ORDER — SODIUM CHLORIDE 9 MG/ML
INJECTION, SOLUTION INTRAVENOUS CONTINUOUS
Status: DISCONTINUED | OUTPATIENT
Start: 2024-03-29 | End: 2024-04-01

## 2024-03-29 RX ORDER — HEPARIN SODIUM 5000 [USP'U]/ML
5000 INJECTION, SOLUTION INTRAVENOUS; SUBCUTANEOUS EVERY 8 HOURS SCHEDULED
Status: DISCONTINUED | OUTPATIENT
Start: 2024-03-29 | End: 2024-04-09 | Stop reason: HOSPADM

## 2024-03-29 RX ORDER — CALCIUM CHLORIDE, MAGNESIUM CHLORIDE, DEXTROSE MONOHYDRATE, LACTIC ACID, SODIUM CHLORIDE, SODIUM BICARBONATE AND POTASSIUM CHLORIDE 5.15; 2.03; 22; 5.4; 6.46; 3.09; .157 G/L; G/L; G/L; G/L; G/L; G/L; G/L
INJECTION INTRAVENOUS CONTINUOUS
Status: DISCONTINUED | OUTPATIENT
Start: 2024-03-29 | End: 2024-03-29

## 2024-03-29 RX ORDER — SODIUM CHLORIDE 0.9 % (FLUSH) 0.9 %
5-40 SYRINGE (ML) INJECTION EVERY 12 HOURS SCHEDULED
Status: DISCONTINUED | OUTPATIENT
Start: 2024-03-29 | End: 2024-04-09 | Stop reason: HOSPADM

## 2024-03-29 RX ORDER — INSULIN LISPRO 100 [IU]/ML
0-4 INJECTION, SOLUTION INTRAVENOUS; SUBCUTANEOUS NIGHTLY
Status: DISCONTINUED | OUTPATIENT
Start: 2024-03-29 | End: 2024-03-29

## 2024-03-29 RX ORDER — SODIUM CHLORIDE, SODIUM LACTATE, POTASSIUM CHLORIDE, AND CALCIUM CHLORIDE .6; .31; .03; .02 G/100ML; G/100ML; G/100ML; G/100ML
500 INJECTION, SOLUTION INTRAVENOUS
Status: COMPLETED | OUTPATIENT
Start: 2024-03-29 | End: 2024-03-29

## 2024-03-29 RX ORDER — CALCIUM GLUCONATE 94 MG/ML
1000 INJECTION, SOLUTION INTRAVENOUS ONCE
Status: COMPLETED | OUTPATIENT
Start: 2024-03-29 | End: 2024-03-29

## 2024-03-29 RX ORDER — NOREPINEPHRINE BITARTRATE 0.06 MG/ML
1-100 INJECTION, SOLUTION INTRAVENOUS CONTINUOUS
Status: DISCONTINUED | OUTPATIENT
Start: 2024-03-29 | End: 2024-04-02

## 2024-03-29 RX ORDER — SODIUM CHLORIDE 9 MG/ML
INJECTION, SOLUTION INTRAVENOUS CONTINUOUS
Status: DISCONTINUED | OUTPATIENT
Start: 2024-03-29 | End: 2024-03-29

## 2024-03-29 RX ORDER — ONDANSETRON 4 MG/1
4 TABLET, ORALLY DISINTEGRATING ORAL EVERY 8 HOURS PRN
Status: DISCONTINUED | OUTPATIENT
Start: 2024-03-29 | End: 2024-04-09 | Stop reason: HOSPADM

## 2024-03-29 RX ORDER — SODIUM CHLORIDE 9 MG/ML
30 INJECTION, SOLUTION INTRAVENOUS ONCE
Status: CANCELLED | OUTPATIENT
Start: 2024-03-29 | End: 2024-03-29

## 2024-03-29 RX ORDER — ATORVASTATIN CALCIUM 40 MG/1
40 TABLET, FILM COATED ORAL NIGHTLY
Status: DISCONTINUED | OUTPATIENT
Start: 2024-03-29 | End: 2024-04-09 | Stop reason: HOSPADM

## 2024-03-29 RX ORDER — VASOPRESSIN 20 U/ML
INJECTION PARENTERAL
Status: COMPLETED
Start: 2024-03-29 | End: 2024-03-29

## 2024-03-29 RX ORDER — DEXTROSE MONOHYDRATE 100 MG/ML
INJECTION, SOLUTION INTRAVENOUS CONTINUOUS PRN
Status: DISCONTINUED | OUTPATIENT
Start: 2024-03-29 | End: 2024-04-09 | Stop reason: HOSPADM

## 2024-03-29 RX ORDER — ONDANSETRON 2 MG/ML
4 INJECTION INTRAMUSCULAR; INTRAVENOUS EVERY 6 HOURS PRN
Status: DISCONTINUED | OUTPATIENT
Start: 2024-03-29 | End: 2024-04-09 | Stop reason: HOSPADM

## 2024-03-29 RX ORDER — 0.9 % SODIUM CHLORIDE 0.9 %
500 INTRAVENOUS SOLUTION INTRAVENOUS ONCE
Status: COMPLETED | OUTPATIENT
Start: 2024-03-29 | End: 2024-03-29

## 2024-03-29 RX ORDER — PANTOPRAZOLE SODIUM 40 MG/1
40 TABLET, DELAYED RELEASE ORAL
Status: DISCONTINUED | OUTPATIENT
Start: 2024-03-29 | End: 2024-04-09 | Stop reason: HOSPADM

## 2024-03-29 RX ORDER — ALBUMIN (HUMAN) 12.5 G/50ML
25 SOLUTION INTRAVENOUS EVERY 8 HOURS
Status: COMPLETED | OUTPATIENT
Start: 2024-03-29 | End: 2024-03-30

## 2024-03-29 RX ORDER — INSULIN LISPRO 100 [IU]/ML
0-4 INJECTION, SOLUTION INTRAVENOUS; SUBCUTANEOUS EVERY 6 HOURS
Status: DISCONTINUED | OUTPATIENT
Start: 2024-03-29 | End: 2024-04-09 | Stop reason: HOSPADM

## 2024-03-29 RX ORDER — ONDANSETRON 2 MG/ML
4 INJECTION INTRAMUSCULAR; INTRAVENOUS EVERY 6 HOURS PRN
Status: DISCONTINUED | OUTPATIENT
Start: 2024-03-29 | End: 2024-04-01 | Stop reason: SDUPTHER

## 2024-03-29 RX ORDER — GLUCAGON 1 MG/ML
1 KIT INJECTION PRN
Status: DISCONTINUED | OUTPATIENT
Start: 2024-03-29 | End: 2024-04-09 | Stop reason: HOSPADM

## 2024-03-29 RX ORDER — MAGNESIUM SULFATE 1 G/100ML
1000 INJECTION INTRAVENOUS ONCE
Status: COMPLETED | OUTPATIENT
Start: 2024-03-29 | End: 2024-03-29

## 2024-03-29 RX ORDER — 0.9 % SODIUM CHLORIDE 0.9 %
2000 INTRAVENOUS SOLUTION INTRAVENOUS ONCE
Status: DISCONTINUED | OUTPATIENT
Start: 2024-03-29 | End: 2024-03-29

## 2024-03-29 RX ORDER — ACETAMINOPHEN 325 MG/1
650 TABLET ORAL EVERY 6 HOURS PRN
Status: DISCONTINUED | OUTPATIENT
Start: 2024-03-29 | End: 2024-04-09 | Stop reason: HOSPADM

## 2024-03-29 RX ORDER — MAGNESIUM SULFATE IN WATER 40 MG/ML
2000 INJECTION, SOLUTION INTRAVENOUS
Status: COMPLETED | OUTPATIENT
Start: 2024-03-29 | End: 2024-03-29

## 2024-03-29 RX ORDER — CLOPIDOGREL BISULFATE 75 MG/1
75 TABLET ORAL DAILY
Status: DISCONTINUED | OUTPATIENT
Start: 2024-03-29 | End: 2024-04-09 | Stop reason: HOSPADM

## 2024-03-29 RX ORDER — SODIUM CHLORIDE 9 MG/ML
INJECTION, SOLUTION INTRAVENOUS PRN
Status: DISCONTINUED | OUTPATIENT
Start: 2024-03-29 | End: 2024-04-09 | Stop reason: HOSPADM

## 2024-03-29 RX ORDER — MAGNESIUM SULFATE 1 G/100ML
INJECTION INTRAVENOUS
Status: COMPLETED
Start: 2024-03-29 | End: 2024-03-29

## 2024-03-29 RX ORDER — NOREPINEPHRINE BITARTRATE 0.06 MG/ML
INJECTION, SOLUTION INTRAVENOUS
Status: COMPLETED
Start: 2024-03-29 | End: 2024-03-29

## 2024-03-29 RX ORDER — ACETAMINOPHEN 650 MG/1
650 SUPPOSITORY RECTAL EVERY 6 HOURS PRN
Status: DISCONTINUED | OUTPATIENT
Start: 2024-03-29 | End: 2024-04-09 | Stop reason: HOSPADM

## 2024-03-29 RX ORDER — INSULIN LISPRO 100 [IU]/ML
0-4 INJECTION, SOLUTION INTRAVENOUS; SUBCUTANEOUS
Status: DISCONTINUED | OUTPATIENT
Start: 2024-03-29 | End: 2024-03-29

## 2024-03-29 RX ORDER — HALOPERIDOL 5 MG/ML
2 INJECTION INTRAMUSCULAR ONCE
Status: COMPLETED | OUTPATIENT
Start: 2024-03-29 | End: 2024-03-29

## 2024-03-29 RX ORDER — LACTULOSE 10 G/15ML
20 SOLUTION ORAL EVERY 4 HOURS
Status: DISCONTINUED | OUTPATIENT
Start: 2024-03-29 | End: 2024-04-01

## 2024-03-29 RX ORDER — 0.9 % SODIUM CHLORIDE 0.9 %
1000 INTRAVENOUS SOLUTION INTRAVENOUS ONCE
Status: COMPLETED | OUTPATIENT
Start: 2024-03-29 | End: 2024-03-29

## 2024-03-29 RX ORDER — SODIUM CHLORIDE 0.9 % (FLUSH) 0.9 %
5-40 SYRINGE (ML) INJECTION PRN
Status: DISCONTINUED | OUTPATIENT
Start: 2024-03-29 | End: 2024-04-09 | Stop reason: HOSPADM

## 2024-03-29 RX ORDER — ONDANSETRON 2 MG/ML
INJECTION INTRAMUSCULAR; INTRAVENOUS
Status: COMPLETED
Start: 2024-03-29 | End: 2024-03-29

## 2024-03-29 RX ORDER — POLYETHYLENE GLYCOL 3350 17 G/17G
17 POWDER, FOR SOLUTION ORAL DAILY PRN
Status: DISCONTINUED | OUTPATIENT
Start: 2024-03-29 | End: 2024-04-09 | Stop reason: HOSPADM

## 2024-03-29 RX ORDER — GLUCAGON 1 MG/ML
1 KIT INJECTION PRN
Status: DISCONTINUED | OUTPATIENT
Start: 2024-03-29 | End: 2024-03-29 | Stop reason: SDUPTHER

## 2024-03-29 RX ADMIN — SODIUM CHLORIDE 500 ML: 9 INJECTION, SOLUTION INTRAVENOUS at 05:52

## 2024-03-29 RX ADMIN — SODIUM BICARBONATE 100 MEQ: 84 INJECTION, SOLUTION INTRAVENOUS at 08:55

## 2024-03-29 RX ADMIN — SODIUM BICARBONATE: 84 INJECTION, SOLUTION INTRAVENOUS at 16:24

## 2024-03-29 RX ADMIN — CEFEPIME 2000 MG: 2 INJECTION, POWDER, FOR SOLUTION INTRAVENOUS at 02:11

## 2024-03-29 RX ADMIN — MAGNESIUM SULFATE HEPTAHYDRATE 1000 MG: 1 INJECTION, SOLUTION INTRAVENOUS at 17:01

## 2024-03-29 RX ADMIN — SODIUM BICARBONATE 100 MEQ: 84 INJECTION, SOLUTION INTRAVENOUS at 05:53

## 2024-03-29 RX ADMIN — Medication 25 MCG/MIN: at 03:17

## 2024-03-29 RX ADMIN — SODIUM BICARBONATE: 84 INJECTION, SOLUTION INTRAVENOUS at 18:31

## 2024-03-29 RX ADMIN — ONDANSETRON 4 MG: 2 INJECTION INTRAMUSCULAR; INTRAVENOUS at 01:34

## 2024-03-29 RX ADMIN — SODIUM BICARBONATE 100 MEQ: 84 INJECTION, SOLUTION INTRAVENOUS at 17:02

## 2024-03-29 RX ADMIN — SODIUM BICARBONATE: 84 INJECTION, SOLUTION INTRAVENOUS at 23:54

## 2024-03-29 RX ADMIN — Medication 40 MCG/MIN: at 03:50

## 2024-03-29 RX ADMIN — CALCIUM GLUCONATE 1000 MG: 98 INJECTION, SOLUTION INTRAVENOUS at 03:24

## 2024-03-29 RX ADMIN — SODIUM BICARBONATE: 84 INJECTION, SOLUTION INTRAVENOUS at 04:29

## 2024-03-29 RX ADMIN — VASOPRESSIN 0.04 UNITS/MIN: 20 INJECTION INTRAVENOUS at 20:20

## 2024-03-29 RX ADMIN — Medication 60 MCG/MIN: at 13:32

## 2024-03-29 RX ADMIN — SODIUM CHLORIDE, POTASSIUM CHLORIDE, SODIUM LACTATE AND CALCIUM CHLORIDE 500 ML: 600; 310; 30; 20 INJECTION, SOLUTION INTRAVENOUS at 03:26

## 2024-03-29 RX ADMIN — Medication 45 MCG/MIN: at 03:51

## 2024-03-29 RX ADMIN — HYDROCORTISONE SODIUM SUCCINATE 100 MG: 100 INJECTION, POWDER, FOR SOLUTION INTRAMUSCULAR; INTRAVENOUS at 05:33

## 2024-03-29 RX ADMIN — HALOPERIDOL LACTATE 2 MG: 5 INJECTION, SOLUTION INTRAMUSCULAR at 02:14

## 2024-03-29 RX ADMIN — Medication 10 MCG/MIN: at 01:45

## 2024-03-29 RX ADMIN — VANCOMYCIN HYDROCHLORIDE 2000 MG: 1 INJECTION, POWDER, LYOPHILIZED, FOR SOLUTION INTRAVENOUS at 02:47

## 2024-03-29 RX ADMIN — MEROPENEM 1000 MG: 1 INJECTION, POWDER, FOR SOLUTION INTRAVENOUS at 20:38

## 2024-03-29 RX ADMIN — MAGNESIUM SULFATE HEPTAHYDRATE 2000 MG: 40 INJECTION, SOLUTION INTRAVENOUS at 03:27

## 2024-03-29 RX ADMIN — SODIUM BICARBONATE 100 MEQ: 84 INJECTION, SOLUTION INTRAVENOUS at 04:24

## 2024-03-29 RX ADMIN — Medication 100 MEQ: at 17:02

## 2024-03-29 RX ADMIN — SODIUM BICARBONATE: 84 INJECTION, SOLUTION INTRAVENOUS at 08:37

## 2024-03-29 RX ADMIN — DEXTROSE MONOHYDRATE 250 ML: 100 INJECTION, SOLUTION INTRAVENOUS at 03:30

## 2024-03-29 RX ADMIN — INSULIN HUMAN 6 UNITS: 100 INJECTION, SOLUTION PARENTERAL at 03:32

## 2024-03-29 RX ADMIN — SODIUM BICARBONATE: 84 INJECTION, SOLUTION INTRAVENOUS at 13:24

## 2024-03-29 RX ADMIN — LACTULOSE 20 G: 20 SOLUTION ORAL at 11:48

## 2024-03-29 RX ADMIN — Medication 5 MCG/MIN: at 01:11

## 2024-03-29 RX ADMIN — SODIUM CHLORIDE, PRESERVATIVE FREE 10 ML: 5 INJECTION INTRAVENOUS at 09:03

## 2024-03-29 RX ADMIN — SODIUM BICARBONATE: 84 INJECTION, SOLUTION INTRAVENOUS at 13:23

## 2024-03-29 RX ADMIN — SODIUM BICARBONATE: 84 INJECTION, SOLUTION INTRAVENOUS at 23:53

## 2024-03-29 RX ADMIN — VASOPRESSIN 0.04 UNITS/MIN: 20 INJECTION INTRAVENOUS at 04:26

## 2024-03-29 RX ADMIN — MAGNESIUM SULFATE 1000 MG: 1 INJECTION INTRAVENOUS at 17:01

## 2024-03-29 RX ADMIN — ALBUMIN (HUMAN) 25 G: 0.25 INJECTION, SOLUTION INTRAVENOUS at 23:55

## 2024-03-29 RX ADMIN — SODIUM CHLORIDE: 9 INJECTION, SOLUTION INTRAVENOUS at 13:22

## 2024-03-29 RX ADMIN — VASOPRESSIN 0.04 UNITS/MIN: 20 INJECTION INTRAVENOUS at 04:28

## 2024-03-29 RX ADMIN — Medication 55 MCG/MIN: at 08:32

## 2024-03-29 RX ADMIN — ATORVASTATIN CALCIUM 40 MG: 40 TABLET, FILM COATED ORAL at 20:38

## 2024-03-29 RX ADMIN — Medication 50 MCG/MIN: at 03:56

## 2024-03-29 RX ADMIN — SODIUM BICARBONATE 100 MEQ: 84 INJECTION, SOLUTION INTRAVENOUS at 08:32

## 2024-03-29 RX ADMIN — SODIUM CHLORIDE, PRESERVATIVE FREE 10 ML: 5 INJECTION INTRAVENOUS at 20:32

## 2024-03-29 RX ADMIN — SODIUM BICARBONATE: 84 INJECTION, SOLUTION INTRAVENOUS at 23:58

## 2024-03-29 RX ADMIN — SODIUM CHLORIDE: 9 INJECTION, SOLUTION INTRAVENOUS at 23:00

## 2024-03-29 RX ADMIN — SODIUM CHLORIDE 1000 ML: 9 INJECTION, SOLUTION INTRAVENOUS at 01:00

## 2024-03-29 RX ADMIN — ALBUMIN (HUMAN) 25 G: 0.25 INJECTION, SOLUTION INTRAVENOUS at 18:13

## 2024-03-29 RX ADMIN — VASOPRESSIN 0.04 UNITS/MIN: 20 INJECTION INTRAVENOUS at 11:20

## 2024-03-29 RX ADMIN — MICONAZOLE NITRATE: 20 POWDER TOPICAL at 20:38

## 2024-03-29 RX ADMIN — Medication 20 MCG/MIN: at 23:57

## 2024-03-29 RX ADMIN — Medication 50 MCG/MIN: at 18:02

## 2024-03-29 RX ADMIN — Medication 100 MEQ: at 05:53

## 2024-03-29 RX ADMIN — HEPARIN SODIUM 5000 UNITS: 5000 INJECTION INTRAVENOUS; SUBCUTANEOUS at 06:43

## 2024-03-29 RX ADMIN — HEPARIN SODIUM 5000 UNITS: 5000 INJECTION INTRAVENOUS; SUBCUTANEOUS at 15:04

## 2024-03-29 RX ADMIN — SODIUM BICARBONATE 50 MEQ: 84 INJECTION, SOLUTION INTRAVENOUS at 03:32

## 2024-03-29 RX ADMIN — SODIUM BICARBONATE: 84 INJECTION, SOLUTION INTRAVENOUS at 18:32

## 2024-03-29 RX ADMIN — LACTULOSE 20 G: 20 SOLUTION ORAL at 17:47

## 2024-03-29 RX ADMIN — LACTULOSE 20 G: 20 SOLUTION ORAL at 23:55

## 2024-03-29 RX ADMIN — LACTULOSE 20 G: 20 SOLUTION ORAL at 20:37

## 2024-03-29 RX ADMIN — Medication 100 MEQ: at 04:24

## 2024-03-29 RX ADMIN — HEPARIN SODIUM 5000 UNITS: 5000 INJECTION INTRAVENOUS; SUBCUTANEOUS at 20:38

## 2024-03-29 RX ADMIN — CLOPIDOGREL BISULFATE 75 MG: 75 TABLET, FILM COATED ORAL at 11:47

## 2024-03-29 ASSESSMENT — PAIN SCALES - GENERAL
PAINLEVEL_OUTOF10: 0
PAINLEVEL_OUTOF10: 5

## 2024-03-29 ASSESSMENT — LIFESTYLE VARIABLES
HOW OFTEN DO YOU HAVE A DRINK CONTAINING ALCOHOL: NEVER
HOW MANY STANDARD DRINKS CONTAINING ALCOHOL DO YOU HAVE ON A TYPICAL DAY: PATIENT DOES NOT DRINK

## 2024-03-29 NOTE — ED NOTES
Straight cath attempted on patient, twice, with zero urine return. Instead, a milky yellow substance was noted filling with urinary catheter tubing.

## 2024-03-29 NOTE — ED PROVIDER NOTES
TIMES DAILY WITH MEALS    pantoprazole (PROTONIX) 40 mg, Oral, DAILY BEFORE BREAKFAST    sacubitril-valsartan (ENTRESTO) 24-26 MG per tablet 1 tablet, Oral, EVERY 12 HOURS    SITagliptin (JANUVIA) 25 mg, Oral, DAILY      Family History:  Family History   Problem Relation Age of Onset    Hypertension Father      Social History:  Social History     Tobacco Use    Smoking status: Never    Smokeless tobacco: Never   Substance Use Topics    Alcohol use: Not Currently    Drug use: Never     Allergies:  Allergies   Allergen Reactions    Iodine Swelling    Tvipo-Byucs-Vgffawn-Pramoxine     Penicillins Rash and Swelling     Airway swelling       Review of Systems     Review of Systems  AMS: Unable to perform ROS due to altered mental status.    Physical Exam & Vital Signs   Vital Signs - I reviewed the patient's vital signs.    Vitals:    03/29/24 0413 03/29/24 0418 03/29/24 0423 03/29/24 0438   BP: (!) 62/51 (!) 97/41 (!) 103/56 (!) 148/47   Pulse: 66 68 68 (!) 102   Resp: 21 21 22 17   Temp:       TempSrc:       SpO2: 98% 98% 98% 99%   Weight:       Height:         Physical Exam:    GENERAL: altered  HEENT:  * Pupils equal and reactive  * Head atraumatic  CV:  * audible heart sounds  * warm and perfused extremities bilaterally  PULMONARY: Good air movement, no wheezes, no crackles  ABDOMEN/: soft, noted distension, no guarding, noted diffuse abdominal tenderness, Mcburney negative, Rovsig negative, no masses, no CVA tenderness.  EXTREMITIES/BACK: warm and perfused, no tenderness, no edema, no signs of DVT (warmth, asymmetric swelling, erythema, or calf tenderness).  SKIN: no rashes or signs of trauma  NEURO:  * Altered, GCS 13  * Moving upper and lower extremities equally    Medical Decision Making     Patient is a 83 y.o. female presenting for AMS vomiting and AP. Vitals reveal  hypothermia, hypotension, bradycardia  and physical exam reveals  abdominal tenderness . EKG showed  atrial fibrillation with a heart rate of

## 2024-03-29 NOTE — CONSULTS
Nephrology Consultation          Patient: Ana Rosa Bearden MRN: [id number]  SSN: [id number]    YOB: 1940  Age: 83 y.o.  Sex: female      Subjective:   Reason for the consultation.  Acute kidney injury/severe metabolic acidosis/hyperkalemia  History of present illness.  The patient is 83-year-old woman with underlying history of hypertension, diabetes mellitus type 2, history of CHF, preserved LVEF, chronic kidney disease stage IIIb was sent from the nursing facility for altered mental status and vomiting.  On arrival in the ER she was hypotensive bradycardic and hypothermic diagnosed with severe sepsis initiated on protocol received fluid boluses started on IV antibiotics.  Her initial chemistry showed severely abnormal kidney functions with BUN 81, creatinine 6.74 and bicarb of 7 only which prompted nephrology consultation.  Her last baseline creatinine was 1.9 in November 2023.  Renal ultrasound no hydronephrosis.  She is currently on 2 pressors.  She seems to be very confused and lethargic.  No noticed lower extremity swelling.  She was noticed to be on Entresto and metformin.    Past Medical History:   Diagnosis Date    Acute respiratory distress     CHF (congestive heart failure) (HCC)     CKD (chronic kidney disease)     Diabetes mellitus (HCC)     GERD (gastroesophageal reflux disease)     Hypercholesterolemia     Hyperlipidemia     Hypertension     Migraine      Past Surgical History:   Procedure Laterality Date    HERNIA REPAIR      IR NONTUNNELED VASCULAR CATHETER  3/29/2024    IR NONTUNNELED VASCULAR CATHETER 3/29/2024 Ez Chowdhury MD [facility] ANGIO IR    TUBAL LIGATION        Family History   Problem Relation Age of Onset    Hypertension Father      Social History     Tobacco Use    Smoking status: Never    Smokeless tobacco: Never   Substance Use Topics    Alcohol use: Not Currently      Current Facility-Administered Medications   Medication Dose Route Frequency Provider Last

## 2024-03-29 NOTE — ED TRIAGE NOTES
Pt BIB EMS from [facility] for patient being unresponsive. Upon EMS arrival, she was awake, but confused. Patient was hypotensive 90 SBP, , pupils unequal and irregular. Pt has SubQ peripheral IV on arrival from SNF, and 20g in Left wrist from EMS.

## 2024-03-29 NOTE — CONSULTS
Infectious Disease Consult Note    Reason for Consult:  Septic shock   Date of Consultation: March 29, 2024  Date of Admission: 3/29/2024  Referring Physician: Hospitalist     HPI:  83 y.o WF who presented to the ED via EMS from [facility], w c/o AMS and emesis, found to be septic for which ID has been consulted.  She was minimally responsive during my assessment, history obtained from chart documentations.  Her medical history significant for CHF, CKD, DM, GERD, hyperlipidemia, HTN and migraine headaches.  She was noted to be confused, and hypotensive on initial assessment in the ED. she was also noted to be hypothermic with T min of 93.5F, tachycardic, hypotensive, requiring pressor support and maintain O2 sats on RA.  Initial blood work showed a WBC of 11.4, Cr 6.67, HCO2 8, lactic acid 5.5 trended up to 9.8, and Procal 0.10.  She tested negative for COVID and influenza in the ED. nasal MRSA PCR is negative.  Blood, sputum and urine Cxs are pending.  Admission CXR shows pulmonary edema. CT abdo/pel concerning for acute pancreatitis. She is on meropenem and Vanc, has a documented PCN allergy, received cefepime in the ED w/o any adverse reactions.  She has no left neck triple-lumen and temporary HD cath placed today    Review of Systems: Unobtainable, minimally responsive    Past Medical History:  Past Medical History:   Diagnosis Date    Acute respiratory distress     CHF (congestive heart failure) (HCC)     CKD (chronic kidney disease)     Diabetes mellitus (HCC)     GERD (gastroesophageal reflux disease)     Hypercholesterolemia     Hyperlipidemia     Hypertension     Migraine          Past surgical history     Past Surgical History:   Procedure Laterality Date    HERNIA REPAIR      TUBAL LIGATION          Social History   Social History     Tobacco Use    Smoking status: Never    Smokeless tobacco: Never   Substance Use Topics    Alcohol use: Not Currently    Drug use: Never        Family history   Family History

## 2024-03-29 NOTE — OR NURSING
Patient tolerated temp HD catheter placement  Consent obtained, site prepped, procedure performed without complication  20 cm Triple Lumen, Trialysis temporary dialysis catheter placed in Right IJV  Post imaging ordered  Report given to Primary RN

## 2024-03-29 NOTE — H&P
History and Physical    NAME:   Ana Rosa Bearden   :  1940   MRN:  [id number]     Date/Time: 3/29/2024 7:35 AM    Patient PCP: Beth Hardy MD  ______________________________________________________________________       Subjective:     CHIEF COMPLAINT:     Altered mental status        HISTORY OF PRESENT ILLNESS:     General Daily Progress Note  Patient is a 83 y.o. year old female past medical history of hypertension type 2 diabetes chronic kidney disease congestive heart failure patient resting at the nursing home came to emergency room with altered mental status and vomiting  Came to the ER patient was hypotensive bradycardic and hypothermic  Sepsis alert was initiated central line was plain  Patient received 1-1/2 L of IV fluids in the ER received Maxipime and vancomycin in the ER started on sodium bicarb drip patient admitted to ICU    Initial blood work was done in the ER sodium 141 potassium 6.3 chloride 117 CO2 8 anion gap 16 BUN 85 creatinine 6.67 WBC 11.4 hemoglobin 9.4 hematocrit 32.5 and the platelets was 273 CT scan of the head negative chest x-ray was negative CT scan of the abdomen pelvis shows suspicious for acute pancreatitis        Past Medical History:   Diagnosis Date    Acute respiratory distress     CHF (congestive heart failure) (HCC)     CKD (chronic kidney disease)     Diabetes mellitus (HCC)     GERD (gastroesophageal reflux disease)     Hypercholesterolemia     Hyperlipidemia     Hypertension     Migraine         Past Surgical History:   Procedure Laterality Date    HERNIA REPAIR      TUBAL LIGATION         Social History     Tobacco Use    Smoking status: Never    Smokeless tobacco: Never   Substance Use Topics    Alcohol use: Not Currently        Family History   Problem Relation Age of Onset    Hypertension Father        Allergies   Allergen Reactions    Iodine Swelling    Hojrz-Lknys-Sixrslt-Pramoxine     Penicillins Rash and Swelling     Airway swelling        Prior to

## 2024-03-29 NOTE — CARE COORDINATION
03/29/24 1010   Service Assessment   Patient Orientation Other (see comment)  (Information provided via Nikhil guzmán)   Cognition Other (see comment)  (Information provided via Nikhil guzmán)   History Provided By Child/Family   Primary Caregiver Other (Comment)  ([facility])   Support Systems Children;Other (Comment)  ([facility])   Patient's Healthcare Decision Maker is: Legal Next of Kin   PCP Verified by CM Yes   Last Visit to PCP Within last 3 months   Prior Functional Level Assistance with the following:;Bathing;Dressing;Toileting;Feeding;Cooking;Housework;Shopping;Mobility   Current Functional Level Assistance with the following:;Bathing;Dressing;Toileting;Feeding;Cooking;Housework;Shopping;Mobility   Can patient return to prior living arrangement Yes   Ability to make needs known: Other (see comment)  (Information provided via Nikhil guzmán)   Family able to assist with home care needs: Other (comment)  ([facility])   Would you like for me to discuss the discharge plan with any other family members/significant others, and if so, who? Yes  (SonNikhil and Daughter, Ana Rosa)   Financial Resources Medicaid;Medicare   Community Resources None   Services At/After Discharge   Transition of Care Consult (CM Consult) Discharge Planning;Long Term Care     1010: CM telephoned patient's sonNikhil to complete DCP. Demos verified as accurate. Patient is a long term care resident of [facility]. Choice letter received, placed on chart and referral sent via [facility]. When medically cleared for discharge, patient will return to [facility]. CM will continue to follow patient and recs of medical team.    Current Dispo: [facility]    1320: ID note attached in [facility]Port.    Advance Care Planning     General Advance Care Planning (ACP) Conversation    Date of Conversation: 3/29/2024  Conducted with:  Healthcare Decision Maker: Next of Kin by law (only applies in absence of

## 2024-03-30 ENCOUNTER — ANESTHESIA EVENT (OUTPATIENT)
Dept: ICU | Facility: HOSPITAL | Age: 84
End: 2024-03-30
Payer: MEDICARE

## 2024-03-30 ENCOUNTER — ANESTHESIA (OUTPATIENT)
Dept: ICU | Facility: HOSPITAL | Age: 84
End: 2024-03-30
Payer: MEDICARE

## 2024-03-30 LAB
ALBUMIN SERPL-MCNC: 3.2 G/DL (ref 3.5–5)
ALBUMIN SERPL-MCNC: 3.3 G/DL (ref 3.5–5)
ALBUMIN SERPL-MCNC: 3.4 G/DL (ref 3.5–5)
ALBUMIN SERPL-MCNC: 3.5 G/DL (ref 3.5–5)
ALBUMIN/GLOB SERPL: 1 (ref 1.1–2.2)
ALP SERPL-CCNC: 86 U/L (ref 45–117)
ALT SERPL-CCNC: 20 U/L (ref 12–78)
AMMONIA PLAS-SCNC: 20 UMOL/L
ANION GAP SERPL CALC-SCNC: 12 MMOL/L (ref 5–15)
ANION GAP SERPL CALC-SCNC: 15 MMOL/L (ref 5–15)
ANION GAP SERPL CALC-SCNC: 6 MMOL/L (ref 5–15)
ANION GAP SERPL CALC-SCNC: 7 MMOL/L (ref 5–15)
ANION GAP SERPL CALC-SCNC: 7 MMOL/L (ref 5–15)
ANION GAP SERPL CALC-SCNC: 9 MMOL/L (ref 5–15)
ARTERIAL PATENCY WRIST A: ABNORMAL
AST SERPL W P-5'-P-CCNC: 21 U/L (ref 15–37)
BASE EXCESS BLDA CALC-SCNC: 0.2 MMOL/L (ref 0–3)
BASOPHILS # BLD: 0 K/UL (ref 0–0.1)
BASOPHILS NFR BLD: 0 % (ref 0–1)
BDY SITE: ABNORMAL
BILIRUB SERPL-MCNC: 0.9 MG/DL (ref 0.2–1)
BNP SERPL-MCNC: ABNORMAL PG/ML
BODY TEMPERATURE: 94.5
BUN SERPL-MCNC: 19 MG/DL (ref 6–20)
BUN SERPL-MCNC: 19 MG/DL (ref 6–20)
BUN SERPL-MCNC: 23 MG/DL (ref 6–20)
BUN SERPL-MCNC: 29 MG/DL (ref 6–20)
BUN SERPL-MCNC: 39 MG/DL (ref 6–20)
BUN SERPL-MCNC: 40 MG/DL (ref 6–20)
BUN/CREAT SERPL: 10 (ref 12–20)
BUN/CREAT SERPL: 11 (ref 12–20)
BUN/CREAT SERPL: 9 (ref 12–20)
BUN/CREAT SERPL: 9 (ref 12–20)
CA-I BLD-MCNC: 1.04 MMOL/L (ref 1.13–1.32)
CA-I BLD-MCNC: 7.4 MG/DL (ref 8.5–10.1)
CA-I BLD-MCNC: 7.6 MG/DL (ref 8.5–10.1)
CA-I BLD-MCNC: 7.7 MG/DL (ref 8.5–10.1)
CA-I BLD-MCNC: 7.8 MG/DL (ref 8.5–10.1)
CA-I BLD-MCNC: 8 MG/DL (ref 8.5–10.1)
CA-I BLD-MCNC: 8.2 MG/DL (ref 8.5–10.1)
CHLORIDE SERPL-SCNC: 107 MMOL/L (ref 97–108)
CHLORIDE SERPL-SCNC: 107 MMOL/L (ref 97–108)
CHLORIDE SERPL-SCNC: 109 MMOL/L (ref 97–108)
CHLORIDE SERPL-SCNC: 111 MMOL/L (ref 97–108)
CHLORIDE SERPL-SCNC: 112 MMOL/L (ref 97–108)
CHLORIDE SERPL-SCNC: 112 MMOL/L (ref 97–108)
CO2 SERPL-SCNC: 24 MMOL/L (ref 21–32)
CO2 SERPL-SCNC: 25 MMOL/L (ref 21–32)
CO2 SERPL-SCNC: 28 MMOL/L (ref 21–32)
CO2 SERPL-SCNC: 28 MMOL/L (ref 21–32)
COHGB MFR BLD: 0.3 % (ref 1–2)
CREAT SERPL-MCNC: 2.09 MG/DL (ref 0.55–1.02)
CREAT SERPL-MCNC: 2.16 MG/DL (ref 0.55–1.02)
CREAT SERPL-MCNC: 2.36 MG/DL (ref 0.55–1.02)
CREAT SERPL-MCNC: 2.63 MG/DL (ref 0.55–1.02)
CREAT SERPL-MCNC: 3.44 MG/DL (ref 0.55–1.02)
CREAT SERPL-MCNC: 3.54 MG/DL (ref 0.55–1.02)
DATE LAST DOSE: NORMAL
DIFFERENTIAL METHOD BLD: ABNORMAL
DOSE AMOUNT: NORMAL UNITS
EOSINOPHIL # BLD: 0 K/UL (ref 0–0.4)
EOSINOPHIL NFR BLD: 0 % (ref 0–7)
ERYTHROCYTE [DISTWIDTH] IN BLOOD BY AUTOMATED COUNT: 16.6 % (ref 11.5–14.5)
GAS FLOW.O2 O2 DELIVERY SYS: 2 L/MIN
GLOBULIN SER CALC-MCNC: 3.1 G/DL (ref 2–4)
GLUCOSE BLD STRIP.AUTO-MCNC: 108 MG/DL (ref 65–100)
GLUCOSE BLD STRIP.AUTO-MCNC: 145 MG/DL (ref 65–100)
GLUCOSE BLD STRIP.AUTO-MCNC: 151 MG/DL (ref 65–100)
GLUCOSE BLD STRIP.AUTO-MCNC: 165 MG/DL (ref 65–100)
GLUCOSE SERPL-MCNC: 142 MG/DL (ref 65–100)
GLUCOSE SERPL-MCNC: 153 MG/DL (ref 65–100)
GLUCOSE SERPL-MCNC: 159 MG/DL (ref 65–100)
GLUCOSE SERPL-MCNC: 162 MG/DL (ref 65–100)
GLUCOSE SERPL-MCNC: 196 MG/DL (ref 65–100)
GLUCOSE SERPL-MCNC: 199 MG/DL (ref 65–100)
HCO3 BLDA-SCNC: 24 MMOL/L (ref 22–26)
HCT VFR BLD AUTO: 22 % (ref 35–47)
HGB BLD-MCNC: 7.1 G/DL (ref 11.5–16)
IMM GRANULOCYTES # BLD AUTO: 0.1 K/UL (ref 0–0.04)
IMM GRANULOCYTES NFR BLD AUTO: 1 % (ref 0–0.5)
LIPASE SERPL-CCNC: 32 U/L (ref 13–75)
LYMPHOCYTES # BLD: 0.8 K/UL (ref 0.8–3.5)
LYMPHOCYTES NFR BLD: 9 % (ref 12–49)
MAGNESIUM SERPL-MCNC: 1.6 MG/DL (ref 1.6–2.4)
MAGNESIUM SERPL-MCNC: 1.9 MG/DL (ref 1.6–2.4)
MAGNESIUM SERPL-MCNC: 2 MG/DL (ref 1.6–2.4)
MAGNESIUM SERPL-MCNC: 2 MG/DL (ref 1.6–2.4)
MAGNESIUM SERPL-MCNC: 2.1 MG/DL (ref 1.6–2.4)
MCH RBC QN AUTO: 28.2 PG (ref 26–34)
MCHC RBC AUTO-ENTMCNC: 32.3 G/DL (ref 30–36.5)
MCV RBC AUTO: 87.3 FL (ref 80–99)
METHGB MFR BLD: 0.8 % (ref 0–1.4)
MONOCYTES # BLD: 1 K/UL (ref 0–1)
MONOCYTES NFR BLD: 11 % (ref 5–13)
NEUTS SEG # BLD: 7.8 K/UL (ref 1.8–8)
NEUTS SEG NFR BLD: 79 % (ref 32–75)
NRBC # BLD: 0 K/UL (ref 0–0.01)
NRBC BLD-RTO: 0 PER 100 WBC
OXYHGB MFR BLD: 94.8 % (ref 95–99)
PCO2 BLDA: 30 MMHG (ref 35–45)
PERFORMED BY:: ABNORMAL
PH BLDA: 7.51 (ref 7.35–7.45)
PHOSPHATE SERPL-MCNC: 2.2 MG/DL (ref 2.6–4.7)
PHOSPHATE SERPL-MCNC: 2.2 MG/DL (ref 2.6–4.7)
PHOSPHATE SERPL-MCNC: 2.3 MG/DL (ref 2.6–4.7)
PHOSPHATE SERPL-MCNC: 2.8 MG/DL (ref 2.6–4.7)
PHOSPHATE SERPL-MCNC: 2.9 MG/DL (ref 2.6–4.7)
PHOSPHATE SERPL-MCNC: 3 MG/DL (ref 2.6–4.7)
PLATELET # BLD AUTO: 219 K/UL (ref 150–400)
PMV BLD AUTO: 11 FL (ref 8.9–12.9)
PO2 BLDA: 76 MMHG (ref 80–100)
POTASSIUM SERPL-SCNC: 3.1 MMOL/L (ref 3.5–5.1)
POTASSIUM SERPL-SCNC: 3.3 MMOL/L (ref 3.5–5.1)
POTASSIUM SERPL-SCNC: 3.6 MMOL/L (ref 3.5–5.1)
POTASSIUM SERPL-SCNC: 3.7 MMOL/L (ref 3.5–5.1)
POTASSIUM SERPL-SCNC: 3.9 MMOL/L (ref 3.5–5.1)
POTASSIUM SERPL-SCNC: 3.9 MMOL/L (ref 3.5–5.1)
PROT SERPL-MCNC: 6.3 G/DL (ref 6.4–8.2)
RBC # BLD AUTO: 2.52 M/UL (ref 3.8–5.2)
SAO2 % BLD: 96 % (ref 95–99)
SAO2% DEVICE SAO2% SENSOR NAME: ABNORMAL
SODIUM SERPL-SCNC: 143 MMOL/L (ref 136–145)
SODIUM SERPL-SCNC: 144 MMOL/L (ref 136–145)
SODIUM SERPL-SCNC: 144 MMOL/L (ref 136–145)
SODIUM SERPL-SCNC: 146 MMOL/L (ref 136–145)
SPECIMEN SITE: ABNORMAL
VANCOMYCIN SERPL-MCNC: 16.3 UG/ML
WBC # BLD AUTO: 9.7 K/UL (ref 3.6–11)

## 2024-03-30 PROCEDURE — 2580000003 HC RX 258: Performed by: FAMILY MEDICINE

## 2024-03-30 PROCEDURE — 6360000002 HC RX W HCPCS: Performed by: INTERNAL MEDICINE

## 2024-03-30 PROCEDURE — 85025 COMPLETE CBC W/AUTO DIFF WBC: CPT

## 2024-03-30 PROCEDURE — 36620 INSERTION CATHETER ARTERY: CPT

## 2024-03-30 PROCEDURE — 99233 SBSQ HOSP IP/OBS HIGH 50: CPT | Performed by: INTERNAL MEDICINE

## 2024-03-30 PROCEDURE — 6370000000 HC RX 637 (ALT 250 FOR IP): Performed by: FAMILY MEDICINE

## 2024-03-30 PROCEDURE — 83735 ASSAY OF MAGNESIUM: CPT

## 2024-03-30 PROCEDURE — 82962 GLUCOSE BLOOD TEST: CPT

## 2024-03-30 PROCEDURE — 80202 ASSAY OF VANCOMYCIN: CPT

## 2024-03-30 PROCEDURE — 82803 BLOOD GASES ANY COMBINATION: CPT

## 2024-03-30 PROCEDURE — P9047 ALBUMIN (HUMAN), 25%, 50ML: HCPCS | Performed by: INTERNAL MEDICINE

## 2024-03-30 PROCEDURE — 2000000000 HC ICU R&B

## 2024-03-30 PROCEDURE — 36415 COLL VENOUS BLD VENIPUNCTURE: CPT

## 2024-03-30 PROCEDURE — 2500000003 HC RX 250 WO HCPCS: Performed by: STUDENT IN AN ORGANIZED HEALTH CARE EDUCATION/TRAINING PROGRAM

## 2024-03-30 PROCEDURE — 6360000002 HC RX W HCPCS: Performed by: FAMILY MEDICINE

## 2024-03-30 PROCEDURE — 84100 ASSAY OF PHOSPHORUS: CPT

## 2024-03-30 PROCEDURE — 2580000003 HC RX 258: Performed by: INTERNAL MEDICINE

## 2024-03-30 PROCEDURE — 83690 ASSAY OF LIPASE: CPT

## 2024-03-30 PROCEDURE — 90945 DIALYSIS ONE EVALUATION: CPT

## 2024-03-30 PROCEDURE — 80053 COMPREHEN METABOLIC PANEL: CPT

## 2024-03-30 PROCEDURE — 82330 ASSAY OF CALCIUM: CPT

## 2024-03-30 PROCEDURE — 36620 INSERTION CATHETER ARTERY: CPT | Performed by: NURSE ANESTHETIST, CERTIFIED REGISTERED

## 2024-03-30 PROCEDURE — 94761 N-INVAS EAR/PLS OXIMETRY MLT: CPT

## 2024-03-30 PROCEDURE — 80069 RENAL FUNCTION PANEL: CPT

## 2024-03-30 PROCEDURE — 82140 ASSAY OF AMMONIA: CPT

## 2024-03-30 PROCEDURE — 2700000000 HC OXYGEN THERAPY PER DAY

## 2024-03-30 PROCEDURE — 6360000002 HC RX W HCPCS: Performed by: STUDENT IN AN ORGANIZED HEALTH CARE EDUCATION/TRAINING PROGRAM

## 2024-03-30 PROCEDURE — 2580000003 HC RX 258: Performed by: STUDENT IN AN ORGANIZED HEALTH CARE EDUCATION/TRAINING PROGRAM

## 2024-03-30 PROCEDURE — 83880 ASSAY OF NATRIURETIC PEPTIDE: CPT

## 2024-03-30 PROCEDURE — 2500000003 HC RX 250 WO HCPCS: Performed by: INTERNAL MEDICINE

## 2024-03-30 RX ORDER — HEPARIN SODIUM 5000 [USP'U]/ML
5000 INJECTION, SOLUTION INTRAVENOUS; SUBCUTANEOUS ONCE
Status: COMPLETED | OUTPATIENT
Start: 2024-03-30 | End: 2024-03-30

## 2024-03-30 RX ORDER — CALCIUM CHLORIDE, MAGNESIUM CHLORIDE, DEXTROSE MONOHYDRATE, LACTIC ACID, SODIUM CHLORIDE, SODIUM BICARBONATE AND POTASSIUM CHLORIDE 3.68; 3.05; 22; 5.4; 6.46; 3.09; .314 G/L; G/L; G/L; G/L; G/L; G/L; G/L
INJECTION INTRAVENOUS CONTINUOUS
Status: DISCONTINUED | OUTPATIENT
Start: 2024-03-30 | End: 2024-04-01

## 2024-03-30 RX ORDER — POTASSIUM CHLORIDE 7.45 MG/ML
10 INJECTION INTRAVENOUS
Status: COMPLETED | OUTPATIENT
Start: 2024-03-30 | End: 2024-03-30

## 2024-03-30 RX ORDER — MAGNESIUM SULFATE 1 G/100ML
1000 INJECTION INTRAVENOUS ONCE
Status: COMPLETED | OUTPATIENT
Start: 2024-03-30 | End: 2024-03-30

## 2024-03-30 RX ADMIN — POTASSIUM CHLORIDE 10 MEQ: 7.46 INJECTION, SOLUTION INTRAVENOUS at 04:58

## 2024-03-30 RX ADMIN — HEPARIN SODIUM 5000 UNITS: 5000 INJECTION INTRAVENOUS; SUBCUTANEOUS at 04:11

## 2024-03-30 RX ADMIN — VANCOMYCIN HYDROCHLORIDE 750 MG: 750 INJECTION, POWDER, LYOPHILIZED, FOR SOLUTION INTRAVENOUS at 23:00

## 2024-03-30 RX ADMIN — SODIUM CHLORIDE, PRESERVATIVE FREE 10 ML: 5 INJECTION INTRAVENOUS at 19:37

## 2024-03-30 RX ADMIN — POTASSIUM CHLORIDE 10 MEQ: 7.46 INJECTION, SOLUTION INTRAVENOUS at 04:09

## 2024-03-30 RX ADMIN — LACTULOSE 20 G: 20 SOLUTION ORAL at 17:10

## 2024-03-30 RX ADMIN — LACTULOSE 20 G: 20 SOLUTION ORAL at 03:15

## 2024-03-30 RX ADMIN — CALCIUM CHLORIDE, MAGNESIUM CHLORIDE, DEXTROSE MONOHYDRATE, LACTIC ACID, SODIUM CHLORIDE, SODIUM BICARBONATE AND POTASSIUM CHLORIDE: 3.68; 3.05; 22; 5.4; 6.46; 3.09; .314 INJECTION INTRAVENOUS at 10:10

## 2024-03-30 RX ADMIN — CALCIUM CHLORIDE, MAGNESIUM CHLORIDE, DEXTROSE MONOHYDRATE, LACTIC ACID, SODIUM CHLORIDE, SODIUM BICARBONATE AND POTASSIUM CHLORIDE: 3.68; 3.05; 22; 5.4; 6.46; 3.09; .314 INJECTION INTRAVENOUS at 04:26

## 2024-03-30 RX ADMIN — SODIUM CHLORIDE, PRESERVATIVE FREE 10 ML: 5 INJECTION INTRAVENOUS at 08:05

## 2024-03-30 RX ADMIN — LACTULOSE 20 G: 20 SOLUTION ORAL at 23:00

## 2024-03-30 RX ADMIN — MICONAZOLE NITRATE: 20 POWDER TOPICAL at 08:05

## 2024-03-30 RX ADMIN — HEPARIN SODIUM 5000 UNITS: 5000 INJECTION INTRAVENOUS; SUBCUTANEOUS at 13:07

## 2024-03-30 RX ADMIN — LACTULOSE 20 G: 20 SOLUTION ORAL at 08:04

## 2024-03-30 RX ADMIN — MEROPENEM 1000 MG: 1 INJECTION, POWDER, FOR SOLUTION INTRAVENOUS at 19:37

## 2024-03-30 RX ADMIN — LACTULOSE 20 G: 20 SOLUTION ORAL at 12:07

## 2024-03-30 RX ADMIN — SODIUM CHLORIDE: 9 INJECTION, SOLUTION INTRAVENOUS at 19:00

## 2024-03-30 RX ADMIN — LACTULOSE 20 G: 20 SOLUTION ORAL at 19:37

## 2024-03-30 RX ADMIN — Medication 5 MCG/MIN: at 19:31

## 2024-03-30 RX ADMIN — CALCIUM CHLORIDE, MAGNESIUM CHLORIDE, DEXTROSE MONOHYDRATE, LACTIC ACID, SODIUM CHLORIDE, SODIUM BICARBONATE AND POTASSIUM CHLORIDE: 3.68; 3.05; 22; 5.4; 6.46; 3.09; .314 INJECTION INTRAVENOUS at 10:09

## 2024-03-30 RX ADMIN — CLOPIDOGREL BISULFATE 75 MG: 75 TABLET, FILM COATED ORAL at 08:04

## 2024-03-30 RX ADMIN — VASOPRESSIN 0.04 UNITS/MIN: 20 INJECTION INTRAVENOUS at 04:01

## 2024-03-30 RX ADMIN — ATORVASTATIN CALCIUM 40 MG: 40 TABLET, FILM COATED ORAL at 19:37

## 2024-03-30 RX ADMIN — POTASSIUM CHLORIDE 10 MEQ: 7.46 INJECTION, SOLUTION INTRAVENOUS at 07:04

## 2024-03-30 RX ADMIN — MAGNESIUM SULFATE HEPTAHYDRATE 1000 MG: 1 INJECTION, SOLUTION INTRAVENOUS at 04:08

## 2024-03-30 RX ADMIN — CALCIUM CHLORIDE, MAGNESIUM CHLORIDE, DEXTROSE MONOHYDRATE, LACTIC ACID, SODIUM CHLORIDE, SODIUM BICARBONATE AND POTASSIUM CHLORIDE: 3.68; 3.05; 22; 5.4; 6.46; 3.09; .314 INJECTION INTRAVENOUS at 21:30

## 2024-03-30 RX ADMIN — VANCOMYCIN HYDROCHLORIDE 750 MG: 750 INJECTION, POWDER, LYOPHILIZED, FOR SOLUTION INTRAVENOUS at 12:07

## 2024-03-30 RX ADMIN — MICONAZOLE NITRATE: 20 POWDER TOPICAL at 19:38

## 2024-03-30 RX ADMIN — PANTOPRAZOLE SODIUM 40 MG: 40 TABLET, DELAYED RELEASE ORAL at 08:04

## 2024-03-30 RX ADMIN — MEROPENEM 1000 MG: 1 INJECTION, POWDER, FOR SOLUTION INTRAVENOUS at 08:04

## 2024-03-30 RX ADMIN — POTASSIUM PHOSPHATE, MONOBASIC POTASSIUM PHOSPHATE, DIBASIC 15 MMOL: 224; 236 INJECTION, SOLUTION, CONCENTRATE INTRAVENOUS at 12:56

## 2024-03-30 RX ADMIN — SODIUM CHLORIDE: 9 INJECTION, SOLUTION INTRAVENOUS at 21:30

## 2024-03-30 RX ADMIN — SODIUM CHLORIDE: 9 INJECTION, SOLUTION INTRAVENOUS at 04:14

## 2024-03-30 RX ADMIN — HEPARIN SODIUM 5000 UNITS: 5000 INJECTION INTRAVENOUS; SUBCUTANEOUS at 21:23

## 2024-03-30 RX ADMIN — ALBUMIN (HUMAN) 25 G: 0.25 INJECTION, SOLUTION INTRAVENOUS at 08:04

## 2024-03-30 RX ADMIN — HEPARIN SODIUM 5000 UNITS: 5000 INJECTION INTRAVENOUS; SUBCUTANEOUS at 20:15

## 2024-03-30 RX ADMIN — POTASSIUM CHLORIDE 10 MEQ: 7.46 INJECTION, SOLUTION INTRAVENOUS at 06:01

## 2024-03-30 RX ADMIN — Medication 5 MCG/MIN: at 18:56

## 2024-03-30 ASSESSMENT — PAIN SCALES - GENERAL
PAINLEVEL_OUTOF10: 0

## 2024-03-30 NOTE — ANESTHESIA PROCEDURE NOTES
Arterial Line:    An arterial line was placed using ultrasound guidance, in the ICU for the following indication(s): continuous blood pressure monitoring.    A 22 gauge (size), 1 and 3/4 inch (length), Arrow (type) catheter was placed, Seldinger technique used, into the right radial artery, secured by tape and Tegaderm.    Events:  patient tolerated procedure well with no complications and EBL < 5mL.3/30/2024 12:21 AM3/30/2024 12:49 AM  Resident/CRNA: Mike Spears, WERO - CRNA  Preanesthetic Checklist  Completed: patient identified, IV checked, site marked, risks and benefits discussed, surgical/procedural consents, equipment checked, pre-op evaluation, timeout performed, anesthesia consent given, oxygen available, monitors applied/VS acknowledged, fire risk safety assessment completed and verbalized and blood product R/B/A discussed and consented

## 2024-03-31 LAB
ALBUMIN SERPL-MCNC: 1.9 G/DL (ref 3.5–5)
ALBUMIN SERPL-MCNC: 3 G/DL (ref 3.5–5)
ALBUMIN/GLOB SERPL: 0.9 (ref 1.1–2.2)
ALP SERPL-CCNC: 88 U/L (ref 45–117)
ALT SERPL-CCNC: 19 U/L (ref 12–78)
AMMONIA PLAS-SCNC: 14 UMOL/L
ANION GAP SERPL CALC-SCNC: 11 MMOL/L (ref 5–15)
ANION GAP SERPL CALC-SCNC: 7 MMOL/L (ref 5–15)
ANION GAP SERPL CALC-SCNC: 8 MMOL/L (ref 5–15)
AST SERPL W P-5'-P-CCNC: 23 U/L (ref 15–37)
BACTERIA SPEC CULT: ABNORMAL
BACTERIA SPEC CULT: NORMAL
BASOPHILS # BLD: 0 K/UL (ref 0–0.1)
BASOPHILS NFR BLD: 0 % (ref 0–1)
BILIRUB SERPL-MCNC: 1 MG/DL (ref 0.2–1)
BUN SERPL-MCNC: 11 MG/DL (ref 6–20)
BUN SERPL-MCNC: 11 MG/DL (ref 6–20)
BUN SERPL-MCNC: 13 MG/DL (ref 6–20)
BUN SERPL-MCNC: 17 MG/DL (ref 6–20)
BUN SERPL-MCNC: 8 MG/DL (ref 6–20)
BUN/CREAT SERPL: 11 (ref 12–20)
BUN/CREAT SERPL: 8 (ref 12–20)
BUN/CREAT SERPL: 9 (ref 12–20)
CA-I BLD-MCNC: 5.2 MG/DL (ref 8.5–10.1)
CA-I BLD-MCNC: 7.6 MG/DL (ref 8.5–10.1)
CA-I BLD-MCNC: 7.7 MG/DL (ref 8.5–10.1)
CA-I BLD-MCNC: 7.7 MG/DL (ref 8.5–10.1)
CA-I BLD-MCNC: 8 MG/DL (ref 8.5–10.1)
CHLORIDE SERPL-SCNC: 111 MMOL/L (ref 97–108)
CHLORIDE SERPL-SCNC: 111 MMOL/L (ref 97–108)
CHLORIDE SERPL-SCNC: 112 MMOL/L (ref 97–108)
CHLORIDE SERPL-SCNC: 113 MMOL/L (ref 97–108)
CHLORIDE SERPL-SCNC: 124 MMOL/L (ref 97–108)
CO2 SERPL-SCNC: 14 MMOL/L (ref 21–32)
CO2 SERPL-SCNC: 22 MMOL/L (ref 21–32)
CO2 SERPL-SCNC: 23 MMOL/L (ref 21–32)
CO2 SERPL-SCNC: 23 MMOL/L (ref 21–32)
CO2 SERPL-SCNC: 24 MMOL/L (ref 21–32)
COLONY COUNT, CNT: ABNORMAL
COLONY COUNT, CNT: ABNORMAL
CREAT SERPL-MCNC: 0.75 MG/DL (ref 0.55–1.02)
CREAT SERPL-MCNC: 1.36 MG/DL (ref 0.55–1.02)
CREAT SERPL-MCNC: 1.37 MG/DL (ref 0.55–1.02)
CREAT SERPL-MCNC: 1.53 MG/DL (ref 0.55–1.02)
CREAT SERPL-MCNC: 1.79 MG/DL (ref 0.55–1.02)
DATE LAST DOSE: NORMAL
DIFFERENTIAL METHOD BLD: ABNORMAL
DOSE AMOUNT: NORMAL UNITS
EOSINOPHIL # BLD: 0 K/UL (ref 0–0.4)
EOSINOPHIL NFR BLD: 0 % (ref 0–7)
ERYTHROCYTE [DISTWIDTH] IN BLOOD BY AUTOMATED COUNT: 16.9 % (ref 11.5–14.5)
GLOBULIN SER CALC-MCNC: 3.2 G/DL (ref 2–4)
GLUCOSE BLD STRIP.AUTO-MCNC: 116 MG/DL (ref 65–100)
GLUCOSE BLD STRIP.AUTO-MCNC: 125 MG/DL (ref 65–100)
GLUCOSE BLD STRIP.AUTO-MCNC: 131 MG/DL (ref 65–100)
GLUCOSE SERPL-MCNC: 122 MG/DL (ref 65–100)
GLUCOSE SERPL-MCNC: 129 MG/DL (ref 65–100)
GLUCOSE SERPL-MCNC: 130 MG/DL (ref 65–100)
GLUCOSE SERPL-MCNC: 131 MG/DL (ref 65–100)
GLUCOSE SERPL-MCNC: 87 MG/DL (ref 65–100)
HCT VFR BLD AUTO: 22.5 % (ref 35–47)
HGB BLD-MCNC: 7.1 G/DL (ref 11.5–16)
IMM GRANULOCYTES # BLD AUTO: 0.1 K/UL (ref 0–0.04)
IMM GRANULOCYTES NFR BLD AUTO: 1 % (ref 0–0.5)
LIPASE SERPL-CCNC: 26 U/L (ref 13–75)
LYMPHOCYTES # BLD: 0.8 K/UL (ref 0.8–3.5)
LYMPHOCYTES NFR BLD: 10 % (ref 12–49)
Lab: ABNORMAL
Lab: NORMAL
MAGNESIUM SERPL-MCNC: 1.4 MG/DL (ref 1.6–2.4)
MAGNESIUM SERPL-MCNC: 2.1 MG/DL (ref 1.6–2.4)
MAGNESIUM SERPL-MCNC: 2.1 MG/DL (ref 1.6–2.4)
MAGNESIUM SERPL-MCNC: 2.2 MG/DL (ref 1.6–2.4)
MAGNESIUM SERPL-MCNC: 2.2 MG/DL (ref 1.6–2.4)
MCH RBC QN AUTO: 28.4 PG (ref 26–34)
MCHC RBC AUTO-ENTMCNC: 31.6 G/DL (ref 30–36.5)
MCV RBC AUTO: 90 FL (ref 80–99)
MONOCYTES # BLD: 0.7 K/UL (ref 0–1)
MONOCYTES NFR BLD: 9 % (ref 5–13)
NEUTS SEG # BLD: 6.7 K/UL (ref 1.8–8)
NEUTS SEG NFR BLD: 80 % (ref 32–75)
NRBC # BLD: 0 K/UL (ref 0–0.01)
NRBC BLD-RTO: 0 PER 100 WBC
PERFORMED BY:: ABNORMAL
PHOSPHATE SERPL-MCNC: 1.2 MG/DL (ref 2.6–4.7)
PHOSPHATE SERPL-MCNC: 1.7 MG/DL (ref 2.6–4.7)
PHOSPHATE SERPL-MCNC: 1.8 MG/DL (ref 2.6–4.7)
PHOSPHATE SERPL-MCNC: 2.7 MG/DL (ref 2.6–4.7)
PLATELET # BLD AUTO: 151 K/UL (ref 150–400)
PMV BLD AUTO: 10.5 FL (ref 8.9–12.9)
POTASSIUM SERPL-SCNC: 2.7 MMOL/L (ref 3.5–5.1)
POTASSIUM SERPL-SCNC: 3.5 MMOL/L (ref 3.5–5.1)
POTASSIUM SERPL-SCNC: 3.7 MMOL/L (ref 3.5–5.1)
POTASSIUM SERPL-SCNC: 3.8 MMOL/L (ref 3.5–5.1)
POTASSIUM SERPL-SCNC: 3.9 MMOL/L (ref 3.5–5.1)
PROT SERPL-MCNC: 6.2 G/DL (ref 6.4–8.2)
RBC # BLD AUTO: 2.5 M/UL (ref 3.8–5.2)
SODIUM SERPL-SCNC: 141 MMOL/L (ref 136–145)
SODIUM SERPL-SCNC: 142 MMOL/L (ref 136–145)
SODIUM SERPL-SCNC: 143 MMOL/L (ref 136–145)
SODIUM SERPL-SCNC: 144 MMOL/L (ref 136–145)
SODIUM SERPL-SCNC: 149 MMOL/L (ref 136–145)
VANCOMYCIN SERPL-MCNC: 18 UG/ML
WBC # BLD AUTO: 8.3 K/UL (ref 3.6–11)

## 2024-03-31 PROCEDURE — 94761 N-INVAS EAR/PLS OXIMETRY MLT: CPT

## 2024-03-31 PROCEDURE — 82962 GLUCOSE BLOOD TEST: CPT

## 2024-03-31 PROCEDURE — 80069 RENAL FUNCTION PANEL: CPT

## 2024-03-31 PROCEDURE — 2580000003 HC RX 258: Performed by: FAMILY MEDICINE

## 2024-03-31 PROCEDURE — 83690 ASSAY OF LIPASE: CPT

## 2024-03-31 PROCEDURE — 83735 ASSAY OF MAGNESIUM: CPT

## 2024-03-31 PROCEDURE — 36415 COLL VENOUS BLD VENIPUNCTURE: CPT

## 2024-03-31 PROCEDURE — 51798 US URINE CAPACITY MEASURE: CPT

## 2024-03-31 PROCEDURE — 2580000003 HC RX 258: Performed by: INTERNAL MEDICINE

## 2024-03-31 PROCEDURE — 6370000000 HC RX 637 (ALT 250 FOR IP): Performed by: FAMILY MEDICINE

## 2024-03-31 PROCEDURE — 84100 ASSAY OF PHOSPHORUS: CPT

## 2024-03-31 PROCEDURE — 80053 COMPREHEN METABOLIC PANEL: CPT

## 2024-03-31 PROCEDURE — 82140 ASSAY OF AMMONIA: CPT

## 2024-03-31 PROCEDURE — 2500000003 HC RX 250 WO HCPCS: Performed by: INTERNAL MEDICINE

## 2024-03-31 PROCEDURE — 85025 COMPLETE CBC W/AUTO DIFF WBC: CPT

## 2024-03-31 PROCEDURE — 6360000002 HC RX W HCPCS: Performed by: FAMILY MEDICINE

## 2024-03-31 PROCEDURE — 90945 DIALYSIS ONE EVALUATION: CPT

## 2024-03-31 PROCEDURE — 99233 SBSQ HOSP IP/OBS HIGH 50: CPT | Performed by: INTERNAL MEDICINE

## 2024-03-31 PROCEDURE — 2700000000 HC OXYGEN THERAPY PER DAY

## 2024-03-31 PROCEDURE — 2000000000 HC ICU R&B

## 2024-03-31 PROCEDURE — 80202 ASSAY OF VANCOMYCIN: CPT

## 2024-03-31 RX ORDER — CARVEDILOL 12.5 MG/1
12.5 TABLET ORAL ONCE
Status: COMPLETED | OUTPATIENT
Start: 2024-03-31 | End: 2024-03-31

## 2024-03-31 RX ORDER — ZIPRASIDONE MESYLATE 20 MG/ML
10 INJECTION, POWDER, LYOPHILIZED, FOR SOLUTION INTRAMUSCULAR ONCE
Status: DISCONTINUED | OUTPATIENT
Start: 2024-03-31 | End: 2024-04-09 | Stop reason: HOSPADM

## 2024-03-31 RX ORDER — CARVEDILOL 12.5 MG/1
12.5 TABLET ORAL 2 TIMES DAILY WITH MEALS
Status: DISCONTINUED | OUTPATIENT
Start: 2024-04-01 | End: 2024-04-09 | Stop reason: HOSPADM

## 2024-03-31 RX ADMIN — LACTULOSE 20 G: 20 SOLUTION ORAL at 15:07

## 2024-03-31 RX ADMIN — MEROPENEM 1000 MG: 1 INJECTION, POWDER, FOR SOLUTION INTRAVENOUS at 20:49

## 2024-03-31 RX ADMIN — PANTOPRAZOLE SODIUM 40 MG: 40 TABLET, DELAYED RELEASE ORAL at 08:18

## 2024-03-31 RX ADMIN — CALCIUM CHLORIDE, MAGNESIUM CHLORIDE, DEXTROSE MONOHYDRATE, LACTIC ACID, SODIUM CHLORIDE, SODIUM BICARBONATE AND POTASSIUM CHLORIDE: 3.68; 3.05; 22; 5.4; 6.46; 3.09; .314 INJECTION INTRAVENOUS at 02:41

## 2024-03-31 RX ADMIN — CALCIUM CHLORIDE, MAGNESIUM CHLORIDE, DEXTROSE MONOHYDRATE, LACTIC ACID, SODIUM CHLORIDE, SODIUM BICARBONATE AND POTASSIUM CHLORIDE: 3.68; 3.05; 22; 5.4; 6.46; 3.09; .314 INJECTION INTRAVENOUS at 23:38

## 2024-03-31 RX ADMIN — LACTULOSE 20 G: 20 SOLUTION ORAL at 11:24

## 2024-03-31 RX ADMIN — CLOPIDOGREL BISULFATE 75 MG: 75 TABLET, FILM COATED ORAL at 08:18

## 2024-03-31 RX ADMIN — CALCIUM CHLORIDE, MAGNESIUM CHLORIDE, DEXTROSE MONOHYDRATE, LACTIC ACID, SODIUM CHLORIDE, SODIUM BICARBONATE AND POTASSIUM CHLORIDE: 3.68; 3.05; 22; 5.4; 6.46; 3.09; .314 INJECTION INTRAVENOUS at 07:54

## 2024-03-31 RX ADMIN — HEPARIN SODIUM 5000 UNITS: 5000 INJECTION INTRAVENOUS; SUBCUTANEOUS at 04:33

## 2024-03-31 RX ADMIN — CALCIUM CHLORIDE, MAGNESIUM CHLORIDE, DEXTROSE MONOHYDRATE, LACTIC ACID, SODIUM CHLORIDE, SODIUM BICARBONATE AND POTASSIUM CHLORIDE: 3.68; 3.05; 22; 5.4; 6.46; 3.09; .314 INJECTION INTRAVENOUS at 18:25

## 2024-03-31 RX ADMIN — LACTULOSE 20 G: 20 SOLUTION ORAL at 08:17

## 2024-03-31 RX ADMIN — CALCIUM CHLORIDE, MAGNESIUM CHLORIDE, DEXTROSE MONOHYDRATE, LACTIC ACID, SODIUM CHLORIDE, SODIUM BICARBONATE AND POTASSIUM CHLORIDE: 3.68; 3.05; 22; 5.4; 6.46; 3.09; .314 INJECTION INTRAVENOUS at 13:08

## 2024-03-31 RX ADMIN — CALCIUM CHLORIDE, MAGNESIUM CHLORIDE, DEXTROSE MONOHYDRATE, LACTIC ACID, SODIUM CHLORIDE, SODIUM BICARBONATE AND POTASSIUM CHLORIDE: 3.68; 3.05; 22; 5.4; 6.46; 3.09; .314 INJECTION INTRAVENOUS at 18:23

## 2024-03-31 RX ADMIN — SODIUM CHLORIDE, PRESERVATIVE FREE 10 ML: 5 INJECTION INTRAVENOUS at 08:18

## 2024-03-31 RX ADMIN — MICONAZOLE NITRATE: 20 POWDER TOPICAL at 20:49

## 2024-03-31 RX ADMIN — CALCIUM CHLORIDE, MAGNESIUM CHLORIDE, DEXTROSE MONOHYDRATE, LACTIC ACID, SODIUM CHLORIDE, SODIUM BICARBONATE AND POTASSIUM CHLORIDE: 3.68; 3.05; 22; 5.4; 6.46; 3.09; .314 INJECTION INTRAVENOUS at 07:56

## 2024-03-31 RX ADMIN — HEPARIN SODIUM 5000 UNITS: 5000 INJECTION INTRAVENOUS; SUBCUTANEOUS at 13:55

## 2024-03-31 RX ADMIN — HEPARIN SODIUM 5000 UNITS: 5000 INJECTION INTRAVENOUS; SUBCUTANEOUS at 22:30

## 2024-03-31 RX ADMIN — LACTULOSE 20 G: 20 SOLUTION ORAL at 20:49

## 2024-03-31 RX ADMIN — LACTULOSE 20 G: 20 SOLUTION ORAL at 04:33

## 2024-03-31 RX ADMIN — SODIUM CHLORIDE: 9 INJECTION, SOLUTION INTRAVENOUS at 06:05

## 2024-03-31 RX ADMIN — CARVEDILOL 12.5 MG: 12.5 TABLET, FILM COATED ORAL at 20:48

## 2024-03-31 RX ADMIN — ATORVASTATIN CALCIUM 40 MG: 40 TABLET, FILM COATED ORAL at 20:49

## 2024-03-31 RX ADMIN — VANCOMYCIN HYDROCHLORIDE 750 MG: 750 INJECTION, POWDER, LYOPHILIZED, FOR SOLUTION INTRAVENOUS at 23:34

## 2024-03-31 RX ADMIN — MEROPENEM 1000 MG: 1 INJECTION, POWDER, FOR SOLUTION INTRAVENOUS at 08:15

## 2024-03-31 RX ADMIN — POTASSIUM PHOSPHATE, MONOBASIC POTASSIUM PHOSPHATE, DIBASIC 20 MMOL: 224; 236 INJECTION, SOLUTION, CONCENTRATE INTRAVENOUS at 05:38

## 2024-03-31 RX ADMIN — VANCOMYCIN HYDROCHLORIDE 750 MG: 750 INJECTION, POWDER, LYOPHILIZED, FOR SOLUTION INTRAVENOUS at 11:24

## 2024-03-31 RX ADMIN — SODIUM CHLORIDE: 9 INJECTION, SOLUTION INTRAVENOUS at 02:05

## 2024-03-31 RX ADMIN — MICONAZOLE NITRATE: 20 POWDER TOPICAL at 08:18

## 2024-03-31 RX ADMIN — SODIUM CHLORIDE, PRESERVATIVE FREE 10 ML: 5 INJECTION INTRAVENOUS at 20:49

## 2024-03-31 RX ADMIN — CALCIUM CHLORIDE, MAGNESIUM CHLORIDE, DEXTROSE MONOHYDRATE, LACTIC ACID, SODIUM CHLORIDE, SODIUM BICARBONATE AND POTASSIUM CHLORIDE: 3.68; 3.05; 22; 5.4; 6.46; 3.09; .314 INJECTION INTRAVENOUS at 13:10

## 2024-03-31 ASSESSMENT — PAIN SCALES - GENERAL
PAINLEVEL_OUTOF10: 0
PAINLEVEL_OUTOF10: 0

## 2024-04-01 LAB
ALBUMIN SERPL-MCNC: 2.7 G/DL (ref 3.5–5)
ALBUMIN SERPL-MCNC: 2.8 G/DL (ref 3.5–5)
ALBUMIN/GLOB SERPL: 0.8 (ref 1.1–2.2)
ALP SERPL-CCNC: 96 U/L (ref 45–117)
ALT SERPL-CCNC: 21 U/L (ref 12–78)
ANION GAP SERPL CALC-SCNC: 7 MMOL/L (ref 5–15)
ANION GAP SERPL CALC-SCNC: 9 MMOL/L (ref 5–15)
AST SERPL W P-5'-P-CCNC: 21 U/L (ref 15–37)
BILIRUB SERPL-MCNC: 1.2 MG/DL (ref 0.2–1)
BUN SERPL-MCNC: 12 MG/DL (ref 6–20)
BUN SERPL-MCNC: 12 MG/DL (ref 6–20)
BUN/CREAT SERPL: 8 (ref 12–20)
BUN/CREAT SERPL: 8 (ref 12–20)
CA-I BLD-MCNC: 7.8 MG/DL (ref 8.5–10.1)
CHLORIDE SERPL-SCNC: 111 MMOL/L (ref 97–108)
CHLORIDE SERPL-SCNC: 112 MMOL/L (ref 97–108)
CO2 SERPL-SCNC: 22 MMOL/L (ref 21–32)
CO2 SERPL-SCNC: 23 MMOL/L (ref 21–32)
CREAT SERPL-MCNC: 1.43 MG/DL (ref 0.55–1.02)
CREAT SERPL-MCNC: 1.45 MG/DL (ref 0.55–1.02)
CRP SERPL-MCNC: 9.11 MG/DL (ref 0–0.3)
DATE LAST DOSE: NORMAL
DOSE AMOUNT: NORMAL UNITS
ERYTHROCYTE [DISTWIDTH] IN BLOOD BY AUTOMATED COUNT: 16.6 % (ref 11.5–14.5)
FERRITIN SERPL-MCNC: 188 NG/ML (ref 8–252)
GLOBULIN SER CALC-MCNC: 3.4 G/DL (ref 2–4)
GLUCOSE BLD STRIP.AUTO-MCNC: 108 MG/DL (ref 65–100)
GLUCOSE BLD STRIP.AUTO-MCNC: 109 MG/DL (ref 65–100)
GLUCOSE SERPL-MCNC: 135 MG/DL (ref 65–100)
GLUCOSE SERPL-MCNC: 136 MG/DL (ref 65–100)
HCT VFR BLD AUTO: 22.4 % (ref 35–47)
HGB BLD-MCNC: 6.8 G/DL (ref 11.5–16)
IRON SATN MFR SERPL: 34 % (ref 20–50)
IRON SERPL-MCNC: 43 UG/DL (ref 35–150)
MAGNESIUM SERPL-MCNC: 2.2 MG/DL (ref 1.6–2.4)
MCH RBC QN AUTO: 28.1 PG (ref 26–34)
MCHC RBC AUTO-ENTMCNC: 30.4 G/DL (ref 30–36.5)
MCV RBC AUTO: 92.6 FL (ref 80–99)
NRBC # BLD: 0 K/UL (ref 0–0.01)
NRBC BLD-RTO: 0 PER 100 WBC
PERFORMED BY:: ABNORMAL
PERFORMED BY:: ABNORMAL
PHOSPHATE SERPL-MCNC: 3.8 MG/DL (ref 2.6–4.7)
PLATELET # BLD AUTO: 121 K/UL (ref 150–400)
PMV BLD AUTO: 11 FL (ref 8.9–12.9)
POTASSIUM SERPL-SCNC: 4 MMOL/L (ref 3.5–5.1)
POTASSIUM SERPL-SCNC: 4 MMOL/L (ref 3.5–5.1)
PROCALCITONIN SERPL-MCNC: 0.1 NG/ML
PROT SERPL-MCNC: 6.1 G/DL (ref 6.4–8.2)
PTH-INTACT SERPL-MCNC: 270.6 PG/ML (ref 18.4–88)
RBC # BLD AUTO: 2.42 M/UL (ref 3.8–5.2)
SODIUM SERPL-SCNC: 142 MMOL/L (ref 136–145)
SODIUM SERPL-SCNC: 142 MMOL/L (ref 136–145)
TIBC SERPL-MCNC: 127 UG/DL (ref 250–450)
VANCOMYCIN SERPL-MCNC: 25.2 UG/ML
WBC # BLD AUTO: 6 K/UL (ref 3.6–11)

## 2024-04-01 PROCEDURE — 80069 RENAL FUNCTION PANEL: CPT

## 2024-04-01 PROCEDURE — 2580000003 HC RX 258: Performed by: INTERNAL MEDICINE

## 2024-04-01 PROCEDURE — 86900 BLOOD TYPING SEROLOGIC ABO: CPT

## 2024-04-01 PROCEDURE — 6370000000 HC RX 637 (ALT 250 FOR IP): Performed by: FAMILY MEDICINE

## 2024-04-01 PROCEDURE — 82962 GLUCOSE BLOOD TEST: CPT

## 2024-04-01 PROCEDURE — 83735 ASSAY OF MAGNESIUM: CPT

## 2024-04-01 PROCEDURE — 6360000002 HC RX W HCPCS: Performed by: FAMILY MEDICINE

## 2024-04-01 PROCEDURE — 94761 N-INVAS EAR/PLS OXIMETRY MLT: CPT

## 2024-04-01 PROCEDURE — 90945 DIALYSIS ONE EVALUATION: CPT

## 2024-04-01 PROCEDURE — P9016 RBC LEUKOCYTES REDUCED: HCPCS

## 2024-04-01 PROCEDURE — 80202 ASSAY OF VANCOMYCIN: CPT

## 2024-04-01 PROCEDURE — 83970 ASSAY OF PARATHORMONE: CPT

## 2024-04-01 PROCEDURE — 82728 ASSAY OF FERRITIN: CPT

## 2024-04-01 PROCEDURE — 30233N1 TRANSFUSION OF NONAUTOLOGOUS RED BLOOD CELLS INTO PERIPHERAL VEIN, PERCUTANEOUS APPROACH: ICD-10-PCS | Performed by: FAMILY MEDICINE

## 2024-04-01 PROCEDURE — 36415 COLL VENOUS BLD VENIPUNCTURE: CPT

## 2024-04-01 PROCEDURE — 86850 RBC ANTIBODY SCREEN: CPT

## 2024-04-01 PROCEDURE — 86140 C-REACTIVE PROTEIN: CPT

## 2024-04-01 PROCEDURE — 2500000003 HC RX 250 WO HCPCS: Performed by: STUDENT IN AN ORGANIZED HEALTH CARE EDUCATION/TRAINING PROGRAM

## 2024-04-01 PROCEDURE — 83540 ASSAY OF IRON: CPT

## 2024-04-01 PROCEDURE — 85027 COMPLETE CBC AUTOMATED: CPT

## 2024-04-01 PROCEDURE — 86923 COMPATIBILITY TEST ELECTRIC: CPT

## 2024-04-01 PROCEDURE — 86901 BLOOD TYPING SEROLOGIC RH(D): CPT

## 2024-04-01 PROCEDURE — 84145 PROCALCITONIN (PCT): CPT

## 2024-04-01 PROCEDURE — 80053 COMPREHEN METABOLIC PANEL: CPT

## 2024-04-01 PROCEDURE — 6370000000 HC RX 637 (ALT 250 FOR IP): Performed by: INTERNAL MEDICINE

## 2024-04-01 PROCEDURE — 2000000000 HC ICU R&B

## 2024-04-01 PROCEDURE — 2500000003 HC RX 250 WO HCPCS: Performed by: INTERNAL MEDICINE

## 2024-04-01 PROCEDURE — 2580000003 HC RX 258: Performed by: FAMILY MEDICINE

## 2024-04-01 PROCEDURE — 36430 TRANSFUSION BLD/BLD COMPNT: CPT

## 2024-04-01 PROCEDURE — 99232 SBSQ HOSP IP/OBS MODERATE 35: CPT | Performed by: INTERNAL MEDICINE

## 2024-04-01 RX ORDER — MIDODRINE HYDROCHLORIDE 5 MG/1
10 TABLET ORAL EVERY 8 HOURS SCHEDULED
Status: DISCONTINUED | OUTPATIENT
Start: 2024-04-01 | End: 2024-04-02

## 2024-04-01 RX ORDER — SODIUM CHLORIDE 9 MG/ML
INJECTION, SOLUTION INTRAVENOUS PRN
Status: DISCONTINUED | OUTPATIENT
Start: 2024-04-01 | End: 2024-04-09 | Stop reason: HOSPADM

## 2024-04-01 RX ADMIN — SODIUM CHLORIDE, PRESERVATIVE FREE 10 ML: 5 INJECTION INTRAVENOUS at 08:52

## 2024-04-01 RX ADMIN — POTASSIUM PHOSPHATE, MONOBASIC POTASSIUM PHOSPHATE, DIBASIC 20 MMOL: 224; 236 INJECTION, SOLUTION, CONCENTRATE INTRAVENOUS at 00:56

## 2024-04-01 RX ADMIN — PANTOPRAZOLE SODIUM 40 MG: 40 TABLET, DELAYED RELEASE ORAL at 05:49

## 2024-04-01 RX ADMIN — CLOPIDOGREL BISULFATE 75 MG: 75 TABLET, FILM COATED ORAL at 08:52

## 2024-04-01 RX ADMIN — MIDODRINE HYDROCHLORIDE 10 MG: 5 TABLET ORAL at 10:06

## 2024-04-01 RX ADMIN — MIDODRINE HYDROCHLORIDE 10 MG: 5 TABLET ORAL at 20:35

## 2024-04-01 RX ADMIN — SODIUM CHLORIDE, PRESERVATIVE FREE 10 ML: 5 INJECTION INTRAVENOUS at 20:35

## 2024-04-01 RX ADMIN — HEPARIN SODIUM 5000 UNITS: 5000 INJECTION INTRAVENOUS; SUBCUTANEOUS at 15:56

## 2024-04-01 RX ADMIN — ATORVASTATIN CALCIUM 40 MG: 40 TABLET, FILM COATED ORAL at 20:35

## 2024-04-01 RX ADMIN — Medication 5 MCG/MIN: at 09:30

## 2024-04-01 RX ADMIN — LACTULOSE 20 G: 20 SOLUTION ORAL at 01:09

## 2024-04-01 RX ADMIN — HEPARIN SODIUM 5000 UNITS: 5000 INJECTION INTRAVENOUS; SUBCUTANEOUS at 05:49

## 2024-04-01 RX ADMIN — MEROPENEM 1000 MG: 1 INJECTION, POWDER, FOR SOLUTION INTRAVENOUS at 20:34

## 2024-04-01 RX ADMIN — LACTULOSE 20 G: 20 SOLUTION ORAL at 05:49

## 2024-04-01 RX ADMIN — MICONAZOLE NITRATE: 20 POWDER TOPICAL at 20:35

## 2024-04-01 RX ADMIN — HEPARIN SODIUM 5000 UNITS: 5000 INJECTION INTRAVENOUS; SUBCUTANEOUS at 22:00

## 2024-04-01 RX ADMIN — MEROPENEM 1000 MG: 1 INJECTION, POWDER, FOR SOLUTION INTRAVENOUS at 08:52

## 2024-04-01 RX ADMIN — MICONAZOLE NITRATE: 20 POWDER TOPICAL at 08:52

## 2024-04-01 RX ADMIN — CARVEDILOL 12.5 MG: 12.5 TABLET, FILM COATED ORAL at 08:52

## 2024-04-01 RX ADMIN — LACTULOSE 20 G: 20 SOLUTION ORAL at 08:52

## 2024-04-01 ASSESSMENT — PAIN SCALES - GENERAL
PAINLEVEL_OUTOF10: 0
PAINLEVEL_OUTOF10: 0

## 2024-04-01 NOTE — CARE COORDINATION
Patient is a long term care resident at [facility].  SNF would like a therapy eval to see if patient has a skilled need.  If patient has a skilled need, she will require insurance auth to return to SNF. CM has sent clinical updates to SNF to review.

## 2024-04-02 ENCOUNTER — APPOINTMENT (OUTPATIENT)
Facility: HOSPITAL | Age: 84
DRG: 871 | End: 2024-04-02
Payer: MEDICARE

## 2024-04-02 LAB
ABO + RH BLD: NORMAL
ALBUMIN SERPL-MCNC: 2.6 G/DL (ref 3.5–5)
ALBUMIN SERPL-MCNC: 2.6 G/DL (ref 3.5–5)
ALBUMIN SERPL-MCNC: 2.7 G/DL (ref 3.5–5)
ALBUMIN/GLOB SERPL: 0.8 (ref 1.1–2.2)
ALP SERPL-CCNC: 94 U/L (ref 45–117)
ALT SERPL-CCNC: 18 U/L (ref 12–78)
ANION GAP SERPL CALC-SCNC: 5 MMOL/L (ref 5–15)
ANION GAP SERPL CALC-SCNC: 7 MMOL/L (ref 5–15)
ANION GAP SERPL CALC-SCNC: 7 MMOL/L (ref 5–15)
AST SERPL W P-5'-P-CCNC: 21 U/L (ref 15–37)
BILIRUB SERPL-MCNC: 1.1 MG/DL (ref 0.2–1)
BLD PROD TYP BPU: NORMAL
BLOOD BANK BLOOD PRODUCT EXPIRATION DATE: NORMAL
BLOOD BANK DISPENSE STATUS: NORMAL
BLOOD BANK ISBT PRODUCT BLOOD TYPE: 5100
BLOOD BANK PRODUCT CODE: NORMAL
BLOOD BANK UNIT TYPE AND RH: NORMAL
BLOOD GROUP ANTIBODIES SERPL: NEGATIVE
BPU ID: NORMAL
BUN SERPL-MCNC: 18 MG/DL (ref 6–20)
BUN SERPL-MCNC: 24 MG/DL (ref 6–20)
BUN SERPL-MCNC: 24 MG/DL (ref 6–20)
BUN/CREAT SERPL: 10 (ref 12–20)
BUN/CREAT SERPL: 10 (ref 12–20)
BUN/CREAT SERPL: 12 (ref 12–20)
CA-I BLD-MCNC: 1.1 MMOL/L (ref 1.12–1.32)
CA-I BLD-MCNC: 8 MG/DL (ref 8.5–10.1)
CA-I BLD-MCNC: 8.1 MG/DL (ref 8.5–10.1)
CA-I BLD-MCNC: 8.2 MG/DL (ref 8.5–10.1)
CHLORIDE BLD-SCNC: 122 MMOL/L
CHLORIDE SERPL-SCNC: 107 MMOL/L (ref 97–108)
CHLORIDE SERPL-SCNC: 112 MMOL/L (ref 97–108)
CHLORIDE SERPL-SCNC: 112 MMOL/L (ref 97–108)
CO2 BLD-SCNC: 9 MMOL/L
CO2 SERPL-SCNC: 24 MMOL/L (ref 21–32)
CO2 SERPL-SCNC: 24 MMOL/L (ref 21–32)
CO2 SERPL-SCNC: 27 MMOL/L (ref 21–32)
CREAT SERPL-MCNC: 1.49 MG/DL (ref 0.55–1.02)
CREAT SERPL-MCNC: 2.29 MG/DL (ref 0.55–1.02)
CREAT SERPL-MCNC: 2.33 MG/DL (ref 0.55–1.02)
CREAT UR-MCNC: 6.69 MG/DL (ref 0.6–1.3)
CROSSMATCH RESULT: NORMAL
ERYTHROCYTE [DISTWIDTH] IN BLOOD BY AUTOMATED COUNT: 15.9 % (ref 11.5–14.5)
ERYTHROCYTE [DISTWIDTH] IN BLOOD BY AUTOMATED COUNT: 16 % (ref 11.5–14.5)
ERYTHROCYTE [DISTWIDTH] IN BLOOD BY AUTOMATED COUNT: 16.1 % (ref 11.5–14.5)
GLOBULIN SER CALC-MCNC: 3.1 G/DL (ref 2–4)
GLUCOSE BLD STRIP.AUTO-MCNC: 117 MG/DL (ref 65–100)
GLUCOSE BLD STRIP.AUTO-MCNC: 121 MG/DL (ref 65–100)
GLUCOSE BLD STRIP.AUTO-MCNC: 126 MG/DL (ref 65–100)
GLUCOSE BLD STRIP.AUTO-MCNC: 129 MG/DL (ref 65–100)
GLUCOSE BLD STRIP.AUTO-MCNC: 130 MG/DL (ref 65–100)
GLUCOSE BLD STRIP.AUTO-MCNC: 131 MG/DL (ref 65–100)
GLUCOSE SERPL-MCNC: 116 MG/DL (ref 65–100)
GLUCOSE SERPL-MCNC: 118 MG/DL (ref 65–100)
GLUCOSE SERPL-MCNC: 153 MG/DL (ref 65–100)
HBV SURFACE AB SER QL: NONREACTIVE
HBV SURFACE AB SER-ACNC: <3.1 MIU/ML
HBV SURFACE AG SER QL: <0.1 INDEX
HBV SURFACE AG SER QL: NEGATIVE
HCO3 BLD-SCNC: 8.4 MMOL/L (ref 19–28)
HCT VFR BLD AUTO: 24.6 % (ref 35–47)
HCT VFR BLD AUTO: 25.4 % (ref 35–47)
HCT VFR BLD AUTO: 27.6 % (ref 35–47)
HGB BLD-MCNC: 7.8 G/DL (ref 11.5–16)
HGB BLD-MCNC: 8.1 G/DL (ref 11.5–16)
HGB BLD-MCNC: 9 G/DL (ref 11.5–16)
IPAP/PIP: ABNORMAL
LACTATE BLD-SCNC: 4.4 MMOL/L (ref 0.4–2)
MCH RBC QN AUTO: 28.7 PG (ref 26–34)
MCH RBC QN AUTO: 28.7 PG (ref 26–34)
MCH RBC QN AUTO: 28.8 PG (ref 26–34)
MCHC RBC AUTO-ENTMCNC: 31.7 G/DL (ref 30–36.5)
MCHC RBC AUTO-ENTMCNC: 31.9 G/DL (ref 30–36.5)
MCHC RBC AUTO-ENTMCNC: 32.6 G/DL (ref 30–36.5)
MCV RBC AUTO: 88.5 FL (ref 80–99)
MCV RBC AUTO: 90.1 FL (ref 80–99)
MCV RBC AUTO: 90.4 FL (ref 80–99)
NRBC # BLD: 0.02 K/UL (ref 0–0.01)
NRBC # BLD: 0.03 K/UL (ref 0–0.01)
NRBC # BLD: 0.04 K/UL (ref 0–0.01)
NRBC BLD-RTO: 0.3 PER 100 WBC
NRBC BLD-RTO: 0.3 PER 100 WBC
NRBC BLD-RTO: 0.4 PER 100 WBC
PCO2 BLD: 26.7 MMHG (ref 35–45)
PERFORMED BY:: ABNORMAL
PH BLD: 7.1 (ref 7.35–7.45)
PHOSPHATE SERPL-MCNC: 2 MG/DL (ref 2.6–4.7)
PHOSPHATE SERPL-MCNC: 3 MG/DL (ref 2.6–4.7)
PLATELET # BLD AUTO: 149 K/UL (ref 150–400)
PLATELET # BLD AUTO: 162 K/UL (ref 150–400)
PLATELET # BLD AUTO: 171 K/UL (ref 150–400)
PMV BLD AUTO: 10.8 FL (ref 8.9–12.9)
PMV BLD AUTO: 10.9 FL (ref 8.9–12.9)
PMV BLD AUTO: 11 FL (ref 8.9–12.9)
PO2 BLD: 42 MMHG (ref 75–100)
POTASSIUM BLD-SCNC: 6.4 MMOL/L (ref 3.5–5.5)
POTASSIUM SERPL-SCNC: 3.4 MMOL/L (ref 3.5–5.1)
POTASSIUM SERPL-SCNC: 3.9 MMOL/L (ref 3.5–5.1)
POTASSIUM SERPL-SCNC: 4.1 MMOL/L (ref 3.5–5.1)
PROT SERPL-MCNC: 5.7 G/DL (ref 6.4–8.2)
RBC # BLD AUTO: 2.72 M/UL (ref 3.8–5.2)
RBC # BLD AUTO: 2.82 M/UL (ref 3.8–5.2)
RBC # BLD AUTO: 3.12 M/UL (ref 3.8–5.2)
SODIUM BLD-SCNC: 138 MMOL/L (ref 136–145)
SODIUM SERPL-SCNC: 139 MMOL/L (ref 136–145)
SODIUM SERPL-SCNC: 143 MMOL/L (ref 136–145)
SODIUM SERPL-SCNC: 143 MMOL/L (ref 136–145)
SPECIMEN EXP DATE BLD: NORMAL
TRANSFUSION STATUS PATIENT QL: NORMAL
UNIT DIVISION: 0
UNIT ISSUE DATE/TIME: NORMAL
WBC # BLD AUTO: 8 K/UL (ref 3.6–11)
WBC # BLD AUTO: 9 K/UL (ref 3.6–11)
WBC # BLD AUTO: 9.2 K/UL (ref 3.6–11)

## 2024-04-02 PROCEDURE — 92610 EVALUATE SWALLOWING FUNCTION: CPT

## 2024-04-02 PROCEDURE — 97530 THERAPEUTIC ACTIVITIES: CPT

## 2024-04-02 PROCEDURE — 6360000002 HC RX W HCPCS: Performed by: FAMILY MEDICINE

## 2024-04-02 PROCEDURE — 80069 RENAL FUNCTION PANEL: CPT

## 2024-04-02 PROCEDURE — 2580000003 HC RX 258: Performed by: FAMILY MEDICINE

## 2024-04-02 PROCEDURE — 80053 COMPREHEN METABOLIC PANEL: CPT

## 2024-04-02 PROCEDURE — 2709999900 HC NON-CHARGEABLE SUPPLY

## 2024-04-02 PROCEDURE — 2700000000 HC OXYGEN THERAPY PER DAY

## 2024-04-02 PROCEDURE — 87340 HEPATITIS B SURFACE AG IA: CPT

## 2024-04-02 PROCEDURE — 6370000000 HC RX 637 (ALT 250 FOR IP): Performed by: FAMILY MEDICINE

## 2024-04-02 PROCEDURE — 1100000000 HC RM PRIVATE

## 2024-04-02 PROCEDURE — 82962 GLUCOSE BLOOD TEST: CPT

## 2024-04-02 PROCEDURE — 6370000000 HC RX 637 (ALT 250 FOR IP): Performed by: INTERNAL MEDICINE

## 2024-04-02 PROCEDURE — 99232 SBSQ HOSP IP/OBS MODERATE 35: CPT | Performed by: INTERNAL MEDICINE

## 2024-04-02 PROCEDURE — 6360000002 HC RX W HCPCS: Performed by: INTERNAL MEDICINE

## 2024-04-02 PROCEDURE — 71045 X-RAY EXAM CHEST 1 VIEW: CPT

## 2024-04-02 PROCEDURE — 86704 HEP B CORE ANTIBODY TOTAL: CPT

## 2024-04-02 PROCEDURE — 5A1D70Z PERFORMANCE OF URINARY FILTRATION, INTERMITTENT, LESS THAN 6 HOURS PER DAY: ICD-10-PCS | Performed by: FAMILY MEDICINE

## 2024-04-02 PROCEDURE — 97163 PT EVAL HIGH COMPLEX 45 MIN: CPT

## 2024-04-02 PROCEDURE — 86706 HEP B SURFACE ANTIBODY: CPT

## 2024-04-02 PROCEDURE — 94761 N-INVAS EAR/PLS OXIMETRY MLT: CPT

## 2024-04-02 PROCEDURE — 97110 THERAPEUTIC EXERCISES: CPT

## 2024-04-02 PROCEDURE — 90935 HEMODIALYSIS ONE EVALUATION: CPT

## 2024-04-02 PROCEDURE — 86803 HEPATITIS C AB TEST: CPT

## 2024-04-02 PROCEDURE — 36556 INSERT NON-TUNNEL CV CATH: CPT

## 2024-04-02 RX ORDER — HEPARIN SODIUM 1000 [USP'U]/ML
2800 INJECTION, SOLUTION INTRAVENOUS; SUBCUTANEOUS PRN
Status: DISCONTINUED | OUTPATIENT
Start: 2024-04-02 | End: 2024-04-09 | Stop reason: HOSPADM

## 2024-04-02 RX ADMIN — HEPARIN SODIUM 5000 UNITS: 5000 INJECTION INTRAVENOUS; SUBCUTANEOUS at 15:07

## 2024-04-02 RX ADMIN — HEPARIN SODIUM 2800 UNITS: 1000 INJECTION INTRAVENOUS; SUBCUTANEOUS at 14:30

## 2024-04-02 RX ADMIN — MEROPENEM 1000 MG: 1 INJECTION, POWDER, FOR SOLUTION INTRAVENOUS at 08:37

## 2024-04-02 RX ADMIN — HEPARIN SODIUM 5000 UNITS: 5000 INJECTION INTRAVENOUS; SUBCUTANEOUS at 22:17

## 2024-04-02 RX ADMIN — HEPARIN SODIUM 5000 UNITS: 5000 INJECTION INTRAVENOUS; SUBCUTANEOUS at 05:44

## 2024-04-02 RX ADMIN — MIDODRINE HYDROCHLORIDE 10 MG: 5 TABLET ORAL at 05:44

## 2024-04-02 RX ADMIN — PANTOPRAZOLE SODIUM 40 MG: 40 TABLET, DELAYED RELEASE ORAL at 05:44

## 2024-04-02 RX ADMIN — MICONAZOLE NITRATE: 20 POWDER TOPICAL at 22:56

## 2024-04-02 RX ADMIN — SODIUM CHLORIDE, PRESERVATIVE FREE 10 ML: 5 INJECTION INTRAVENOUS at 08:38

## 2024-04-02 RX ADMIN — CARVEDILOL 12.5 MG: 12.5 TABLET, FILM COATED ORAL at 18:14

## 2024-04-02 RX ADMIN — ATORVASTATIN CALCIUM 40 MG: 40 TABLET, FILM COATED ORAL at 22:17

## 2024-04-02 RX ADMIN — CLOPIDOGREL BISULFATE 75 MG: 75 TABLET, FILM COATED ORAL at 08:37

## 2024-04-02 RX ADMIN — CARVEDILOL 12.5 MG: 12.5 TABLET, FILM COATED ORAL at 08:37

## 2024-04-02 RX ADMIN — MICONAZOLE NITRATE: 20 POWDER TOPICAL at 08:37

## 2024-04-02 RX ADMIN — SODIUM CHLORIDE, PRESERVATIVE FREE 10 ML: 5 INJECTION INTRAVENOUS at 22:21

## 2024-04-02 ASSESSMENT — PAIN SCALES - GENERAL
PAINLEVEL_OUTOF10: 0

## 2024-04-02 NOTE — DIALYSIS
Completed 3 hour dialysis and tolerated 2L fluid removal. Catheter dressing changed as scheduled.    No problems encountered during treatment.    CHRIS Sharp received dialysis report.

## 2024-04-02 NOTE — CARE COORDINATION
CM reviewed Pt medicals, Pt comes from [facility].    Pt appears to be total care, needs assistance with ADL to include feeding.     [facility] asked for PT/OT eval to get auth.

## 2024-04-03 LAB
ALBUMIN SERPL-MCNC: 2.4 G/DL (ref 3.5–5)
ANION GAP SERPL CALC-SCNC: 6 MMOL/L (ref 5–15)
BUN SERPL-MCNC: 20 MG/DL (ref 6–20)
BUN/CREAT SERPL: 13 (ref 12–20)
CA-I BLD-MCNC: 7.9 MG/DL (ref 8.5–10.1)
CHLORIDE SERPL-SCNC: 110 MMOL/L (ref 97–108)
CO2 SERPL-SCNC: 26 MMOL/L (ref 21–32)
CREAT SERPL-MCNC: 1.58 MG/DL (ref 0.55–1.02)
ERYTHROCYTE [DISTWIDTH] IN BLOOD BY AUTOMATED COUNT: 15.7 % (ref 11.5–14.5)
GLUCOSE BLD STRIP.AUTO-MCNC: 105 MG/DL (ref 65–100)
GLUCOSE BLD STRIP.AUTO-MCNC: 108 MG/DL (ref 65–100)
GLUCOSE BLD STRIP.AUTO-MCNC: 120 MG/DL (ref 65–100)
GLUCOSE BLD STRIP.AUTO-MCNC: 166 MG/DL (ref 65–100)
GLUCOSE BLD STRIP.AUTO-MCNC: 169 MG/DL (ref 65–100)
GLUCOSE SERPL-MCNC: 102 MG/DL (ref 65–100)
HCT VFR BLD AUTO: 24.9 % (ref 35–47)
HCV AB SER IA-ACNC: <0.02 INDEX
HCV AB SERPL QL IA: NONREACTIVE
HGB BLD-MCNC: 8 G/DL (ref 11.5–16)
MCH RBC QN AUTO: 28.5 PG (ref 26–34)
MCHC RBC AUTO-ENTMCNC: 32.1 G/DL (ref 30–36.5)
MCV RBC AUTO: 88.6 FL (ref 80–99)
NRBC # BLD: 0.02 K/UL (ref 0–0.01)
NRBC BLD-RTO: 0.4 PER 100 WBC
PERFORMED BY:: ABNORMAL
PHOSPHATE SERPL-MCNC: 2.4 MG/DL (ref 2.6–4.7)
PLATELET # BLD AUTO: 143 K/UL (ref 150–400)
PMV BLD AUTO: 10.5 FL (ref 8.9–12.9)
POTASSIUM SERPL-SCNC: 3.5 MMOL/L (ref 3.5–5.1)
RBC # BLD AUTO: 2.81 M/UL (ref 3.8–5.2)
SODIUM SERPL-SCNC: 142 MMOL/L (ref 136–145)
WBC # BLD AUTO: 5.7 K/UL (ref 3.6–11)

## 2024-04-03 PROCEDURE — 6370000000 HC RX 637 (ALT 250 FOR IP): Performed by: INTERNAL MEDICINE

## 2024-04-03 PROCEDURE — 94761 N-INVAS EAR/PLS OXIMETRY MLT: CPT

## 2024-04-03 PROCEDURE — 1100000000 HC RM PRIVATE

## 2024-04-03 PROCEDURE — 2580000003 HC RX 258: Performed by: FAMILY MEDICINE

## 2024-04-03 PROCEDURE — 85027 COMPLETE CBC AUTOMATED: CPT

## 2024-04-03 PROCEDURE — 80069 RENAL FUNCTION PANEL: CPT

## 2024-04-03 PROCEDURE — 2500000003 HC RX 250 WO HCPCS: Performed by: FAMILY MEDICINE

## 2024-04-03 PROCEDURE — 99232 SBSQ HOSP IP/OBS MODERATE 35: CPT | Performed by: INTERNAL MEDICINE

## 2024-04-03 PROCEDURE — 82962 GLUCOSE BLOOD TEST: CPT

## 2024-04-03 PROCEDURE — 36415 COLL VENOUS BLD VENIPUNCTURE: CPT

## 2024-04-03 PROCEDURE — 2700000000 HC OXYGEN THERAPY PER DAY

## 2024-04-03 PROCEDURE — 92526 ORAL FUNCTION THERAPY: CPT

## 2024-04-03 PROCEDURE — 6360000002 HC RX W HCPCS: Performed by: FAMILY MEDICINE

## 2024-04-03 PROCEDURE — 6370000000 HC RX 637 (ALT 250 FOR IP): Performed by: FAMILY MEDICINE

## 2024-04-03 RX ORDER — BUTALBITAL, ACETAMINOPHEN AND CAFFEINE 50; 325; 40 MG/1; MG/1; MG/1
1 TABLET ORAL EVERY 6 HOURS PRN
Status: DISCONTINUED | OUTPATIENT
Start: 2024-04-03 | End: 2024-04-09 | Stop reason: HOSPADM

## 2024-04-03 RX ADMIN — MICONAZOLE NITRATE: 20 POWDER TOPICAL at 21:44

## 2024-04-03 RX ADMIN — SODIUM CHLORIDE, PRESERVATIVE FREE 10 ML: 5 INJECTION INTRAVENOUS at 10:16

## 2024-04-03 RX ADMIN — CLOPIDOGREL BISULFATE 75 MG: 75 TABLET, FILM COATED ORAL at 10:15

## 2024-04-03 RX ADMIN — MEROPENEM 500 MG: 500 INJECTION, POWDER, FOR SOLUTION INTRAVENOUS at 10:16

## 2024-04-03 RX ADMIN — CARVEDILOL 12.5 MG: 12.5 TABLET, FILM COATED ORAL at 10:15

## 2024-04-03 RX ADMIN — HEPARIN SODIUM 5000 UNITS: 5000 INJECTION INTRAVENOUS; SUBCUTANEOUS at 14:00

## 2024-04-03 RX ADMIN — SODIUM CHLORIDE, PRESERVATIVE FREE 10 ML: 5 INJECTION INTRAVENOUS at 21:44

## 2024-04-03 RX ADMIN — HEPARIN SODIUM 5000 UNITS: 5000 INJECTION INTRAVENOUS; SUBCUTANEOUS at 21:43

## 2024-04-03 RX ADMIN — ONDANSETRON 4 MG: 2 INJECTION INTRAMUSCULAR; INTRAVENOUS at 11:21

## 2024-04-03 RX ADMIN — MICONAZOLE NITRATE: 20 POWDER TOPICAL at 09:00

## 2024-04-03 RX ADMIN — CARVEDILOL 12.5 MG: 12.5 TABLET, FILM COATED ORAL at 18:00

## 2024-04-03 RX ADMIN — BUTALBITAL, ACETAMINOPHEN, AND CAFFEINE 1 TABLET: 50; 325; 40 TABLET ORAL at 11:21

## 2024-04-03 RX ADMIN — HEPARIN SODIUM 5000 UNITS: 5000 INJECTION INTRAVENOUS; SUBCUTANEOUS at 05:53

## 2024-04-03 RX ADMIN — ATORVASTATIN CALCIUM 40 MG: 40 TABLET, FILM COATED ORAL at 21:44

## 2024-04-03 RX ADMIN — PANTOPRAZOLE SODIUM 40 MG: 40 TABLET, DELAYED RELEASE ORAL at 05:54

## 2024-04-03 NOTE — CARE COORDINATION
15:05PM  Pending auth ref #[id number] at [facility].  Admissions confirmed patient is a LTC.     TORY Hinson  [phone]      14:53PM IDR 24 HRS. IV ABX (Meropenem). IV ABX at d/c TBD.  Requested for auth to be started.  Per admissions at [facility] patient was ambulatory while at SNF.  It isn't clear if the patient is a LTC resident at [facility]. Will need nephrology, clearance      TORY Hinson  [phone]

## 2024-04-04 LAB
ALBUMIN SERPL-MCNC: 2.5 G/DL (ref 3.5–5)
ALBUMIN/GLOB SERPL: 0.7 (ref 1.1–2.2)
ALP SERPL-CCNC: 87 U/L (ref 45–117)
ALT SERPL-CCNC: 16 U/L (ref 12–78)
ANION GAP SERPL CALC-SCNC: 1 MMOL/L (ref 5–15)
AST SERPL W P-5'-P-CCNC: 18 U/L (ref 15–37)
BILIRUB SERPL-MCNC: 0.6 MG/DL (ref 0.2–1)
BUN SERPL-MCNC: 25 MG/DL (ref 6–20)
BUN/CREAT SERPL: 11 (ref 12–20)
CA-I BLD-MCNC: 8.3 MG/DL (ref 8.5–10.1)
CHLORIDE SERPL-SCNC: 110 MMOL/L (ref 97–108)
CO2 SERPL-SCNC: 28 MMOL/L (ref 21–32)
CREAT SERPL-MCNC: 2.18 MG/DL (ref 0.55–1.02)
ERYTHROCYTE [DISTWIDTH] IN BLOOD BY AUTOMATED COUNT: 15.6 % (ref 11.5–14.5)
GLOBULIN SER CALC-MCNC: 3.6 G/DL (ref 2–4)
GLUCOSE BLD STRIP.AUTO-MCNC: 101 MG/DL (ref 65–100)
GLUCOSE BLD STRIP.AUTO-MCNC: 118 MG/DL (ref 65–100)
GLUCOSE BLD STRIP.AUTO-MCNC: 172 MG/DL (ref 65–100)
GLUCOSE SERPL-MCNC: 105 MG/DL (ref 65–100)
HCT VFR BLD AUTO: 25.1 % (ref 35–47)
HGB BLD-MCNC: 7.9 G/DL (ref 11.5–16)
MCH RBC QN AUTO: 28.4 PG (ref 26–34)
MCHC RBC AUTO-ENTMCNC: 31.5 G/DL (ref 30–36.5)
MCV RBC AUTO: 90.3 FL (ref 80–99)
NRBC # BLD: 0 K/UL (ref 0–0.01)
NRBC BLD-RTO: 0 PER 100 WBC
PERFORMED BY:: ABNORMAL
PHOSPHATE SERPL-MCNC: 2.9 MG/DL (ref 2.6–4.7)
PLATELET # BLD AUTO: 138 K/UL (ref 150–400)
PMV BLD AUTO: 10.9 FL (ref 8.9–12.9)
POTASSIUM SERPL-SCNC: 3.6 MMOL/L (ref 3.5–5.1)
PROT SERPL-MCNC: 6.1 G/DL (ref 6.4–8.2)
RBC # BLD AUTO: 2.78 M/UL (ref 3.8–5.2)
SODIUM SERPL-SCNC: 139 MMOL/L (ref 136–145)
WBC # BLD AUTO: 6.3 K/UL (ref 3.6–11)

## 2024-04-04 PROCEDURE — 84100 ASSAY OF PHOSPHORUS: CPT

## 2024-04-04 PROCEDURE — 1100000000 HC RM PRIVATE

## 2024-04-04 PROCEDURE — 97165 OT EVAL LOW COMPLEX 30 MIN: CPT

## 2024-04-04 PROCEDURE — 82962 GLUCOSE BLOOD TEST: CPT

## 2024-04-04 PROCEDURE — 2700000000 HC OXYGEN THERAPY PER DAY

## 2024-04-04 PROCEDURE — 36415 COLL VENOUS BLD VENIPUNCTURE: CPT

## 2024-04-04 PROCEDURE — 97530 THERAPEUTIC ACTIVITIES: CPT

## 2024-04-04 PROCEDURE — 6370000000 HC RX 637 (ALT 250 FOR IP): Performed by: FAMILY MEDICINE

## 2024-04-04 PROCEDURE — 85027 COMPLETE CBC AUTOMATED: CPT

## 2024-04-04 PROCEDURE — 6370000000 HC RX 637 (ALT 250 FOR IP): Performed by: INTERNAL MEDICINE

## 2024-04-04 PROCEDURE — 99232 SBSQ HOSP IP/OBS MODERATE 35: CPT | Performed by: INTERNAL MEDICINE

## 2024-04-04 PROCEDURE — 2580000003 HC RX 258: Performed by: FAMILY MEDICINE

## 2024-04-04 PROCEDURE — 94761 N-INVAS EAR/PLS OXIMETRY MLT: CPT

## 2024-04-04 PROCEDURE — 80053 COMPREHEN METABOLIC PANEL: CPT

## 2024-04-04 PROCEDURE — 6360000002 HC RX W HCPCS: Performed by: FAMILY MEDICINE

## 2024-04-04 RX ORDER — MIDODRINE HYDROCHLORIDE 5 MG/1
5 TABLET ORAL
Status: DISCONTINUED | OUTPATIENT
Start: 2024-04-04 | End: 2024-04-05

## 2024-04-04 RX ADMIN — ATORVASTATIN CALCIUM 40 MG: 40 TABLET, FILM COATED ORAL at 21:44

## 2024-04-04 RX ADMIN — PANTOPRAZOLE SODIUM 40 MG: 40 TABLET, DELAYED RELEASE ORAL at 06:34

## 2024-04-04 RX ADMIN — MICONAZOLE NITRATE: 20 POWDER TOPICAL at 10:08

## 2024-04-04 RX ADMIN — MICONAZOLE NITRATE: 20 POWDER TOPICAL at 21:48

## 2024-04-04 RX ADMIN — CLOPIDOGREL BISULFATE 75 MG: 75 TABLET, FILM COATED ORAL at 10:07

## 2024-04-04 RX ADMIN — SODIUM CHLORIDE, PRESERVATIVE FREE 10 ML: 5 INJECTION INTRAVENOUS at 10:08

## 2024-04-04 RX ADMIN — CARVEDILOL 12.5 MG: 12.5 TABLET, FILM COATED ORAL at 09:00

## 2024-04-04 RX ADMIN — HEPARIN SODIUM 5000 UNITS: 5000 INJECTION INTRAVENOUS; SUBCUTANEOUS at 06:34

## 2024-04-04 RX ADMIN — HEPARIN SODIUM 5000 UNITS: 5000 INJECTION INTRAVENOUS; SUBCUTANEOUS at 22:00

## 2024-04-04 RX ADMIN — SODIUM CHLORIDE, PRESERVATIVE FREE 10 ML: 5 INJECTION INTRAVENOUS at 21:44

## 2024-04-04 RX ADMIN — CARVEDILOL 12.5 MG: 12.5 TABLET, FILM COATED ORAL at 18:50

## 2024-04-04 RX ADMIN — MEROPENEM 500 MG: 500 INJECTION, POWDER, FOR SOLUTION INTRAVENOUS at 10:08

## 2024-04-04 RX ADMIN — HEPARIN SODIUM 5000 UNITS: 5000 INJECTION INTRAVENOUS; SUBCUTANEOUS at 13:48

## 2024-04-04 RX ADMIN — MIDODRINE HYDROCHLORIDE 5 MG: 5 TABLET ORAL at 12:32

## 2024-04-04 ASSESSMENT — PAIN SCALES - GENERAL
PAINLEVEL_OUTOF10: 0
PAINLEVEL_OUTOF10: 0

## 2024-04-04 NOTE — CARE COORDINATION
CM reviewed clinical chart. Patient was started on HD in hospital.  Unsure if patient will need a out patient HD chair.  Nephrologist is still determining.  Hepatitis labs have been ordered if out patient HD chair is needed.     Patient is a LTC resident at [facility]. CM sent clinical updates this morning for SNF to review.

## 2024-04-05 LAB
ALBUMIN SERPL-MCNC: 2.4 G/DL (ref 3.5–5)
ALBUMIN SERPL-MCNC: 2.4 G/DL (ref 3.5–5)
ALBUMIN/GLOB SERPL: 0.8 (ref 1.1–2.2)
ALP SERPL-CCNC: 81 U/L (ref 45–117)
ALT SERPL-CCNC: 14 U/L (ref 12–78)
ANION GAP SERPL CALC-SCNC: 3 MMOL/L (ref 5–15)
ANION GAP SERPL CALC-SCNC: 4 MMOL/L (ref 5–15)
AST SERPL W P-5'-P-CCNC: 23 U/L (ref 15–37)
BACTERIA SPEC CULT: NORMAL
BACTERIA SPEC CULT: NORMAL
BILIRUB SERPL-MCNC: 0.6 MG/DL (ref 0.2–1)
BUN SERPL-MCNC: 24 MG/DL (ref 6–20)
BUN SERPL-MCNC: 24 MG/DL (ref 6–20)
BUN/CREAT SERPL: 12 (ref 12–20)
BUN/CREAT SERPL: 13 (ref 12–20)
CA-I BLD-MCNC: 8.2 MG/DL (ref 8.5–10.1)
CA-I BLD-MCNC: 8.2 MG/DL (ref 8.5–10.1)
CHLORIDE SERPL-SCNC: 114 MMOL/L (ref 97–108)
CHLORIDE SERPL-SCNC: 114 MMOL/L (ref 97–108)
CO2 SERPL-SCNC: 26 MMOL/L (ref 21–32)
CO2 SERPL-SCNC: 27 MMOL/L (ref 21–32)
CREAT SERPL-MCNC: 1.84 MG/DL (ref 0.55–1.02)
CREAT SERPL-MCNC: 1.93 MG/DL (ref 0.55–1.02)
ERYTHROCYTE [DISTWIDTH] IN BLOOD BY AUTOMATED COUNT: 15.8 % (ref 11.5–14.5)
GLOBULIN SER CALC-MCNC: 3.2 G/DL (ref 2–4)
GLUCOSE BLD STRIP.AUTO-MCNC: 107 MG/DL (ref 65–100)
GLUCOSE BLD STRIP.AUTO-MCNC: 109 MG/DL (ref 65–100)
GLUCOSE BLD STRIP.AUTO-MCNC: 116 MG/DL (ref 65–100)
GLUCOSE BLD STRIP.AUTO-MCNC: 122 MG/DL (ref 65–100)
GLUCOSE BLD STRIP.AUTO-MCNC: 132 MG/DL (ref 65–100)
GLUCOSE BLD STRIP.AUTO-MCNC: 98 MG/DL (ref 65–100)
GLUCOSE SERPL-MCNC: 105 MG/DL (ref 65–100)
GLUCOSE SERPL-MCNC: 106 MG/DL (ref 65–100)
HCT VFR BLD AUTO: 23.8 % (ref 35–47)
HGB BLD-MCNC: 7.4 G/DL (ref 11.5–16)
Lab: NORMAL
Lab: NORMAL
MCH RBC QN AUTO: 28.6 PG (ref 26–34)
MCHC RBC AUTO-ENTMCNC: 31.1 G/DL (ref 30–36.5)
MCV RBC AUTO: 91.9 FL (ref 80–99)
NRBC # BLD: 0 K/UL (ref 0–0.01)
NRBC BLD-RTO: 0 PER 100 WBC
PERFORMED BY:: ABNORMAL
PERFORMED BY:: NORMAL
PHOSPHATE SERPL-MCNC: 3 MG/DL (ref 2.6–4.7)
PLATELET # BLD AUTO: 115 K/UL (ref 150–400)
PMV BLD AUTO: 11.1 FL (ref 8.9–12.9)
POTASSIUM SERPL-SCNC: 3.7 MMOL/L (ref 3.5–5.1)
POTASSIUM SERPL-SCNC: 3.8 MMOL/L (ref 3.5–5.1)
PROT SERPL-MCNC: 5.6 G/DL (ref 6.4–8.2)
RBC # BLD AUTO: 2.59 M/UL (ref 3.8–5.2)
SODIUM SERPL-SCNC: 144 MMOL/L (ref 136–145)
SODIUM SERPL-SCNC: 144 MMOL/L (ref 136–145)
WBC # BLD AUTO: 5.4 K/UL (ref 3.6–11)

## 2024-04-05 PROCEDURE — 36415 COLL VENOUS BLD VENIPUNCTURE: CPT

## 2024-04-05 PROCEDURE — 85027 COMPLETE CBC AUTOMATED: CPT

## 2024-04-05 PROCEDURE — 80053 COMPREHEN METABOLIC PANEL: CPT

## 2024-04-05 PROCEDURE — 2580000003 HC RX 258: Performed by: FAMILY MEDICINE

## 2024-04-05 PROCEDURE — 6360000002 HC RX W HCPCS: Performed by: INTERNAL MEDICINE

## 2024-04-05 PROCEDURE — 2700000000 HC OXYGEN THERAPY PER DAY

## 2024-04-05 PROCEDURE — 6360000002 HC RX W HCPCS: Performed by: FAMILY MEDICINE

## 2024-04-05 PROCEDURE — 94761 N-INVAS EAR/PLS OXIMETRY MLT: CPT

## 2024-04-05 PROCEDURE — 80069 RENAL FUNCTION PANEL: CPT

## 2024-04-05 PROCEDURE — 6370000000 HC RX 637 (ALT 250 FOR IP): Performed by: FAMILY MEDICINE

## 2024-04-05 PROCEDURE — 1100000000 HC RM PRIVATE

## 2024-04-05 PROCEDURE — 82962 GLUCOSE BLOOD TEST: CPT

## 2024-04-05 PROCEDURE — 6370000000 HC RX 637 (ALT 250 FOR IP): Performed by: INTERNAL MEDICINE

## 2024-04-05 PROCEDURE — 99232 SBSQ HOSP IP/OBS MODERATE 35: CPT | Performed by: INTERNAL MEDICINE

## 2024-04-05 RX ADMIN — HEPARIN SODIUM 5000 UNITS: 5000 INJECTION INTRAVENOUS; SUBCUTANEOUS at 16:10

## 2024-04-05 RX ADMIN — MICONAZOLE NITRATE: 20 POWDER TOPICAL at 09:04

## 2024-04-05 RX ADMIN — SODIUM CHLORIDE, PRESERVATIVE FREE 10 ML: 5 INJECTION INTRAVENOUS at 09:04

## 2024-04-05 RX ADMIN — PANTOPRAZOLE SODIUM 40 MG: 40 TABLET, DELAYED RELEASE ORAL at 05:09

## 2024-04-05 RX ADMIN — CARVEDILOL 12.5 MG: 12.5 TABLET, FILM COATED ORAL at 17:34

## 2024-04-05 RX ADMIN — BUTALBITAL, ACETAMINOPHEN, AND CAFFEINE 1 TABLET: 50; 325; 40 TABLET ORAL at 23:52

## 2024-04-05 RX ADMIN — EPOETIN ALFA-EPBX 10000 UNITS: 10000 INJECTION, SOLUTION INTRAVENOUS; SUBCUTANEOUS at 17:35

## 2024-04-05 RX ADMIN — ATORVASTATIN CALCIUM 40 MG: 40 TABLET, FILM COATED ORAL at 21:21

## 2024-04-05 RX ADMIN — HEPARIN SODIUM 5000 UNITS: 5000 INJECTION INTRAVENOUS; SUBCUTANEOUS at 21:54

## 2024-04-05 RX ADMIN — INSULIN LISPRO 0 UNITS: 100 INJECTION, SOLUTION INTRAVENOUS; SUBCUTANEOUS at 23:58

## 2024-04-05 RX ADMIN — SODIUM CHLORIDE, PRESERVATIVE FREE 10 ML: 5 INJECTION INTRAVENOUS at 21:24

## 2024-04-05 RX ADMIN — CARVEDILOL 12.5 MG: 12.5 TABLET, FILM COATED ORAL at 09:01

## 2024-04-05 RX ADMIN — CLOPIDOGREL BISULFATE 75 MG: 75 TABLET, FILM COATED ORAL at 09:01

## 2024-04-05 RX ADMIN — HEPARIN SODIUM 5000 UNITS: 5000 INJECTION INTRAVENOUS; SUBCUTANEOUS at 05:52

## 2024-04-05 RX ADMIN — MEROPENEM 500 MG: 500 INJECTION, POWDER, FOR SOLUTION INTRAVENOUS at 09:05

## 2024-04-05 ASSESSMENT — PAIN DESCRIPTION - DESCRIPTORS: DESCRIPTORS: ACHING

## 2024-04-05 ASSESSMENT — PAIN DESCRIPTION - LOCATION: LOCATION: HEAD

## 2024-04-05 ASSESSMENT — PAIN SCALES - GENERAL: PAINLEVEL_OUTOF10: 6

## 2024-04-05 ASSESSMENT — PAIN DESCRIPTION - ORIENTATION: ORIENTATION: ANTERIOR

## 2024-04-05 NOTE — CARE COORDINATION
IDR 48HRS.  Currently on day 8/10 on IV Meropenem.  IV ABX at discharge TBD.  Unsure if patient will need a new outpatient HD chair.  Nephrologist still deciding.      Patient is a LTC resident @ [facility].  Recent clinicals sent this morning to LTC facility.      TORY Hinson  [phone]

## 2024-04-06 ENCOUNTER — APPOINTMENT (OUTPATIENT)
Facility: HOSPITAL | Age: 84
DRG: 871 | End: 2024-04-06
Payer: MEDICARE

## 2024-04-06 LAB
ALBUMIN SERPL-MCNC: 2.8 G/DL (ref 3.5–5)
ANION GAP SERPL CALC-SCNC: 3 MMOL/L (ref 5–15)
BUN SERPL-MCNC: 19 MG/DL (ref 6–20)
BUN/CREAT SERPL: 12 (ref 12–20)
CA-I BLD-MCNC: 8.8 MG/DL (ref 8.5–10.1)
CHLORIDE SERPL-SCNC: 114 MMOL/L (ref 97–108)
CO2 SERPL-SCNC: 28 MMOL/L (ref 21–32)
CREAT SERPL-MCNC: 1.56 MG/DL (ref 0.55–1.02)
ERYTHROCYTE [DISTWIDTH] IN BLOOD BY AUTOMATED COUNT: 16.2 % (ref 11.5–14.5)
GLUCOSE BLD STRIP.AUTO-MCNC: 102 MG/DL (ref 65–100)
GLUCOSE BLD STRIP.AUTO-MCNC: 120 MG/DL (ref 65–100)
GLUCOSE BLD STRIP.AUTO-MCNC: 129 MG/DL (ref 65–100)
GLUCOSE BLD STRIP.AUTO-MCNC: 156 MG/DL (ref 65–100)
GLUCOSE SERPL-MCNC: 121 MG/DL (ref 65–100)
HCT VFR BLD AUTO: 28.4 % (ref 35–47)
HGB BLD-MCNC: 8.7 G/DL (ref 11.5–16)
MCH RBC QN AUTO: 28.4 PG (ref 26–34)
MCHC RBC AUTO-ENTMCNC: 30.6 G/DL (ref 30–36.5)
MCV RBC AUTO: 92.8 FL (ref 80–99)
NRBC # BLD: 0 K/UL (ref 0–0.01)
NRBC BLD-RTO: 0 PER 100 WBC
PERFORMED BY:: ABNORMAL
PHOSPHATE SERPL-MCNC: 2.5 MG/DL (ref 2.6–4.7)
PLATELET # BLD AUTO: 153 K/UL (ref 150–400)
PMV BLD AUTO: 11.1 FL (ref 8.9–12.9)
POTASSIUM SERPL-SCNC: 3.5 MMOL/L (ref 3.5–5.1)
RBC # BLD AUTO: 3.06 M/UL (ref 3.8–5.2)
SODIUM SERPL-SCNC: 145 MMOL/L (ref 136–145)
WBC # BLD AUTO: 5.6 K/UL (ref 3.6–11)

## 2024-04-06 PROCEDURE — 6370000000 HC RX 637 (ALT 250 FOR IP): Performed by: INTERNAL MEDICINE

## 2024-04-06 PROCEDURE — 97110 THERAPEUTIC EXERCISES: CPT

## 2024-04-06 PROCEDURE — 36415 COLL VENOUS BLD VENIPUNCTURE: CPT

## 2024-04-06 PROCEDURE — 97116 GAIT TRAINING THERAPY: CPT

## 2024-04-06 PROCEDURE — 1100000000 HC RM PRIVATE

## 2024-04-06 PROCEDURE — 80069 RENAL FUNCTION PANEL: CPT

## 2024-04-06 PROCEDURE — 99232 SBSQ HOSP IP/OBS MODERATE 35: CPT | Performed by: INTERNAL MEDICINE

## 2024-04-06 PROCEDURE — 92526 ORAL FUNCTION THERAPY: CPT

## 2024-04-06 PROCEDURE — 82962 GLUCOSE BLOOD TEST: CPT

## 2024-04-06 PROCEDURE — 6360000002 HC RX W HCPCS: Performed by: FAMILY MEDICINE

## 2024-04-06 PROCEDURE — 71045 X-RAY EXAM CHEST 1 VIEW: CPT

## 2024-04-06 PROCEDURE — 85027 COMPLETE CBC AUTOMATED: CPT

## 2024-04-06 PROCEDURE — 2580000003 HC RX 258: Performed by: FAMILY MEDICINE

## 2024-04-06 PROCEDURE — 6370000000 HC RX 637 (ALT 250 FOR IP): Performed by: FAMILY MEDICINE

## 2024-04-06 RX ADMIN — HEPARIN SODIUM 5000 UNITS: 5000 INJECTION INTRAVENOUS; SUBCUTANEOUS at 18:52

## 2024-04-06 RX ADMIN — PANTOPRAZOLE SODIUM 40 MG: 40 TABLET, DELAYED RELEASE ORAL at 06:37

## 2024-04-06 RX ADMIN — HEPARIN SODIUM 5000 UNITS: 5000 INJECTION INTRAVENOUS; SUBCUTANEOUS at 22:09

## 2024-04-06 RX ADMIN — ATORVASTATIN CALCIUM 40 MG: 40 TABLET, FILM COATED ORAL at 21:17

## 2024-04-06 RX ADMIN — HEPARIN SODIUM 5000 UNITS: 5000 INJECTION INTRAVENOUS; SUBCUTANEOUS at 06:30

## 2024-04-06 RX ADMIN — CARVEDILOL 12.5 MG: 12.5 TABLET, FILM COATED ORAL at 18:52

## 2024-04-06 RX ADMIN — INSULIN LISPRO 0 UNITS: 100 INJECTION, SOLUTION INTRAVENOUS; SUBCUTANEOUS at 06:43

## 2024-04-06 RX ADMIN — CARVEDILOL 12.5 MG: 12.5 TABLET, FILM COATED ORAL at 08:33

## 2024-04-06 RX ADMIN — SODIUM CHLORIDE, PRESERVATIVE FREE 10 ML: 5 INJECTION INTRAVENOUS at 21:30

## 2024-04-06 RX ADMIN — SODIUM CHLORIDE, PRESERVATIVE FREE 10 ML: 5 INJECTION INTRAVENOUS at 10:35

## 2024-04-06 RX ADMIN — CLOPIDOGREL BISULFATE 75 MG: 75 TABLET, FILM COATED ORAL at 08:33

## 2024-04-06 ASSESSMENT — PAIN SCALES - GENERAL: PAINLEVEL_OUTOF10: 2

## 2024-04-06 NOTE — DIALYSIS
Right temp trialysis catheter removed.  Catheter tip intact. Pressure held x 10 minutes.  No bleeding.  Exit site covered with folded 4x4, clear dressing applied.  Suture above exit site, on right side of neck left intact, in place.  Report called to Nick Reveles [facility]

## 2024-04-07 LAB
ALBUMIN SERPL-MCNC: 2.7 G/DL (ref 3.5–5)
ANION GAP SERPL CALC-SCNC: 0 MMOL/L (ref 5–15)
BUN SERPL-MCNC: 16 MG/DL (ref 6–20)
BUN/CREAT SERPL: 11 (ref 12–20)
CA-I BLD-MCNC: 8.8 MG/DL (ref 8.5–10.1)
CHLORIDE SERPL-SCNC: 116 MMOL/L (ref 97–108)
CO2 SERPL-SCNC: 29 MMOL/L (ref 21–32)
CREAT SERPL-MCNC: 1.51 MG/DL (ref 0.55–1.02)
ERYTHROCYTE [DISTWIDTH] IN BLOOD BY AUTOMATED COUNT: 16.8 % (ref 11.5–14.5)
GLUCOSE BLD STRIP.AUTO-MCNC: 101 MG/DL (ref 65–100)
GLUCOSE BLD STRIP.AUTO-MCNC: 114 MG/DL (ref 65–100)
GLUCOSE BLD STRIP.AUTO-MCNC: 181 MG/DL (ref 65–100)
GLUCOSE BLD STRIP.AUTO-MCNC: 97 MG/DL (ref 65–100)
GLUCOSE SERPL-MCNC: 99 MG/DL (ref 65–100)
HCT VFR BLD AUTO: 27.3 % (ref 35–47)
HGB BLD-MCNC: 8.3 G/DL (ref 11.5–16)
MCH RBC QN AUTO: 28.6 PG (ref 26–34)
MCHC RBC AUTO-ENTMCNC: 30.4 G/DL (ref 30–36.5)
MCV RBC AUTO: 94.1 FL (ref 80–99)
NRBC # BLD: 0 K/UL (ref 0–0.01)
NRBC BLD-RTO: 0 PER 100 WBC
PERFORMED BY:: ABNORMAL
PERFORMED BY:: NORMAL
PHOSPHATE SERPL-MCNC: 3.2 MG/DL (ref 2.6–4.7)
PLATELET # BLD AUTO: 149 K/UL (ref 150–400)
PMV BLD AUTO: 10.8 FL (ref 8.9–12.9)
POTASSIUM SERPL-SCNC: 3.7 MMOL/L (ref 3.5–5.1)
RBC # BLD AUTO: 2.9 M/UL (ref 3.8–5.2)
SODIUM SERPL-SCNC: 145 MMOL/L (ref 136–145)
WBC # BLD AUTO: 5.8 K/UL (ref 3.6–11)

## 2024-04-07 PROCEDURE — 6360000002 HC RX W HCPCS: Performed by: FAMILY MEDICINE

## 2024-04-07 PROCEDURE — 99232 SBSQ HOSP IP/OBS MODERATE 35: CPT | Performed by: INTERNAL MEDICINE

## 2024-04-07 PROCEDURE — 2580000003 HC RX 258: Performed by: FAMILY MEDICINE

## 2024-04-07 PROCEDURE — 6370000000 HC RX 637 (ALT 250 FOR IP): Performed by: FAMILY MEDICINE

## 2024-04-07 PROCEDURE — 6370000000 HC RX 637 (ALT 250 FOR IP): Performed by: INTERNAL MEDICINE

## 2024-04-07 PROCEDURE — 80069 RENAL FUNCTION PANEL: CPT

## 2024-04-07 PROCEDURE — 1100000000 HC RM PRIVATE

## 2024-04-07 PROCEDURE — 82962 GLUCOSE BLOOD TEST: CPT

## 2024-04-07 PROCEDURE — 85027 COMPLETE CBC AUTOMATED: CPT

## 2024-04-07 PROCEDURE — 36415 COLL VENOUS BLD VENIPUNCTURE: CPT

## 2024-04-07 RX ADMIN — CLOPIDOGREL BISULFATE 75 MG: 75 TABLET, FILM COATED ORAL at 08:30

## 2024-04-07 RX ADMIN — HEPARIN SODIUM 5000 UNITS: 5000 INJECTION INTRAVENOUS; SUBCUTANEOUS at 05:49

## 2024-04-07 RX ADMIN — ATORVASTATIN CALCIUM 40 MG: 40 TABLET, FILM COATED ORAL at 22:00

## 2024-04-07 RX ADMIN — CARVEDILOL 12.5 MG: 12.5 TABLET, FILM COATED ORAL at 18:42

## 2024-04-07 RX ADMIN — PANTOPRAZOLE SODIUM 40 MG: 40 TABLET, DELAYED RELEASE ORAL at 05:50

## 2024-04-07 RX ADMIN — SODIUM CHLORIDE, PRESERVATIVE FREE 10 ML: 5 INJECTION INTRAVENOUS at 21:30

## 2024-04-07 RX ADMIN — CARVEDILOL 12.5 MG: 12.5 TABLET, FILM COATED ORAL at 08:30

## 2024-04-07 RX ADMIN — HEPARIN SODIUM 5000 UNITS: 5000 INJECTION INTRAVENOUS; SUBCUTANEOUS at 18:43

## 2024-04-07 RX ADMIN — HEPARIN SODIUM 5000 UNITS: 5000 INJECTION INTRAVENOUS; SUBCUTANEOUS at 22:00

## 2024-04-07 RX ADMIN — SODIUM CHLORIDE, PRESERVATIVE FREE 10 ML: 5 INJECTION INTRAVENOUS at 09:00

## 2024-04-07 RX ADMIN — ACETAMINOPHEN 650 MG: 325 TABLET ORAL at 08:30

## 2024-04-07 ASSESSMENT — PAIN SCALES - GENERAL: PAINLEVEL_OUTOF10: 3

## 2024-04-07 ASSESSMENT — PAIN DESCRIPTION - DESCRIPTORS: DESCRIPTORS: ACHING;DISCOMFORT

## 2024-04-07 ASSESSMENT — PAIN DESCRIPTION - LOCATION: LOCATION: RECTUM

## 2024-04-08 VITALS
HEIGHT: 62 IN | OXYGEN SATURATION: 98 % | DIASTOLIC BLOOD PRESSURE: 97 MMHG | RESPIRATION RATE: 18 BRPM | WEIGHT: 189.6 LBS | TEMPERATURE: 97.5 F | SYSTOLIC BLOOD PRESSURE: 151 MMHG | HEART RATE: 80 BPM | BODY MASS INDEX: 34.89 KG/M2

## 2024-04-08 LAB
ALBUMIN SERPL-MCNC: 2.5 G/DL (ref 3.5–5)
ALBUMIN SERPL-MCNC: 2.5 G/DL (ref 3.5–5)
ALBUMIN/GLOB SERPL: 0.7 (ref 1.1–2.2)
ALP SERPL-CCNC: 89 U/L (ref 45–117)
ALT SERPL-CCNC: 12 U/L (ref 12–78)
ANION GAP SERPL CALC-SCNC: 0 MMOL/L (ref 5–15)
ANION GAP SERPL CALC-SCNC: 1 MMOL/L (ref 5–15)
AST SERPL W P-5'-P-CCNC: 17 U/L (ref 15–37)
BILIRUB SERPL-MCNC: 0.6 MG/DL (ref 0.2–1)
BUN SERPL-MCNC: 16 MG/DL (ref 6–20)
BUN SERPL-MCNC: 16 MG/DL (ref 6–20)
BUN/CREAT SERPL: 10 (ref 12–20)
BUN/CREAT SERPL: 10 (ref 12–20)
CA-I BLD-MCNC: 8.2 MG/DL (ref 8.5–10.1)
CA-I BLD-MCNC: 8.3 MG/DL (ref 8.5–10.1)
CHLORIDE SERPL-SCNC: 116 MMOL/L (ref 97–108)
CHLORIDE SERPL-SCNC: 117 MMOL/L (ref 97–108)
CO2 SERPL-SCNC: 30 MMOL/L (ref 21–32)
CO2 SERPL-SCNC: 30 MMOL/L (ref 21–32)
CREAT SERPL-MCNC: 1.61 MG/DL (ref 0.55–1.02)
CREAT SERPL-MCNC: 1.61 MG/DL (ref 0.55–1.02)
ERYTHROCYTE [DISTWIDTH] IN BLOOD BY AUTOMATED COUNT: 17.1 % (ref 11.5–14.5)
GLOBULIN SER CALC-MCNC: 3.5 G/DL (ref 2–4)
GLUCOSE BLD STRIP.AUTO-MCNC: 135 MG/DL (ref 65–100)
GLUCOSE BLD STRIP.AUTO-MCNC: 202 MG/DL (ref 65–100)
GLUCOSE BLD STRIP.AUTO-MCNC: 99 MG/DL (ref 65–100)
GLUCOSE SERPL-MCNC: 83 MG/DL (ref 65–100)
GLUCOSE SERPL-MCNC: 87 MG/DL (ref 65–100)
HCT VFR BLD AUTO: 24.6 % (ref 35–47)
HGB BLD-MCNC: 7.3 G/DL (ref 11.5–16)
MCH RBC QN AUTO: 28.3 PG (ref 26–34)
MCHC RBC AUTO-ENTMCNC: 29.7 G/DL (ref 30–36.5)
MCV RBC AUTO: 95.3 FL (ref 80–99)
NRBC # BLD: 0 K/UL (ref 0–0.01)
NRBC BLD-RTO: 0 PER 100 WBC
PERFORMED BY:: ABNORMAL
PERFORMED BY:: ABNORMAL
PERFORMED BY:: NORMAL
PHOSPHATE SERPL-MCNC: 3.2 MG/DL (ref 2.6–4.7)
PLATELET # BLD AUTO: 172 K/UL (ref 150–400)
PMV BLD AUTO: 10.9 FL (ref 8.9–12.9)
POTASSIUM SERPL-SCNC: 3.5 MMOL/L (ref 3.5–5.1)
POTASSIUM SERPL-SCNC: 3.5 MMOL/L (ref 3.5–5.1)
PROT SERPL-MCNC: 6 G/DL (ref 6.4–8.2)
RBC # BLD AUTO: 2.58 M/UL (ref 3.8–5.2)
SODIUM SERPL-SCNC: 147 MMOL/L (ref 136–145)
SODIUM SERPL-SCNC: 147 MMOL/L (ref 136–145)
WBC # BLD AUTO: 4.4 K/UL (ref 3.6–11)

## 2024-04-08 PROCEDURE — 36415 COLL VENOUS BLD VENIPUNCTURE: CPT

## 2024-04-08 PROCEDURE — 6370000000 HC RX 637 (ALT 250 FOR IP): Performed by: FAMILY MEDICINE

## 2024-04-08 PROCEDURE — 97530 THERAPEUTIC ACTIVITIES: CPT

## 2024-04-08 PROCEDURE — 99232 SBSQ HOSP IP/OBS MODERATE 35: CPT | Performed by: INTERNAL MEDICINE

## 2024-04-08 PROCEDURE — 80053 COMPREHEN METABOLIC PANEL: CPT

## 2024-04-08 PROCEDURE — 6360000002 HC RX W HCPCS: Performed by: INTERNAL MEDICINE

## 2024-04-08 PROCEDURE — 6360000002 HC RX W HCPCS: Performed by: FAMILY MEDICINE

## 2024-04-08 PROCEDURE — 6370000000 HC RX 637 (ALT 250 FOR IP): Performed by: INTERNAL MEDICINE

## 2024-04-08 PROCEDURE — 85027 COMPLETE CBC AUTOMATED: CPT

## 2024-04-08 PROCEDURE — 80069 RENAL FUNCTION PANEL: CPT

## 2024-04-08 PROCEDURE — 2580000003 HC RX 258: Performed by: FAMILY MEDICINE

## 2024-04-08 PROCEDURE — 82962 GLUCOSE BLOOD TEST: CPT

## 2024-04-08 RX ADMIN — CLOPIDOGREL BISULFATE 75 MG: 75 TABLET, FILM COATED ORAL at 10:12

## 2024-04-08 RX ADMIN — SODIUM CHLORIDE, PRESERVATIVE FREE 10 ML: 5 INJECTION INTRAVENOUS at 10:13

## 2024-04-08 RX ADMIN — CARVEDILOL 12.5 MG: 12.5 TABLET, FILM COATED ORAL at 17:46

## 2024-04-08 RX ADMIN — CARVEDILOL 12.5 MG: 12.5 TABLET, FILM COATED ORAL at 10:12

## 2024-04-08 RX ADMIN — PANTOPRAZOLE SODIUM 40 MG: 40 TABLET, DELAYED RELEASE ORAL at 10:12

## 2024-04-08 RX ADMIN — HEPARIN SODIUM 5000 UNITS: 5000 INJECTION INTRAVENOUS; SUBCUTANEOUS at 05:51

## 2024-04-08 RX ADMIN — INSULIN LISPRO 1 UNITS: 100 INJECTION, SOLUTION INTRAVENOUS; SUBCUTANEOUS at 12:42

## 2024-04-08 RX ADMIN — EPOETIN ALFA-EPBX 10000 UNITS: 10000 INJECTION, SOLUTION INTRAVENOUS; SUBCUTANEOUS at 18:56

## 2024-04-08 RX ADMIN — HEPARIN SODIUM 5000 UNITS: 5000 INJECTION INTRAVENOUS; SUBCUTANEOUS at 12:43

## 2024-04-08 ASSESSMENT — PAIN SCALES - GENERAL
PAINLEVEL_OUTOF10: 0

## 2024-04-08 NOTE — DISCHARGE SUMMARY
labs notable for H/H 8.1/25.4 (improved from yesterday at 7.8/24.6)     No blood culture growth in 3 days     CXR showed nasogastric tube tip over distal stomach w/ improved right basilar aeration     Pt is resting in bed. She is responsive to questions. Denies any constitutional symptoms, but states she is cold, an the nurses gave her some blankets.     4/3     Speech therapy consulted on pt      OT and PT attempted eval, but patient stated she had a headache. Will try again later      Nephrology - pt off CRRT and pressors. Plan to attempt IHD yesterday - complete 3 hr dialysis and tolerated 2L fluid removal. No problems.      ID - pt afebrile w/ normal WBC - yesterday was day 5 of Meropenem for UTI K. Pneumoniae. Blood cultures remain negative. Plan to continue antibiotics for above infection.         Patient is on 1L nasal cannula. States she has been nauseous, but was able to eat 1/2 her breakfast.     4/4     CM - Will need nephrology clearance prior to discharge. She is a long-term care resident at [facility].     ID - per f/u yesterday - pt remains afebrile w/ normal WBCs. Day #6 of meropenem. Blood cultures continue to remain negative     Nephrology -  Determining whether or not pt needs to be on OP dialysis. Hepatitis labs have been ordered just in case (Hep B surface antigen negative. Hepatitis C antibody non-reactive). Hold off on dialysis for now     CT of abdomen showed no acute abnormality other than peripancreatic inflammation.     Morning labs:     BUN 25, Cr 2.18, GFR 22, glucose 105, H/H 7.9/25.1     Patient is feeling weak and tired today. No nausea or vomiting. She is on 1L nasal cannula.      4/5     ID consulted - yesterday was day 7 of IV Meropenem. Pt still afebrile w/ normal WBCs      PT and OT consulted on patient      Morning labs:     BUN 24, Creatinine 1.84, GFR 27, glucose 109, H/H 7.4/23.8     Patient on 1L nasal cannula. Resting in bed and aroused when I

## 2024-04-08 NOTE — CARE COORDINATION
Transition of Care Plan:    RUR:   Prior Level of Functioning:   Disposition: [facility].  Room 206-B.  RN to call report @ [phone]   If SNF or IPR: Date FOC offered: 29 MAR 24  Date FOC received: 29 [facility] 24  Accepting facility: [facility]  Date authorization started with reference number: 2 APR 24  Date authorization received and expires: 5 APR 24  Follow up appointments: PCP and all other recommended specialists   DME needed: N/A  Transportation at discharge: Transport   IM/IMM Medicare/ letter given: Medicare pt has received, reviewed, and signed 2nd IM letter informing them of their right to appeal the discharge.  Signed copy has been placed on pt bedside chart.  Is patient a  and connected with [facility]? N/A  If yes, was [place] transfer form completed and [facility] notified? N/A  Caregiver Contact: Son, Nikhil Ko  Discharge Caregiver contacted prior to discharge?   Care Conference needed? N/A  Barriers to discharge: N/A      TORY Hinson  [phone]                                              13:16PM Message sent to admissions re: bed availability.  Awaiting a response.     TORY Hinson  [phone]          08:20AM Currently on 2L NC.  IV ABX (Meropenem) complete.  At this time patient will not need outpatient HD chair.  Must follow up w/nephrology x 2 weeks after discharge.  Recent clinicals sent to LT facility ([facility]).       TORY Hinson  [phone]

## 2024-04-08 NOTE — DISCHARGE INSTRUCTIONS
Discharge Instructions       PATIENT ID: Ana Rosa Bearden  MRN: [id number]   YOB: 1940    DATE OF ADMISSION: 3/29/2024  DATE OF DISCHARGE: 4/8/2024    PRIMARY CARE PROVIDER: [unfilled]     ATTENDING PHYSICIAN: Beth Hardy MD   DISCHARGING PROVIDER: Beth Hardy MD    To contact this individual call [phone] and ask the  to page.   If unavailable, ask to be transferred the Adult Hospitalist Department.    DISCHARGE DIAGNOSES acute kidney injury    CONSULTATIONS: [unfilled]      PROCEDURES/SURGERIES: * No surgery found *    PENDING TEST RESULTS:   At the time of discharge the following test results are still pending: None    FOLLOW UP APPOINTMENTS:   Beth Hardy MD  [address] [facility]   [place] [address] [phone]    Follow up in 1 week(s)         ADDITIONAL CARE RECOMMENDATIONS: Follow-up with nephrology as an outpatient    DIET: Cardiac diet      ACTIVITY: Activity as tolerated    Wound care: {Discharge Wound Care Instructions:94502}    EQUIPMENT needed: ***      DISCHARGE MEDICATIONS:   See Medication Reconciliation Form    It is important that you take the medication exactly as they are prescribed.   Keep your medication in the bottles provided by the pharmacist and keep a list of the medication names, dosages, and times to be taken in your wallet.   Do not take other medications without consulting your doctor.       NOTIFY YOUR PHYSICIAN FOR ANY OF THE FOLLOWING:   Fever over 101 degrees for 24 hours.   Chest pain, shortness of breath, fever, chills, nausea, vomiting, diarrhea, change in mentation, falling, weakness, bleeding. Severe pain or pain not relieved by medications.  Or, any other signs or symptoms that you may have questions about.      DISPOSITION:    Home With:   OT  PT  HH  RN       SNF/Inpatient Rehab/LTAC    Independent/assisted living    Hospice    Other:         PROBLEM LIST Updated:  Yes ***       Signed:   Beth Hardy

## 2024-04-09 NOTE — PROGRESS NOTES
Infectious Disease Progress Note             Subjective:   Pt seen and examined at bedside.  Remains in the ICU for close monitoring, Levophed support.  CRP on hold for now.  Blood Cx from 3/29 is negative, Klebsiella pneumoniae isolated from urine Cx.  MRSA PCR is also negative  Objective:   Physical Exam:     /77   Pulse (!) 135   Temp 99.1 °F (37.3 °C) (Oral)   Resp 19   Ht 1.575 m (5' 2\")   Wt 86.9 kg (191 lb 9.3 oz)   SpO2 98%   BMI 35.04 kg/m²  O2 Flow Rate (L/min): 2 L/min O2 Device: Nasal cannula    Temp (24hrs), Av °F (36.7 °C), Min:95.5 °F (35.3 °C), Max:99.6 °F (37.6 °C)    701 - 1900  In: 220.9 [I.V.:12.1]  Out: -    1901 -  07  In: 9346 [I.V.:5095.6]  Out: 4316 [Urine:80]    General: NAD, alert, follows some commands  HEENT: JACOB, Moist mucosa, right IJ triple-lumen temporary HD cath  Lungs: CTA b/l, decreased bases, no rales/rhonchi  Heart: S1S2+, RRR, no murmur  Abdo: Soft, NT, ND, +BS   : Dennis catheter in place, + FMS   Exts: No edema, + pulses b/l   Skin: No wounds, No rashes or lesions    Data Review:       Recent Days:    Recent Labs     24  0220 24  0300 24  0600   WBC 9.7 8.3 6.0   HGB 7.1* 7.1* 6.8*   HCT 22.0* 22.5* 22.4*    151 121*     Recent Labs     24  1727 24  2119 24  0600   BUN 11 11 12  12   CREATININE 1.37* 1.36* 1.45*  1.43*       Lab Results   Component Value Date/Time    CRP 9.11 2024 06:00 AM          Microbiology     Results       Procedure Component Value Units Date/Time    Culture, Urine [[id number]] Collected: 24    Order Status: Completed Specimen: Urine Updated: 24 08     Special Requests --        No Special Requests  Reflexed from [id number]       Culture No Growth (<1000 cfu/mL)       Culture, Respiratory [[id number]] Collected: 24 0545    Order Status: Canceled Specimen: Sputum Aspirated     Culture, Blood 1 [[id number]]     Order 
                    Infectious Disease Progress Note             Subjective:   Pt seen and examined at bedside. Stable, denies new complaints, no acute events since last seen, remains stable   Objective:   Physical Exam:     /71   Pulse 83   Temp 98.1 °F (36.7 °C)   Resp 13   Ht 1.575 m (5' 2\")   Wt 86.9 kg (191 lb 9.3 oz)   SpO2 100%   BMI 35.04 kg/m²  O2 Flow Rate (L/min): 1 L/min O2 Device: Nasal cannula    Temp (24hrs), Av.5 °F (36.9 °C), Min:96.3 °F (35.7 °C), Max:100 °F (37.8 °C)    701 -  190  In: 3400.4 [I.V.:2400]  Out: 2720 [Urine:20]   1901 -  0700  In: 1879.9 [I.V.:61.7]  Out: 1896 [Urine:25]    General: NAD, alert, follows some commands  HEENT: JACOB, Moist mucosa, right IJ triple-lumen temporary HD cath  Lungs: CTA b/l, decreased bases, no rales/rhonchi  Heart: S1S2+, RRR, no murmur  Abdo: Soft, NT, ND, +BS   : Dennis catheter in place, + FMS   Exts: No edema, + pulses b/l   Skin: No wounds, No rashes or lesions    Data Review:       Recent Days:    Recent Labs     24  0600 24  2345 24  0230   WBC 6.0 9.0 9.2   HGB 6.8* 7.8* 8.1*   HCT 22.4* 24.6* 25.4*   * 162 171       Recent Labs     24  2119 24  0600 24  0230   BUN 11 12  12 24*  24*   CREATININE 1.36* 1.45*  1.43* 2.33*  2.29*         Lab Results   Component Value Date/Time    CRP 9.11 2024 06:00 AM          Microbiology     Results       Procedure Component Value Units Date/Time    Culture, Urine [[id number]] Collected: 24    Order Status: Completed Specimen: Urine Updated: 24 0852     Special Requests --        No Special Requests  Reflexed from [id number]       Culture No Growth (<1000 cfu/mL)       Culture, Respiratory [[id number]] Collected: 24 0545    Order Status: Canceled Specimen: Sputum Aspirated     Culture, Blood 1 [[id number]]     Order Status: Canceled Specimen: Blood     Culture, Blood 2 [[id number]]     Order Status: 
                    Infectious Disease Progress Note           Subjective:   Continues to do well clinically, denies new complaints, no acute events events since last seen, still w generalized weakness   Objective:   Physical Exam:     /63   Pulse 80   Temp 98.1 °F (36.7 °C) (Oral)   Resp 18   Ht 1.575 m (5' 2\")   Wt 86.9 kg (191 lb 9.3 oz)   SpO2 99%   BMI 35.04 kg/m²  O2 Flow Rate (L/min): 1 L/min O2 Device: Nasal cannula    Temp (24hrs), Av.2 °F (36.8 °C), Min:97.9 °F (36.6 °C), Max:98.4 °F (36.9 °C)    No intake/output data recorded.    1901 -  0700  In: 626.6 [P.O.:520]  Out: 1150 [Urine:750]    General: NAD, alert and following commands   HEENT: JACOB, Moist mucosa  Lungs: CTA b/l, decreased bases, no rales/rhonchi  Heart: S1S2+, RRR, no murmur  Abdo: Soft, NT, ND, +BS   : external barrera cath and FMS   Exts: No edema, + pulses b/l   Skin: No wounds, No rashes or lesions    Data Review:       Recent Days:    Recent Labs     24  0536 24  0658 24  0704   WBC 5.7 6.3 5.4   HGB 8.0* 7.9* 7.4*   HCT 24.9* 25.1* 23.8*   * 138* 115*       Recent Labs     24  0536 24  0658 24  0704   BUN 20 25* 24*  24*   CREATININE 1.58* 2.18* 1.93*  1.84*         Lab Results   Component Value Date/Time    CRP 9.11 2024 06:00 AM      Microbiology     Results       Procedure Component Value Units Date/Time    Culture, Urine [[id number]] Collected: 24    Order Status: Completed Specimen: Urine Updated: 2452     Special Requests --        No Special Requests  Reflexed from [id number]       Culture No Growth (<1000 cfu/mL)       Culture, Respiratory [[id number]] Collected: 24 0545    Order Status: Canceled Specimen: Sputum Aspirated     Culture, Blood 1 [[id number]]     Order Status: Canceled Specimen: Blood     Culture, Blood 2 [[id number]]     Order Status: Canceled Specimen: Blood     MRSA by PCR [[id number]] Collected: 24 0530 
                    Infectious Disease Progress Note           Subjective:   Continues to do well clinically, denies new complaints, plans for d/c back to SNF today, no acute events since last seen   Objective:   Physical Exam:     BP (!) 143/79   Pulse 76   Temp 97.5 °F (36.4 °C) (Oral)   Resp 20   Ht 1.575 m (5' 2\")   Wt 86 kg (189 lb 9.5 oz)   SpO2 100%   BMI 34.68 kg/m²  O2 Flow Rate (L/min): 2 L/min O2 Device: Nasal cannula    Temp (24hrs), Av.5 °F (36.4 °C), Min:97.5 °F (36.4 °C), Max:97.6 °F (36.4 °C)    No intake/output data recorded.    1901 -  0700  In: 240 [P.O.:240]  Out: 350 [Urine:350]    General: NAD, AAO x 2 (Person and place)   HEENT: JACOB, Moist mucosa  Lungs: CTA b/l, decreased bases, no rales/rhonchi  Heart: S1S2+, RRR, no murmur  Abdo: Soft, NT, ND, +BS   : external barrera cath and FMS   Exts: No edema, + pulses b/l   Skin: No wounds, No rashes or lesions    Data Review:       Recent Days:    Recent Labs     24  0858 24  0744 24  0502   WBC 5.6 5.8 4.4   HGB 8.7* 8.3* 7.3*   HCT 28.4* 27.3* 24.6*    149* 172       Recent Labs     24  0744 24  0502 24  0503   BUN 16 16 16   CREATININE 1.51* 1.61* 1.61*       Lab Results   Component Value Date/Time    CRP 9.11 2024 06:00 AM      Microbiology     Results       Procedure Component Value Units Date/Time    Culture, Urine [[id number]] Collected: 24    Order Status: Completed Specimen: Urine Updated: 24     Special Requests --        No Special Requests  Reflexed from [id number]       Culture No Growth (<1000 cfu/mL)       Culture, Respiratory [[id number]] Collected: 2445    Order Status: Canceled Specimen: Sputum Aspirated     Culture, Blood 1 [[id number]]     Order Status: Canceled Specimen: Blood     Culture, Blood 2 [[id number]]     Order Status: Canceled Specimen: Blood     MRSA by PCR [[id number]] Collected: 24 0530    Order Status: Completed 
                    Infectious Disease Progress Note           Subjective:   No change in clinical status, remains stable, denies new complaints, right neck temp HD cath discontinued   Objective:   Physical Exam:     BP (!) 142/67 Comment: The nurse Shauna was notified  Pulse 73   Temp 97.5 °F (36.4 °C) (Oral)   Resp 18   Ht 1.575 m (5' 2\")   Wt 86.9 kg (191 lb 9.3 oz)   SpO2 100%   BMI 35.04 kg/m²  O2 Flow Rate (L/min): 1 L/min O2 Device: Nasal cannula    Temp (24hrs), Av °F (36.7 °C), Min:97.4 °F (36.3 °C), Max:98.6 °F (37 °C)    No intake/output data recorded.    1901 -  0700  In: 370 [P.O.:360; I.V.:10]  Out: 1550 [Urine:1200]    General: NAD, alert and following commands   HEENT: JACOB, Moist mucosa  Lungs: CTA b/l, decreased bases, no rales/rhonchi  Heart: S1S2+, RRR, no murmur  Abdo: Soft, NT, ND, +BS   : external barrera cath and FMS   Exts: No edema, + pulses b/l   Skin: No wounds, No rashes or lesions    Data Review:       Recent Days:    Recent Labs     24  0658 24  0704 24  0858   WBC 6.3 5.4 5.6   HGB 7.9* 7.4* 8.7*   HCT 25.1* 23.8* 28.4*   * 115* 153       Recent Labs     24  0658 24  0704 24  0858   BUN 25* 24*  24* 19   CREATININE 2.18* 1.93*  1.84* 1.56*         Lab Results   Component Value Date/Time    CRP 9.11 2024 06:00 AM      Microbiology     Results       Procedure Component Value Units Date/Time    Culture, Urine [[id number]] Collected: 24    Order Status: Completed Specimen: Urine Updated: 24     Special Requests --        No Special Requests  Reflexed from [id number]       Culture No Growth (<1000 cfu/mL)       Culture, Respiratory [[id number]] Collected: 24 0545    Order Status: Canceled Specimen: Sputum Aspirated     Culture, Blood 1 [[id number]]     Order Status: Canceled Specimen: Blood     Culture, Blood 2 [[id number]]     Order Status: Canceled Specimen: Blood     MRSA by PCR [[id number]] 
                    Infectious Disease Progress Note           Subjective:   Remains stable, no change in clinical status, denies new complaints,  afebrile w a normal WBC on routine labs   Objective:   Physical Exam:     BP (!) 103/50 Comment: reported to Shauna PEREZ  Pulse 76   Temp 97.5 °F (36.4 °C) (Oral)   Resp 18   Ht 1.575 m (5' 2\")   Wt 86.9 kg (191 lb 9.3 oz)   SpO2 92%   BMI 35.04 kg/m²  O2 Flow Rate (L/min): 2 L/min O2 Device: Nasal cannula    Temp (24hrs), Av.7 °F (36.5 °C), Min:97.5 °F (36.4 °C), Max:97.9 °F (36.6 °C)    701 - 1900  In: 240 [P.O.:240]  Out: 350 [Urine:350]   1901 -  0700  In: 370 [P.O.:360; I.V.:10]  Out: 500 [Urine:500]    General: NAD, AAO x 2 (Person and place)   HEENT: JACOB, Moist mucosa  Lungs: CTA b/l, decreased bases, no rales/rhonchi  Heart: S1S2+, RRR, no murmur  Abdo: Soft, NT, ND, +BS   : external barrera cath and FMS   Exts: No edema, + pulses b/l   Skin: No wounds, No rashes or lesions    Data Review:       Recent Days:    Recent Labs     24  0704 24  0858 24  0744   WBC 5.4 5.6 5.8   HGB 7.4* 8.7* 8.3*   HCT 23.8* 28.4* 27.3*   * 153 149*       Recent Labs     24  0704 24  0858 24  0744   BUN 24*  24* 19 16   CREATININE 1.93*  1.84* 1.56* 1.51*       Lab Results   Component Value Date/Time    CRP 9.11 2024 06:00 AM      Microbiology     Results       Procedure Component Value Units Date/Time    Culture, Urine [[id number]] Collected: 24    Order Status: Completed Specimen: Urine Updated: 24 0852     Special Requests --        No Special Requests  Reflexed from [id number]       Culture No Growth (<1000 cfu/mL)       Culture, Respiratory [[id number]] Collected: 24 0545    Order Status: Canceled Specimen: Sputum Aspirated     Culture, Blood 1 [[id number]]     Order Status: Canceled Specimen: Blood     Culture, Blood 2 [[id number]]     Order Status: Canceled Specimen: Blood  
         Nephrology follow-up        Patient: Ana Rosa Baerden MRN: [id number]  SSN: [id number]    YOB: 1940  Age: 83 y.o.  Sex: female      Subjective:   The patient is seen in the room  She looks comfortable  Had IHD on 4/02   Her creatinine has improved to 1.5  Her temporary dialysis catheter was removed yesterday        Past Medical History:   Diagnosis Date    Acute respiratory distress     CHF (congestive heart failure) (HCC)     CKD (chronic kidney disease)     Diabetes mellitus (HCC)     GERD (gastroesophageal reflux disease)     Hypercholesterolemia     Hyperlipidemia     Hypertension     Migraine      Past Surgical History:   Procedure Laterality Date    HERNIA REPAIR      IR NONTUNNELED VASCULAR CATHETER  3/29/2024    IR NONTUNNELED VASCULAR CATHETER 3/29/2024 Ez Chowdhury MD [facility] IR    TUBAL LIGATION        Family History   Problem Relation Age of Onset    Hypertension Father      Social History     Tobacco Use    Smoking status: Never    Smokeless tobacco: Never   Substance Use Topics    Alcohol use: Not Currently      Current Facility-Administered Medications   Medication Dose Route Frequency Provider Last Rate Last Admin    [START ON 4/8/2024] epoetin peggy-epbx (RETACRIT) injection 10,000 Units  10,000 Units SubCUTAneous Once per day on Mon Wed Fri Sanam Berger MD        butalbital-acetaminophen-caffeine (FIORICET, ESGIC) per tablet 1 tablet  1 tablet Oral Q6H PRN Beth Hardy MD   1 tablet at 04/05/24 2352    heparin (porcine) injection 2,800 Units  2,800 Units IntraCATHeter PRN Sanam Berger MD   2,800 Units at 04/02/24 1430    0.9 % sodium chloride infusion   IntraVENous PRN Beth Hardy MD        carvedilol (COREG) tablet 12.5 mg  12.5 mg Oral BID with meals Keyon Geronimo MD   12.5 mg at 04/07/24 0830    ziprasidone (GEODON) injection 10 mg  10 mg IntraMUSCular Once Keyon Geronimo MD        dextrose 10 % infusion   IntraVENous Continuous PRN Pamela 
         Nephrology follow-up        Patient: Ana Rosa Bearden MRN: [id number]  SSN: [id number]    YOB: 1940  Age: 83 y.o.  Sex: female      Subjective:   The patient is seen in the room  She looks comfortable  Had IHD on 4/02   Off dialysis  Her temporary dialysis catheter was removed 4/06        Past Medical History:   Diagnosis Date    Acute respiratory distress     CHF (congestive heart failure) (HCC)     CKD (chronic kidney disease)     Diabetes mellitus (HCC)     GERD (gastroesophageal reflux disease)     Hypercholesterolemia     Hyperlipidemia     Hypertension     Migraine      Past Surgical History:   Procedure Laterality Date    HERNIA REPAIR      IR NONTUNNELED VASCULAR CATHETER  3/29/2024    IR NONTUNNELED VASCULAR CATHETER 3/29/2024 Ez Chowdhury MD [facility]    TUBAL LIGATION        Family History   Problem Relation Age of Onset    Hypertension Father      Social History     Tobacco Use    Smoking status: Never    Smokeless tobacco: Never   Substance Use Topics    Alcohol use: Not Currently      Current Facility-Administered Medications   Medication Dose Route Frequency Provider Last Rate Last Admin    epoetin pgegy-epbx (RETACRIT) injection 10,000 Units  10,000 Units SubCUTAneous Once per day on Mon Wed Fri Sanam Berger MD   10,000 Units at 04/08/24 1856    butalbital-acetaminophen-caffeine (FIORICET, ESGIC) per tablet 1 tablet  1 tablet Oral Q6H PRN Beth Hardy MD   1 tablet at 04/05/24 2352    heparin (porcine) injection 2,800 Units  2,800 Units IntraCATHeter PRN Sanam Berger MD   2,800 Units at 04/02/24 1430    0.9 % sodium chloride infusion   IntraVENous PRN Beth Hardy MD        carvedilol (COREG) tablet 12.5 mg  12.5 mg Oral BID with meals Keyon Geronimo MD   12.5 mg at 04/08/24 1746    ziprasidone (GEODON) injection 10 mg  10 mg IntraMUSCular Once Keyon Geronimo MD        dextrose 10 % infusion   IntraVENous Continuous PRN Amor Santiago MD        
         Nephrology follow-up        Patient: Ana Rosa Bearden MRN: [id number]  SSN: [id number]    YOB: 1940  Age: 83 y.o.  Sex: female      Subjective:   The patient is seen in ICU  She is awake and answering qs  Off CRRT   Off  pressors  Resolved metabolic acidosis  No lower extremity swelling  Improving hypothermia, no leukocytosis  NG in place  Will attempt IHD today     Past Medical History:   Diagnosis Date    Acute respiratory distress     CHF (congestive heart failure) (HCC)     CKD (chronic kidney disease)     Diabetes mellitus (HCC)     GERD (gastroesophageal reflux disease)     Hypercholesterolemia     Hyperlipidemia     Hypertension     Migraine      Past Surgical History:   Procedure Laterality Date    HERNIA REPAIR      IR NONTUNNELED VASCULAR CATHETER  3/29/2024    IR NONTUNNELED VASCULAR CATHETER 3/29/2024 Ez Chowdhury MD [facility]    TUBAL LIGATION        Family History   Problem Relation Age of Onset    Hypertension Father      Social History     Tobacco Use    Smoking status: Never    Smokeless tobacco: Never   Substance Use Topics    Alcohol use: Not Currently      Current Facility-Administered Medications   Medication Dose Route Frequency Provider Last Rate Last Admin    heparin (porcine) injection 2,800 Units  2,800 Units IntraCATHeter PRN Sanam Berger MD        midodrine (PROAMATINE) tablet 10 mg  10 mg Oral 3 times per day Beth Hardy MD   10 mg at 04/02/24 0544    0.9 % sodium chloride infusion   IntraVENous PRN Beth Hardy MD        epoetin peggy-epbx (RETACRIT) injection 10,000 Units  10,000 Units SubCUTAneous Once per day on Mon Wed Fri Sanam Berger MD        carvedilol (COREG) tablet 12.5 mg  12.5 mg Oral BID with meals Keyon Geronimo MD   12.5 mg at 04/02/24 0837    ziprasidone (GEODON) injection 10 mg  10 mg IntraMUSCular Once Keyon Geronimo MD        norepinephrine (LEVOPHED) 16 mg in sodium chloride 0.9 % 250 mL infusion  1-100 mcg/min 
         Nephrology follow-up        Patient: Ana Rosa Bearden MRN: [id number]  SSN: [id number]    YOB: 1940  Age: 83 y.o.  Sex: female      Subjective:   The patient is seen in ICU  She is awake and answering qs  Off CRRT since last night  Off  pressors  Resolved metabolic acidosis  No lower extremity swelling  Improving hypothermia, no leukocytosis  NG in place    Past Medical History:   Diagnosis Date    Acute respiratory distress     CHF (congestive heart failure) (HCC)     CKD (chronic kidney disease)     Diabetes mellitus (HCC)     GERD (gastroesophageal reflux disease)     Hypercholesterolemia     Hyperlipidemia     Hypertension     Migraine      Past Surgical History:   Procedure Laterality Date    HERNIA REPAIR      IR NONTUNNELED VASCULAR CATHETER  3/29/2024    IR NONTUNNELED VASCULAR CATHETER 3/29/2024 Ez Chowdhury MD [facility]    TUBAL LIGATION        Family History   Problem Relation Age of Onset    Hypertension Father      Social History     Tobacco Use    Smoking status: Never    Smokeless tobacco: Never   Substance Use Topics    Alcohol use: Not Currently      Current Facility-Administered Medications   Medication Dose Route Frequency Provider Last Rate Last Admin    midodrine (PROAMATINE) tablet 10 mg  10 mg Oral 3 times per day Beth Hardy MD   10 mg at 04/01/24 1006    0.9 % sodium chloride infusion   IntraVENous PRN Beth Hardy MD        epoetin peggy-epbx (RETACRIT) injection 10,000 Units  10,000 Units SubCUTAneous Once per day on Mon Wed Fri Sanam Berger MD        carvedilol (COREG) tablet 12.5 mg  12.5 mg Oral BID with meals Keyon Geronimo MD   12.5 mg at 04/01/24 0852    ziprasidone (GEODON) injection 10 mg  10 mg IntraMUSCular Once Keyon Geronimo MD        norepinephrine (LEVOPHED) 16 mg in sodium chloride 0.9 % 250 mL infusion  1-100 mcg/min IntraVENous Continuous Amor Santiago MD 1.9 mL/hr at 04/01/24 1141 2 mcg/min at 04/01/24 1141    dextrose 
         Nephrology follow-up        Patient: Ana Rosa Bearden MRN: [id number]  SSN: [id number]    YOB: 1940  Age: 83 y.o.  Sex: female      Subjective:   The patient is seen in the room  She looks comfortable  Had IHD on 4/02 and tolerated well  Removed 2.0 L   Her creatinine 1.93  Plan to hold off dialysis and remove hd cath in am       Past Medical History:   Diagnosis Date    Acute respiratory distress     CHF (congestive heart failure) (HCC)     CKD (chronic kidney disease)     Diabetes mellitus (HCC)     GERD (gastroesophageal reflux disease)     Hypercholesterolemia     Hyperlipidemia     Hypertension     Migraine      Past Surgical History:   Procedure Laterality Date    HERNIA REPAIR      IR NONTUNNELED VASCULAR CATHETER  3/29/2024    IR NONTUNNELED VASCULAR CATHETER 3/29/2024 Ez Chowdhury MD [facility]    TUBAL LIGATION        Family History   Problem Relation Age of Onset    Hypertension Father      Social History     Tobacco Use    Smoking status: Never    Smokeless tobacco: Never   Substance Use Topics    Alcohol use: Not Currently      Current Facility-Administered Medications   Medication Dose Route Frequency Provider Last Rate Last Admin    midodrine (PROAMATINE) tablet 5 mg  5 mg Oral TID WC Sanam Berger MD   5 mg at 04/04/24 1232    butalbital-acetaminophen-caffeine (FIORICET, ESGIC) per tablet 1 tablet  1 tablet Oral Q6H PRN Beth Hardy MD   1 tablet at 04/03/24 1121    heparin (porcine) injection 2,800 Units  2,800 Units IntraCATHeter PRN Sanam Breger MD   2,800 Units at 04/02/24 1430    epoetin peggy-epbx (RETACRIT) injection 10,000 Units  10,000 Units IntraVENous Once per day on Mon Wed Fri Sanam Berger MD   10,000 Units at 04/05/24 1735    0.9 % sodium chloride infusion   IntraVENous PRN Beth Hardy MD        carvedilol (COREG) tablet 12.5 mg  12.5 mg Oral BID with meals Keyon Geronimo MD   12.5 mg at 04/05/24 1734    ziprasidone (GEODON) injection 
         Nephrology follow-up        Patient: Ana Rosa Bearden MRN: [id number]  SSN: [id number]    YOB: 1940  Age: 83 y.o.  Sex: female      Subjective:   The patient is seen in ICU  She is awake and answering qs  On CRRT  Off  pressors  Resolved metabolic acidosis  No lower extremity swelling  Improving hypothermia, no leukocytosis  NG in place    Past Medical History:   Diagnosis Date    Acute respiratory distress     CHF (congestive heart failure) (HCC)     CKD (chronic kidney disease)     Diabetes mellitus (HCC)     GERD (gastroesophageal reflux disease)     Hypercholesterolemia     Hyperlipidemia     Hypertension     Migraine      Past Surgical History:   Procedure Laterality Date    HERNIA REPAIR      IR NONTUNNELED VASCULAR CATHETER  3/29/2024    IR NONTUNNELED VASCULAR CATHETER 3/29/2024 Ez Chowdhury MD [facility] IR    TUBAL LIGATION        Family History   Problem Relation Age of Onset    Hypertension Father      Social History     Tobacco Use    Smoking status: Never    Smokeless tobacco: Never   Substance Use Topics    Alcohol use: Not Currently      Current Facility-Administered Medications   Medication Dose Route Frequency Provider Last Rate Last Admin    prismaSol BGK 4/2.5 dialysis solution   Dialysis Continuous Sanam Berger MD 1,000 mL/hr at 03/31/24 0754 New Bag at 03/31/24 0754    prismaSol BGK 4/2.5 dialysis solution   Dialysis Continuous Sanam Berger MD 1,000 mL/hr at 03/31/24 0756 New Bag at 03/31/24 0756    vancomycin (VANCOCIN) 750 mg in sodium chloride 0.9 % 250 mL IVPB ([id number])  750 mg IntraVENous Q12H Beth Hardy  mL/hr at 03/31/24 1124 750 mg at 03/31/24 1124    norepinephrine (LEVOPHED) 16 mg in sodium chloride 0.9 % 250 mL infusion  1-100 mcg/min IntraVENous Continuous Amor Santiago MD   Stopped at 03/31/24 0300    ondansetron (ZOFRAN) injection 4 mg  4 mg IntraVENous Q6H PRN Amor Santiago MD   4 mg at 03/29/24 0134    dextrose bolus 10% 125 mL  
         Nephrology follow-up        Patient: Ana Rosa Bearden MRN: [id number]  SSN: [id number]    YOB: 1940  Age: 83 y.o.  Sex: female      Subjective:   The patient is seen in the room  She looks comfortable  Had IHD yesterday and tolerated well  Removed 2.0 L       Past Medical History:   Diagnosis Date    Acute respiratory distress     CHF (congestive heart failure) (HCC)     CKD (chronic kidney disease)     Diabetes mellitus (HCC)     GERD (gastroesophageal reflux disease)     Hypercholesterolemia     Hyperlipidemia     Hypertension     Migraine      Past Surgical History:   Procedure Laterality Date    HERNIA REPAIR      IR NONTUNNELED VASCULAR CATHETER  3/29/2024    IR NONTUNNELED VASCULAR CATHETER 3/29/2024 Ez Chowdhury MD [facility] IR    TUBAL LIGATION        Family History   Problem Relation Age of Onset    Hypertension Father      Social History     Tobacco Use    Smoking status: Never    Smokeless tobacco: Never   Substance Use Topics    Alcohol use: Not Currently      Current Facility-Administered Medications   Medication Dose Route Frequency Provider Last Rate Last Admin    butalbital-acetaminophen-caffeine (FIORICET, ESGIC) per tablet 1 tablet  1 tablet Oral Q6H PRN Beth Hardy MD   1 tablet at 04/03/24 1121    heparin (porcine) injection 2,800 Units  2,800 Units IntraCATHeter PRN Sanam Berger MD   2,800 Units at 04/02/24 1430    epoetin peggy-epbx (RETACRIT) injection 10,000 Units  10,000 Units IntraVENous Once per day on Mon Wed Fri Sanam Berger MD        meropenem (MERREM) 500 mg in sodium chloride 0.9 % 100 mL IVPB (mini-bag)  500 mg IntraVENous Q24H Beth Hardy MD   Stopped at 04/03/24 1316    0.9 % sodium chloride infusion   IntraVENous PRN Beth Hardy MD        carvedilol (COREG) tablet 12.5 mg  12.5 mg Oral BID with meals Keyon Geronimo MD   12.5 mg at 04/03/24 1800    ziprasidone (GEODON) injection 10 mg  10 mg IntraMUSCular Once Keyon Geronimo 
         Nephrology follow-up        Patient: Ana Rosa Bearden MRN: [id number]  SSN: [id number]    YOB: 1940  Age: 83 y.o.  Sex: female      Subjective:   The patient is seen in the room  She looks comfortable  Had IHD on 4/02 and tolerated well  Removed 2.0 L   Her creatinine is 2.18  Plan to hold off dialysis for now      Past Medical History:   Diagnosis Date    Acute respiratory distress     CHF (congestive heart failure) (HCC)     CKD (chronic kidney disease)     Diabetes mellitus (HCC)     GERD (gastroesophageal reflux disease)     Hypercholesterolemia     Hyperlipidemia     Hypertension     Migraine      Past Surgical History:   Procedure Laterality Date    HERNIA REPAIR      IR NONTUNNELED VASCULAR CATHETER  3/29/2024    IR NONTUNNELED VASCULAR CATHETER 3/29/2024 Ez Chowdhury MD [facility]GIO IR    TUBAL LIGATION        Family History   Problem Relation Age of Onset    Hypertension Father      Social History     Tobacco Use    Smoking status: Never    Smokeless tobacco: Never   Substance Use Topics    Alcohol use: Not Currently      Current Facility-Administered Medications   Medication Dose Route Frequency Provider Last Rate Last Admin    butalbital-acetaminophen-caffeine (FIORICET, ESGIC) per tablet 1 tablet  1 tablet Oral Q6H PRN Beth Hardy MD   1 tablet at 04/03/24 1121    heparin (porcine) injection 2,800 Units  2,800 Units IntraCATHeter PRN Sanam Berger MD   2,800 Units at 04/02/24 1430    epoetin peggy-epbx (RETACRIT) injection 10,000 Units  10,000 Units IntraVENous Once per day on Mon Wed Fri Sanam Berger MD        meropenem (MERREM) 500 mg in sodium chloride 0.9 % 100 mL IVPB (mini-bag)  500 mg IntraVENous Q24H Beth Hardy MD 33.3 mL/hr at 04/04/24 1008 500 mg at 04/04/24 1008    0.9 % sodium chloride infusion   IntraVENous PRN Beth Hardy MD        carvedilol (COREG) tablet 12.5 mg  12.5 mg Oral BID with meals Keyon Geronimo MD   12.5 mg at 04/04/24 0900 
 @ 8559  Placed a call to patient son to inform them that the patient will be discharging back to [facility]. Family member wanted to be notified. Nurse left a message /voicemail on family members service. Patient transportation will arrive around 6pm.   
 notified of K 3.1 and Mag 1.6.  Received orders to administer Mag 1g and 4 runs of 10 meq KCL.  Per MD, also change dialysate and replacement fluids to 4K/2.5Ca.  MD also notified of Bicarb 25 and Na 144.  Received order to d/c Bicarb gtt and change CRRT replacement and dialysate to standard bicarb concentrations.    
2008  TMP, Pressure Drop, and Filter pressures noted to be rising very quickly.  Filter is clogging.  Blood returned to patient.     2023  Spoke with  regarding clotted filter.  Per MD, restart circuit one more time.  Prime circuit with 5000 units of Heparin.  If it clots again, do not restart the second time.      2151  CRRT restarted.    0440   notified of K 3.5 and Phos 1.7.  Received order to replete with 20 mmol K Phos.  
4 Eyes Skin Assessment     NAME:  Ana Rosa Bearden  YOB: 1940  MEDICAL RECORD NUMBER:  [id number]    The patient is being assessed for  Transfer to New Unit    I agree that at least one RN has performed a thorough Head to Toe Skin Assessment on the patient. ALL assessment sites listed below have been assessed.      Areas assessed by both nurses:    Head, Face, Ears, Shoulders, Back, Chest, Arms, Elbows, Hands, Sacrum. Buttock, Coccyx, Ischium, Legs. Feet and Heels, Under Medical Devices , and Other ***        Does the Patient have a Wound? No noted wound(s)       Homar Prevention initiated by RN: Yes  Wound Care Orders initiated by RN: No    Pressure Injury (Stage 3,4, Unstageable, DTI, NWPT, and Complex wounds) if present, place Wound referral order by RN under : No    New Ostomies, if present place, Ostomy referral order under : No     Nurse 1 eSignature: Electronically signed by Vania Yu RN on 4/2/24 at 6:02 PM EDT    **SHARE this note so that the co-signing nurse can place an eSignature**    Nurse 2 eSignature: {Esignature:[id number]}   
4 Eyes Skin Assessment     NAME:  Ana Rosa Bearden  YOB: 1940  MEDICAL RECORD NUMBER:  [id number]    The patient is being assessed for  Other Weekly     I agree that at least one RN has performed a thorough Head to Toe Skin Assessment on the patient. ALL assessment sites listed below have been assessed.      Areas assessed by both nurses:    Head, Face, Ears, Shoulders, Back, Chest, Arms, Elbows, Hands, Sacrum. Buttock, Coccyx, Ischium, Legs. Feet and Heels, and Under Medical Devices         Does the Patient have a Wound? No noted wound(s)       No open wounds. Redness and rash to bilateral buttocks and groin areas.     Homar Prevention initiated by RN: Yes  Wound Care Orders initiated by RN: No    Pressure Injury (Stage 3,4, Unstageable, DTI, NWPT, and Complex wounds) if present, place Wound referral order by RN under : No    New Ostomies, if present place, Ostomy referral order under : No     Nurse 1 eSignature: Electronically signed by Aggie Mendenhall LPN on 4/3/24 at 4:56 AM EDT    **SHARE this note so that the co-signing nurse can place an eSignature**    Nurse 2 eSignature: Electronically signed by Mae Chopra RN on 4/3/24 at 5:11 AM EDT    
@1700 Nurse called report  to facility and there was no answer.   @2005 called [facility] to get an estimated time of arrival for the patient. Personnel stated that \"they were in the building, they would be right up\". [facility] anticipated time was @ 1800.    
Comprehensive Nutrition Assessment    Type and Reason for Visit:  Initial, NPO/Clear Liquid    Nutrition Recommendations/Plan:   Advance diet as medically feasible   When medically appropriate initiate Vital HP @ 20ml/h, advance by 10ml q4h to goal of 50ml/h + 70ml FWF q4h or per MD  At goal provides 1200kcal (96%), 105g pro (>100%), 1603ml fluid (100%)  Monitor TF intake and tolerance, labs, and GI symptoms      Malnutrition Assessment:  Malnutrition Status:  No malnutrition (03/31/24 2000)    Context:  Acute Illness     Findings of the 6 clinical characteristics of malnutrition:  Energy Intake:  Unable to assess  Weight Loss:  No significant weight loss     Body Fat Loss:  Unable to assess     Muscle Mass Loss:  Unable to assess    Fluid Accumulation:  No significant fluid accumulation     Strength:  Not Performed    Nutrition Assessment:    Pt admitted for AMS. Unable to get nutrition hx, no wt loss noted per EMR. Provided TF recs above due to NPO status. Pt weaned off pressors, on CRRT, will follow per protocol. Meds: insulin, lactulose, abx, protonix, zofran Labs: K 3.8, Mg 2.2, , phos 1.8    Nutrition Related Findings:    NFPE deferred, no dysphagia hx noted, no current N/V, pt with diarrhea. LBM 3/31. Generalized 1+ pitting edema Wound Type: None       Current Nutrition Intake & Therapies:    Average Meal Intake: NPO  Average Supplements Intake: NPO  Diet NPO  ADULT TUBE FEEDING; Nasogastric; Peptide Based High Protein; Continuous; 20; Yes; 10; Q 4 hours; 50; 100; Q 4 hours    Anthropometric Measures:  Height: 157.5 cm (5' 2\")  Ideal Body Weight (IBW): 110 lbs (50 kg)       Current Body Weight: 88.9 kg (195 lb 15.8 oz),   IBW. Weight Source: Bed Scale  Current BMI (kg/m2): 35.8  Usual Body Weight: 88.5 kg (195 lb)  % Weight Change (Calculated): 0.5  Weight Adjustment For: No Adjustment                 BMI Categories: Obese Class 2 (BMI 35.0 -39.9)    Estimated Daily Nutrient Needs:  Energy 
Comprehensive Nutrition Assessment    Type and Reason for Visit:  Reassess (Early Goal)    Nutrition Recommendations/Plan:   SBS/Mildly Thick Liq diet, advance per SLP rec's.  Ensure compact x2/d, magic cup x1/d.  Consider appetite stimulant.  Monitor and document PO and ONS intakes, BM in I/Os.     Malnutrition Assessment:  Malnutrition Status:  Mild malnutrition (04/02/24 1211)    Context:  Acute Illness       Nutrition Assessment:    Admitted for AMS. Unable to get nutrition hx, no wt loss noted per EMR. Provided TF recs above due to NPO status. Pt weaned off pressors, on CRRT, will follow per protocol. (4/2) Pt pleasant at visit; endorsed h/o poor intakes >1 mth d/t anorexia while at SNF. NGT feeds infusing at visit w/ flushes at 200 mL q4hrs. Noted plan for SLP eval today. RD to provide regimen if unable to advance to PO diet diet. Consider appropriateness of appetite stimulant when on PO diet for adequate intakes. Noted Pt off CRRT(3/29-3/31); will modify EENs. (4/5) RN endorsed improved intakes, PO intake ~50% of Breakfast tray today. Pt tray untouched at Lunch, asleep during visit and did not awake to RD calling name. Continue diet, RD to add ONS for adequate intakes. Labs: Cl 114, BUN 24, Cr 1.93, GFR 26, Ca 8.2, H/H 7.4/23.8. Meds: PPI, SSI, merrem, heparin, plavix, coreg, lipitor.    Nutrition Related Findings:    NFPE w/o acute findings, well nourished. No N/V/C reported by RN. NGT removed, SLP rec'd SBS/Mildly Thick Liq diet; ?edentulous. +D; NCB=893 mL x24 hrs per I/Os. Trace generalized edema. Wound Type: None       Current Nutrition Intake & Therapies:    Average Meal Intake: 51-75%  Average Supplements Intake: None Ordered  ADULT DIET; Dysphagia - Soft and Bite Sized; Mildly Thick (Nectar)    Anthropometric Measures:  Height: 157.5 cm (5' 2\")  Ideal Body Weight (IBW): 110 lbs (50 kg)       Current Body Weight: 86.9 kg (191 lb 9.3 oz) (4/5), 174.2 % IBW. Weight Source: Bed Scale  Current BMI (kg/m2): 
Comprehensive Nutrition Assessment    Type and Reason for Visit:  Reassess (Interim)    Nutrition Recommendations/Plan:   NPO, advance to PO diet per SLP rec's.    If unable to advance to PO diet, modify TF to Glucerna 1.5 Jhon(Diabetic) via NGT at goal rate of 40 mL/hr.  Flush with 120 mL H2O q4hrs.  Provides: 1440 kcal(81%), 79 gm protein(112%), 1450 mL H2O(82%).    Continue to monitor and document intakes, TF rate, flushes, tolerance, BM in I/Os.     Malnutrition Assessment:  Malnutrition Status:  Mild malnutrition (04/02/24 1211)    Context:  Acute Illness     Findings of the 6 clinical characteristics of malnutrition:  Energy Intake:  50% or less of estimated energy requirements for 5 or more days  Weight Loss:  No significant weight loss     Body Fat Loss:  No significant body fat loss     Muscle Mass Loss:  No significant muscle mass loss    Fluid Accumulation:  No significant fluid accumulation     Strength:  Not Performed    Nutrition Assessment:    Pt admitted for AMS. Unable to get nutrition hx, no wt loss noted per EMR. Provided TF recs above due to NPO status. Pt weaned off pressors, on CRRT, will follow per protocol. (4/2) Pt pleasant at visit; endorsed h/o poor intakes >1 mth d/t anorexia while at SNF(baseline of cracker/biscuit w/ ginger ale/juice x2/d). Pt denied offer of ONS nor appetite stimulant at SNF. Baseline of tolerating Regular/Thin Liq diet. NGT feeds infusing at visit w/ flushes at 200 mL q4hrs. Noted plan for SLP eval today. RD to provide regimen if unable to advance to PO diet diet. Consider appropriateness of appetite stimulant when on PO diet for adequate intakes. Noted Pt off CRRT(3/29-3/31); will modify EENs. Labs: Cl 112, BUN 24, Cr 2.33, GFR 21, Ca 8.1, T.Bili 1.1, H/H 7.8/24.6. Meds: PPI, midodrine, merrem, heparin, plavix, coreg.    Nutrition Related Findings:    NFPE w/o acute findings, well nourished. No N/V/C reported. NPO w/ NGT as sole nutrition source. SLP eval pending; 
Consult received, chart reviewed and spoke with nsg. Pt currently sleeping and nsg requests SLP to attempt assessment 4-2-2024.  Pt currently with NG tube. Will plan for assessment if patient able to participate 4-2-2024.   
Discharge paperwork complete. Print and send with patient when patient is ready to leave. Primary nurse aware.  
Flexi-seal accidentally removed when tech and nurse pulled patient up in bed. Assessed patient's rectum which show no signs of trauma. Pt express discomfort and was given tylenol. Patient also stated she does not want another flexi seal placed. Provider notified.  
General Daily Progress Note      Patient Name:   Ana Rosa Bearden       YOB: 1940       Age:  83 y.o.      Admit Date: 3/29/2024      Subjective:     HISTORY OF PRESENT ILLNESS:     General Daily Progress Note  Patient is a 83 y.o. year old female past medical history of hypertension type 2 diabetes chronic kidney disease congestive heart failure patient resting at the nursing home came to emergency room with altered mental status and vomiting  Came to the ER patient was hypotensive bradycardic and hypothermic  Sepsis alert was initiated central line was plain  Patient received 1-1/2 L of IV fluids in the ER received Maxipime and vancomycin in the ER started on sodium bicarb drip patient admitted to ICU     Initial blood work was done in the ER sodium 141 potassium 6.3 chloride 117 CO2 8 anion gap 16 BUN 85 creatinine 6.67 WBC 11.4 hemoglobin 9.4 hematocrit 32.5 and the platelets was 273 CT scan of the head negative chest x-ray was negative CT scan of the abdomen pelvis shows suspicious for acute pancreatitis    3/29    Nephrology - IR to place temporary dialysis catheter, CRRT orders in [facility].      Morning labs: Potassium 5.8, CO2 7, BUN 81, Creatinine 6.74, GFR 6, Lactic acid 9.8, glucose 184, Nt-pro BNP 12,673 down from yesterday at 14,309, ammonia 170, H/H 9.5/31.7, pH = 7.2, anion gap 22     CXR - Congestive failure with interstitial pulmonary edema and patchy  perihilar alveolar edema versus pneumonic infiltrates.    ID consult - septic shock with AMS.     3/30     Arterial line placed for continuous blood pressure monitoring     3/31   IM consult - Patient is responsive being treated for sepsis, Globin 7.1  Patient was seen during CRRT    ID - plan to discontinue Vancomycin, continue Meropenem, follow up on blood and sputum cultures,  repeat procal and check CRP     4/1      Patient on 2L nasal cannula.   On Levophed for hypotension  NG tube in place    Morning labs: Creatinine 1.45 from 1.36, 
General Daily Progress Note      Patient Name:   Ana Rosa Bearden       YOB: 1940       Age:  83 y.o.      Admit Date: 3/29/2024      Subjective:     HISTORY OF PRESENT ILLNESS:     General Daily Progress Note  Patient is a 83 y.o. year old female past medical history of hypertension type 2 diabetes chronic kidney disease congestive heart failure patient resting at the nursing home came to emergency room with altered mental status and vomiting  Came to the ER patient was hypotensive bradycardic and hypothermic  Sepsis alert was initiated central line was plain  Patient received 1-1/2 L of IV fluids in the ER received Maxipime and vancomycin in the ER started on sodium bicarb drip patient admitted to ICU     Initial blood work was done in the ER sodium 141 potassium 6.3 chloride 117 CO2 8 anion gap 16 BUN 85 creatinine 6.67 WBC 11.4 hemoglobin 9.4 hematocrit 32.5 and the platelets was 273 CT scan of the head negative chest x-ray was negative CT scan of the abdomen pelvis shows suspicious for acute pancreatitis    3/29    Nephrology - IR to place temporary dialysis catheter, CRRT orders in [facility]     Morning labs: Potassium 5.8, CO2 7, BUN 81, Creatinine 6.74, GFR 6, Lactic acid 9.8, glucose 184, Nt-pro BNP 12,673 down from yesterday at 14,309, ammonia 170, H/H 9.5/31.7, pH = 7.2, anion gap 22     CXR - Congestive failure with interstitial pulmonary edema and patchy  perihilar alveolar edema versus pneumonic infiltrates.        Objective:     Vitals:    03/29/24 0830   BP:    Pulse: 75   Resp: 20   Temp: (!) 95.3 °F (35.2 °C)   SpO2: 98%        Recent Results (from the past 24 hour(s))   Magnesium    Collection Time: 03/29/24  1:42 AM   Result Value Ref Range    Magnesium 1.3 (L) 1.6 - 2.4 mg/dL   Lipase    Collection Time: 03/29/24  1:42 AM   Result Value Ref Range    Lipase 36 13 - 75 U/L   Troponin    Collection Time: 03/29/24  1:42 AM   Result Value Ref Range    Troponin, High Sensitivity 15 0 - 51 
General Daily Progress Note      Patient Name:   Ana Rosa Bearden       YOB: 1940       Age:  83 y.o.      Admit Date: 3/29/2024      Subjective:     HISTORY OF PRESENT ILLNESS:     General Daily Progress Note  Patient is a 83 y.o. year old female past medical history of hypertension type 2 diabetes chronic kidney disease congestive heart failure patient resting at the nursing home came to emergency room with altered mental status and vomiting  Came to the ER patient was hypotensive bradycardic and hypothermic  Sepsis alert was initiated central line was plain  Patient received 1-1/2 L of IV fluids in the ER received Maxipime and vancomycin in the ER started on sodium bicarb drip patient admitted to ICU     Initial blood work was done in the ER sodium 141 potassium 6.3 chloride 117 CO2 8 anion gap 16 BUN 85 creatinine 6.67 WBC 11.4 hemoglobin 9.4 hematocrit 32.5 and the platelets was 273 CT scan of the head negative chest x-ray was negative CT scan of the abdomen pelvis shows suspicious for acute pancreatitis    3/29    Nephrology - IR to place temporary dialysis catheter, CRRT orders in [facility].      Morning labs: Potassium 5.8, CO2 7, BUN 81, Creatinine 6.74, GFR 6, Lactic acid 9.8, glucose 184, Nt-pro BNP 12,673 down from yesterday at 14,309, ammonia 170, H/H 9.5/31.7, pH = 7.2, anion gap 22     CXR - Congestive failure with interstitial pulmonary edema and patchy  perihilar alveolar edema versus pneumonic infiltrates.    ID consult - septic shock with AMS.     3/30     Arterial line placed for continuous blood pressure monitoring     3/31   IM consult - Patient is responsive being treated for sepsis, Globin 7.1  Patient was seen during CRRT    ID - plan to discontinue Vancomycin, continue Meropenem, follow up on blood and sputum cultures,  repeat procal and check CRP     4/1      Patient on 2L nasal cannula.   On Levophed for hypotension  NG tube in place    Morning labs: Creatinine 1.45 from 1.36, 
General Daily Progress Note      Patient Name:   Ana Rosa Bearden       YOB: 1940       Age:  83 y.o.      Admit Date: 3/29/2024      Subjective:     HISTORY OF PRESENT ILLNESS:     General Daily Progress Note  Patient is a 83 y.o. year old female past medical history of hypertension type 2 diabetes chronic kidney disease congestive heart failure patient resting at the nursing home came to emergency room with altered mental status and vomiting  Came to the ER patient was hypotensive bradycardic and hypothermic  Sepsis alert was initiated central line was plain  Patient received 1-1/2 L of IV fluids in the ER received Maxipime and vancomycin in the ER started on sodium bicarb drip patient admitted to ICU     Initial blood work was done in the ER sodium 141 potassium 6.3 chloride 117 CO2 8 anion gap 16 BUN 85 creatinine 6.67 WBC 11.4 hemoglobin 9.4 hematocrit 32.5 and the platelets was 273 CT scan of the head negative chest x-ray was negative CT scan of the abdomen pelvis shows suspicious for acute pancreatitis    3/29    Nephrology - IR to place temporary dialysis catheter, CRRT orders in [facility].      Morning labs: Potassium 5.8, CO2 7, BUN 81, Creatinine 6.74, GFR 6, Lactic acid 9.8, glucose 184, Nt-pro BNP 12,673 down from yesterday at 14,309, ammonia 170, H/H 9.5/31.7, pH = 7.2, anion gap 22     CXR - Congestive failure with interstitial pulmonary edema and patchy  perihilar alveolar edema versus pneumonic infiltrates.    ID consult - septic shock with AMS.     3/30     Arterial line placed for continuous blood pressure monitoring     3/31   IM consult - Patient is responsive being treated for sepsis, Globin 7.1  Patient was seen during CRRT    ID - plan to discontinue Vancomycin, continue Meropenem, follow up on blood and sputum cultures,  repeat procal and check CRP     4/1      Patient on 2L nasal cannula.   On Levophed for hypotension  NG tube in place    Morning labs: Creatinine 1.45 from 1.36, 
I have received nephrology consultation  I have spoken with ICU nurse  Labs reviewed  CASIE on underlying CKD 4  Severe metabolic acidosis (on metformin at the nursing home)  Severe hyperkalemia  Not voiding yet  Negative bladder scan  On 2 pressors  Received IV sodium bicarbonate 250 mEq  On bicarb drip  No hypoxia  Repeat BMP is pending    I have spoken with the son Mr Ko in detail by phone  Plan to initiate on CRRT  IR to place a temporary dialysis catheter  CRRT orders in epic  I have communicated with ICU nurse  
Labs reviewed  Na 149  K 4.8  CO2 12  Cr 5.88  Mg 1.5    Iv NaHCO3 100 meq push  On HCO3 drip  On high HCO3 CRRT dialysate and replacement fluids  No UF (keep net positive balance)  Iv Mg 1 gm   Spoke with icu nurse    Repeat labs  Na 149  K 4.0  CO2 18 (improving)  Cr 4.86  
Labs sent for pt, values drastically different than last lab draw. Resent tube for confirmation of accurate values.   
Meropenem Extended-Infusion Dosing/Monitoring  Current regimen:  1 g every 12 hours    Recent Labs     03/31/24  2119 04/01/24  0600 04/02/24  0230   CREATININE 1.36* 1.45*  1.43* 2.33*  2.29*   BUN 11 12  12 24*  24*       Estimated CrCl:  19 mL/min; HD prn    Plan: Change to meropenem 0.5g IV over 180 minutes every 24 hours per [facility] P&T Committee Protocol with respect to extended-infusion ?-lactam antibiotics. Pharmacy will continue to monitor patient daily and will make dosage adjustments based upon changing renal function.  
OT eval order received and acknowledged. OT eval attempted at 11:03 AM however pt reporting 10/10 headache and requesting therapy return after she receives pain medication. Will continue to follow patient and attempt OT eval at a later time. Thank you.   
PT treatment attempted, patient requested to wait until her headache is better.We will try later.  
PT treatment session attempted at 1056, however pt declined tx session due to feeling nauseous. Will continue to check on pt and see them for treatment session at a later time. Thank you.   
Progress Note  Date:3/31/2024       [facility]  Patient Name:Ana Rosa Bearden     YOB: 1940     Age:83 y.o.        Subjective    Subjective  Patient followed by Dr. Campbell for septic shock with possible pancreatitis, started on Vancomycin and Meropenem (penicillin allergy).  Blood cultures, urine cultures and sputum cultures ordered.  She is hypothermic with now normal WBC.  Procal was mildly elevated.  Blood cultures negative so far.  Urine culture grew Klebsiella pneumoniae.   Patient is somnolent and unresponsive.          Objective         Vitals Last 24 Hours:  TEMPERATURE:  Temp  Av.8 °F (34.3 °C)  Min: 91.6 °F (33.1 °C)  Max: 97.3 °F (36.3 °C)  RESPIRATIONS RANGE: Resp  Av.3  Min: 16  Max: 28  PULSE OXIMETRY RANGE: SpO2  Av.7 %  Min: 94 %  Max: 100 %  PULSE RANGE: Pulse  Av.8  Min: 89  Max: 125  BLOOD PRESSURE RANGE: Systolic (24hrs), Av , Min:106 , Max:138   ; Diastolic (24hrs), Av, Min:59, Max:114         Objective:  General Appearance:  In no acute distress and ill-appearing.    Vital signs: (most recent): Blood pressure (!) 128/114, pulse (!) 112, temperature (!) 93 °F (33.9 °C), temperature source Rectal, resp. rate 22, height 1.575 m (5' 2\"), weight 88.9 kg (195 lb 15.8 oz), SpO2 98 %.  (Hypothermic).    Output: Producing urine (Dennis catheter) and producing stool (Flexiseal).    HEENT: (Eyes closed, nasal O2 cannula, NG tube)    Lungs:  Normal effort.  There are rhonchi.  No rales.    Heart: Normal rate.  Regular rhythm.  No murmur.   Chest: Symmetric chest wall expansion.   Abdomen: Abdomen is soft and non-distended.  Hypoactive bowel sounds.   There is no abdominal tenderness.     Extremities: There is no dependent edema.    Neurological: (somnolent).    Skin:  Warm.  No rash.     Labs/Imaging/Diagnostics    Labs:  CBC:  Recent Labs     24  0558 24  0220 24  0300   WBC 13.8* 9.7 8.3   RBC 3.31* 2.52* 2.50*   HGB 9.5* 7.1* 7.1*   HCT 31.7* 
Pt d/c to [place] via [facility] ambulance at 2050, VS within normal. Discharge documents given to ambulance staff. Report given to [place] RN on duty.   
Spiritual Care Assessment/Progress Note  [facility]    Name: Ana Rosa Bearden MRN: [id number]    Age: 83 y.o.     Sex: female   Language: English     Date: 4/6/2024            Total Time Calculated: 25 min              Spiritual Assessment begun in [facility] MEDICAL ONCOLOGY  Service Provided For:: Patient  Referral/Consult From:: Rounding  Encounter Overview/Reason : Initial Encounter    Spiritual beliefs:      [x] Involved in a sunday tradition/spiritual practice: Episcopalian sunday     [] Supported by a sunday community:      [] Claims no spiritual orientation:      [] Seeking spiritual identity:           [] Adheres to an individual form of spirituality:      [] Not able to assess:                Identified resources for coping and support system:   Support System: Children       [] Prayer                  [x] Devotional reading               [] Music                  [] Guided Imagery     [] Pet visits                                        [] Other: (COMMENT)     Specific area/focus of visit   Encounter:    Crisis:    Spiritual/Emotional needs: Type: Spiritual Support  Ritual, Rites and Sacraments:    Grief, Loss, and Adjustments:    Ethics/Mediation:    Behavioral Health:    Palliative Care:    Advance Care Planning:      Plan/Referrals: Other (Comment) ( is available if needed)    Narrative: SA- Rounding visit in [facility]. The patient was present during the visit.  assessed the patient's spiritual/emotional needs, and also explored the patient's support system and coping mechanism. The patient was coping positively (e.g., sunday) with receiving a support from her children. The patient shared about her grateful heart toward her children for their support. She shared about she appreciated and liked when her children visited and read the Bible for her.  continued to provide a supportive presence with an active listening.  empowered the patient by the life review: the 
Vancomycin Dosing Consult  Ana Rosa Bearden is a 83 y.o. female with sepsis. Pharmacy was consulted by Dr. Hardy to dose and monitor vancomycin. Today is day 1.    Antibiotic regimen: Vancomycin + meropenem    Temp (24hrs), Av.2 °F (32.9 °C), Min:86.4 °F (30.2 °C), Max:95.3 °F (35.2 °C)    Recent Labs     24  0145 24  0545 24  0558 24  1055 24  1431   WBC 11.4*  --  13.8*  --   --    CREATININE 6.67* 6.74*  --  6.57* 5.88*   BUN 85* 81*  --  81* 70*     Est CrCl: 7 mL/min; CRRT started 3/29 1324  Concomitant nephrotoxic drugs: Vasopressors    Cultures:   3/29 blood x 2: pending  3/29 urine: pending    MRSA Swab: Not detected 3/29    Target range: Trough 15-20 mcg/mL (Continuous Renal Replacement Therapy)      Assessment/Plan:   Patient hypothermic and in septic shock.   CRRT started 3/29 ~1330  Vancomycin 2000 mg x 1 administered 3/29 0247  Random level planned for 3/30 with AM labs.   Antimicrobial stop date  per consult     Mihaela Lino, PharmD, BCPS, BCCCP  Clinical Pharmacist   [phone]    
Vancomycin Dosing Consult  Ana Rosa Bearden is a 83 y.o. female with sepsis. Pharmacy was consulted by Dr. Hardy to dose and monitor vancomycin. Today is day 1.    Antibiotic regimen: Vancomycin + meropenem    Temp (24hrs), Av.7 °F (33.2 °C), Min:86.4 °F (30.2 °C), Max:94.1 °F (34.5 °C)    Recent Labs     24  0145 24  0545 24  0558 24  1055 24  0155 24  0220 24  0950   WBC 11.4*  --  13.8*  --   --  9.7  --    CREATININE 6.67*   < >  --    < > 3.54* 3.44* 2.63*   BUN 85*   < >  --    < > 40* 39* 29*    < > = values in this interval not displayed.     Est CrCl: 7 mL/min; CRRT started 3/29 1324  Concomitant nephrotoxic drugs: Vasopressors    Cultures:   3/29 blood x 2: NGTD, prelim  3/29 urine: NGTD, prelim    MRSA Swab: Not detected 3/29    Target range: Trough 15-20 mcg/mL (Continuous Renal Replacement Therapy)    Recent level history:  Date/Time Dose & Interval Measured Level (mcg/mL) Associated AUC/ODALYS   3/30 0220 2000mg x1 16.3         Assessment/Plan:   Patient hypothermic and in septic shock.   CRRT started 3/29 ~1330  Vancomycin level is ready to re-dose.  Start Vancomycin 750mg IV q12h  Random level planned for 3/31 at 1000.   Antimicrobial stop date  per consult         
Vancomycin Dosing Consult  Ana Rosa Bearden is a 83 y.o. female with sepsis. Pharmacy was consulted by Dr. Hardy to dose and monitor vancomycin. Today is day 3.    Antibiotic regimen: Vancomycin + meropenem    Temp (24hrs), Av.8 °F (34.3 °C), Min:91.6 °F (33.1 °C), Max:97.3 °F (36.3 °C)    Recent Labs     24  0558 24  1055 24  0220 24  0950 24  2305 24  0300 24  1033   WBC 13.8*  --  9.7  --   --  8.3  --    CREATININE  --    < > 3.44*   < > 2.16* 1.79* 1.53*   BUN  --    < > 39*   < > 19 17 13    < > = values in this interval not displayed.     Est CrCl: 7 mL/min; CRRT started 3/29 1324  Concomitant nephrotoxic drugs: Vasopressors    Cultures:   3/29 blood x 2: NGTD, prelim  3/29 urine: NGTD, prelim    MRSA Swab: Not detected 3/29    Target range: Trough 15-20 mcg/mL (Continuous Renal Replacement Therapy)    Recent level history:  Date/Time Dose & Interval Measured Level (mcg/mL) Associated AUC/ODALYS   3/30 0220 2000mg x1 16.3    3/31 1033 750mg q12h 18.0         Assessment/Plan:   Patient hypothermic and in septic shock.   CRRT started 3/29 ~1330.  Scr is improving.  Vancomycin level is at target.  Continue the same regimen.  Random level planned for  at 1000.   Antimicrobial stop date  per consult           
    PLAN :  Patient continues to benefit from skilled intervention to address functional impairments. Continue treatment per established plan of care to address goals.    Recommendation for discharge: (in order for the patient to meet his/her long term goals)  Skilled Nursing Facility    IF patient discharges home will need the following DME:none     SUBJECTIVE:   Patient stated “I will work with you but I am tired.”    OBJECTIVE DATA SUMMARY:   Critical Behavior:  Orientation  Overall Orientation Status: Within Normal Limits  Cognition  Overall Cognitive Status: WNL    Functional Mobility Training:  Bed Mobility:  Bed Mobility Training  Supine to Sit: Minimum assistance  Sit to Supine: Supervision  Scooting: Supervision  Transfers:  Transfer Training  Sit to Stand: Contact-guard assistance  Stand to Sit: Contact-guard assistance  Balance:     Wheelchair Mobility:     Ambulation/Gait Training:                                Gait  Gait Training: Yes  Overall Level of Assistance: Minimum assistance  Distance (ft): 18 Feet  Assistive Device: Walker, rolling                     Therapeutic Exercises:       EXERCISE   Sets   Reps   Active Active Assist   Passive Self ROM   Comments   Ankle Pumps 1 30 [x] [] [] []    Quad Sets/Glut Sets   [] [] [] []    Hamstring Sets   [] [] [] []    Short Arc Quads   [] [] [] []    Heel Slides   [] [] [] []    Straight Leg Raises   [] [] [] []    Hip abd/add   [] [] [] []    Long Arc Quads 1 20 [x] [] [] []    Marching   [] [] [] []    Seated HR/TR   [] [] [] []       [] [] [] []       Pain Ratin/10 reported  Pain Intervention(s):   pain is at a level acceptable to the patient    Activity Tolerance:   Good    After treatment patient left in no apparent distress:   Bed locked and returned to lowest position, Call bell within reach, and nsg updated     COMMUNICATION/COLLABORATION:   The patient’s plan of care was discussed with: Registered nurse    Patient Education  Education Given 
5-40 mL, 5-40 mL, IntraVENous, PRN  0.9 % sodium chloride infusion, , IntraVENous, PRN  heparin (porcine) injection 5,000 Units, 5,000 Units, SubCUTAneous, 3 times per day  ondansetron (ZOFRAN-ODT) disintegrating tablet 4 mg, 4 mg, Oral, Q8H PRN **OR** ondansetron (ZOFRAN) injection 4 mg, 4 mg, IntraVENous, Q6H PRN  polyethylene glycol (GLYCOLAX) packet 17 g, 17 g, Oral, Daily PRN  acetaminophen (TYLENOL) tablet 650 mg, 650 mg, Oral, Q6H PRN **OR** acetaminophen (TYLENOL) suppository 650 mg, 650 mg, Rectal, Q6H PRN  meropenem (MERREM) 1,000 mg in sodium chloride 0.9 % 100 mL IVPB (mini-bag), 1,000 mg, IntraVENous, Q12H  atorvastatin (LIPITOR) tablet 40 mg, 40 mg, Oral, Nightly  clopidogrel (PLAVIX) tablet 75 mg, 75 mg, Oral, Daily  pantoprazole (PROTONIX) tablet 40 mg, 40 mg, Oral, QAM AC  lactulose (CHRONULAC) 10 GM/15ML solution 20 g, 20 g, Oral, Q4H  0.9 % sodium chloride *CRRT Pre-blood pump*, , Dialysis, Continuous  vancomycin (VANCOCIN) intermittent dosing (placeholder), 1 each, Other, RX Placeholder  insulin lispro (HUMALOG) injection vial 0-4 Units, 0-4 Units, SubCUTAneous, Q6H  miconazole (MICOTIN) 2 % powder, , Topical, BID  Physical    VITALS:  /69   Pulse (!) 118   Temp 97.7 °F (36.5 °C) (Esophageal)   Resp 20   Ht 1.575 m (5' 2\")   Wt 88.9 kg (195 lb 15.8 oz)   SpO2 96%   BMI 35.85 kg/m²   BLOOD PRESSURE RANGE:  Systolic (24hrs), Av , Min:110 , Max:138   ; Diastolic (24hrs), Av, Min:59, Max:114  CONSTITUTIONAL: Obtunded  EYES:  Lids and lashes normal, pupils equal, round and reactive to light, extra ocular muscles intact, sclera clear, conjunctiva normal  LUNGS:  No increased work of breathing, good air exchange, clear to auscultation bilaterally, no crackles or wheezing  CARDIOVASCULAR:  Normal apical impulse, regular rate and rhythm, normal S1 and S2, no S3 or S4, and no murmur noted  ABDOMEN:  No scars, normal bowel sounds, soft, non-distended, non-tender, no masses palpated, no 
    Past Medical History:   Diagnosis Date    Acute respiratory distress     CHF (congestive heart failure) (HCC)     CKD (chronic kidney disease)     Diabetes mellitus (HCC)     GERD (gastroesophageal reflux disease)     Hypercholesterolemia     Hyperlipidemia     Hypertension     Migraine      Past Surgical History:   Procedure Laterality Date    HERNIA REPAIR      IR NONTUNNELED VASCULAR CATHETER  3/29/2024    IR NONTUNNELED VASCULAR CATHETER 3/29/2024 Ez Chowdhury MD [facility] ANGIO IR    TUBAL LIGATION          Expanded or extensive additional review of patient history:   Lives With: Other (comment) ([facility])  Type of Home: Facility ([facility])  Home Layout: One level  Home Access: Level entry                    Home Equipment: Rollator  Has the patient had two or more falls in the past year or any fall with injury in the past year?:  (one fall prior to admission)  Social/Functional History  Lives With: Other (comment) ([facility])  Type of Home: Facility ([facility])  Home Layout: One level  Home Access: Level entry  Home Equipment: Rollator  Has the patient had two or more falls in the past year or any fall with injury in the past year?:  (one fall prior to admission)  Receives Help From: Other (comment) (facility staff)  ADL Assistance: Independent  Ambulation Assistance: Needs assistance ([place] with rollator)  Transfer Assistance: Independent      EXAMINATION OF PERFORMANCE DEFICITS:    Cognitive/Behavioral Status:  Orientation  Orientation Level: Oriented X4  Cognition  Following Commands: Follows one step commands with repetition;Follows one step commands with increased time  Insights: Decreased awareness of deficits  Initiation: Requires cues for some  Sequencing: Requires cues for some    Skin: bruise noted near R chest and R lower upper arm      Range of Motion:   AROM: Generally decreased, functional       Strength:  Strength: Generally decreased, functional      Tone & Sensation:      Sensation: 
03/30/2024 02:20 AM    HCT 22.0 03/30/2024 02:20 AM    MCV 87.3 03/30/2024 02:20 AM    MCH 28.2 03/30/2024 02:20 AM    MCHC 32.3 03/30/2024 02:20 AM    RDW 16.6 03/30/2024 02:20 AM     03/30/2024 02:20 AM    MPV 11.0 03/30/2024 02:20 AM     CMP:    Lab Results   Component Value Date/Time     03/30/2024 03:00 PM    K 3.9 03/30/2024 03:00 PM     03/30/2024 03:00 PM    CO2 28 03/30/2024 03:00 PM    BUN 23 03/30/2024 03:00 PM    CREATININE 2.36 03/30/2024 03:00 PM    GFRAA 50 08/11/2022 03:01 AM    AGRATIO 1.0 03/30/2024 02:20 AM    AGRATIO 0.7 08/08/2022 12:41 AM    LABGLOM 20 03/30/2024 03:00 PM    GLUCOSE 162 03/30/2024 03:00 PM    PROT 6.3 03/30/2024 02:20 AM    LABALBU 3.4 03/30/2024 03:00 PM    CALCIUM 7.8 03/30/2024 03:00 PM    BILITOT 0.9 03/30/2024 02:20 AM    ALKPHOS 86 03/30/2024 02:20 AM    ALKPHOS 84 08/08/2022 12:41 AM    AST 21 03/30/2024 02:20 AM    ALT 20 03/30/2024 02:20 AM       ASSESSMENT AND PLAN      Principal Problem:    Shock (HCC)  Plan: On IV antibiotics ID following  Active Problems:    CASIE (acute kidney injury) (HCC)  Plan: Nephrology following      Prognosis is guarded  
3/29/2024  EXAM: CT ABDOMEN PELVIS WO CONTRAST INDICATION: abdominal tenderness, vomiting, shock COMPARISON: CT      Peripancreatic inflammatory changes suspicious for acute pancreatitis with no identified complications. Incidentals as above including colonic diverticulosis.    CT HEAD WO CONTRAST    Result Date: 3/29/2024       No acute intracranial hemorrhage, mass or infarct. No significant change in medial left occipital encephalomalacia or an pattern of atrophy and chronic white matter change most compatible with small vessel ischemic and/or senescent change. Intracranial atherosclerosis.         Assessment//Plan           Hospital Problems             Last Modified POA    * (Principal) Shock ([facility]) 3/29/2024 Yes    CASIE (acute kidney injury) ([facility]) 3/29/2024 Yes     1. Septic shock with hypothermia, hypotension requiring vasopressors, leukocytosis, elevated lactic acid and procal  2. Possible complicated UTI (pelvicaliectasis) with marked pyuria, urine culture pending  3. Possible aspiration pneumonia, RLL  4. Peripancreatic inflammation, significance unclear, lipase normal  5. Acute on chronic renal failure  6. Metabolic encephalopathy with altered mental status  7. Penicillin allergy    Comment:  WBC now normal.    Continue IV Vancomycin and Meropenem  Follow-up blood and urine cultures  Awaiting sputum culture  In am, repeat CBC, lactic acid, procal, check CRP        Electronically signed by Mu Weaver MD      
packet 17 g  17 g Oral Daily PRN Beth Hardy MD        acetaminophen (TYLENOL) tablet 650 mg  650 mg Oral Q6H PRN Beth Hardy MD        Or    acetaminophen (TYLENOL) suppository 650 mg  650 mg Rectal Q6H PRN Beth Hardy MD        meropenem (MERREM) 1,000 mg in sodium chloride 0.9 % 100 mL IVPB (mini-bag)  1,000 mg IntraVENous Q12H Beth Hardy MD   Stopped at 03/30/24 1104    atorvastatin (LIPITOR) tablet 40 mg  40 mg Oral Nightly Beth Hardy MD   40 mg at 03/29/24 2038    clopidogrel (PLAVIX) tablet 75 mg  75 mg Oral Daily Beth Hardy MD   75 mg at 03/30/24 0804    pantoprazole (PROTONIX) tablet 40 mg  40 mg Oral QAM AC Beth Hardy MD   40 mg at 03/30/24 0804    lactulose (CHRONULAC) 10 GM/15ML solution 20 g  20 g Oral Q4H Beth Hardy MD   20 g at 03/30/24 1207    0.9 % sodium chloride *CRRT Pre-blood pump*   Dialysis Continuous Sanam Berger  mL/hr at 03/30/24 0414 New Bag at 03/30/24 0414    vancomycin (VANCOCIN) intermittent dosing (placeholder)  1 each Other RX Placeholder Beth Hardy MD        insulin lispro (HUMALOG) injection vial 0-4 Units  0-4 Units SubCUTAneous Q6H Beth Hardy MD        miconazole (MICOTIN) 2 % powder   Topical BID Beth Hardy MD   Given at 03/30/24 0805        Allergies   Allergen Reactions    Iodine Swelling    Hyucy-Unovj-Zgwoyge-Pramoxine     Penicillins Rash and Swelling     Airway swelling       Review of Systems:  Unable to obtain.    Objective:     Vitals:    03/30/24 0900 03/30/24 1000 03/30/24 1100 03/30/24 1200   BP: 135/75 127/71 122/71 126/83   Pulse: (!) 104 (!) 104 93 100   Resp: 24 20 15 15   Temp:   (!) 92.2 °F (33.4 °C)    TempSrc:   Esophageal    SpO2: 96% 96% 96% 97%   Weight:       Height:            Physical Exam:  General: Confused  Eyes: sclera anicteric  Oral Cavity: No thrush or ulcers  Neck: no JVD  Chest: Fair bilateral air entry  Heart: normal sounds  Abdomen: soft and non tender   :  
to obtain.    Objective:     Vitals:    04/06/24 0013 04/06/24 0356 04/06/24 0815 04/06/24 1109   BP: (!) 169/79 (!) 166/91 (!) 156/84 (!) 142/67   Pulse: 78 80 78 73   Resp: 20 20 18    Temp: 98.6 °F (37 °C) 98.1 °F (36.7 °C) 98.1 °F (36.7 °C) 97.5 °F (36.4 °C)   TempSrc: Oral Oral Oral    SpO2: 99% 99% 100% 100%   Weight:       Height:            Physical Exam:  General: Alert and not in apparent distress  Eyes: sclera anicteric  Oral Cavity: No thrush or ulcers  Neck: no JVD  Chest: Fair bilateral air entry  Heart: normal sounds  Abdomen: soft and non tender   :  no barrera  Lower Extremities: no edema  Skin: no rash  Neuro: Intact  Psychiatric: Cooperative        Assessment/Plan:     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  Resolving ATN and creatinine has improved to 1.5 today  Plan to remove HD catheter today  Okay to discharge from renal standpoint  Follow-up with me in my office in 2 weeks      Hypertension  Noticed normal to elevated blood pressures  On carvedilol 12.5 mg twice a day  Discontinued midodrine.    Septic shock  Resolved and completed antibiotics  ID is on board  CT of the abdomen no acute abnormality apart from peripancreatic inflammation      Acute metabolic encephalopathy  From uremia and sepsis  CT head negative  Clinically looks better  At her baseline mental status    DVT prophylaxis  Agreed with unfractionated subcu heparin    Hypernatremia  Free water deficit  Water flushes via NG  resolved    Hypophosphatemia  Phosphorous 1.7-->3.8  Received IV K-Phos 20 mmol x 1 dose    Anemia:  Hb 6.8  Iron sats 34, Ferritin 188  Epo iv with HD   S/p Blood Tx 1 unit  HD 8.7    Plan:       Signed By: Sanam Berger MD     April 6, 2024       
    COMMUNICATION/EDUCATION:   The patient’s plan of care was discussed with: Registered nurse    Patient Education  Education Given To: Patient  Education Provided: Role of Therapy;Plan of Care  Education Method: Verbal;Demonstration  Barriers to Learning: None  Education Outcome: Verbalized understanding;Demonstrated understanding;Continued education needed    Thank you for this referral.  GAYE Brown  Minutes: 32    
isolated from urine Cx (03/29), external barrera in place         Remains afebrile w a normal WBC on routine labs         On day # 6 of meropenem (PCN allergy)         Routine labs in the morning     2. Acute hypoxia, due to CHF exacerbation, suspected aspiration       Clear lungs on exam, maintaining O2 sats on 2L       Continue on empiric Meropenem for now          3.  Acute on chronic renal failure w severe metabolic acidosis/hyperkalemia       S/p CRRT, Cr staying stable on serial labs      4.  AMS due to metabolic encephalopathy CT head on 3/29 was negative for acute abnormality       Mentation improved from admission, follows some commands     Kelli Campbell MD    4/3/2024      
urine Cx (03/29), external barrera in place         Afebrile w a normal WBC on routine labs         On day # 7 of meropenem, not anticipating d/c on IV antibiotics         F/u labs ordered for AM     2. Acute hypoxia, due to CHF exacerbation, suspected aspiration       Clear lungs on exam, maintaining O2 sats on 2L       Continue on empiric Meropenem for now          3.  Acute on chronic renal failure w severe metabolic acidosis on admission        Improved after CRRT. Cr trending up on serial labs         4.  AMS due to metabolic encephalopathy CT head on 3/29 was negative for acute abnormality       Mentation at baseline, following commands appropriately     Kelli Campbell MD    4/4/2024      
mg/dL    Albumin 2.7 (L) 3.5 - 5.0 g/dL   CBC    Collection Time: 04/07/24  7:44 AM   Result Value Ref Range    WBC 5.8 3.6 - 11.0 K/uL    RBC 2.90 (L) 3.80 - 5.20 M/uL    Hemoglobin 8.3 (L) 11.5 - 16.0 g/dL    Hematocrit 27.3 (L) 35.0 - 47.0 %    MCV 94.1 80.0 - 99.0 FL    MCH 28.6 26.0 - 34.0 PG    MCHC 30.4 30.0 - 36.5 g/dL    RDW 16.8 (H) 11.5 - 14.5 %    Platelets 149 (L) 150 - 400 K/uL    MPV 10.8 8.9 - 12.9 FL    Nucleated RBCs 0.0 0.0  WBC    nRBC 0.00 0.00 - 0.01 K/uL   POCT Glucose    Collection Time: 04/07/24  7:53 AM   Result Value Ref Range    POC Glucose 97 65 - 100 mg/dL    Performed by: CIPRIANO GAINES    POCT Glucose    Collection Time: 04/07/24 11:01 AM   Result Value Ref Range    POC Glucose 181 (H) 65 - 100 mg/dL    Performed by: CIPRIANO GAINES        Results       Procedure Component Value Units Date/Time    Culture, Urine [[id number]] Collected: 03/29/24 2020    Order Status: Completed Specimen: Urine Updated: 03/31/24 0852     Special Requests --        No Special Requests  Reflexed from [id number]       Culture No Growth (<1000 cfu/mL)       Culture, Respiratory [[id number]] Collected: 03/29/24 0545    Order Status: Canceled Specimen: Sputum Aspirated     Culture, Blood 1 [[id number]]     Order Status: Canceled Specimen: Blood     Culture, Blood 2 [[id number]]     Order Status: Canceled Specimen: Blood     MRSA by PCR [[id number]] Collected: 03/29/24 0530    Order Status: Completed Specimen: Swab Updated: 03/29/24 1122     MRSA by PCR Not Detected       Rapid influenza A/B antigens [[id number]] Collected: 03/29/24 0142    Order Status: Completed Specimen: Nasal Washing Updated: 03/29/24 0247     Influenza A Ag Negative        Influenza B Ag Negative       COVID-19, Rapid [[id number]] Collected: 03/29/24 0142    Order Status: Completed Specimen: Nasopharyngeal Updated: 03/29/24 0247     SARS-CoV-2, Rapid Not Detected        Comment: Rapid Abbott ID Now   The specimen is NEGATIVE for SARS-CoV2, 
Albumin 2.4 (L) 3.5 - 5.0 g/dL   CBC    Collection Time: 04/03/24  5:36 AM   Result Value Ref Range    WBC 5.7 3.6 - 11.0 K/uL    RBC 2.81 (L) 3.80 - 5.20 M/uL    Hemoglobin 8.0 (L) 11.5 - 16.0 g/dL    Hematocrit 24.9 (L) 35.0 - 47.0 %    MCV 88.6 80.0 - 99.0 FL    MCH 28.5 26.0 - 34.0 PG    MCHC 32.1 30.0 - 36.5 g/dL    RDW 15.7 (H) 11.5 - 14.5 %    Platelets 143 (L) 150 - 400 K/uL    MPV 10.5 8.9 - 12.9 FL    Nucleated RBCs 0.4 (H) 0.0  WBC    nRBC 0.02 (H) 0.00 - 0.01 K/uL   POCT Glucose    Collection Time: 04/03/24  5:50 AM   Result Value Ref Range    POC Glucose 105 (H) 65 - 100 mg/dL    Performed by: Abdirashid Marcial    POCT Glucose    Collection Time: 04/03/24  7:36 AM   Result Value Ref Range    POC Glucose 108 (H) 65 - 100 mg/dL    Performed by: Henrry Alegria        Results       Procedure Component Value Units Date/Time    Culture, Urine [[id number]] Collected: 03/29/24 2020    Order Status: Completed Specimen: Urine Updated: 03/31/24 0852     Special Requests --        No Special Requests  Reflexed from [id number]       Culture No Growth (<1000 cfu/mL)       Culture, Respiratory [[id number]] Collected: 03/29/24 0545    Order Status: Canceled Specimen: Sputum Aspirated     Culture, Blood 1 [[id number]]     Order Status: Canceled Specimen: Blood     Culture, Blood 2 [[id number]]     Order Status: Canceled Specimen: Blood     MRSA by PCR [[id number]] Collected: 03/29/24 0530    Order Status: Completed Specimen: Swab Updated: 03/29/24 1122     MRSA by PCR Not Detected       Rapid influenza A/B antigens [[id number]] Collected: 03/29/24 0142    Order Status: Completed Specimen: Nasal Washing Updated: 03/29/24 0247     Influenza A Ag Negative        Influenza B Ag Negative       COVID-19, Rapid [[id number]] Collected: 03/29/24 0142    Order Status: Completed Specimen: Nasopharyngeal Updated: 03/29/24 0247     SARS-CoV-2, Rapid Not Detected        Comment: Rapid Abbott ID Now   The specimen is NEGATIVE 
COVID-19.   This test has been authorized by the FDA under an Emergency Use Authorization (EUA) for use by authorized laboratories.   Fact sheet for Healthcare Providers:  https://www.fda.gov/media/786586/download Fact sheet for Patients: https://www.fda.gov/media/127481/download   Methodology: Isothermal Nucleic Acid Amplification       Blood Culture 1 [[id number]] Collected: 03/29/24 0142    Order Status: Completed Specimen: Blood Updated: 04/05/24 1516     Special Requests --        Right  White port  Central       Culture No growth 7 days       Blood Culture 2 [[id number]] Collected: 03/29/24 0142    Order Status: Completed Specimen: Blood Updated: 04/05/24 1516     Special Requests No Special Requests        Culture No growth 7 days       Culture, Urine [[id number]]  (Abnormal)  (Susceptibility) Collected: 03/29/24 0142    Order Status: Completed Specimen: Urine Updated: 03/31/24 0851     Special Requests No Special Requests        [place] count 14,000        [place] count colonies/ml        Culture Klebsiella pneumoniae       Susceptibility        Klebsiella pneumoniae      BACTERIAL SUSCEPTIBILITY PANEL ODALYS      amikacin <=2 ug/mL Sensitive      ampicillin 16 ug/mL Resistant      ampicillin-sulbactam 4 ug/mL Sensitive      ceFAZolin <=4 ug/mL Sensitive      cefepime <=1 ug/mL Sensitive      cefOXitin <=4 ug/mL Sensitive      cefTAZidime <=1 ug/mL Sensitive      cefTRIAXone <=1 ug/mL Sensitive      ciprofloxacin <=0.25 ug/mL Sensitive      gentamicin <=1 ug/mL Sensitive      levofloxacin <=0.12 ug/mL Sensitive      meropenem <=0.25 ug/mL Sensitive      nitrofurantoin 32 ug/mL Sensitive      piperacillin-tazobactam <=4 ug/mL Sensitive      tobramycin <=1 ug/mL Sensitive      trimethoprim-sulfamethoxazole <=20 ug/mL Sensitive                                    XR CHEST 1 VIEW    Result Date: 3/29/2024  Congestive failure with interstitial pulmonary edema and patchy perihilar alveolar edema versus pneumonic 
https://www.fda.gov/media/231284/download Fact sheet for Patients: https://www.fda.gov/media/937000/download   Methodology: Isothermal Nucleic Acid Amplification       Blood Culture 1 [[id number]] Collected: 03/29/24 0142    Order Status: Completed Specimen: Blood Updated: 04/01/24 0857     Special Requests --        Right  White port  Central       Culture No growth 3 days       Blood Culture 2 [[id number]] Collected: 03/29/24 0142    Order Status: Completed Specimen: Blood Updated: 04/01/24 0857     Special Requests No Special Requests        Culture No growth 3 days       Culture, Urine [[id number]]  (Abnormal)  (Susceptibility) Collected: 03/29/24 0142    Order Status: Completed Specimen: Urine Updated: 03/31/24 0851     Special Requests No Special Requests        [place] count 14,000        [place] count colonies/ml        Culture Klebsiella pneumoniae       Susceptibility        Klebsiella pneumoniae      BACTERIAL SUSCEPTIBILITY PANEL ODALYS      amikacin <=2 ug/mL Sensitive      ampicillin 16 ug/mL Resistant      ampicillin-sulbactam 4 ug/mL Sensitive      ceFAZolin <=4 ug/mL Sensitive      cefepime <=1 ug/mL Sensitive      cefOXitin <=4 ug/mL Sensitive      cefTAZidime <=1 ug/mL Sensitive      cefTRIAXone <=1 ug/mL Sensitive      ciprofloxacin <=0.25 ug/mL Sensitive      gentamicin <=1 ug/mL Sensitive      levofloxacin <=0.12 ug/mL Sensitive      meropenem <=0.25 ug/mL Sensitive      nitrofurantoin 32 ug/mL Sensitive      piperacillin-tazobactam <=4 ug/mL Sensitive      tobramycin <=1 ug/mL Sensitive      trimethoprim-sulfamethoxazole <=20 ug/mL Sensitive                                    XR CHEST 1 VIEW    Result Date: 3/29/2024  Congestive failure with interstitial pulmonary edema and patchy perihilar alveolar edema versus pneumonic infiltrates.    CT ABDOMEN PELVIS WO CONTRAST Additional Contrast? None    Result Date: 3/29/2024  Peripancreatic inflammatory changes suspicious for acute pancreatitis with no 
Date: 3/29/2024  No acute intracranial hemorrhage, mass or infarct. No significant change in medial left occipital encephalomalacia or an pattern of atrophy and chronic white matter change most compatible with small vessel ischemic and/or senescent change. Intracranial atherosclerosis.    XR CHEST PORTABLE    Result Date: 3/29/2024  Congestive failure with interstitial pulmonary edema status post aortic valve replacement.         Labs:     Recent Labs     04/04/24  0658 04/05/24  0704   WBC 6.3 5.4   HGB 7.9* 7.4*   HCT 25.1* 23.8*   * 115*       Recent Labs     04/03/24  0536 04/04/24  0658 04/05/24  0704    139 144  144   K 3.5 3.6 3.8  3.7   * 110* 114*  114*   CO2 26 28 26  27   BUN 20 25* 24*  24*   CREATININE 1.58* 2.18* 1.93*  1.84*   GLUCOSE 102* 105* 106*  105*   CALCIUM 7.9* 8.3* 8.2*  8.2*   PHOS 2.4* 2.9 3.0       Recent Labs     04/03/24  0536 04/04/24  0658 04/05/24  0704   AST  --  18 23   ALT  --  16 14   ALKPHOS  --  87 81   BILITOT  --  0.6 0.6   LABALBU 2.4* 2.5* 2.4*  2.4*   GLOB  --  3.6 3.2       No results for input(s): \"INR\", \"PROTIME\", \"APTT\" in the last 72 hours.   No results for input(s): \"IRON\", \"TIBC\", \"FERRITIN\" in the last 72 hours.    Invalid input(s): \"PSAT\"     No results found for: \"MTHGULYE56\", \"FOLATE\", \"RBCF\"   No results for input(s): \"PH\", \"PCO2\", \"PO2\" in the last 72 hours.  No results for input(s): \"CKMB\", \"CKMBINDEX\", \"TROPHS\" in the last 72 hours.    Invalid input(s): \"TOTALCK\", \"TROPININ\"    No results found for: \"CHOL\", \"TRIG\", \"HDL\", \"LDLCHOLESTEROL\", \"LDLCALC\", \"VLDL\", \"CHOLHDLRATIO\"  Lab Results   Component Value Date/Time    POCGLU 122 04/05/2024 11:59 AM    POCGLU 109 04/05/2024 07:25 AM    POCGLU 98 04/05/2024 05:12 AM    POCGLU 132 04/05/2024 12:05 AM    POCGLU 118 04/04/2024 06:27 PM     Lab Results   Component Value Date/Time    COLORU [place] 03/29/2024 08:20 PM    GLUCOSEU Negative 03/29/2024 08:20 PM    BILIRUBINUR Negative 
injection 2,800 Units, 2,800 Units, IntraCATHeter, PRN, Sanam Berger MD, 2,800 Units at 04/02/24 1430    epoetin peggy-epbx (RETACRIT) injection 10,000 Units, 10,000 Units, IntraVENous, Once per day on Mon Wed Fri, Sanam Berger MD    meropenem (MERREM) 500 mg in sodium chloride 0.9 % 100 mL IVPB (mini-bag), 500 mg, IntraVENous, Q24H, Beth Hardy MD, Last Rate: 33.3 mL/hr at 04/04/24 1008, 500 mg at 04/04/24 1008    0.9 % sodium chloride infusion, , IntraVENous, PRN, Beth Hardy MD    carvedilol (COREG) tablet 12.5 mg, 12.5 mg, Oral, BID with meals, Keyon Geronimo MD, 12.5 mg at 04/04/24 0900    ziprasidone (GEODON) injection 10 mg, 10 mg, IntraMUSCular, Once, Keyon Geronimo MD    dextrose 10 % infusion, , IntraVENous, Continuous PRN, Amor Santiago MD    glucose chewable tablet 16 g, 4 tablet, Oral, PRN, Beth Hardy MD    dextrose bolus 10% 125 mL, 125 mL, IntraVENous, PRN **OR** dextrose bolus 10% 250 mL, 250 mL, IntraVENous, PRN, Beth Hardy MD    glucagon injection 1 mg, 1 mg, SubCUTAneous, PRN, Beth Hardy MD    dextrose 10 % infusion, , IntraVENous, Continuous PRN, Beth Hardy MD    sodium chloride flush 0.9 % injection 5-40 mL, 5-40 mL, IntraVENous, 2 times per day, Beth Hardy MD, 10 mL at 04/04/24 1008    sodium chloride flush 0.9 % injection 5-40 mL, 5-40 mL, IntraVENous, PRN, Beth Hardy MD    0.9 % sodium chloride infusion, , IntraVENous, PRN, Beth Hardy MD    heparin (porcine) injection 5,000 Units, 5,000 Units, SubCUTAneous, 3 times per day, Beth Hardy MD, 5,000 Units at 04/04/24 0634    ondansetron (ZOFRAN-ODT) disintegrating tablet 4 mg, 4 mg, Oral, Q8H PRN **OR** ondansetron (ZOFRAN) injection 4 mg, 4 mg, IntraVENous, Q6H PRN, Beth Hardy MD, 4 mg at 04/03/24 1121    polyethylene glycol (GLYCOLAX) packet 17 g, 17 g, Oral, Daily PRN, Beth Hardy MD    acetaminophen (TYLENOL) tablet 650 mg, 650 mg, Oral, Q6H PRN **OR** 
acetaminophen (TYLENOL) suppository 650 mg, 650 mg, Rectal, Q6H PRN, Beth Hardy MD    atorvastatin (LIPITOR) tablet 40 mg, 40 mg, Oral, Nightly, Beth Hardy MD, 40 mg at 04/04/24 2144    clopidogrel (PLAVIX) tablet 75 mg, 75 mg, Oral, Daily, Beth Hardy MD, 75 mg at 04/04/24 1007    pantoprazole (PROTONIX) tablet 40 mg, 40 mg, Oral, QAM AC, Beth Hardy MD, 40 mg at 04/05/24 0509    insulin lispro (HUMALOG) injection vial 0-4 Units, 0-4 Units, SubCUTAneous, Q6H, Beth Hardy MD    miconazole (MICOTIN) 2 % powder, , Topical, BID, Beth Hardy MD, Given at 04/04/24 8109               
03/29/2024 08:20 PM    NITRU Negative 03/29/2024 08:20 PM    LEUKOCYTESUR Large 03/29/2024 08:20 PM         Assessment:       Acute metabolic acidosis  Acute kidney injury  Septic Shock  Possible complicated UTI  Possible Aspiration pneumonia   Anemia   Metabolic encephalopathy  Hyperkalemia  History of chronic kidney disease  CHF  Sepsis with hypotension  Type 2 diabetes  History of hypertension  Hypothermia  Acute pancreatitis  Elevated ammonia level           Plan:     Packed RBCs     CRRT was discontinued yesterday    Continue to follow up on blood cultures  Sputum culture still pending    Atorvastatin 40 mg po QHS   Carvedilol 12.5 mg po BID with meals   Clopidogrel 75 mg po QD   Retacrit injection 10,000 units subQ MWF  Heparin 5,000 units subQ q8 hrs   Sliding Scale Insulin   Lactulose 20 g oral q4 hrs  Meropenem 1,000 mg IV @33.3 mL/hr   Midodrine 10 mg 3 times a day  Miconazole 2% powder topical BID on groin   Protonix 40 mg po QD before breakfast   Vancomycin 750 mg IV @250 mL/hr      Will transfuse 1 unit packed RBC      Current Facility-Administered Medications:     epoetin peggy-epbx (RETACRIT) injection 10,000 Units, 10,000 Units, SubCUTAneous, Once per day on Mon Wed Fri, Sanam Berger MD    Vancomycin - Please draw a random level on 4/1 at 1000.  Thanks!, , Other, Once, Beth Hardy MD    carvedilol (COREG) tablet 12.5 mg, 12.5 mg, Oral, BID with meals, Keyon Geronimo MD    ziprasidone (GEODON) injection 10 mg, 10 mg, IntraMUSCular, Once, Keyon Geronimo MD    prismaSol BGK 4/2.5 dialysis solution, , Dialysis, Continuous, Sanam Berger MD, Last Rate: 1,000 mL/hr at 03/31/24 2338, New Bag at 03/31/24 2338    prismaSol BGK 4/2.5 dialysis solution, , Dialysis, Continuous, Sanam Berger MD, Last Rate: 1,000 mL/hr at 03/31/24 2338, New Bag at 03/31/24 2338    vancomycin (VANCOCIN) 750 mg in sodium chloride 0.9 % 250 mL IVPB (Livt8Ths), 750 mg, IntraVENous, Q12H, Beth Hardy MD, Stopped

## 2024-04-09 NOTE — PLAN OF CARE
PHYSICAL THERAPY TREATMENT     Patient: Ana Rosa Bearden (83 y.o. female)  Date: 4/8/2024  Diagnosis: Hyperkalemia [E87.5]  Hypomagnesemia [E83.42]  Delirium [R41.0]  Shock (HCC) [R57.9]  Acute pulmonary edema (HCC) [J81.0]  Metabolic acidosis [E87.20]  Hypothermia, initial encounter [T68.XXXA]  Septic shock (HCC) [A41.9, R65.21]  Acute renal failure, unspecified acute renal failure type (HCC) [N17.9]  Acute on chronic congestive heart failure, unspecified heart failure type (HCC) [I50.9]  CASIE (acute kidney injury) (HCC) [N17.9] Shock (HCC)      Precautions: Fall Risk, Bed Alarm                      Recommendations for nursing mobility: Out of bed to chair for meals, Encourage HEP in prep for ADLs/mobility; see handout for details, AD and gt belt for bed to chair , Amb to bathroom with AD and gait belt, and Assist x1    In place during session: External Catheter and EKG/telemetry   Chart, physical therapy assessment, plan of care and goals were reviewed.  ASSESSMENT  Patient continues with skilled PT services and is progressing towards goals. Pt sitting on toilet in bathroom upon PT arrival, agreeable to session. (See below for objective details and assist levels).     Overall pt tolerated session fair today. Pt transferred off of toilet sba. Pt performed seated therex with no complaints of pain or discomfort. Pt ambulated ~25 ft with RW with a slow and slightly unsteady gait. Pt demonstrated two small lob that pt was able to self correct. Pt required min A for LE management when laying back down in bed. Pt left supine in bed with all needs met. Will continue to benefit from skilled PT services, and will continue to progress as tolerated. Potential barriers for safe discharge:. Current PT DC recommendation Skilled Nursing Facility once medically appropriate.    GOALS:  Problem: Physical Therapy - Adult  Goal: By Discharge: Performs mobility at highest level of function for planned discharge setting.  See 
  Problem: Discharge Planning  Goal: Discharge to home or other facility with appropriate resources  4/1/2024 0532 by Anita Quiros RN  Outcome: Progressing  4/1/2024 0530 by Anita Quiros RN  Outcome: Progressing     Problem: Safety - Adult  Goal: Free from fall injury  4/1/2024 0737 by Carmen Watson RN  Outcome: Progressing  4/1/2024 0532 by Anita Quiros RN  Outcome: Progressing  4/1/2024 0530 by Anita Quiros RN  Outcome: Progressing     Problem: Chronic Conditions and Co-morbidities  Goal: Patient's chronic conditions and co-morbidity symptoms are monitored and maintained or improved  4/1/2024 0532 by Anita Quiros RN  Outcome: Progressing  4/1/2024 0530 by Anita Quiros RN  Outcome: Progressing     Problem: Neurosensory - Adult  Goal: Achieves stable or improved neurological status  4/1/2024 0532 by Anita Quiros RN  Outcome: Progressing  4/1/2024 0530 by Anita Quiros RN  Outcome: Progressing  Goal: Absence of seizures  4/1/2024 0532 by Anita Quiros RN  Outcome: Progressing  4/1/2024 0530 by Anita Quiros RN  Outcome: Progressing  Goal: Remains free of injury related to seizures activity  4/1/2024 0532 by Anita Quiros RN  Outcome: Progressing  4/1/2024 0530 by Anita Quiros RN  Outcome: Progressing  Goal: Achieves maximal functionality and self care  4/1/2024 0532 by Anita Quiros RN  Outcome: Progressing  4/1/2024 0530 by Anita Quiros RN  Outcome: Progressing     Problem: Respiratory - Adult  Goal: Achieves optimal ventilation and oxygenation  4/1/2024 0532 by Anita Quiros RN  Outcome: Progressing  4/1/2024 0530 by Anita Quiros RN  Outcome: Progressing     Problem: Cardiovascular - Adult  Goal: Maintains optimal cardiac output and hemodynamic stability  4/1/2024 0532 by Anita Quiros RN  Outcome: Progressing  4/1/2024 0530 by Anita Quiros RN  Outcome: Progressing  Flowsheets (Taken 3/31/2024 1930)  Maintains optimal cardiac output and hemodynamic stability:   Monitor blood pressure 
  Problem: Discharge Planning  Goal: Discharge to home or other facility with appropriate resources  Recent Flowsheet Documentation  Taken 3/30/2024 2000 by Iris Ambrose RN  Discharge to home or other facility with appropriate resources: Identify barriers to discharge with patient and caregiver     Problem: Safety - Adult  Goal: Free from fall injury  Recent Flowsheet Documentation  Taken 3/30/2024 2000 by Iris Ambrose RN  Free From Fall Injury:   Instruct family/caregiver on patient safety   Based on caregiver fall risk screen, instruct family/caregiver to ask for assistance with transferring infant if caregiver noted to have fall risk factors     Problem: Chronic Conditions and Co-morbidities  Goal: Patient's chronic conditions and co-morbidity symptoms are monitored and maintained or improved  Recent Flowsheet Documentation  Taken 3/30/2024 2000 by Iris Ambrose RN  Care Plan - Patient's Chronic Conditions and Co-Morbidity Symptoms are Monitored and Maintained or Improved:   Monitor and assess patient's chronic conditions and comorbid symptoms for stability, deterioration, or improvement   Collaborate with multidisciplinary team to address chronic and comorbid conditions and prevent exacerbation or deterioration   Update acute care plan with appropriate goals if chronic or comorbid symptoms are exacerbated and prevent overall improvement and discharge     Problem: Neurosensory - Adult  Goal: Achieves stable or improved neurological status  Recent Flowsheet Documentation  Taken 3/30/2024 2000 by Iris Ambrose RN  Achieves stable or improved neurological status:   Assess for and report changes in neurological status   Maintain blood pressure and fluid volume within ordered parameters to optimize cerebral perfusion and minimize risk of hemorrhage   Monitor temperature, glucose, and sodium. Initiate appropriate interventions as ordered  Goal: Absence of seizures  Recent Flowsheet Documentation  Taken 
  Problem: Neurosensory - Adult  Goal: Achieves stable or improved neurological status  Recent Flowsheet Documentation  Taken 3/29/2024 2000 by Iris Ambrose RN  Achieves stable or improved neurological status:   Assess for and report changes in neurological status   Maintain blood pressure and fluid volume within ordered parameters to optimize cerebral perfusion and minimize risk of hemorrhage   Monitor temperature, glucose, and sodium. Initiate appropriate interventions as ordered  3/29/2024 1947 by Po Gilliam RN  Outcome: Not Progressing  Flowsheets (Taken 3/29/2024 0800)  Achieves stable or improved neurological status:   Assess for and report changes in neurological status   Monitor temperature, glucose, and sodium. Initiate appropriate interventions as ordered   Maintain blood pressure and fluid volume within ordered parameters to optimize cerebral perfusion and minimize risk of hemorrhage  Goal: Achieves maximal functionality and self care  Recent Flowsheet Documentation  Taken 3/29/2024 2000 by Iris Ambrose RN  Achieves maximal functionality and self care: Monitor swallowing and airway patency with patient fatigue and changes in neurological status  3/29/2024 1947 by Po Gilliam RN  Outcome: Not Progressing     Problem: Respiratory - Adult  Goal: Achieves optimal ventilation and oxygenation  Recent Flowsheet Documentation  Taken 3/29/2024 2000 by Iris Ambrose RN  Achieves optimal ventilation and oxygenation:   Assess for changes in respiratory status   Assess for changes in mentation and behavior   Position to facilitate oxygenation and minimize respiratory effort   Oxygen supplementation based on oxygen saturation or arterial blood gases   Encourage broncho-pulmonary hygiene including cough, deep breathe, incentive spirometry   Assess and instruct to report shortness of breath or any respiratory difficulty   Respiratory therapy support as indicated   Assess the need for suctioning and 
  Problem: Physical Therapy - Adult  Goal: By Discharge: Performs mobility at highest level of function for planned discharge setting.  See evaluation for individualized goals.  Description: FUNCTIONAL STATUS PRIOR TO ADMISSION: Patient was modified independent using a rollator for functional mobility.    HOME SUPPORT PRIOR TO ADMISSION: pt LTC resident at [facility]    Physical Therapy Goals  Initiated 4/2/2024  Pt stated goal: to walk  Pt will be I with LE HEP in 7 days.  Pt will perform bed mobility with Modified [place] in 7 days.  Pt will demonstrate improvement in sitting balance from fair to good in 7 days.     4/8/2024 1530 by Daija Carballo PTA  Outcome: Progressing     Problem: Occupational Therapy - Adult  Goal: By Discharge: Performs self-care activities at highest level of function for planned discharge setting.  See evaluation for individualized goals.  Description: FUNCTIONAL STATUS PRIOR TO ADMISSION:  pt reports she utilized rollator and IND w/ ADLs; 1 fall     HOME SUPPORT: pt is a LTC resident     Occupational Therapy Goals:  Initiated 4/4/2024  Patient/Family stated goal: I want to get better  Patient will perform grooming in standing with IND within 7 day(s).  Patient will perform UB bathing with IND within 7 day(s).  Patient will perform LB bathing with IND within 7 day(s).  Patient will perform toilet transfers with mod I  within 7 day(s).  Patient will perform all aspects of toileting with IND within 7 day(s).  Patient will participate in upper extremity therapeutic exercise/activities with INDwithin 7 day(s).    4/8/2024 0912 by Carol Razo OTA  Outcome: Progressing     
  Problem: Respiratory - Adult  Goal: Achieves optimal ventilation and oxygenation  Outcome: Progressing     Problem: Skin/Tissue Integrity - Adult  Goal: Skin integrity remains intact  Outcome: Progressing  Goal: Incisions, wounds, or drain sites healing without S/S of infection  Outcome: Progressing  Goal: Oral mucous membranes remain intact  Outcome: Progressing     Problem: Nutrition Deficit:  Goal: Optimize nutritional status  Recent Flowsheet Documentation  Taken 4/5/2024 1402 by Carissa Mendoza RD  Nutrient intake appropriate for improving, restoring, or maintaining nutritional needs:   Assess nutritional status and recommend course of action   Monitor oral intake, labs, and treatment plans   Recommend appropriate diets, oral nutritional supplements, and vitamin/mineral supplements     Problem: Nutrition Deficit:  Goal: Optimize nutritional status  Outcome: Progressing  Flowsheets (Taken 4/5/2024 1402 by Carissa Mendoza RD)  Nutrient intake appropriate for improving, restoring, or maintaining nutritional needs:   Assess nutritional status and recommend course of action   Monitor oral intake, labs, and treatment plans   Recommend appropriate diets, oral nutritional supplements, and vitamin/mineral supplements     
  Problem: Safety - Adult  Goal: Free from fall injury  4/3/2024 2232 by Jenny Gaviria, RN  Outcome: Progressing  4/3/2024 2036 by Maxime Davey RN  Outcome: Progressing     Problem: Skin/Tissue Integrity - Adult  Goal: Skin integrity remains intact  4/3/2024 2232 by Jenny Gaviria, RN  Outcome: Progressing  4/3/2024 2036 by Maxime Davey, RN  Outcome: Progressing  Flowsheets (Taken 4/3/2024 1915 by Jenny Gaviria, RN)  Skin Integrity Remains Intact: Monitor for areas of redness and/or skin breakdown     
  Problem: Safety - Adult  Goal: Free from fall injury  Outcome: Progressing  Flowsheets (Taken 3/30/2024 2000 by Iris Ambrose RN)  Free From Fall Injury:   Instruct family/caregiver on patient safety   Based on caregiver fall risk screen, instruct family/caregiver to ask for assistance with transferring infant if caregiver noted to have fall risk factors     Problem: Chronic Conditions and Co-morbidities  Goal: Patient's chronic conditions and co-morbidity symptoms are monitored and maintained or improved  Outcome: Progressing  Flowsheets (Taken 3/30/2024 2000 by Iris Ambrose RN)  Care Plan - Patient's Chronic Conditions and Co-Morbidity Symptoms are Monitored and Maintained or Improved:   Monitor and assess patient's chronic conditions and comorbid symptoms for stability, deterioration, or improvement   Collaborate with multidisciplinary team to address chronic and comorbid conditions and prevent exacerbation or deterioration   Update acute care plan with appropriate goals if chronic or comorbid symptoms are exacerbated and prevent overall improvement and discharge     Problem: Neurosensory - Adult  Goal: Achieves stable or improved neurological status  Outcome: Progressing  Flowsheets (Taken 3/30/2024 2000 by Iris Ambrose RN)  Achieves stable or improved neurological status:   Assess for and report changes in neurological status   Maintain blood pressure and fluid volume within ordered parameters to optimize cerebral perfusion and minimize risk of hemorrhage   Monitor temperature, glucose, and sodium. Initiate appropriate interventions as ordered     Problem: Respiratory - Adult  Goal: Achieves optimal ventilation and oxygenation  4/2/2024 0410 by Amy Mejia, RN  Outcome: Progressing  Flowsheets (Taken 3/30/2024 2000 by Iris Ambrose RN)  Achieves optimal ventilation and oxygenation:   Assess for changes in respiratory status   Assess for changes in mentation and behavior   Position to 
PHYSICAL THERAPY EVALUATION  Patient: Ana Rosa Bearden (83 y.o. female)  Date: 4/2/2024  Primary Diagnosis: Hyperkalemia [E87.5]  Hypomagnesemia [E83.42]  Delirium [R41.0]  Shock (HCC) [R57.9]  Acute pulmonary edema (HCC) [J81.0]  Metabolic acidosis [E87.20]  Hypothermia, initial encounter [T68.XXXA]  Septic shock (HCC) [A41.9, R65.21]  Acute renal failure, unspecified acute renal failure type (HCC) [N17.9]  Acute on chronic congestive heart failure, unspecified heart failure type (HCC) [I50.9]  CASIE (acute kidney injury) (HCC) [N17.9]       Precautions: Fall Risk, Bed Alarm, NPO                      Recommendations for nursing mobility: Encourage HEP in prep for ADLs/mobility; see handout for details, Frequent repositioning to prevent skin breakdown, Use of bed/chair alarm for safety, LE elevation for management of edema, and Assist x2    In place during session: Peripheral IV, Central Venous Line, Arterial Line, Nasal Cannula 2L, External Catheter, Flexi-seal, EKG/telemetry , Pulse ox, and Nasogastric Tube    ASSESSMENT  Pt is a 83 y.o. female admitted on 3/29/2024 for AMS, vomiting, bradycardia, hypotension, hypothermic; PMHx; CHF, CKD, DM, GERD, hypercholesterolemia, hyperlipidemia, HTN; pt currently being treated for shock. Pt semi supine upon PT arrival, agreeable to evaluation. Pt A&O x 3, disoriented to place.      Based on the objective data described below, the patient currently presents with impaired functional mobility, decreased independence in ADLs, impaired strength, decreased activity tolerance, and impaired balance. (See below for objective details and assist levels).     Overall pt tolerated session fair today with no c/o pain throughout session, c/o mild dizziness with mild orthostatic hypotension with positional changes and extended upright positioning; vitals monitored throughout session as detailed below. Pt demod BLE AROM >3/5 at bed level, currently presents with BLE edema. Pt required modAx1 
Pt progressing toward goals for discharge  
Interventions:  DIET RECOMMENDATIONS: Soft and bite sized and thin liquids    SWALLOW SAFETY PRECAUTIONS: Upright positioning during po intake and after po intake for at least 30-60 minutes, slow rate, small-single sips and bites STRICT aspiration and GERD precautions advised. Monitor for overt s/sx of aspiration. No Straws. ST to follow.    Acute SLP Services: Yes, patient will be followed by speech-language pathology 4x/week to address goals. Patient's rehabilitation potential is considered to be Good.    Discharge Recommendations: To Be Determined     SUBJECTIVE:   Im tired of the thick stuff    OBJECTIVE:     Past Medical History:   Diagnosis Date    Acute respiratory distress     CHF (congestive heart failure) (HCC)     CKD (chronic kidney disease)     Diabetes mellitus (HCC)     GERD (gastroesophageal reflux disease)     Hypercholesterolemia     Hyperlipidemia     Hypertension     Migraine      Past Surgical History:   Procedure Laterality Date    HERNIA REPAIR      IR NONTUNNELED VASCULAR CATHETER  3/29/2024    IR NONTUNNELED VASCULAR CATHETER 3/29/2024 Ez Chowdhury MD [facility] IR    TUBAL LIGATION       Prior Level of Function/Home Situation:   Social/Functional History  Lives With: Other (comment) (LTC at [facility])  Type of Home: Facility ([facility])  Home Layout: One level  Home Access: Level entry  Home Equipment: Rollator  Has the patient had two or more falls in the past year or any fall with injury in the past year?:  (one fall prior to admission)  Receives Help From: Other (comment) (facility staff)  ADL Assistance: Independent  Ambulation Assistance: Needs assistance (Robel with rollator)  Transfer Assistance: Independent     Start of Session End of Session   Heart Rate 78 78   SPO2 98 99       Baseline Assessment:  Current Diet : Soft and Bite-Sized  Current Liquid Diet : Mildly Thick  Prior Dysphagia History: Denies  Patient Complaint: Dislike of Mildly thick liquids    General:    
Hypertension     Migraine      Past Surgical History:   Procedure Laterality Date    HERNIA REPAIR      IR NONTUNNELED VASCULAR CATHETER  3/29/2024    IR NONTUNNELED VASCULAR CATHETER 3/29/2024 Ez Chowdhury MD [facility] ANGIO IR    TUBAL LIGATION       Prior Level of Function/Home Situation:   Social/Functional History  Lives With: Other (comment) (LTC at [facility])  Type of Home: Facility ([facility])  Home Layout: One level  Home Access: Level entry  Home Equipment: Rollator  Has the patient had two or more falls in the past year or any fall with injury in the past year?:  (one fall prior to admission)  Receives Help From: Other (comment) (facility staff)  ADL Assistance: Independent  Ambulation Assistance: Needs assistance (Robel with rollator)  Transfer Assistance: Independent    Cognitive and Communication Status:  Neurologic State: Alert  Orientation Level: Oriented to person, Oriented to place, and Oriented to time  Cognition: Follows commands and Decreased attention/concentration    Baseline Assessment:  Current Diet : NPO  Current Liquid Diet : NPO  Prior Dysphagia History: pt denies previous dysphagia, baseline diet is diabetic/cardiac, reg/thin  Baseline Vocal Quality: Normal  Hearing: appears intact    General:   Subjective: Pt agreeable to assessment  O2 Device: Nasal cannula  Current Diet : NPO  Current Liquid Diet : NPO  Dentition: Edentulous  Patient Positioning: Upright in bed  Volitional Cough: Weak       Dysphagia:  Oral Assessment:  Oral Motor   Labial: No impairment  Dentition: Edentulous  Oral Hygiene: Dried secretions  Lingual: No impairment  Velum: No Impairment  Mandible: No impairment  P.O. Trials:     PO Trials  Neuromuscular Estim Used: No  Assessment Method(s): Observation;Palpation  Patient Position: slightly reclined, upright in bed  Vocal Quality: No Impairment  Consistency Presented: Easy to chew;Pureed;Thin;Mildly Thick  How Presented: SLP-fed/Presented;Self-fed/presented  Bolus 
or more falls in the past year or any fall with injury in the past year?:  (one fall prior to admission)  Receives Help From: Other (comment) (facility staff)  ADL Assistance: Independent  Ambulation Assistance: Needs assistance (Robel with rollator)  Transfer Assistance: Independent    Cognitive and Communication Status:  Neurologic State: Alert and Confused  Orientation Level: Oriented to person  Cognition: Follows commands and Decreased attention/concentration                         After Treatment:  Patient left in no apparent distress in bed, Call bell left within reach, Nursing notified, and Updated patient's board with:  diet recommendations and aspiration precautions     Pain:  VAS (numerical) 6/10     COMMUNICATION/EDUCATION:   Swallow safety precautions, Aspiration precautions, GERD precautions, Diet recommendations, and Prognosis and SLP POC education provided to Patient and Nurse via explanation and teach back, all questions/concerns addressed. Patient verbalizes understanding and demonstrates understanding.    The patient's plan of care including recommendations, planned interventions, and recommended diet changes were discussed with: Registered nurse.    Patient/family agree to work toward stated goals and plan of care    Thank you,  Cherry Drummond M.S. CCC-SLP  Minutes: 16   
safety of pt and clinician.     Daija Carballo, MARCO  Minutes: 38  
output, weight and lab values   Obtain nutritional consult as needed     Problem: Genitourinary - Adult  Goal: Absence of urinary retention  3/30/2024 2249 by Iris Ambrose, RN  Outcome: Progressing  Flowsheets (Taken 3/30/2024 2000)  Absence of urinary retention:   Assess patient’s ability to void and empty bladder   Monitor intake/output and perform bladder scan as needed     
thick liquids diet without clinical indicators of aspiration given no cues within 7 day(s).        -Patient will tolerate PO trials without clinical indicators of aspiration given no cues within 7 day(s).      -Patient will participate in modified barium swallow study within 7 day(s), if indicated pending pt's progress.      -Patient will demonstrate understanding of swallow safety precautions and aspiration precautions, diet recs with no cues within 7 day(s).           4/2/2024 1259 by Umm Vega, SLP  Outcome: Progressing

## 2024-04-10 LAB — HBV CORE AB SERPL QL IA: NORMAL

## 2024-04-18 ENCOUNTER — HOSPITAL ENCOUNTER (EMERGENCY)
Facility: HOSPITAL | Age: 84
Discharge: HOME | End: 2024-04-18
Attending: STUDENT IN AN ORGANIZED HEALTH CARE EDUCATION/TRAINING PROGRAM
Payer: MEDICARE

## 2024-04-18 ENCOUNTER — APPOINTMENT (OUTPATIENT)
Facility: HOSPITAL | Age: 84
End: 2024-04-18
Payer: MEDICARE

## 2024-04-18 VITALS
HEIGHT: 62 IN | RESPIRATION RATE: 22 BRPM | TEMPERATURE: 97.5 F | WEIGHT: 182 LBS | DIASTOLIC BLOOD PRESSURE: 82 MMHG | HEART RATE: 68 BPM | BODY MASS INDEX: 33.49 KG/M2 | OXYGEN SATURATION: 99 % | SYSTOLIC BLOOD PRESSURE: 167 MMHG

## 2024-04-18 DIAGNOSIS — N39.0 URINARY TRACT INFECTION IN FEMALE: ICD-10-CM

## 2024-04-18 DIAGNOSIS — I50.9 CONGESTIVE HEART FAILURE, UNSPECIFIED HF CHRONICITY, UNSPECIFIED HEART FAILURE TYPE (HCC): Primary | ICD-10-CM

## 2024-04-18 LAB
ALBUMIN SERPL-MCNC: 3 G/DL (ref 3.5–5)
ALBUMIN/GLOB SERPL: 0.7 (ref 1.1–2.2)
ALP SERPL-CCNC: 95 U/L (ref 45–117)
ALT SERPL-CCNC: 17 U/L (ref 12–78)
ANION GAP SERPL CALC-SCNC: 7 MMOL/L (ref 5–15)
APPEARANCE UR: ABNORMAL
AST SERPL W P-5'-P-CCNC: 15 U/L (ref 15–37)
BACTERIA URNS QL MICRO: NEGATIVE /HPF
BASOPHILS # BLD: 0 K/UL (ref 0–0.1)
BASOPHILS NFR BLD: 0 % (ref 0–1)
BILIRUB SERPL-MCNC: 1.2 MG/DL (ref 0.2–1)
BILIRUB UR QL: NEGATIVE
BNP SERPL-MCNC: 6952 PG/ML
BUN SERPL-MCNC: 19 MG/DL (ref 6–20)
BUN/CREAT SERPL: 12 (ref 12–20)
CA-I BLD-MCNC: 8.8 MG/DL (ref 8.5–10.1)
CHLORIDE SERPL-SCNC: 112 MMOL/L (ref 97–108)
CO2 SERPL-SCNC: 25 MMOL/L (ref 21–32)
COLOR UR: ABNORMAL
CREAT SERPL-MCNC: 1.6 MG/DL (ref 0.55–1.02)
DIFFERENTIAL METHOD BLD: ABNORMAL
EOSINOPHIL # BLD: 0.1 K/UL (ref 0–0.4)
EOSINOPHIL NFR BLD: 2 % (ref 0–7)
EPITH CASTS URNS QL MICRO: ABNORMAL /LPF
ERYTHROCYTE [DISTWIDTH] IN BLOOD BY AUTOMATED COUNT: 17.4 % (ref 11.5–14.5)
GLOBULIN SER CALC-MCNC: 4.1 G/DL (ref 2–4)
GLUCOSE SERPL-MCNC: 125 MG/DL (ref 65–100)
GLUCOSE UR STRIP.AUTO-MCNC: NEGATIVE MG/DL
HCT VFR BLD AUTO: 30.1 % (ref 35–47)
HGB BLD-MCNC: 9.1 G/DL (ref 11.5–16)
HGB UR QL STRIP: NEGATIVE
IMM GRANULOCYTES # BLD AUTO: 0 K/UL (ref 0–0.04)
IMM GRANULOCYTES NFR BLD AUTO: 0 % (ref 0–0.5)
KETONES UR QL STRIP.AUTO: 5 MG/DL
LEUKOCYTE ESTERASE UR QL STRIP.AUTO: ABNORMAL
LYMPHOCYTES # BLD: 0.5 K/UL (ref 0.8–3.5)
LYMPHOCYTES NFR BLD: 11 % (ref 12–49)
MCH RBC QN AUTO: 29.4 PG (ref 26–34)
MCHC RBC AUTO-ENTMCNC: 30.2 G/DL (ref 30–36.5)
MCV RBC AUTO: 97.4 FL (ref 80–99)
MONOCYTES # BLD: 0.4 K/UL (ref 0–1)
MONOCYTES NFR BLD: 8 % (ref 5–13)
MUCOUS THREADS URNS QL MICRO: NEGATIVE /LPF
NEUTS SEG # BLD: 3.8 K/UL (ref 1.8–8)
NEUTS SEG NFR BLD: 79 % (ref 32–75)
NITRITE UR QL STRIP.AUTO: POSITIVE
NRBC # BLD: 0 K/UL (ref 0–0.01)
NRBC BLD-RTO: 0 PER 100 WBC
PH UR STRIP: 5 (ref 5–8)
PLATELET # BLD AUTO: 219 K/UL (ref 150–400)
PMV BLD AUTO: 10.8 FL (ref 8.9–12.9)
POTASSIUM SERPL-SCNC: 4.8 MMOL/L (ref 3.5–5.1)
PROT SERPL-MCNC: 7.1 G/DL (ref 6.4–8.2)
PROT UR STRIP-MCNC: 30 MG/DL
RBC # BLD AUTO: 3.09 M/UL (ref 3.8–5.2)
RBC #/AREA URNS HPF: ABNORMAL /HPF (ref 0–5)
SODIUM SERPL-SCNC: 144 MMOL/L (ref 136–145)
SP GR UR REFRACTOMETRY: 1.01 (ref 1–1.03)
TROPONIN I SERPL HS-MCNC: 16 NG/L (ref 0–51)
TROPONIN I SERPL HS-MCNC: 19 NG/L (ref 0–51)
URINE CULTURE IF INDICATED: ABNORMAL
UROBILINOGEN UR QL STRIP.AUTO: 0.1 EU/DL (ref 0.1–1)
WBC # BLD AUTO: 4.8 K/UL (ref 3.6–11)
WBC URNS QL MICRO: >100 /HPF (ref 0–4)

## 2024-04-18 PROCEDURE — 85025 COMPLETE CBC W/AUTO DIFF WBC: CPT

## 2024-04-18 PROCEDURE — 96365 THER/PROPH/DIAG IV INF INIT: CPT

## 2024-04-18 PROCEDURE — 6360000002 HC RX W HCPCS: Performed by: STUDENT IN AN ORGANIZED HEALTH CARE EDUCATION/TRAINING PROGRAM

## 2024-04-18 PROCEDURE — 36415 COLL VENOUS BLD VENIPUNCTURE: CPT

## 2024-04-18 PROCEDURE — 84484 ASSAY OF TROPONIN QUANT: CPT

## 2024-04-18 PROCEDURE — 71045 X-RAY EXAM CHEST 1 VIEW: CPT

## 2024-04-18 PROCEDURE — 2580000003 HC RX 258: Performed by: STUDENT IN AN ORGANIZED HEALTH CARE EDUCATION/TRAINING PROGRAM

## 2024-04-18 PROCEDURE — 80053 COMPREHEN METABOLIC PANEL: CPT

## 2024-04-18 PROCEDURE — 99285 EMERGENCY DEPT VISIT HI MDM: CPT

## 2024-04-18 PROCEDURE — 83880 ASSAY OF NATRIURETIC PEPTIDE: CPT

## 2024-04-18 PROCEDURE — 96375 TX/PRO/DX INJ NEW DRUG ADDON: CPT

## 2024-04-18 PROCEDURE — 93005 ELECTROCARDIOGRAM TRACING: CPT | Performed by: STUDENT IN AN ORGANIZED HEALTH CARE EDUCATION/TRAINING PROGRAM

## 2024-04-18 PROCEDURE — 87086 URINE CULTURE/COLONY COUNT: CPT

## 2024-04-18 PROCEDURE — 81001 URINALYSIS AUTO W/SCOPE: CPT

## 2024-04-18 RX ORDER — FUROSEMIDE 20 MG/1
20 TABLET ORAL DAILY
Qty: 60 TABLET | Refills: 0 | Status: SHIPPED | OUTPATIENT
Start: 2024-04-18 | End: 2024-04-18

## 2024-04-18 RX ORDER — CEPHALEXIN 500 MG/1
500 CAPSULE ORAL 2 TIMES DAILY
Qty: 14 CAPSULE | Refills: 0 | Status: SHIPPED | OUTPATIENT
Start: 2024-04-18 | End: 2024-04-18

## 2024-04-18 RX ORDER — FUROSEMIDE 20 MG/1
20 TABLET ORAL DAILY
Qty: 60 TABLET | Refills: 0 | Status: SHIPPED | OUTPATIENT
Start: 2024-04-18

## 2024-04-18 RX ORDER — KETOROLAC TROMETHAMINE 15 MG/ML
15 INJECTION, SOLUTION INTRAMUSCULAR; INTRAVENOUS
Status: COMPLETED | OUTPATIENT
Start: 2024-04-18 | End: 2024-04-18

## 2024-04-18 RX ORDER — CEPHALEXIN 500 MG/1
500 CAPSULE ORAL 2 TIMES DAILY
Qty: 14 CAPSULE | Refills: 0 | Status: SHIPPED | OUTPATIENT
Start: 2024-04-18 | End: 2024-04-25

## 2024-04-18 RX ORDER — FUROSEMIDE 10 MG/ML
40 INJECTION INTRAMUSCULAR; INTRAVENOUS
Status: COMPLETED | OUTPATIENT
Start: 2024-04-18 | End: 2024-04-18

## 2024-04-18 RX ADMIN — KETOROLAC TROMETHAMINE 15 MG: 15 INJECTION, SOLUTION INTRAMUSCULAR; INTRAVENOUS at 08:44

## 2024-04-18 RX ADMIN — AZITHROMYCIN MONOHYDRATE 500 MG: 500 INJECTION, POWDER, LYOPHILIZED, FOR SOLUTION INTRAVENOUS at 05:37

## 2024-04-18 RX ADMIN — FUROSEMIDE 40 MG: 10 INJECTION, SOLUTION INTRAMUSCULAR; INTRAVENOUS at 05:25

## 2024-04-18 RX ADMIN — CEFTRIAXONE SODIUM 2000 MG: 2 INJECTION, POWDER, FOR SOLUTION INTRAMUSCULAR; INTRAVENOUS at 05:26

## 2024-04-18 ASSESSMENT — PAIN SCALES - GENERAL
PAINLEVEL_OUTOF10: 9
PAINLEVEL_OUTOF10: 0
PAINLEVEL_OUTOF10: 9

## 2024-04-18 ASSESSMENT — PAIN DESCRIPTION - LOCATION: LOCATION: HEAD

## 2024-04-18 ASSESSMENT — PAIN - FUNCTIONAL ASSESSMENT
PAIN_FUNCTIONAL_ASSESSMENT: 0-10
PAIN_FUNCTIONAL_ASSESSMENT: 0-10
PAIN_FUNCTIONAL_ASSESSMENT: NONE - DENIES PAIN

## 2024-04-18 NOTE — ED TRIAGE NOTES
[facility], called for CHF exacerbation. Pt was recently discharged from our facility 2 weeks ago for AMS and vomiting, pt was put on Levophed and CRRT. Pt increased work on breathing, BLE edema. Denies CP/ SOB upon arrival to ED.

## 2024-04-18 NOTE — DISCHARGE INSTRUCTIONS
Thank you!  Thank you for allowing me to care for you in the emergency department. It is my goal to provide you with excellent care.  Please fill out the survey that will come to you by mail or email since we listen to your feedback!     Below you will find a list of your tests from today's visit.  Should you have any questions, please do not hesitate to call the emergency department.    Labs  Recent Results (from the past 12 hour(s))   CBC with Auto Differential    Collection Time: 04/18/24  4:06 AM   Result Value Ref Range    WBC 4.8 3.6 - 11.0 K/uL    RBC 3.09 (L) 3.80 - 5.20 M/uL    Hemoglobin 9.1 (L) 11.5 - 16.0 g/dL    Hematocrit 30.1 (L) 35.0 - 47.0 %    MCV 97.4 80.0 - 99.0 FL    MCH 29.4 26.0 - 34.0 PG    MCHC 30.2 30.0 - 36.5 g/dL    RDW 17.4 (H) 11.5 - 14.5 %    Platelets 219 150 - 400 K/uL    MPV 10.8 8.9 - 12.9 FL    Nucleated RBCs 0.0 0.0  WBC    nRBC 0.00 0.00 - 0.01 K/uL    Neutrophils % 79 (H) 32 - 75 %    Lymphocytes % 11 (L) 12 - 49 %    Monocytes % 8 5 - 13 %    Eosinophils % 2 0 - 7 %    Basophils % 0 0 - 1 %    Immature Granulocytes % 0 0 - 0.5 %    Neutrophils Absolute 3.8 1.8 - 8.0 K/UL    Lymphocytes Absolute 0.5 (L) 0.8 - 3.5 K/UL    Monocytes Absolute 0.4 0.0 - 1.0 K/UL    Eosinophils Absolute 0.1 0.0 - 0.4 K/UL    Basophils Absolute 0.0 0.0 - 0.1 K/UL    Immature Granulocytes Absolute 0.0 0.00 - 0.04 K/UL    Differential Type AUTOMATED     Comprehensive Metabolic Panel    Collection Time: 04/18/24  4:06 AM   Result Value Ref Range    Sodium 144 136 - 145 mmol/L    Potassium 4.8 3.5 - 5.1 mmol/L    Chloride 112 (H) 97 - 108 mmol/L    CO2 25 21 - 32 mmol/L    Anion Gap 7 5 - 15 mmol/L    Glucose 125 (H) 65 - 100 mg/dL    BUN 19 6 - 20 mg/dL    Creatinine 1.60 (H) 0.55 - 1.02 mg/dL    Bun/Cre Ratio 12 12 - 20      Est, Glom Filt Rate 32 (L) >60 ml/min/1.73m2    Calcium 8.8 8.5 - 10.1 mg/dL    Total Bilirubin 1.2 (H) 0.2 - 1.0 mg/dL    AST 15 15 - 37 U/L    ALT 17 12 - 78 U/L

## 2024-04-18 NOTE — ED NOTES
Report called to CHRIS Antoine at [facility] and rehab. Discharge instructions reviewed regarding medications and follow care with Dr Grimaldo to watch metabolic panel. Nurse requested a call with [facility] once transportation is arranged. [phone]

## 2024-04-18 NOTE — ED PROVIDER NOTES
[facility] EMERGENCY DEPT  EMERGENCY DEPARTMENT HISTORY AND PHYSICAL EXAM      Date: 4/18/2024  Patient Name: Ana Rosa Bearden  MRN: [id number]  Birthdate 1940  Date of evaluation: 4/18/2024  Provider: Zay Isabel DO   Note Started: 4:06 AM EDT 4/18/24    HISTORY OF PRESENT ILLNESS     Chief Complaint   Patient presents with    Shortness of Breath       History Provided By: Patient    HPI: Ana Rosa Bearden is a 83 y.o. female with medical history as reviewed below who presents emerged from due to progressive worsening shortness of breath since yesterday.  Patient was recently discharged from hospital due to urosepsis, patient required dialysis and pressors during that admission, finished 7 days of IV antibiotics and was discharged thereafter.  Patient states that she became more acutely short of breath this am prompted her visit to the emergency department.  Denies any fever, chest pain, cough, abdominal pain, urinary symptoms or any other symptoms or complaints.    PAST MEDICAL HISTORY   Past Medical History:  Past Medical History:   Diagnosis Date    Acute respiratory distress     CHF (congestive heart failure) (HCC)     CKD (chronic kidney disease)     Diabetes mellitus (HCC)     GERD (gastroesophageal reflux disease)     Hypercholesterolemia     Hyperlipidemia     Hypertension     Migraine        Past Surgical History:  Past Surgical History:   Procedure Laterality Date    HERNIA REPAIR      IR NONTUNNELED VASCULAR CATHETER  3/29/2024    IR NONTUNNELED VASCULAR CATHETER 3/29/2024 zE Chowdhury MD [facility] RAD ANGIO IR    TUBAL LIGATION         Family History:  Family History   Problem Relation Age of Onset    Hypertension Father        Social History:  Social History     Tobacco Use    Smoking status: Never    Smokeless tobacco: Never   Substance Use Topics    Alcohol use: Not Currently    Drug use: Never       Allergies:  Allergies   Allergen Reactions    Iodine Swelling    Xcjyp-Nzruy-Aujemdn-Pramoxine

## 2024-04-19 LAB
EKG ATRIAL RATE: 277 BPM
EKG DIAGNOSIS: NORMAL
EKG Q-T INTERVAL: 376 MS
EKG QRS DURATION: 70 MS
EKG QTC CALCULATION (BAZETT): 470 MS
EKG R AXIS: 6 DEGREES
EKG T AXIS: 31 DEGREES
EKG VENTRICULAR RATE: 94 BPM

## 2024-04-20 LAB
BACTERIA SPEC CULT: NORMAL
COLONY COUNT, CNT: NORMAL
Lab: NORMAL

## 2024-08-31 ENCOUNTER — HOSPITAL ENCOUNTER (INPATIENT)
Facility: HOSPITAL | Age: 84
LOS: 9 days | Discharge: NURSING, REHAB OR LONG-TERM CARE | DRG: 291 | End: 2024-09-09
Admitting: FAMILY MEDICINE
Payer: MEDICARE

## 2024-08-31 ENCOUNTER — APPOINTMENT (OUTPATIENT)
Facility: HOSPITAL | Age: 84
DRG: 291 | End: 2024-08-31
Payer: MEDICARE

## 2024-08-31 DIAGNOSIS — J96.21 ACUTE ON CHRONIC RESPIRATORY FAILURE WITH HYPOXIA: ICD-10-CM

## 2024-08-31 DIAGNOSIS — I50.9 ACUTE ON CHRONIC CONGESTIVE HEART FAILURE, UNSPECIFIED HEART FAILURE TYPE (HCC): Primary | ICD-10-CM

## 2024-08-31 PROBLEM — J96.01 ACUTE HYPOXIC RESPIRATORY FAILURE: Status: ACTIVE | Noted: 2024-08-31

## 2024-08-31 LAB
ALBUMIN SERPL-MCNC: 2.6 G/DL (ref 3.5–5)
ALBUMIN/GLOB SERPL: 0.5 (ref 1.1–2.2)
ALP SERPL-CCNC: 170 U/L (ref 45–117)
ALT SERPL-CCNC: 23 U/L (ref 12–78)
ANION GAP SERPL CALC-SCNC: 3 MMOL/L (ref 5–15)
ARTERIAL PATENCY WRIST A: YES
AST SERPL W P-5'-P-CCNC: ABNORMAL U/L (ref 15–37)
BASE DEFICIT BLDA-SCNC: 2.4 MMOL/L
BASOPHILS # BLD: 0 K/UL (ref 0–0.1)
BASOPHILS NFR BLD: 0 % (ref 0–1)
BDY SITE: ABNORMAL
BILIRUB SERPL-MCNC: 0.4 MG/DL (ref 0.2–1)
BNP SERPL-MCNC: 2091 PG/ML
BODY TEMPERATURE: 97.6
BUN SERPL-MCNC: 30 MG/DL (ref 6–20)
BUN/CREAT SERPL: 18 (ref 12–20)
CA-I BLD-MCNC: 1.23 MMOL/L (ref 1.13–1.32)
CA-I BLD-MCNC: 8.3 MG/DL (ref 8.5–10.1)
CHLORIDE SERPL-SCNC: 115 MMOL/L (ref 97–108)
CO2 SERPL-SCNC: 22 MMOL/L (ref 21–32)
COHGB MFR BLD: 0 % (ref 1–2)
CREAT SERPL-MCNC: 1.68 MG/DL (ref 0.55–1.02)
DIFFERENTIAL METHOD BLD: ABNORMAL
EKG ATRIAL RATE: 267 BPM
EKG DIAGNOSIS: NORMAL
EKG Q-T INTERVAL: 420 MS
EKG QRS DURATION: 84 MS
EKG QTC CALCULATION (BAZETT): 453 MS
EKG R AXIS: 13 DEGREES
EKG T AXIS: 64 DEGREES
EKG VENTRICULAR RATE: 70 BPM
EOSINOPHIL # BLD: 0.4 K/UL (ref 0–0.4)
EOSINOPHIL NFR BLD: 5 % (ref 0–7)
ERYTHROCYTE [DISTWIDTH] IN BLOOD BY AUTOMATED COUNT: 17.1 % (ref 11.5–14.5)
FIO2 ON VENT: 0.4 %
FLUAV RNA SPEC QL NAA+PROBE: NOT DETECTED
FLUBV RNA SPEC QL NAA+PROBE: NOT DETECTED
GLOBULIN SER CALC-MCNC: 5.2 G/DL (ref 2–4)
GLUCOSE SERPL-MCNC: 111 MG/DL (ref 65–100)
HCO3 BLDA-SCNC: 22 MMOL/L (ref 22–26)
HCT VFR BLD AUTO: 27.7 % (ref 35–47)
HGB BLD-MCNC: 8.3 G/DL (ref 11.5–16)
IMM GRANULOCYTES # BLD AUTO: 0 K/UL (ref 0–0.04)
IMM GRANULOCYTES NFR BLD AUTO: 0 % (ref 0–0.5)
LYMPHOCYTES # BLD: 1 K/UL (ref 0.8–3.5)
LYMPHOCYTES NFR BLD: 15 % (ref 12–49)
MCH RBC QN AUTO: 27.5 PG (ref 26–34)
MCHC RBC AUTO-ENTMCNC: 30 G/DL (ref 30–36.5)
MCV RBC AUTO: 91.7 FL (ref 80–99)
METHGB MFR BLD: 0.4 % (ref 0–1.4)
MONOCYTES # BLD: 0.5 K/UL (ref 0–1)
MONOCYTES NFR BLD: 7 % (ref 5–13)
MRSA DNA SPEC QL NAA+PROBE: NOT DETECTED
NEUTS SEG # BLD: 4.9 K/UL (ref 1.8–8)
NEUTS SEG NFR BLD: 73 % (ref 32–75)
NRBC # BLD: 0 K/UL (ref 0–0.01)
NRBC BLD-RTO: 0 PER 100 WBC
OXYHGB MFR BLD: 98.1 % (ref 95–99)
PCO2 BLDA: 38 MMHG (ref 35–45)
PERFORMED BY:: ABNORMAL
PH BLDA: 7.39 (ref 7.35–7.45)
PLATELET # BLD AUTO: 243 K/UL (ref 150–400)
PMV BLD AUTO: 10.9 FL (ref 8.9–12.9)
PO2 BLDA: 140 MMHG (ref 80–100)
POTASSIUM SERPL-SCNC: 4.9 MMOL/L (ref 3.5–5.1)
POTASSIUM SERPL-SCNC: ABNORMAL MMOL/L (ref 3.5–5.1)
PROT SERPL-MCNC: 7.8 G/DL (ref 6.4–8.2)
RBC # BLD AUTO: 3.02 M/UL (ref 3.8–5.2)
SAO2 % BLD: 99 % (ref 95–99)
SAO2% DEVICE SAO2% SENSOR NAME: ABNORMAL
SARS-COV-2 RNA RESP QL NAA+PROBE: NOT DETECTED
SODIUM SERPL-SCNC: 140 MMOL/L (ref 136–145)
SPECIMEN SITE: ABNORMAL
TROPONIN I SERPL HS-MCNC: 11 NG/L (ref 0–51)
TROPONIN I SERPL HS-MCNC: 12 NG/L (ref 0–51)
TROPONIN I SERPL HS-MCNC: 16 NG/L (ref 0–51)
VENTILATION MODE VENT: ABNORMAL
WBC # BLD AUTO: 6.7 K/UL (ref 3.6–11)

## 2024-08-31 PROCEDURE — 6360000002 HC RX W HCPCS

## 2024-08-31 PROCEDURE — 6370000000 HC RX 637 (ALT 250 FOR IP): Performed by: FAMILY MEDICINE

## 2024-08-31 PROCEDURE — 73030 X-RAY EXAM OF SHOULDER: CPT

## 2024-08-31 PROCEDURE — 84132 ASSAY OF SERUM POTASSIUM: CPT

## 2024-08-31 PROCEDURE — 80053 COMPREHEN METABOLIC PANEL: CPT

## 2024-08-31 PROCEDURE — 83880 ASSAY OF NATRIURETIC PEPTIDE: CPT

## 2024-08-31 PROCEDURE — 87641 MR-STAPH DNA AMP PROBE: CPT

## 2024-08-31 PROCEDURE — 85025 COMPLETE CBC W/AUTO DIFF WBC: CPT

## 2024-08-31 PROCEDURE — 87636 SARSCOV2 & INF A&B AMP PRB: CPT

## 2024-08-31 PROCEDURE — 93005 ELECTROCARDIOGRAM TRACING: CPT

## 2024-08-31 PROCEDURE — 84484 ASSAY OF TROPONIN QUANT: CPT

## 2024-08-31 PROCEDURE — 2580000003 HC RX 258: Performed by: FAMILY MEDICINE

## 2024-08-31 PROCEDURE — 5A0935A ASSISTANCE WITH RESPIRATORY VENTILATION, LESS THAN 24 CONSECUTIVE HOURS, HIGH NASAL FLOW/VELOCITY: ICD-10-PCS | Performed by: FAMILY MEDICINE

## 2024-08-31 PROCEDURE — 94761 N-INVAS EAR/PLS OXIMETRY MLT: CPT

## 2024-08-31 PROCEDURE — 82803 BLOOD GASES ANY COMBINATION: CPT

## 2024-08-31 PROCEDURE — 94640 AIRWAY INHALATION TREATMENT: CPT

## 2024-08-31 PROCEDURE — 71045 X-RAY EXAM CHEST 1 VIEW: CPT

## 2024-08-31 PROCEDURE — 99285 EMERGENCY DEPT VISIT HI MDM: CPT

## 2024-08-31 PROCEDURE — 5A09357 ASSISTANCE WITH RESPIRATORY VENTILATION, LESS THAN 24 CONSECUTIVE HOURS, CONTINUOUS POSITIVE AIRWAY PRESSURE: ICD-10-PCS | Performed by: FAMILY MEDICINE

## 2024-08-31 PROCEDURE — 6360000002 HC RX W HCPCS: Performed by: FAMILY MEDICINE

## 2024-08-31 PROCEDURE — 1100000000 HC RM PRIVATE

## 2024-08-31 PROCEDURE — 96374 THER/PROPH/DIAG INJ IV PUSH: CPT

## 2024-08-31 PROCEDURE — 96375 TX/PRO/DX INJ NEW DRUG ADDON: CPT

## 2024-08-31 PROCEDURE — 2700000000 HC OXYGEN THERAPY PER DAY

## 2024-08-31 PROCEDURE — 36600 WITHDRAWAL OF ARTERIAL BLOOD: CPT

## 2024-08-31 PROCEDURE — 99222 1ST HOSP IP/OBS MODERATE 55: CPT | Performed by: ORTHOPAEDIC SURGERY

## 2024-08-31 PROCEDURE — 94660 CPAP INITIATION&MGMT: CPT

## 2024-08-31 PROCEDURE — 82330 ASSAY OF CALCIUM: CPT

## 2024-08-31 RX ORDER — CLONIDINE HYDROCHLORIDE 0.1 MG/1
0.1 TABLET ORAL 2 TIMES DAILY
Status: DISCONTINUED | OUTPATIENT
Start: 2024-08-31 | End: 2024-09-09 | Stop reason: HOSPADM

## 2024-08-31 RX ORDER — BUDESONIDE AND FORMOTEROL FUMARATE DIHYDRATE 80; 4.5 UG/1; UG/1
2 AEROSOL RESPIRATORY (INHALATION)
Status: DISCONTINUED | OUTPATIENT
Start: 2024-08-31 | End: 2024-09-09 | Stop reason: HOSPADM

## 2024-08-31 RX ORDER — ONDANSETRON 4 MG/1
4 TABLET, ORALLY DISINTEGRATING ORAL EVERY 8 HOURS PRN
Status: DISCONTINUED | OUTPATIENT
Start: 2024-08-31 | End: 2024-09-09 | Stop reason: HOSPADM

## 2024-08-31 RX ORDER — CLOPIDOGREL BISULFATE 75 MG/1
75 TABLET ORAL DAILY
Status: DISCONTINUED | OUTPATIENT
Start: 2024-08-31 | End: 2024-09-09 | Stop reason: HOSPADM

## 2024-08-31 RX ORDER — SUMATRIPTAN 25 MG/1
50 TABLET, FILM COATED ORAL ONCE
Status: DISCONTINUED | OUTPATIENT
Start: 2024-08-31 | End: 2024-08-31

## 2024-08-31 RX ORDER — ONDANSETRON 2 MG/ML
4 INJECTION INTRAMUSCULAR; INTRAVENOUS ONCE
Status: COMPLETED | OUTPATIENT
Start: 2024-08-31 | End: 2024-08-31

## 2024-08-31 RX ORDER — ATORVASTATIN CALCIUM 40 MG/1
40 TABLET, FILM COATED ORAL NIGHTLY
Status: DISCONTINUED | OUTPATIENT
Start: 2024-08-31 | End: 2024-09-09 | Stop reason: HOSPADM

## 2024-08-31 RX ORDER — PANTOPRAZOLE SODIUM 40 MG/1
40 TABLET, DELAYED RELEASE ORAL
Status: DISCONTINUED | OUTPATIENT
Start: 2024-09-01 | End: 2024-09-09 | Stop reason: HOSPADM

## 2024-08-31 RX ORDER — IPRATROPIUM BROMIDE AND ALBUTEROL SULFATE 2.5; .5 MG/3ML; MG/3ML
1 SOLUTION RESPIRATORY (INHALATION) EVERY 4 HOURS PRN
Status: DISCONTINUED | OUTPATIENT
Start: 2024-08-31 | End: 2024-09-09 | Stop reason: HOSPADM

## 2024-08-31 RX ORDER — RIZATRIPTAN BENZOATE 10 MG/1
10 TABLET ORAL DAILY
COMMUNITY

## 2024-08-31 RX ORDER — SODIUM CHLORIDE 9 MG/ML
INJECTION, SOLUTION INTRAVENOUS PRN
Status: DISCONTINUED | OUTPATIENT
Start: 2024-08-31 | End: 2024-09-09 | Stop reason: HOSPADM

## 2024-08-31 RX ORDER — FUROSEMIDE 10 MG/ML
40 INJECTION INTRAMUSCULAR; INTRAVENOUS
Status: COMPLETED | OUTPATIENT
Start: 2024-08-31 | End: 2024-08-31

## 2024-08-31 RX ORDER — ALBUTEROL SULFATE 90 UG/1
2 INHALANT RESPIRATORY (INHALATION) EVERY 4 HOURS PRN
Status: DISCONTINUED | OUTPATIENT
Start: 2024-08-31 | End: 2024-09-09 | Stop reason: HOSPADM

## 2024-08-31 RX ORDER — SODIUM CHLORIDE 0.9 % (FLUSH) 0.9 %
5-40 SYRINGE (ML) INJECTION EVERY 12 HOURS SCHEDULED
Status: DISCONTINUED | OUTPATIENT
Start: 2024-08-31 | End: 2024-09-09 | Stop reason: HOSPADM

## 2024-08-31 RX ORDER — FUROSEMIDE 40 MG
20 TABLET ORAL DAILY
Status: DISCONTINUED | OUTPATIENT
Start: 2024-08-31 | End: 2024-08-31

## 2024-08-31 RX ORDER — ENOXAPARIN SODIUM 100 MG/ML
30 INJECTION SUBCUTANEOUS DAILY
Status: DISCONTINUED | OUTPATIENT
Start: 2024-08-31 | End: 2024-09-09 | Stop reason: HOSPADM

## 2024-08-31 RX ORDER — SODIUM CHLORIDE 0.9 % (FLUSH) 0.9 %
5-40 SYRINGE (ML) INJECTION PRN
Status: DISCONTINUED | OUTPATIENT
Start: 2024-08-31 | End: 2024-09-09 | Stop reason: HOSPADM

## 2024-08-31 RX ORDER — IPRATROPIUM BROMIDE AND ALBUTEROL SULFATE 2.5; .5 MG/3ML; MG/3ML
1 SOLUTION RESPIRATORY (INHALATION)
Status: DISCONTINUED | OUTPATIENT
Start: 2024-08-31 | End: 2024-08-31

## 2024-08-31 RX ORDER — CARVEDILOL 12.5 MG/1
12.5 TABLET ORAL 2 TIMES DAILY WITH MEALS
Status: DISCONTINUED | OUTPATIENT
Start: 2024-08-31 | End: 2024-09-09 | Stop reason: HOSPADM

## 2024-08-31 RX ORDER — ACETAMINOPHEN 325 MG/1
650 TABLET ORAL EVERY 6 HOURS PRN
Status: DISCONTINUED | OUTPATIENT
Start: 2024-08-31 | End: 2024-09-09 | Stop reason: HOSPADM

## 2024-08-31 RX ORDER — BUTALBITAL, ACETAMINOPHEN AND CAFFEINE 50; 325; 40 MG/1; MG/1; MG/1
1 TABLET ORAL EVERY 6 HOURS PRN
Status: DISCONTINUED | OUTPATIENT
Start: 2024-08-31 | End: 2024-09-09 | Stop reason: HOSPADM

## 2024-08-31 RX ORDER — POLYETHYLENE GLYCOL 3350 17 G/17G
17 POWDER, FOR SOLUTION ORAL DAILY PRN
Status: DISCONTINUED | OUTPATIENT
Start: 2024-08-31 | End: 2024-09-09 | Stop reason: HOSPADM

## 2024-08-31 RX ORDER — LISINOPRIL 5 MG/1
5 TABLET ORAL DAILY
Status: ON HOLD | COMMUNITY
End: 2024-09-09 | Stop reason: HOSPADM

## 2024-08-31 RX ORDER — ASPIRIN 81 MG/1
81 TABLET ORAL DAILY
Status: DISCONTINUED | OUTPATIENT
Start: 2024-08-31 | End: 2024-09-09 | Stop reason: HOSPADM

## 2024-08-31 RX ORDER — FUROSEMIDE 10 MG/ML
40 INJECTION INTRAMUSCULAR; INTRAVENOUS DAILY
Status: DISCONTINUED | OUTPATIENT
Start: 2024-08-31 | End: 2024-09-09 | Stop reason: HOSPADM

## 2024-08-31 RX ORDER — CLONIDINE HYDROCHLORIDE 0.1 MG/1
0.1 TABLET ORAL 2 TIMES DAILY
COMMUNITY

## 2024-08-31 RX ORDER — ACETAMINOPHEN 650 MG/1
650 SUPPOSITORY RECTAL EVERY 6 HOURS PRN
Status: DISCONTINUED | OUTPATIENT
Start: 2024-08-31 | End: 2024-09-09 | Stop reason: HOSPADM

## 2024-08-31 RX ORDER — ONDANSETRON 2 MG/ML
4 INJECTION INTRAMUSCULAR; INTRAVENOUS EVERY 6 HOURS PRN
Status: DISCONTINUED | OUTPATIENT
Start: 2024-08-31 | End: 2024-09-09 | Stop reason: HOSPADM

## 2024-08-31 RX ADMIN — ASPIRIN 81 MG: 81 TABLET, COATED ORAL at 12:06

## 2024-08-31 RX ADMIN — BUTALBITAL, ACETAMINOPHEN, AND CAFFEINE 1 TABLET: 325; 50; 40 TABLET ORAL at 12:06

## 2024-08-31 RX ADMIN — ACETAMINOPHEN 650 MG: 325 TABLET ORAL at 20:54

## 2024-08-31 RX ADMIN — SODIUM CHLORIDE, PRESERVATIVE FREE 10 ML: 5 INJECTION INTRAVENOUS at 20:54

## 2024-08-31 RX ADMIN — ONDANSETRON 4 MG: 2 INJECTION INTRAMUSCULAR; INTRAVENOUS at 08:02

## 2024-08-31 RX ADMIN — IPRATROPIUM BROMIDE AND ALBUTEROL SULFATE 1 DOSE: 2.5; .5 SOLUTION RESPIRATORY (INHALATION) at 12:31

## 2024-08-31 RX ADMIN — BUTALBITAL, ACETAMINOPHEN, AND CAFFEINE 1 TABLET: 325; 50; 40 TABLET ORAL at 18:22

## 2024-08-31 RX ADMIN — ATORVASTATIN CALCIUM 40 MG: 40 TABLET, FILM COATED ORAL at 20:54

## 2024-08-31 RX ADMIN — ENOXAPARIN SODIUM 30 MG: 100 INJECTION SUBCUTANEOUS at 12:06

## 2024-08-31 RX ADMIN — CLONIDINE HYDROCHLORIDE 0.1 MG: 0.1 TABLET ORAL at 20:54

## 2024-08-31 RX ADMIN — FUROSEMIDE 40 MG: 10 INJECTION, SOLUTION INTRAMUSCULAR; INTRAVENOUS at 08:03

## 2024-08-31 RX ADMIN — SODIUM CHLORIDE, PRESERVATIVE FREE 10 ML: 5 INJECTION INTRAVENOUS at 12:06

## 2024-08-31 RX ADMIN — CARVEDILOL 12.5 MG: 12.5 TABLET, FILM COATED ORAL at 18:24

## 2024-08-31 RX ADMIN — CLOPIDOGREL BISULFATE 75 MG: 75 TABLET ORAL at 12:06

## 2024-08-31 RX ADMIN — CARVEDILOL 12.5 MG: 12.5 TABLET, FILM COATED ORAL at 12:06

## 2024-08-31 RX ADMIN — Medication 2 PUFF: at 20:29

## 2024-08-31 RX ADMIN — CLONIDINE HYDROCHLORIDE 0.1 MG: 0.1 TABLET ORAL at 12:06

## 2024-08-31 ASSESSMENT — PAIN SCALES - GENERAL
PAINLEVEL_OUTOF10: 6
PAINLEVEL_OUTOF10: 6
PAINLEVEL_OUTOF10: 5
PAINLEVEL_OUTOF10: 0

## 2024-08-31 ASSESSMENT — LIFESTYLE VARIABLES
HOW OFTEN DO YOU HAVE A DRINK CONTAINING ALCOHOL: NEVER
HOW OFTEN DO YOU HAVE A DRINK CONTAINING ALCOHOL: NEVER
HOW MANY STANDARD DRINKS CONTAINING ALCOHOL DO YOU HAVE ON A TYPICAL DAY: PATIENT DOES NOT DRINK
HOW MANY STANDARD DRINKS CONTAINING ALCOHOL DO YOU HAVE ON A TYPICAL DAY: PATIENT DOES NOT DRINK

## 2024-08-31 ASSESSMENT — PAIN DESCRIPTION - DESCRIPTORS: DESCRIPTORS: ACHING;DISCOMFORT

## 2024-08-31 ASSESSMENT — PAIN DESCRIPTION - LOCATION: LOCATION: HEAD

## 2024-08-31 ASSESSMENT — PAIN - FUNCTIONAL ASSESSMENT: PAIN_FUNCTIONAL_ASSESSMENT: PREVENTS OR INTERFERES SOME ACTIVE ACTIVITIES AND ADLS

## 2024-09-01 LAB
ALBUMIN SERPL-MCNC: 2.4 G/DL (ref 3.5–5)
ALBUMIN/GLOB SERPL: 0.5 (ref 1.1–2.2)
ALP SERPL-CCNC: 150 U/L (ref 45–117)
ALT SERPL-CCNC: 13 U/L (ref 12–78)
ANION GAP SERPL CALC-SCNC: 7 MMOL/L (ref 5–15)
AST SERPL W P-5'-P-CCNC: 11 U/L (ref 15–37)
BASOPHILS # BLD: 0 K/UL (ref 0–0.1)
BASOPHILS NFR BLD: 0 % (ref 0–1)
BILIRUB SERPL-MCNC: 0.3 MG/DL (ref 0.2–1)
BNP SERPL-MCNC: 2431 PG/ML
BUN SERPL-MCNC: 29 MG/DL (ref 6–20)
BUN/CREAT SERPL: 17 (ref 12–20)
CA-I BLD-MCNC: 8.8 MG/DL (ref 8.5–10.1)
CHLORIDE SERPL-SCNC: 112 MMOL/L (ref 97–108)
CO2 SERPL-SCNC: 24 MMOL/L (ref 21–32)
CREAT SERPL-MCNC: 1.68 MG/DL (ref 0.55–1.02)
DIFFERENTIAL METHOD BLD: ABNORMAL
EKG ATRIAL RATE: 234 BPM
EKG DIAGNOSIS: NORMAL
EKG Q-T INTERVAL: 400 MS
EKG QRS DURATION: 78 MS
EKG QTC CALCULATION (BAZETT): 444 MS
EKG R AXIS: 29 DEGREES
EKG T AXIS: 59 DEGREES
EKG VENTRICULAR RATE: 74 BPM
EOSINOPHIL # BLD: 0.2 K/UL (ref 0–0.4)
EOSINOPHIL NFR BLD: 5 % (ref 0–7)
ERYTHROCYTE [DISTWIDTH] IN BLOOD BY AUTOMATED COUNT: 16.8 % (ref 11.5–14.5)
GLOBULIN SER CALC-MCNC: 5 G/DL (ref 2–4)
GLUCOSE SERPL-MCNC: 101 MG/DL (ref 65–100)
HCT VFR BLD AUTO: 28.2 % (ref 35–47)
HGB BLD-MCNC: 8.5 G/DL (ref 11.5–16)
IMM GRANULOCYTES # BLD AUTO: 0 K/UL (ref 0–0.04)
IMM GRANULOCYTES NFR BLD AUTO: 0 % (ref 0–0.5)
LYMPHOCYTES # BLD: 1 K/UL (ref 0.8–3.5)
LYMPHOCYTES NFR BLD: 21 % (ref 12–49)
MCH RBC QN AUTO: 27.6 PG (ref 26–34)
MCHC RBC AUTO-ENTMCNC: 30.1 G/DL (ref 30–36.5)
MCV RBC AUTO: 91.6 FL (ref 80–99)
MONOCYTES # BLD: 0.4 K/UL (ref 0–1)
MONOCYTES NFR BLD: 8 % (ref 5–13)
NEUTS SEG # BLD: 3.1 K/UL (ref 1.8–8)
NEUTS SEG NFR BLD: 66 % (ref 32–75)
NRBC # BLD: 0 K/UL (ref 0–0.01)
NRBC BLD-RTO: 0 PER 100 WBC
PLATELET # BLD AUTO: 202 K/UL (ref 150–400)
PMV BLD AUTO: 10.2 FL (ref 8.9–12.9)
POTASSIUM SERPL-SCNC: 4.5 MMOL/L (ref 3.5–5.1)
PROT SERPL-MCNC: 7.4 G/DL (ref 6.4–8.2)
RBC # BLD AUTO: 3.08 M/UL (ref 3.8–5.2)
SODIUM SERPL-SCNC: 143 MMOL/L (ref 136–145)
WBC # BLD AUTO: 4.7 K/UL (ref 3.6–11)

## 2024-09-01 PROCEDURE — 1100000000 HC RM PRIVATE

## 2024-09-01 PROCEDURE — 36415 COLL VENOUS BLD VENIPUNCTURE: CPT

## 2024-09-01 PROCEDURE — 94761 N-INVAS EAR/PLS OXIMETRY MLT: CPT

## 2024-09-01 PROCEDURE — 80053 COMPREHEN METABOLIC PANEL: CPT

## 2024-09-01 PROCEDURE — 6360000002 HC RX W HCPCS: Performed by: FAMILY MEDICINE

## 2024-09-01 PROCEDURE — 6370000000 HC RX 637 (ALT 250 FOR IP): Performed by: FAMILY MEDICINE

## 2024-09-01 PROCEDURE — 83880 ASSAY OF NATRIURETIC PEPTIDE: CPT

## 2024-09-01 PROCEDURE — 85025 COMPLETE CBC W/AUTO DIFF WBC: CPT

## 2024-09-01 PROCEDURE — 2580000003 HC RX 258: Performed by: FAMILY MEDICINE

## 2024-09-01 PROCEDURE — 94640 AIRWAY INHALATION TREATMENT: CPT

## 2024-09-01 PROCEDURE — 2700000000 HC OXYGEN THERAPY PER DAY

## 2024-09-01 RX ORDER — MIRTAZAPINE 15 MG/1
15 TABLET, FILM COATED ORAL NIGHTLY
Status: DISCONTINUED | OUTPATIENT
Start: 2024-09-01 | End: 2024-09-09 | Stop reason: HOSPADM

## 2024-09-01 RX ADMIN — CLOPIDOGREL BISULFATE 75 MG: 75 TABLET ORAL at 08:23

## 2024-09-01 RX ADMIN — BUTALBITAL, ACETAMINOPHEN, AND CAFFEINE 1 TABLET: 325; 50; 40 TABLET ORAL at 05:56

## 2024-09-01 RX ADMIN — CARVEDILOL 12.5 MG: 12.5 TABLET, FILM COATED ORAL at 08:23

## 2024-09-01 RX ADMIN — Medication 2 PUFF: at 21:29

## 2024-09-01 RX ADMIN — BUTALBITAL, ACETAMINOPHEN, AND CAFFEINE 1 TABLET: 325; 50; 40 TABLET ORAL at 20:17

## 2024-09-01 RX ADMIN — ATORVASTATIN CALCIUM 40 MG: 40 TABLET, FILM COATED ORAL at 20:16

## 2024-09-01 RX ADMIN — ASPIRIN 81 MG: 81 TABLET, COATED ORAL at 08:23

## 2024-09-01 RX ADMIN — MIRTAZAPINE 15 MG: 15 TABLET, FILM COATED ORAL at 20:16

## 2024-09-01 RX ADMIN — PANTOPRAZOLE SODIUM 40 MG: 40 TABLET, DELAYED RELEASE ORAL at 05:56

## 2024-09-01 RX ADMIN — SODIUM CHLORIDE, PRESERVATIVE FREE 10 ML: 5 INJECTION INTRAVENOUS at 20:16

## 2024-09-01 RX ADMIN — ONDANSETRON 4 MG: 2 INJECTION INTRAMUSCULAR; INTRAVENOUS at 13:45

## 2024-09-01 RX ADMIN — Medication 2 PUFF: at 09:58

## 2024-09-01 RX ADMIN — CLONIDINE HYDROCHLORIDE 0.1 MG: 0.1 TABLET ORAL at 08:23

## 2024-09-01 RX ADMIN — BUTALBITAL, ACETAMINOPHEN, AND CAFFEINE 1 TABLET: 325; 50; 40 TABLET ORAL at 13:46

## 2024-09-01 RX ADMIN — SODIUM CHLORIDE, PRESERVATIVE FREE 10 ML: 5 INJECTION INTRAVENOUS at 08:23

## 2024-09-01 RX ADMIN — FUROSEMIDE 40 MG: 10 INJECTION, SOLUTION INTRAMUSCULAR; INTRAVENOUS at 08:23

## 2024-09-01 RX ADMIN — CARVEDILOL 12.5 MG: 12.5 TABLET, FILM COATED ORAL at 16:41

## 2024-09-01 RX ADMIN — CLONIDINE HYDROCHLORIDE 0.1 MG: 0.1 TABLET ORAL at 20:19

## 2024-09-01 ASSESSMENT — PAIN SCALES - GENERAL
PAINLEVEL_OUTOF10: 0
PAINLEVEL_OUTOF10: 0
PAINLEVEL_OUTOF10: 8
PAINLEVEL_OUTOF10: 9
PAINLEVEL_OUTOF10: 7
PAINLEVEL_OUTOF10: 0
PAINLEVEL_OUTOF10: 3

## 2024-09-01 ASSESSMENT — PAIN DESCRIPTION - DESCRIPTORS
DESCRIPTORS: ACHING
DESCRIPTORS: ACHING;DISCOMFORT
DESCRIPTORS: ACHING;DISCOMFORT

## 2024-09-01 ASSESSMENT — PAIN - FUNCTIONAL ASSESSMENT
PAIN_FUNCTIONAL_ASSESSMENT: PREVENTS OR INTERFERES SOME ACTIVE ACTIVITIES AND ADLS
PAIN_FUNCTIONAL_ASSESSMENT: PREVENTS OR INTERFERES SOME ACTIVE ACTIVITIES AND ADLS

## 2024-09-01 ASSESSMENT — PAIN DESCRIPTION - LOCATION
LOCATION: HEAD

## 2024-09-01 ASSESSMENT — PAIN DESCRIPTION - ORIENTATION: ORIENTATION: RIGHT;LEFT

## 2024-09-02 LAB
ALBUMIN SERPL-MCNC: 2.5 G/DL (ref 3.5–5)
ALBUMIN/GLOB SERPL: 0.5 (ref 1.1–2.2)
ALP SERPL-CCNC: 142 U/L (ref 45–117)
ALT SERPL-CCNC: 13 U/L (ref 12–78)
ANION GAP SERPL CALC-SCNC: 5 MMOL/L (ref 5–15)
AST SERPL W P-5'-P-CCNC: 11 U/L (ref 15–37)
BILIRUB SERPL-MCNC: 0.3 MG/DL (ref 0.2–1)
BNP SERPL-MCNC: 1802 PG/ML
BUN SERPL-MCNC: 34 MG/DL (ref 6–20)
BUN/CREAT SERPL: 19 (ref 12–20)
CA-I BLD-MCNC: 8.5 MG/DL (ref 8.5–10.1)
CHLORIDE SERPL-SCNC: 113 MMOL/L (ref 97–108)
CO2 SERPL-SCNC: 25 MMOL/L (ref 21–32)
CREAT SERPL-MCNC: 1.79 MG/DL (ref 0.55–1.02)
ERYTHROCYTE [DISTWIDTH] IN BLOOD BY AUTOMATED COUNT: 16.6 % (ref 11.5–14.5)
GLOBULIN SER CALC-MCNC: 5 G/DL (ref 2–4)
GLUCOSE BLD STRIP.AUTO-MCNC: 173 MG/DL (ref 65–100)
GLUCOSE BLD STRIP.AUTO-MCNC: 176 MG/DL (ref 65–100)
GLUCOSE BLD STRIP.AUTO-MCNC: 192 MG/DL (ref 65–100)
GLUCOSE BLD STRIP.AUTO-MCNC: 205 MG/DL (ref 65–100)
GLUCOSE SERPL-MCNC: 104 MG/DL (ref 65–100)
HCT VFR BLD AUTO: 27.8 % (ref 35–47)
HGB BLD-MCNC: 8.6 G/DL (ref 11.5–16)
MCH RBC QN AUTO: 28 PG (ref 26–34)
MCHC RBC AUTO-ENTMCNC: 30.9 G/DL (ref 30–36.5)
MCV RBC AUTO: 90.6 FL (ref 80–99)
NRBC # BLD: 0 K/UL (ref 0–0.01)
NRBC BLD-RTO: 0 PER 100 WBC
PERFORMED BY:: ABNORMAL
PLATELET # BLD AUTO: 204 K/UL (ref 150–400)
PMV BLD AUTO: 10.3 FL (ref 8.9–12.9)
POTASSIUM SERPL-SCNC: 4.4 MMOL/L (ref 3.5–5.1)
PROT SERPL-MCNC: 7.5 G/DL (ref 6.4–8.2)
RBC # BLD AUTO: 3.07 M/UL (ref 3.8–5.2)
SODIUM SERPL-SCNC: 143 MMOL/L (ref 136–145)
WBC # BLD AUTO: 5.3 K/UL (ref 3.6–11)

## 2024-09-02 PROCEDURE — 94761 N-INVAS EAR/PLS OXIMETRY MLT: CPT

## 2024-09-02 PROCEDURE — 1100000000 HC RM PRIVATE

## 2024-09-02 PROCEDURE — 6370000000 HC RX 637 (ALT 250 FOR IP): Performed by: FAMILY MEDICINE

## 2024-09-02 PROCEDURE — 94640 AIRWAY INHALATION TREATMENT: CPT

## 2024-09-02 PROCEDURE — 83880 ASSAY OF NATRIURETIC PEPTIDE: CPT

## 2024-09-02 PROCEDURE — 80053 COMPREHEN METABOLIC PANEL: CPT

## 2024-09-02 PROCEDURE — 85027 COMPLETE CBC AUTOMATED: CPT

## 2024-09-02 PROCEDURE — 82962 GLUCOSE BLOOD TEST: CPT

## 2024-09-02 PROCEDURE — 2700000000 HC OXYGEN THERAPY PER DAY

## 2024-09-02 PROCEDURE — 6360000002 HC RX W HCPCS: Performed by: FAMILY MEDICINE

## 2024-09-02 PROCEDURE — 2580000003 HC RX 258: Performed by: FAMILY MEDICINE

## 2024-09-02 PROCEDURE — 36415 COLL VENOUS BLD VENIPUNCTURE: CPT

## 2024-09-02 RX ADMIN — CLONIDINE HYDROCHLORIDE 0.1 MG: 0.1 TABLET ORAL at 20:37

## 2024-09-02 RX ADMIN — CLOPIDOGREL BISULFATE 75 MG: 75 TABLET ORAL at 08:07

## 2024-09-02 RX ADMIN — Medication 2 PUFF: at 07:46

## 2024-09-02 RX ADMIN — ASPIRIN 81 MG: 81 TABLET, COATED ORAL at 08:07

## 2024-09-02 RX ADMIN — MIRTAZAPINE 15 MG: 15 TABLET, FILM COATED ORAL at 20:37

## 2024-09-02 RX ADMIN — FUROSEMIDE 40 MG: 10 INJECTION, SOLUTION INTRAMUSCULAR; INTRAVENOUS at 08:07

## 2024-09-02 RX ADMIN — Medication 2 PUFF: at 21:41

## 2024-09-02 RX ADMIN — SODIUM CHLORIDE, PRESERVATIVE FREE 10 ML: 5 INJECTION INTRAVENOUS at 08:07

## 2024-09-02 RX ADMIN — CARVEDILOL 12.5 MG: 12.5 TABLET, FILM COATED ORAL at 08:07

## 2024-09-02 RX ADMIN — CLONIDINE HYDROCHLORIDE 0.1 MG: 0.1 TABLET ORAL at 08:07

## 2024-09-02 RX ADMIN — CARVEDILOL 12.5 MG: 12.5 TABLET, FILM COATED ORAL at 16:29

## 2024-09-02 RX ADMIN — SODIUM CHLORIDE, PRESERVATIVE FREE 10 ML: 5 INJECTION INTRAVENOUS at 20:38

## 2024-09-02 RX ADMIN — BUTALBITAL, ACETAMINOPHEN, AND CAFFEINE 1 TABLET: 325; 50; 40 TABLET ORAL at 16:29

## 2024-09-02 RX ADMIN — PANTOPRAZOLE SODIUM 40 MG: 40 TABLET, DELAYED RELEASE ORAL at 05:30

## 2024-09-02 RX ADMIN — ATORVASTATIN CALCIUM 40 MG: 40 TABLET, FILM COATED ORAL at 20:37

## 2024-09-02 ASSESSMENT — PAIN SCALES - GENERAL
PAINLEVEL_OUTOF10: 0
PAINLEVEL_OUTOF10: 0
PAINLEVEL_OUTOF10: 7
PAINLEVEL_OUTOF10: 0

## 2024-09-03 ENCOUNTER — APPOINTMENT (OUTPATIENT)
Facility: HOSPITAL | Age: 84
DRG: 291 | End: 2024-09-03
Payer: MEDICARE

## 2024-09-03 LAB
BACTERIA SPEC CULT: NORMAL
BNP SERPL-MCNC: 1525 PG/ML
Lab: NORMAL

## 2024-09-03 PROCEDURE — 94640 AIRWAY INHALATION TREATMENT: CPT

## 2024-09-03 PROCEDURE — 6370000000 HC RX 637 (ALT 250 FOR IP): Performed by: FAMILY MEDICINE

## 2024-09-03 PROCEDURE — 2700000000 HC OXYGEN THERAPY PER DAY

## 2024-09-03 PROCEDURE — 36415 COLL VENOUS BLD VENIPUNCTURE: CPT

## 2024-09-03 PROCEDURE — 2580000003 HC RX 258: Performed by: FAMILY MEDICINE

## 2024-09-03 PROCEDURE — 83880 ASSAY OF NATRIURETIC PEPTIDE: CPT

## 2024-09-03 PROCEDURE — 1100000000 HC RM PRIVATE

## 2024-09-03 PROCEDURE — 6360000002 HC RX W HCPCS: Performed by: FAMILY MEDICINE

## 2024-09-03 PROCEDURE — 71045 X-RAY EXAM CHEST 1 VIEW: CPT

## 2024-09-03 PROCEDURE — 94761 N-INVAS EAR/PLS OXIMETRY MLT: CPT

## 2024-09-03 RX ORDER — TRAMADOL HYDROCHLORIDE 50 MG/1
50 TABLET ORAL ONCE
Status: COMPLETED | OUTPATIENT
Start: 2024-09-03 | End: 2024-09-03

## 2024-09-03 RX ADMIN — CLONIDINE HYDROCHLORIDE 0.1 MG: 0.1 TABLET ORAL at 20:36

## 2024-09-03 RX ADMIN — CARVEDILOL 12.5 MG: 12.5 TABLET, FILM COATED ORAL at 19:44

## 2024-09-03 RX ADMIN — ASPIRIN 81 MG: 81 TABLET, COATED ORAL at 09:33

## 2024-09-03 RX ADMIN — SODIUM CHLORIDE, PRESERVATIVE FREE 10 ML: 5 INJECTION INTRAVENOUS at 09:34

## 2024-09-03 RX ADMIN — BUTALBITAL, ACETAMINOPHEN, AND CAFFEINE 1 TABLET: 325; 50; 40 TABLET ORAL at 10:12

## 2024-09-03 RX ADMIN — FUROSEMIDE 40 MG: 10 INJECTION, SOLUTION INTRAMUSCULAR; INTRAVENOUS at 09:33

## 2024-09-03 RX ADMIN — CLONIDINE HYDROCHLORIDE 0.1 MG: 0.1 TABLET ORAL at 09:33

## 2024-09-03 RX ADMIN — CARVEDILOL 12.5 MG: 12.5 TABLET, FILM COATED ORAL at 09:33

## 2024-09-03 RX ADMIN — SODIUM CHLORIDE, PRESERVATIVE FREE 10 ML: 5 INJECTION INTRAVENOUS at 20:40

## 2024-09-03 RX ADMIN — Medication 2 PUFF: at 08:24

## 2024-09-03 RX ADMIN — POLYETHYLENE GLYCOL 3350 17 G: 17 POWDER, FOR SOLUTION ORAL at 14:23

## 2024-09-03 RX ADMIN — Medication 2 PUFF: at 19:43

## 2024-09-03 RX ADMIN — CLOPIDOGREL BISULFATE 75 MG: 75 TABLET ORAL at 09:33

## 2024-09-03 RX ADMIN — ATORVASTATIN CALCIUM 40 MG: 40 TABLET, FILM COATED ORAL at 20:36

## 2024-09-03 RX ADMIN — PANTOPRAZOLE SODIUM 40 MG: 40 TABLET, DELAYED RELEASE ORAL at 05:58

## 2024-09-03 RX ADMIN — MIRTAZAPINE 15 MG: 15 TABLET, FILM COATED ORAL at 20:36

## 2024-09-03 RX ADMIN — BUTALBITAL, ACETAMINOPHEN, AND CAFFEINE 1 TABLET: 325; 50; 40 TABLET ORAL at 00:48

## 2024-09-03 RX ADMIN — TRAMADOL HYDROCHLORIDE 50 MG: 50 TABLET ORAL at 14:23

## 2024-09-03 ASSESSMENT — PAIN - FUNCTIONAL ASSESSMENT
PAIN_FUNCTIONAL_ASSESSMENT: ACTIVITIES ARE NOT PREVENTED
PAIN_FUNCTIONAL_ASSESSMENT: PREVENTS OR INTERFERES SOME ACTIVE ACTIVITIES AND ADLS
PAIN_FUNCTIONAL_ASSESSMENT: ACTIVITIES ARE NOT PREVENTED

## 2024-09-03 ASSESSMENT — PAIN SCALES - GENERAL
PAINLEVEL_OUTOF10: 2
PAINLEVEL_OUTOF10: 0
PAINLEVEL_OUTOF10: 7
PAINLEVEL_OUTOF10: 10
PAINLEVEL_OUTOF10: 0
PAINLEVEL_OUTOF10: 8

## 2024-09-03 ASSESSMENT — PAIN SCALES - WONG BAKER
WONGBAKER_NUMERICALRESPONSE: NO HURT
WONGBAKER_NUMERICALRESPONSE: HURTS A LITTLE BIT
WONGBAKER_NUMERICALRESPONSE: NO HURT

## 2024-09-03 ASSESSMENT — PAIN DESCRIPTION - LOCATION
LOCATION: HEAD
LOCATION: HEAD

## 2024-09-03 ASSESSMENT — PAIN DESCRIPTION - DESCRIPTORS: DESCRIPTORS: ACHING;DISCOMFORT

## 2024-09-04 ENCOUNTER — APPOINTMENT (OUTPATIENT)
Facility: HOSPITAL | Age: 84
DRG: 291 | End: 2024-09-04
Payer: MEDICARE

## 2024-09-04 LAB
ALBUMIN SERPL-MCNC: 2.5 G/DL (ref 3.5–5)
ALBUMIN/GLOB SERPL: 0.5 (ref 1.1–2.2)
ALP SERPL-CCNC: 137 U/L (ref 45–117)
ALT SERPL-CCNC: 30 U/L (ref 12–78)
ANION GAP SERPL CALC-SCNC: 6 MMOL/L (ref 5–15)
AST SERPL W P-5'-P-CCNC: 29 U/L (ref 15–37)
BILIRUB SERPL-MCNC: 0.2 MG/DL (ref 0.2–1)
BNP SERPL-MCNC: 1185 PG/ML
BUN SERPL-MCNC: 38 MG/DL (ref 6–20)
BUN/CREAT SERPL: 20 (ref 12–20)
CA-I BLD-MCNC: 8.4 MG/DL (ref 8.5–10.1)
CHLORIDE SERPL-SCNC: 109 MMOL/L (ref 97–108)
CO2 SERPL-SCNC: 23 MMOL/L (ref 21–32)
CREAT SERPL-MCNC: 1.87 MG/DL (ref 0.55–1.02)
ERYTHROCYTE [DISTWIDTH] IN BLOOD BY AUTOMATED COUNT: 16.6 % (ref 11.5–14.5)
GLOBULIN SER CALC-MCNC: 5.2 G/DL (ref 2–4)
GLUCOSE SERPL-MCNC: 105 MG/DL (ref 65–100)
HCT VFR BLD AUTO: 30.3 % (ref 35–47)
HGB BLD-MCNC: 9.3 G/DL (ref 11.5–16)
MCH RBC QN AUTO: 27.8 PG (ref 26–34)
MCHC RBC AUTO-ENTMCNC: 30.7 G/DL (ref 30–36.5)
MCV RBC AUTO: 90.7 FL (ref 80–99)
NRBC # BLD: 0 K/UL (ref 0–0.01)
NRBC BLD-RTO: 0 PER 100 WBC
PLATELET # BLD AUTO: 214 K/UL (ref 150–400)
PMV BLD AUTO: 10.3 FL (ref 8.9–12.9)
POTASSIUM SERPL-SCNC: 4.6 MMOL/L (ref 3.5–5.1)
PROT SERPL-MCNC: 7.7 G/DL (ref 6.4–8.2)
RBC # BLD AUTO: 3.34 M/UL (ref 3.8–5.2)
SODIUM SERPL-SCNC: 138 MMOL/L (ref 136–145)
WBC # BLD AUTO: 7.7 K/UL (ref 3.6–11)

## 2024-09-04 PROCEDURE — 2700000000 HC OXYGEN THERAPY PER DAY

## 2024-09-04 PROCEDURE — 97161 PT EVAL LOW COMPLEX 20 MIN: CPT

## 2024-09-04 PROCEDURE — 70450 CT HEAD/BRAIN W/O DYE: CPT

## 2024-09-04 PROCEDURE — 97530 THERAPEUTIC ACTIVITIES: CPT

## 2024-09-04 PROCEDURE — 97165 OT EVAL LOW COMPLEX 30 MIN: CPT

## 2024-09-04 PROCEDURE — 1100000000 HC RM PRIVATE

## 2024-09-04 PROCEDURE — 6360000002 HC RX W HCPCS: Performed by: FAMILY MEDICINE

## 2024-09-04 PROCEDURE — 80053 COMPREHEN METABOLIC PANEL: CPT

## 2024-09-04 PROCEDURE — 36415 COLL VENOUS BLD VENIPUNCTURE: CPT

## 2024-09-04 PROCEDURE — 83880 ASSAY OF NATRIURETIC PEPTIDE: CPT

## 2024-09-04 PROCEDURE — 6370000000 HC RX 637 (ALT 250 FOR IP): Performed by: FAMILY MEDICINE

## 2024-09-04 PROCEDURE — 85027 COMPLETE CBC AUTOMATED: CPT

## 2024-09-04 PROCEDURE — 94640 AIRWAY INHALATION TREATMENT: CPT

## 2024-09-04 PROCEDURE — 2580000003 HC RX 258: Performed by: FAMILY MEDICINE

## 2024-09-04 PROCEDURE — 94761 N-INVAS EAR/PLS OXIMETRY MLT: CPT

## 2024-09-04 RX ORDER — TRAMADOL HYDROCHLORIDE 50 MG/1
50 TABLET ORAL EVERY 8 HOURS PRN
Status: DISCONTINUED | OUTPATIENT
Start: 2024-09-04 | End: 2024-09-09 | Stop reason: HOSPADM

## 2024-09-04 RX ADMIN — ATORVASTATIN CALCIUM 40 MG: 40 TABLET, FILM COATED ORAL at 20:46

## 2024-09-04 RX ADMIN — ONDANSETRON 4 MG: 2 INJECTION INTRAMUSCULAR; INTRAVENOUS at 09:16

## 2024-09-04 RX ADMIN — MIRTAZAPINE 15 MG: 15 TABLET, FILM COATED ORAL at 20:46

## 2024-09-04 RX ADMIN — SODIUM CHLORIDE, PRESERVATIVE FREE 10 ML: 5 INJECTION INTRAVENOUS at 09:17

## 2024-09-04 RX ADMIN — ASPIRIN 81 MG: 81 TABLET, COATED ORAL at 09:16

## 2024-09-04 RX ADMIN — Medication 2 PUFF: at 21:27

## 2024-09-04 RX ADMIN — CLONIDINE HYDROCHLORIDE 0.1 MG: 0.1 TABLET ORAL at 09:16

## 2024-09-04 RX ADMIN — PANTOPRAZOLE SODIUM 40 MG: 40 TABLET, DELAYED RELEASE ORAL at 06:24

## 2024-09-04 RX ADMIN — FUROSEMIDE 40 MG: 10 INJECTION, SOLUTION INTRAMUSCULAR; INTRAVENOUS at 09:16

## 2024-09-04 RX ADMIN — CARVEDILOL 12.5 MG: 12.5 TABLET, FILM COATED ORAL at 09:16

## 2024-09-04 RX ADMIN — TRAMADOL HYDROCHLORIDE 50 MG: 50 TABLET ORAL at 11:35

## 2024-09-04 RX ADMIN — TRAMADOL HYDROCHLORIDE 50 MG: 50 TABLET ORAL at 21:38

## 2024-09-04 RX ADMIN — SODIUM CHLORIDE, PRESERVATIVE FREE 10 ML: 5 INJECTION INTRAVENOUS at 20:49

## 2024-09-04 RX ADMIN — CLONIDINE HYDROCHLORIDE 0.1 MG: 0.1 TABLET ORAL at 20:46

## 2024-09-04 RX ADMIN — Medication 2 PUFF: at 10:03

## 2024-09-04 RX ADMIN — CLOPIDOGREL BISULFATE 75 MG: 75 TABLET ORAL at 09:16

## 2024-09-04 RX ADMIN — CARVEDILOL 12.5 MG: 12.5 TABLET, FILM COATED ORAL at 17:12

## 2024-09-04 ASSESSMENT — PAIN SCALES - GENERAL
PAINLEVEL_OUTOF10: 8
PAINLEVEL_OUTOF10: 5
PAINLEVEL_OUTOF10: 10
PAINLEVEL_OUTOF10: 0

## 2024-09-04 ASSESSMENT — PAIN SCALES - WONG BAKER
WONGBAKER_NUMERICALRESPONSE: NO HURT
WONGBAKER_NUMERICALRESPONSE: NO HURT

## 2024-09-04 ASSESSMENT — PAIN - FUNCTIONAL ASSESSMENT: PAIN_FUNCTIONAL_ASSESSMENT: ACTIVITIES ARE NOT PREVENTED

## 2024-09-04 ASSESSMENT — PAIN DESCRIPTION - DESCRIPTORS
DESCRIPTORS: DISCOMFORT
DESCRIPTORS: POUNDING

## 2024-09-04 ASSESSMENT — PAIN DESCRIPTION - LOCATION
LOCATION: HEAD;NECK
LOCATION: HEAD

## 2024-09-04 ASSESSMENT — PAIN DESCRIPTION - ORIENTATION: ORIENTATION: POSTERIOR

## 2024-09-05 LAB
ANION GAP SERPL CALC-SCNC: 6 MMOL/L (ref 2–12)
BUN SERPL-MCNC: 38 MG/DL (ref 6–20)
BUN/CREAT SERPL: 20 (ref 12–20)
CA-I BLD-MCNC: 8.9 MG/DL (ref 8.5–10.1)
CHLORIDE SERPL-SCNC: 106 MMOL/L (ref 97–108)
CO2 SERPL-SCNC: 24 MMOL/L (ref 21–32)
CREAT SERPL-MCNC: 1.9 MG/DL (ref 0.55–1.02)
EKG ATRIAL RATE: 267 BPM
EKG DIAGNOSIS: NORMAL
EKG P AXIS: 102 DEGREES
EKG Q-T INTERVAL: 426 MS
EKG QRS DURATION: 78 MS
EKG QTC CALCULATION (BAZETT): 446 MS
EKG R AXIS: 0 DEGREES
EKG T AXIS: 41 DEGREES
EKG VENTRICULAR RATE: 66 BPM
GLUCOSE BLD STRIP.AUTO-MCNC: 210 MG/DL (ref 65–100)
GLUCOSE SERPL-MCNC: 190 MG/DL (ref 65–100)
MAGNESIUM SERPL-MCNC: 2.1 MG/DL (ref 1.6–2.4)
PERFORMED BY:: ABNORMAL
POTASSIUM SERPL-SCNC: 4.6 MMOL/L (ref 3.5–5.1)
SODIUM SERPL-SCNC: 136 MMOL/L (ref 136–145)

## 2024-09-05 PROCEDURE — 94640 AIRWAY INHALATION TREATMENT: CPT

## 2024-09-05 PROCEDURE — 6370000000 HC RX 637 (ALT 250 FOR IP): Performed by: FAMILY MEDICINE

## 2024-09-05 PROCEDURE — 2700000000 HC OXYGEN THERAPY PER DAY

## 2024-09-05 PROCEDURE — 1100000000 HC RM PRIVATE

## 2024-09-05 PROCEDURE — 82962 GLUCOSE BLOOD TEST: CPT

## 2024-09-05 PROCEDURE — 94761 N-INVAS EAR/PLS OXIMETRY MLT: CPT

## 2024-09-05 PROCEDURE — 83735 ASSAY OF MAGNESIUM: CPT

## 2024-09-05 PROCEDURE — 36415 COLL VENOUS BLD VENIPUNCTURE: CPT

## 2024-09-05 PROCEDURE — 97530 THERAPEUTIC ACTIVITIES: CPT

## 2024-09-05 PROCEDURE — 80048 BASIC METABOLIC PNL TOTAL CA: CPT

## 2024-09-05 PROCEDURE — 6360000002 HC RX W HCPCS: Performed by: FAMILY MEDICINE

## 2024-09-05 PROCEDURE — 93005 ELECTROCARDIOGRAM TRACING: CPT | Performed by: FAMILY MEDICINE

## 2024-09-05 PROCEDURE — 2580000003 HC RX 258: Performed by: FAMILY MEDICINE

## 2024-09-05 RX ADMIN — TRAMADOL HYDROCHLORIDE 50 MG: 50 TABLET ORAL at 10:14

## 2024-09-05 RX ADMIN — CLONIDINE HYDROCHLORIDE 0.1 MG: 0.1 TABLET ORAL at 09:33

## 2024-09-05 RX ADMIN — ASPIRIN 81 MG: 81 TABLET, COATED ORAL at 09:33

## 2024-09-05 RX ADMIN — SODIUM CHLORIDE, PRESERVATIVE FREE 10 ML: 5 INJECTION INTRAVENOUS at 22:15

## 2024-09-05 RX ADMIN — MIRTAZAPINE 15 MG: 15 TABLET, FILM COATED ORAL at 22:14

## 2024-09-05 RX ADMIN — Medication 2 PUFF: at 20:09

## 2024-09-05 RX ADMIN — Medication 2 PUFF: at 07:37

## 2024-09-05 RX ADMIN — ONDANSETRON 4 MG: 2 INJECTION INTRAMUSCULAR; INTRAVENOUS at 18:53

## 2024-09-05 RX ADMIN — CLONIDINE HYDROCHLORIDE 0.1 MG: 0.1 TABLET ORAL at 22:14

## 2024-09-05 RX ADMIN — SODIUM CHLORIDE, PRESERVATIVE FREE 10 ML: 5 INJECTION INTRAVENOUS at 09:34

## 2024-09-05 RX ADMIN — CARVEDILOL 12.5 MG: 12.5 TABLET, FILM COATED ORAL at 09:33

## 2024-09-05 RX ADMIN — ATORVASTATIN CALCIUM 40 MG: 40 TABLET, FILM COATED ORAL at 22:14

## 2024-09-05 RX ADMIN — PANTOPRAZOLE SODIUM 40 MG: 40 TABLET, DELAYED RELEASE ORAL at 06:11

## 2024-09-05 RX ADMIN — FUROSEMIDE 40 MG: 10 INJECTION, SOLUTION INTRAMUSCULAR; INTRAVENOUS at 09:34

## 2024-09-05 RX ADMIN — ONDANSETRON 4 MG: 2 INJECTION INTRAMUSCULAR; INTRAVENOUS at 12:12

## 2024-09-05 RX ADMIN — CLOPIDOGREL BISULFATE 75 MG: 75 TABLET ORAL at 09:33

## 2024-09-05 ASSESSMENT — PAIN DESCRIPTION - DESCRIPTORS: DESCRIPTORS: ACHING

## 2024-09-05 ASSESSMENT — PAIN DESCRIPTION - LOCATION: LOCATION: HEAD

## 2024-09-05 ASSESSMENT — PAIN SCALES - GENERAL
PAINLEVEL_OUTOF10: 8
PAINLEVEL_OUTOF10: 0
PAINLEVEL_OUTOF10: 0
PAINLEVEL_OUTOF10: 5

## 2024-09-05 ASSESSMENT — PAIN DESCRIPTION - ORIENTATION: ORIENTATION: ANTERIOR

## 2024-09-06 LAB
ALBUMIN SERPL-MCNC: 2.6 G/DL (ref 3.5–5)
ALBUMIN/GLOB SERPL: 0.5 (ref 1.1–2.2)
ALP SERPL-CCNC: 150 U/L (ref 45–117)
ALT SERPL-CCNC: 22 U/L (ref 12–78)
ANION GAP SERPL CALC-SCNC: 5 MMOL/L (ref 2–12)
AST SERPL W P-5'-P-CCNC: 20 U/L (ref 15–37)
BILIRUB SERPL-MCNC: 0.3 MG/DL (ref 0.2–1)
BUN SERPL-MCNC: 38 MG/DL (ref 6–20)
BUN/CREAT SERPL: 20 (ref 12–20)
CA-I BLD-MCNC: 9.1 MG/DL (ref 8.5–10.1)
CHLORIDE SERPL-SCNC: 106 MMOL/L (ref 97–108)
CO2 SERPL-SCNC: 26 MMOL/L (ref 21–32)
CREAT SERPL-MCNC: 1.87 MG/DL (ref 0.55–1.02)
ERYTHROCYTE [DISTWIDTH] IN BLOOD BY AUTOMATED COUNT: 16 % (ref 11.5–14.5)
GLOBULIN SER CALC-MCNC: 5.7 G/DL (ref 2–4)
GLUCOSE BLD STRIP.AUTO-MCNC: 167 MG/DL (ref 65–100)
GLUCOSE BLD STRIP.AUTO-MCNC: 191 MG/DL (ref 65–100)
GLUCOSE BLD STRIP.AUTO-MCNC: 237 MG/DL (ref 65–100)
GLUCOSE SERPL-MCNC: 174 MG/DL (ref 65–100)
HCT VFR BLD AUTO: 33.8 % (ref 35–47)
HGB BLD-MCNC: 10.2 G/DL (ref 11.5–16)
MCH RBC QN AUTO: 27.5 PG (ref 26–34)
MCHC RBC AUTO-ENTMCNC: 30.2 G/DL (ref 30–36.5)
MCV RBC AUTO: 91.1 FL (ref 80–99)
NRBC # BLD: 0 K/UL (ref 0–0.01)
NRBC BLD-RTO: 0 PER 100 WBC
PERFORMED BY:: ABNORMAL
PLATELET # BLD AUTO: 228 K/UL (ref 150–400)
PMV BLD AUTO: 10.5 FL (ref 8.9–12.9)
POTASSIUM SERPL-SCNC: 4.7 MMOL/L (ref 3.5–5.1)
PROT SERPL-MCNC: 8.3 G/DL (ref 6.4–8.2)
RBC # BLD AUTO: 3.71 M/UL (ref 3.8–5.2)
SODIUM SERPL-SCNC: 137 MMOL/L (ref 136–145)
WBC # BLD AUTO: 6.7 K/UL (ref 3.6–11)

## 2024-09-06 PROCEDURE — 2700000000 HC OXYGEN THERAPY PER DAY

## 2024-09-06 PROCEDURE — 82962 GLUCOSE BLOOD TEST: CPT

## 2024-09-06 PROCEDURE — 85027 COMPLETE CBC AUTOMATED: CPT

## 2024-09-06 PROCEDURE — 80053 COMPREHEN METABOLIC PANEL: CPT

## 2024-09-06 PROCEDURE — 97530 THERAPEUTIC ACTIVITIES: CPT

## 2024-09-06 PROCEDURE — 94761 N-INVAS EAR/PLS OXIMETRY MLT: CPT

## 2024-09-06 PROCEDURE — 94640 AIRWAY INHALATION TREATMENT: CPT

## 2024-09-06 PROCEDURE — 6370000000 HC RX 637 (ALT 250 FOR IP): Performed by: FAMILY MEDICINE

## 2024-09-06 PROCEDURE — 36415 COLL VENOUS BLD VENIPUNCTURE: CPT

## 2024-09-06 PROCEDURE — 6360000002 HC RX W HCPCS: Performed by: FAMILY MEDICINE

## 2024-09-06 PROCEDURE — 2580000003 HC RX 258: Performed by: FAMILY MEDICINE

## 2024-09-06 PROCEDURE — 1100000000 HC RM PRIVATE

## 2024-09-06 RX ADMIN — ONDANSETRON 4 MG: 2 INJECTION INTRAMUSCULAR; INTRAVENOUS at 20:29

## 2024-09-06 RX ADMIN — FUROSEMIDE 40 MG: 10 INJECTION, SOLUTION INTRAMUSCULAR; INTRAVENOUS at 10:14

## 2024-09-06 RX ADMIN — CLONIDINE HYDROCHLORIDE 0.1 MG: 0.1 TABLET ORAL at 10:14

## 2024-09-06 RX ADMIN — ASPIRIN 81 MG: 81 TABLET, COATED ORAL at 10:14

## 2024-09-06 RX ADMIN — PANTOPRAZOLE SODIUM 40 MG: 40 TABLET, DELAYED RELEASE ORAL at 06:32

## 2024-09-06 RX ADMIN — MIRTAZAPINE 15 MG: 15 TABLET, FILM COATED ORAL at 20:31

## 2024-09-06 RX ADMIN — SODIUM CHLORIDE, PRESERVATIVE FREE 10 ML: 5 INJECTION INTRAVENOUS at 10:15

## 2024-09-06 RX ADMIN — ATORVASTATIN CALCIUM 40 MG: 40 TABLET, FILM COATED ORAL at 20:31

## 2024-09-06 RX ADMIN — SODIUM CHLORIDE, PRESERVATIVE FREE 10 ML: 5 INJECTION INTRAVENOUS at 20:35

## 2024-09-06 RX ADMIN — ONDANSETRON 4 MG: 2 INJECTION INTRAMUSCULAR; INTRAVENOUS at 10:14

## 2024-09-06 RX ADMIN — TRAMADOL HYDROCHLORIDE 50 MG: 50 TABLET ORAL at 20:34

## 2024-09-06 RX ADMIN — CLONIDINE HYDROCHLORIDE 0.1 MG: 0.1 TABLET ORAL at 20:31

## 2024-09-06 RX ADMIN — Medication 2 PUFF: at 08:25

## 2024-09-06 RX ADMIN — TRAMADOL HYDROCHLORIDE 50 MG: 50 TABLET ORAL at 10:14

## 2024-09-06 RX ADMIN — Medication 2 PUFF: at 20:57

## 2024-09-06 RX ADMIN — CLOPIDOGREL BISULFATE 75 MG: 75 TABLET ORAL at 10:14

## 2024-09-06 ASSESSMENT — PAIN SCALES - GENERAL
PAINLEVEL_OUTOF10: 9
PAINLEVEL_OUTOF10: 0
PAINLEVEL_OUTOF10: 0
PAINLEVEL_OUTOF10: 3
PAINLEVEL_OUTOF10: 9
PAINLEVEL_OUTOF10: 4
PAINLEVEL_OUTOF10: 9

## 2024-09-06 ASSESSMENT — PAIN DESCRIPTION - LOCATION
LOCATION: HEAD

## 2024-09-06 ASSESSMENT — PAIN DESCRIPTION - DESCRIPTORS: DESCRIPTORS: ACHING

## 2024-09-06 ASSESSMENT — PAIN DESCRIPTION - ORIENTATION: ORIENTATION: ANTERIOR;POSTERIOR

## 2024-09-07 PROCEDURE — 94640 AIRWAY INHALATION TREATMENT: CPT

## 2024-09-07 PROCEDURE — 2580000003 HC RX 258: Performed by: FAMILY MEDICINE

## 2024-09-07 PROCEDURE — 6370000000 HC RX 637 (ALT 250 FOR IP): Performed by: INTERNAL MEDICINE

## 2024-09-07 PROCEDURE — 2700000000 HC OXYGEN THERAPY PER DAY

## 2024-09-07 PROCEDURE — 94761 N-INVAS EAR/PLS OXIMETRY MLT: CPT

## 2024-09-07 PROCEDURE — 6370000000 HC RX 637 (ALT 250 FOR IP): Performed by: FAMILY MEDICINE

## 2024-09-07 PROCEDURE — 6360000002 HC RX W HCPCS: Performed by: FAMILY MEDICINE

## 2024-09-07 PROCEDURE — 1100000000 HC RM PRIVATE

## 2024-09-07 PROCEDURE — 93005 ELECTROCARDIOGRAM TRACING: CPT | Performed by: INTERNAL MEDICINE

## 2024-09-07 RX ADMIN — ASPIRIN 81 MG: 81 TABLET, COATED ORAL at 08:44

## 2024-09-07 RX ADMIN — CLONIDINE HYDROCHLORIDE 0.1 MG: 0.1 TABLET ORAL at 20:50

## 2024-09-07 RX ADMIN — FUROSEMIDE 40 MG: 10 INJECTION, SOLUTION INTRAMUSCULAR; INTRAVENOUS at 08:59

## 2024-09-07 RX ADMIN — TRAMADOL HYDROCHLORIDE 50 MG: 50 TABLET ORAL at 18:05

## 2024-09-07 RX ADMIN — ONDANSETRON 4 MG: 2 INJECTION INTRAMUSCULAR; INTRAVENOUS at 09:48

## 2024-09-07 RX ADMIN — TRAMADOL HYDROCHLORIDE 50 MG: 50 TABLET ORAL at 09:46

## 2024-09-07 RX ADMIN — MIRTAZAPINE 15 MG: 15 TABLET, FILM COATED ORAL at 20:50

## 2024-09-07 RX ADMIN — CLONIDINE HYDROCHLORIDE 0.1 MG: 0.1 TABLET ORAL at 08:44

## 2024-09-07 RX ADMIN — SODIUM CHLORIDE, PRESERVATIVE FREE 10 ML: 5 INJECTION INTRAVENOUS at 08:52

## 2024-09-07 RX ADMIN — Medication 2 PUFF: at 08:22

## 2024-09-07 RX ADMIN — PANTOPRAZOLE SODIUM 40 MG: 40 TABLET, DELAYED RELEASE ORAL at 05:08

## 2024-09-07 RX ADMIN — ATORVASTATIN CALCIUM 40 MG: 40 TABLET, FILM COATED ORAL at 20:50

## 2024-09-07 RX ADMIN — SODIUM CHLORIDE, PRESERVATIVE FREE 10 ML: 5 INJECTION INTRAVENOUS at 20:51

## 2024-09-07 RX ADMIN — Medication 2 PUFF: at 20:28

## 2024-09-07 RX ADMIN — ACETAMINOPHEN 650 MG: 325 TABLET ORAL at 15:02

## 2024-09-07 RX ADMIN — APIXABAN 2.5 MG: 2.5 TABLET, FILM COATED ORAL at 12:05

## 2024-09-07 RX ADMIN — CLOPIDOGREL BISULFATE 75 MG: 75 TABLET ORAL at 08:44

## 2024-09-07 RX ADMIN — APIXABAN 2.5 MG: 2.5 TABLET, FILM COATED ORAL at 20:50

## 2024-09-07 ASSESSMENT — PAIN SCALES - GENERAL
PAINLEVEL_OUTOF10: 8
PAINLEVEL_OUTOF10: 10
PAINLEVEL_OUTOF10: 7
PAINLEVEL_OUTOF10: 5
PAINLEVEL_OUTOF10: 8
PAINLEVEL_OUTOF10: 7
PAINLEVEL_OUTOF10: 7
PAINLEVEL_OUTOF10: 5
PAINLEVEL_OUTOF10: 0

## 2024-09-07 ASSESSMENT — PAIN DESCRIPTION - LOCATION
LOCATION: HEAD
LOCATION: HEAD
LOCATION: HEAD;SHOULDER

## 2024-09-07 ASSESSMENT — PAIN DESCRIPTION - DESCRIPTORS
DESCRIPTORS: POUNDING;SHARP
DESCRIPTORS: PRESSURE;SHARP
DESCRIPTORS: PRESSURE;SHARP

## 2024-09-07 ASSESSMENT — PAIN DESCRIPTION - ORIENTATION: ORIENTATION: POSTERIOR;RIGHT;LEFT

## 2024-09-08 LAB
EKG ATRIAL RATE: 288 BPM
EKG DIAGNOSIS: NORMAL
EKG P AXIS: 77 DEGREES
EKG Q-T INTERVAL: 448 MS
EKG QRS DURATION: 78 MS
EKG QTC CALCULATION (BAZETT): 416 MS
EKG R AXIS: -4 DEGREES
EKG T AXIS: 19 DEGREES
EKG VENTRICULAR RATE: 52 BPM

## 2024-09-08 PROCEDURE — 6370000000 HC RX 637 (ALT 250 FOR IP): Performed by: FAMILY MEDICINE

## 2024-09-08 PROCEDURE — 2580000003 HC RX 258: Performed by: FAMILY MEDICINE

## 2024-09-08 PROCEDURE — 2700000000 HC OXYGEN THERAPY PER DAY

## 2024-09-08 PROCEDURE — 1100000000 HC RM PRIVATE

## 2024-09-08 PROCEDURE — 94761 N-INVAS EAR/PLS OXIMETRY MLT: CPT

## 2024-09-08 PROCEDURE — 94640 AIRWAY INHALATION TREATMENT: CPT

## 2024-09-08 PROCEDURE — 6370000000 HC RX 637 (ALT 250 FOR IP): Performed by: INTERNAL MEDICINE

## 2024-09-08 PROCEDURE — 6360000002 HC RX W HCPCS: Performed by: FAMILY MEDICINE

## 2024-09-08 RX ADMIN — FUROSEMIDE 40 MG: 10 INJECTION, SOLUTION INTRAMUSCULAR; INTRAVENOUS at 09:09

## 2024-09-08 RX ADMIN — ASPIRIN 81 MG: 81 TABLET, COATED ORAL at 09:08

## 2024-09-08 RX ADMIN — TRAMADOL HYDROCHLORIDE 50 MG: 50 TABLET ORAL at 03:23

## 2024-09-08 RX ADMIN — ACETAMINOPHEN 650 MG: 325 TABLET ORAL at 09:24

## 2024-09-08 RX ADMIN — ONDANSETRON 4 MG: 2 INJECTION INTRAMUSCULAR; INTRAVENOUS at 20:52

## 2024-09-08 RX ADMIN — TRAMADOL HYDROCHLORIDE 50 MG: 50 TABLET ORAL at 11:29

## 2024-09-08 RX ADMIN — SODIUM CHLORIDE, PRESERVATIVE FREE 10 ML: 5 INJECTION INTRAVENOUS at 09:10

## 2024-09-08 RX ADMIN — CLONIDINE HYDROCHLORIDE 0.1 MG: 0.1 TABLET ORAL at 20:52

## 2024-09-08 RX ADMIN — ATORVASTATIN CALCIUM 40 MG: 40 TABLET, FILM COATED ORAL at 20:52

## 2024-09-08 RX ADMIN — CLOPIDOGREL BISULFATE 75 MG: 75 TABLET ORAL at 09:08

## 2024-09-08 RX ADMIN — APIXABAN 2.5 MG: 2.5 TABLET, FILM COATED ORAL at 09:08

## 2024-09-08 RX ADMIN — ONDANSETRON 4 MG: 2 INJECTION INTRAMUSCULAR; INTRAVENOUS at 09:25

## 2024-09-08 RX ADMIN — CLONIDINE HYDROCHLORIDE 0.1 MG: 0.1 TABLET ORAL at 09:08

## 2024-09-08 RX ADMIN — MIRTAZAPINE 15 MG: 15 TABLET, FILM COATED ORAL at 20:52

## 2024-09-08 RX ADMIN — APIXABAN 2.5 MG: 2.5 TABLET, FILM COATED ORAL at 20:52

## 2024-09-08 RX ADMIN — Medication 2 PUFF: at 07:53

## 2024-09-08 RX ADMIN — PANTOPRAZOLE SODIUM 40 MG: 40 TABLET, DELAYED RELEASE ORAL at 06:07

## 2024-09-08 RX ADMIN — Medication 2 PUFF: at 21:03

## 2024-09-08 RX ADMIN — TRAMADOL HYDROCHLORIDE 50 MG: 50 TABLET ORAL at 20:52

## 2024-09-08 RX ADMIN — SODIUM CHLORIDE, PRESERVATIVE FREE 10 ML: 5 INJECTION INTRAVENOUS at 20:53

## 2024-09-08 ASSESSMENT — PAIN SCALES - GENERAL
PAINLEVEL_OUTOF10: 5
PAINLEVEL_OUTOF10: 2
PAINLEVEL_OUTOF10: 7
PAINLEVEL_OUTOF10: 3
PAINLEVEL_OUTOF10: 10
PAINLEVEL_OUTOF10: 10
PAINLEVEL_OUTOF10: 8
PAINLEVEL_OUTOF10: 9

## 2024-09-08 ASSESSMENT — PAIN DESCRIPTION - DESCRIPTORS
DESCRIPTORS: PRESSURE;SHARP
DESCRIPTORS: PRESSURE;SHARP

## 2024-09-08 ASSESSMENT — PAIN DESCRIPTION - LOCATION
LOCATION: SHOULDER;HEAD
LOCATION: SHOULDER;HEAD

## 2024-09-08 ASSESSMENT — PAIN DESCRIPTION - ORIENTATION
ORIENTATION: RIGHT;LEFT;OUTER
ORIENTATION: RIGHT;LEFT;DISTAL;ANTERIOR

## 2024-09-09 VITALS
DIASTOLIC BLOOD PRESSURE: 51 MMHG | SYSTOLIC BLOOD PRESSURE: 125 MMHG | RESPIRATION RATE: 19 BRPM | WEIGHT: 170.42 LBS | BODY MASS INDEX: 31.36 KG/M2 | OXYGEN SATURATION: 98 % | HEART RATE: 64 BPM | HEIGHT: 62 IN | TEMPERATURE: 98.5 F

## 2024-09-09 PROCEDURE — 6370000000 HC RX 637 (ALT 250 FOR IP): Performed by: INTERNAL MEDICINE

## 2024-09-09 PROCEDURE — 6370000000 HC RX 637 (ALT 250 FOR IP): Performed by: FAMILY MEDICINE

## 2024-09-09 PROCEDURE — 6360000002 HC RX W HCPCS: Performed by: FAMILY MEDICINE

## 2024-09-09 PROCEDURE — 2580000003 HC RX 258: Performed by: FAMILY MEDICINE

## 2024-09-09 PROCEDURE — 2700000000 HC OXYGEN THERAPY PER DAY

## 2024-09-09 PROCEDURE — 94640 AIRWAY INHALATION TREATMENT: CPT

## 2024-09-09 PROCEDURE — 94761 N-INVAS EAR/PLS OXIMETRY MLT: CPT

## 2024-09-09 RX ORDER — BUDESONIDE AND FORMOTEROL FUMARATE DIHYDRATE 80; 4.5 UG/1; UG/1
2 AEROSOL RESPIRATORY (INHALATION)
Qty: 10.2 G | Refills: 3 | Status: SHIPPED | OUTPATIENT
Start: 2024-09-09

## 2024-09-09 RX ORDER — IPRATROPIUM BROMIDE AND ALBUTEROL SULFATE 2.5; .5 MG/3ML; MG/3ML
3 SOLUTION RESPIRATORY (INHALATION) EVERY 4 HOURS PRN
Qty: 360 ML | Refills: 1 | Status: SHIPPED | OUTPATIENT
Start: 2024-09-09

## 2024-09-09 RX ADMIN — CLOPIDOGREL BISULFATE 75 MG: 75 TABLET ORAL at 09:37

## 2024-09-09 RX ADMIN — Medication 2 PUFF: at 08:29

## 2024-09-09 RX ADMIN — FUROSEMIDE 40 MG: 10 INJECTION, SOLUTION INTRAMUSCULAR; INTRAVENOUS at 09:46

## 2024-09-09 RX ADMIN — ASPIRIN 81 MG: 81 TABLET, COATED ORAL at 09:38

## 2024-09-09 RX ADMIN — APIXABAN 2.5 MG: 2.5 TABLET, FILM COATED ORAL at 09:38

## 2024-09-09 RX ADMIN — TRAMADOL HYDROCHLORIDE 50 MG: 50 TABLET ORAL at 06:25

## 2024-09-09 RX ADMIN — PANTOPRAZOLE SODIUM 40 MG: 40 TABLET, DELAYED RELEASE ORAL at 05:48

## 2024-09-09 RX ADMIN — SODIUM CHLORIDE, PRESERVATIVE FREE 10 ML: 5 INJECTION INTRAVENOUS at 09:46

## 2024-09-09 RX ADMIN — CLONIDINE HYDROCHLORIDE 0.1 MG: 0.1 TABLET ORAL at 09:38

## 2024-11-17 ENCOUNTER — APPOINTMENT (OUTPATIENT)
Facility: HOSPITAL | Age: 84
DRG: 291 | End: 2024-11-17
Payer: MEDICARE

## 2024-11-17 ENCOUNTER — HOSPITAL ENCOUNTER (INPATIENT)
Facility: HOSPITAL | Age: 84
LOS: 9 days | Discharge: NURSING, REHAB OR LONG-TERM CARE | DRG: 291 | End: 2024-11-27
Attending: EMERGENCY MEDICINE
Payer: MEDICARE

## 2024-11-17 DIAGNOSIS — I50.9 CONGESTIVE HEART FAILURE, UNSPECIFIED HF CHRONICITY, UNSPECIFIED HEART FAILURE TYPE (HCC): Primary | ICD-10-CM

## 2024-11-17 LAB
ALBUMIN SERPL-MCNC: 2.8 G/DL (ref 3.5–5)
ALBUMIN/GLOB SERPL: 0.5 (ref 1.1–2.2)
ALP SERPL-CCNC: 178 U/L (ref 45–117)
ALT SERPL-CCNC: 24 U/L (ref 12–78)
ANION GAP SERPL CALC-SCNC: 7 MMOL/L (ref 2–12)
AST SERPL W P-5'-P-CCNC: 10 U/L (ref 15–37)
BASOPHILS # BLD: 0 K/UL (ref 0–0.1)
BASOPHILS NFR BLD: 1 % (ref 0–1)
BILIRUB SERPL-MCNC: 0.4 MG/DL (ref 0.2–1)
BUN SERPL-MCNC: 33 MG/DL (ref 6–20)
BUN/CREAT SERPL: 18 (ref 12–20)
CA-I BLD-MCNC: 8.1 MG/DL (ref 8.5–10.1)
CHLORIDE SERPL-SCNC: 108 MMOL/L (ref 97–108)
CO2 SERPL-SCNC: 24 MMOL/L (ref 21–32)
CREAT SERPL-MCNC: 1.86 MG/DL (ref 0.55–1.02)
DIFFERENTIAL METHOD BLD: ABNORMAL
EOSINOPHIL # BLD: 0.1 K/UL (ref 0–0.4)
EOSINOPHIL NFR BLD: 1 % (ref 0–7)
ERYTHROCYTE [DISTWIDTH] IN BLOOD BY AUTOMATED COUNT: 17 % (ref 11.5–14.5)
GLOBULIN SER CALC-MCNC: 5.2 G/DL (ref 2–4)
GLUCOSE SERPL-MCNC: 303 MG/DL (ref 65–100)
HCT VFR BLD AUTO: 29.5 % (ref 35–47)
HGB BLD-MCNC: 8.8 G/DL (ref 11.5–16)
IMM GRANULOCYTES # BLD AUTO: 0.1 K/UL (ref 0–0.04)
IMM GRANULOCYTES NFR BLD AUTO: 1 % (ref 0–0.5)
LYMPHOCYTES # BLD: 0.5 K/UL (ref 0.8–3.5)
LYMPHOCYTES NFR BLD: 6 % (ref 12–49)
MAGNESIUM SERPL-MCNC: 2 MG/DL (ref 1.6–2.4)
MCH RBC QN AUTO: 27.2 PG (ref 26–34)
MCHC RBC AUTO-ENTMCNC: 29.8 G/DL (ref 30–36.5)
MCV RBC AUTO: 91 FL (ref 80–99)
MONOCYTES # BLD: 0.4 K/UL (ref 0–1)
MONOCYTES NFR BLD: 4 % (ref 5–13)
NEUTS SEG # BLD: 7.5 K/UL (ref 1.8–8)
NEUTS SEG NFR BLD: 87 % (ref 32–75)
NRBC # BLD: 0 K/UL (ref 0–0.01)
NRBC BLD-RTO: 0 PER 100 WBC
PLATELET # BLD AUTO: 293 K/UL (ref 150–400)
PMV BLD AUTO: 9.8 FL (ref 8.9–12.9)
POTASSIUM SERPL-SCNC: 4.6 MMOL/L (ref 3.5–5.1)
PROT SERPL-MCNC: 8 G/DL (ref 6.4–8.2)
RBC # BLD AUTO: 3.24 M/UL (ref 3.8–5.2)
SODIUM SERPL-SCNC: 139 MMOL/L (ref 136–145)
TROPONIN I SERPL HS-MCNC: 17 NG/L (ref 0–51)
WBC # BLD AUTO: 8.6 K/UL (ref 3.6–11)

## 2024-11-17 PROCEDURE — 84484 ASSAY OF TROPONIN QUANT: CPT

## 2024-11-17 PROCEDURE — 71045 X-RAY EXAM CHEST 1 VIEW: CPT

## 2024-11-17 PROCEDURE — 99285 EMERGENCY DEPT VISIT HI MDM: CPT

## 2024-11-17 PROCEDURE — 83540 ASSAY OF IRON: CPT

## 2024-11-17 PROCEDURE — 94761 N-INVAS EAR/PLS OXIMETRY MLT: CPT

## 2024-11-17 PROCEDURE — 36415 COLL VENOUS BLD VENIPUNCTURE: CPT

## 2024-11-17 PROCEDURE — 96374 THER/PROPH/DIAG INJ IV PUSH: CPT

## 2024-11-17 PROCEDURE — 93005 ELECTROCARDIOGRAM TRACING: CPT | Performed by: EMERGENCY MEDICINE

## 2024-11-17 PROCEDURE — 85025 COMPLETE CBC W/AUTO DIFF WBC: CPT

## 2024-11-17 PROCEDURE — 80053 COMPREHEN METABOLIC PANEL: CPT

## 2024-11-17 PROCEDURE — 6370000000 HC RX 637 (ALT 250 FOR IP): Performed by: EMERGENCY MEDICINE

## 2024-11-17 PROCEDURE — 2700000000 HC OXYGEN THERAPY PER DAY

## 2024-11-17 PROCEDURE — 6360000002 HC RX W HCPCS: Performed by: EMERGENCY MEDICINE

## 2024-11-17 PROCEDURE — 83735 ASSAY OF MAGNESIUM: CPT

## 2024-11-17 RX ORDER — TRAMADOL HYDROCHLORIDE 50 MG/1
25 TABLET ORAL ONCE
Status: COMPLETED | OUTPATIENT
Start: 2024-11-17 | End: 2024-11-17

## 2024-11-17 RX ORDER — FUROSEMIDE 10 MG/ML
40 INJECTION INTRAMUSCULAR; INTRAVENOUS
Status: COMPLETED | OUTPATIENT
Start: 2024-11-17 | End: 2024-11-17

## 2024-11-17 RX ADMIN — TRAMADOL HYDROCHLORIDE 25 MG: 50 TABLET ORAL at 18:21

## 2024-11-17 RX ADMIN — FUROSEMIDE 40 MG: 10 INJECTION, SOLUTION INTRAMUSCULAR; INTRAVENOUS at 18:12

## 2024-11-17 ASSESSMENT — PAIN - FUNCTIONAL ASSESSMENT: PAIN_FUNCTIONAL_ASSESSMENT: 0-10

## 2024-11-17 ASSESSMENT — PAIN SCALES - GENERAL: PAINLEVEL_OUTOF10: 8

## 2024-11-17 NOTE — ED PROVIDER NOTES
[facility] EMERGENCY DEPT  EMERGENCY DEPARTMENT HISTORY AND PHYSICAL EXAM      Date: 11/17/2024  Patient Name: Ana Rosa Bearden  MRN: [id number]  Birthdate 1940  Date of evaluation: 11/17/2024  Provider: Costa Alan MD   Note Started: 4:55 PM EST 11/17/24    HISTORY OF PRESENT ILLNESS     Chief Complaint   Patient presents with   • Shortness of Breath       History Provided By: Patient    HPI: Ana Rosa Bearden is a 84 y.o. female with complicated past medical history to include congestive heart failure CKD diabetes hypercholesterolemia presents with increasing shortness of breath over the past couple of days.  She denies any fever, chills, nausea, vomiting, chest pain, rash, diarrhea, headache, night sweats.  Symptoms are mild to moderate constant at this time.  She has not treated with anything is failed to improve.  She has not had an oxygen requirement increase.    PAST MEDICAL HISTORY   Past Medical History:  Past Medical History:   Diagnosis Date   • Acute respiratory distress    • CHF (congestive heart failure) (HCC)    • CKD (chronic kidney disease)    • Diabetes mellitus (HCC)    • GERD (gastroesophageal reflux disease)    • Hypercholesterolemia    • Hyperlipidemia    • Hypertension    • Migraine        Past Surgical History:  Past Surgical History:   Procedure Laterality Date   • HERNIA REPAIR     • IR NONTUNNELED VASCULAR CATHETER  3/29/2024    IR NONTUNNELED VASCULAR CATHETER 3/29/2024 Ez Chowdhury MD [facility] RAD ANGIO IR   • TUBAL LIGATION         Family History:  Family History   Problem Relation Age of Onset   • Hypertension Father        Social History:  Social History     Tobacco Use   • Smoking status: Never   • Smokeless tobacco: Never   Substance Use Topics   • Alcohol use: Not Currently   • Drug use: Never       Allergies:  Allergies   Allergen Reactions   • Azithromycin    • Iodine Swelling   • Levaquin [Levofloxacin]    • Uzihr-Ogbkr-Qdcpdfs-Pramoxine    • Penicillins Rash and Swelling

## 2024-11-17 NOTE — ED TRIAGE NOTES
Pt arrives via EMS from [facility] for increased sob. Per EMS pt has hx CHF, wears 2L at baseline, bumped to 4L at facility today. 1 sublingual nitro given en route, pt received 1 duoneb at facility.

## 2024-11-18 ENCOUNTER — APPOINTMENT (OUTPATIENT)
Facility: HOSPITAL | Age: 84
DRG: 291 | End: 2024-11-18
Payer: MEDICARE

## 2024-11-18 PROBLEM — I50.43 CHF (CONGESTIVE HEART FAILURE), NYHA CLASS I, ACUTE ON CHRONIC, COMBINED (HCC): Status: ACTIVE | Noted: 2024-11-18

## 2024-11-18 LAB
ANION GAP SERPL CALC-SCNC: 7 MMOL/L (ref 2–12)
BASOPHILS # BLD: 0.1 K/UL (ref 0–0.1)
BASOPHILS NFR BLD: 1 % (ref 0–1)
BNP SERPL-MCNC: 3355 PG/ML
BUN SERPL-MCNC: 31 MG/DL (ref 6–20)
BUN/CREAT SERPL: 18 (ref 12–20)
CA-I BLD-MCNC: 8.9 MG/DL (ref 8.5–10.1)
CHLORIDE SERPL-SCNC: 109 MMOL/L (ref 97–108)
CO2 SERPL-SCNC: 26 MMOL/L (ref 21–32)
CREAT SERPL-MCNC: 1.71 MG/DL (ref 0.55–1.02)
DIFFERENTIAL METHOD BLD: ABNORMAL
EKG ATRIAL RATE: 271 BPM
EKG ATRIAL RATE: 278 BPM
EKG DIAGNOSIS: NORMAL
EKG DIAGNOSIS: NORMAL
EKG Q-T INTERVAL: 362 MS
EKG Q-T INTERVAL: 382 MS
EKG QRS DURATION: 76 MS
EKG QRS DURATION: 80 MS
EKG QTC CALCULATION (BAZETT): 454 MS
EKG QTC CALCULATION (BAZETT): 480 MS
EKG R AXIS: 19 DEGREES
EKG R AXIS: 49 DEGREES
EKG T AXIS: 74 DEGREES
EKG T AXIS: 92 DEGREES
EKG VENTRICULAR RATE: 106 BPM
EKG VENTRICULAR RATE: 85 BPM
EOSINOPHIL # BLD: 0.2 K/UL (ref 0–0.4)
EOSINOPHIL NFR BLD: 2 % (ref 0–7)
ERYTHROCYTE [DISTWIDTH] IN BLOOD BY AUTOMATED COUNT: 17.2 % (ref 11.5–14.5)
EST. AVERAGE GLUCOSE BLD GHB EST-MCNC: 180 MG/DL
GLUCOSE BLD STRIP.AUTO-MCNC: 146 MG/DL (ref 65–100)
GLUCOSE BLD STRIP.AUTO-MCNC: 215 MG/DL (ref 65–100)
GLUCOSE BLD STRIP.AUTO-MCNC: 241 MG/DL (ref 65–100)
GLUCOSE BLD STRIP.AUTO-MCNC: 279 MG/DL (ref 65–100)
GLUCOSE SERPL-MCNC: 197 MG/DL (ref 65–100)
HBA1C MFR BLD: 7.9 % (ref 4–5.6)
HCT VFR BLD AUTO: 30.9 % (ref 35–47)
HGB BLD-MCNC: 8.9 G/DL (ref 11.5–16)
IMM GRANULOCYTES # BLD AUTO: 0.1 K/UL (ref 0–0.04)
IMM GRANULOCYTES NFR BLD AUTO: 1 % (ref 0–0.5)
IRON SATN MFR SERPL: 9 % (ref 20–50)
IRON SERPL-MCNC: 29 UG/DL (ref 35–150)
LYMPHOCYTES # BLD: 0.7 K/UL (ref 0.8–3.5)
LYMPHOCYTES NFR BLD: 8 % (ref 12–49)
MAGNESIUM SERPL-MCNC: 2 MG/DL (ref 1.6–2.4)
MCH RBC QN AUTO: 27.2 PG (ref 26–34)
MCHC RBC AUTO-ENTMCNC: 28.8 G/DL (ref 30–36.5)
MCV RBC AUTO: 94.5 FL (ref 80–99)
MONOCYTES # BLD: 0.5 K/UL (ref 0–1)
MONOCYTES NFR BLD: 6 % (ref 5–13)
NEUTS SEG # BLD: 7.1 K/UL (ref 1.8–8)
NEUTS SEG NFR BLD: 82 % (ref 32–75)
NRBC # BLD: 0 K/UL (ref 0–0.01)
NRBC BLD-RTO: 0 PER 100 WBC
PERFORMED BY:: ABNORMAL
PHOSPHATE SERPL-MCNC: 4 MG/DL (ref 2.6–4.7)
PLATELET # BLD AUTO: 284 K/UL (ref 150–400)
PMV BLD AUTO: 10.2 FL (ref 8.9–12.9)
POTASSIUM SERPL-SCNC: 4.2 MMOL/L (ref 3.5–5.1)
RBC # BLD AUTO: 3.27 M/UL (ref 3.8–5.2)
RBC MORPH BLD: ABNORMAL
SODIUM SERPL-SCNC: 142 MMOL/L (ref 136–145)
TIBC SERPL-MCNC: 338 UG/DL (ref 250–450)
WBC # BLD AUTO: 8.7 K/UL (ref 3.6–11)

## 2024-11-18 PROCEDURE — 6360000002 HC RX W HCPCS

## 2024-11-18 PROCEDURE — 84100 ASSAY OF PHOSPHORUS: CPT

## 2024-11-18 PROCEDURE — 6370000000 HC RX 637 (ALT 250 FOR IP)

## 2024-11-18 PROCEDURE — 2060000000 HC ICU INTERMEDIATE R&B

## 2024-11-18 PROCEDURE — 83036 HEMOGLOBIN GLYCOSYLATED A1C: CPT

## 2024-11-18 PROCEDURE — 2700000000 HC OXYGEN THERAPY PER DAY

## 2024-11-18 PROCEDURE — 36415 COLL VENOUS BLD VENIPUNCTURE: CPT

## 2024-11-18 PROCEDURE — 83880 ASSAY OF NATRIURETIC PEPTIDE: CPT

## 2024-11-18 PROCEDURE — 85025 COMPLETE CBC W/AUTO DIFF WBC: CPT

## 2024-11-18 PROCEDURE — C8929 TTE W OR WO FOL WCON,DOPPLER: HCPCS

## 2024-11-18 PROCEDURE — 83735 ASSAY OF MAGNESIUM: CPT

## 2024-11-18 PROCEDURE — 80048 BASIC METABOLIC PNL TOTAL CA: CPT

## 2024-11-18 PROCEDURE — 82962 GLUCOSE BLOOD TEST: CPT

## 2024-11-18 PROCEDURE — 94761 N-INVAS EAR/PLS OXIMETRY MLT: CPT

## 2024-11-18 PROCEDURE — 2580000003 HC RX 258

## 2024-11-18 PROCEDURE — 93005 ELECTROCARDIOGRAM TRACING: CPT

## 2024-11-18 PROCEDURE — 6360000004 HC RX CONTRAST MEDICATION

## 2024-11-18 RX ORDER — SUMATRIPTAN SUCCINATE 25 MG/1
50 TABLET ORAL ONCE
Status: COMPLETED | OUTPATIENT
Start: 2024-11-18 | End: 2024-11-18

## 2024-11-18 RX ORDER — ONDANSETRON 4 MG/1
4 TABLET, ORALLY DISINTEGRATING ORAL EVERY 8 HOURS PRN
Status: DISCONTINUED | OUTPATIENT
Start: 2024-11-18 | End: 2024-11-27 | Stop reason: HOSPADM

## 2024-11-18 RX ORDER — POLYETHYLENE GLYCOL 3350 17 G/17G
17 POWDER, FOR SOLUTION ORAL DAILY PRN
Status: DISCONTINUED | OUTPATIENT
Start: 2024-11-18 | End: 2024-11-27 | Stop reason: HOSPADM

## 2024-11-18 RX ORDER — FUROSEMIDE 10 MG/ML
40 INJECTION INTRAMUSCULAR; INTRAVENOUS 2 TIMES DAILY
Status: DISCONTINUED | OUTPATIENT
Start: 2024-11-18 | End: 2024-11-22

## 2024-11-18 RX ORDER — SODIUM CHLORIDE 0.9 % (FLUSH) 0.9 %
5-40 SYRINGE (ML) INJECTION EVERY 12 HOURS SCHEDULED
Status: DISCONTINUED | OUTPATIENT
Start: 2024-11-18 | End: 2024-11-27 | Stop reason: HOSPADM

## 2024-11-18 RX ORDER — POTASSIUM CHLORIDE 1500 MG/1
40 TABLET, EXTENDED RELEASE ORAL PRN
Status: DISCONTINUED | OUTPATIENT
Start: 2024-11-18 | End: 2024-11-18

## 2024-11-18 RX ORDER — SUMATRIPTAN 6 MG/.5ML
6 INJECTION, SOLUTION SUBCUTANEOUS ONCE
Status: COMPLETED | OUTPATIENT
Start: 2024-11-18 | End: 2024-11-18

## 2024-11-18 RX ORDER — HYDRALAZINE HYDROCHLORIDE 25 MG/1
25 TABLET, FILM COATED ORAL EVERY 8 HOURS SCHEDULED
Status: DISCONTINUED | OUTPATIENT
Start: 2024-11-18 | End: 2024-11-27 | Stop reason: HOSPADM

## 2024-11-18 RX ORDER — ACETAMINOPHEN 325 MG/1
650 TABLET ORAL EVERY 6 HOURS PRN
Status: DISCONTINUED | OUTPATIENT
Start: 2024-11-18 | End: 2024-11-27 | Stop reason: HOSPADM

## 2024-11-18 RX ORDER — SODIUM CHLORIDE 0.9 % (FLUSH) 0.9 %
5-40 SYRINGE (ML) INJECTION PRN
Status: DISCONTINUED | OUTPATIENT
Start: 2024-11-18 | End: 2024-11-27 | Stop reason: HOSPADM

## 2024-11-18 RX ORDER — POTASSIUM CHLORIDE 7.45 MG/ML
10 INJECTION INTRAVENOUS PRN
Status: DISCONTINUED | OUTPATIENT
Start: 2024-11-18 | End: 2024-11-18

## 2024-11-18 RX ORDER — HYDRALAZINE HYDROCHLORIDE 25 MG/1
25 TABLET, FILM COATED ORAL 3 TIMES DAILY
COMMUNITY

## 2024-11-18 RX ORDER — ATORVASTATIN CALCIUM 40 MG/1
40 TABLET, FILM COATED ORAL NIGHTLY
Status: DISCONTINUED | OUTPATIENT
Start: 2024-11-18 | End: 2024-11-27 | Stop reason: HOSPADM

## 2024-11-18 RX ORDER — ENOXAPARIN SODIUM 100 MG/ML
40 INJECTION SUBCUTANEOUS DAILY
Status: DISCONTINUED | OUTPATIENT
Start: 2024-11-18 | End: 2024-11-18

## 2024-11-18 RX ORDER — ACETAMINOPHEN 650 MG/1
650 SUPPOSITORY RECTAL EVERY 6 HOURS PRN
Status: DISCONTINUED | OUTPATIENT
Start: 2024-11-18 | End: 2024-11-27 | Stop reason: HOSPADM

## 2024-11-18 RX ORDER — DEXTROSE MONOHYDRATE 100 MG/ML
INJECTION, SOLUTION INTRAVENOUS CONTINUOUS PRN
Status: DISCONTINUED | OUTPATIENT
Start: 2024-11-18 | End: 2024-11-27 | Stop reason: HOSPADM

## 2024-11-18 RX ORDER — TRAMADOL HYDROCHLORIDE 50 MG/1
50 TABLET ORAL EVERY 6 HOURS PRN
Status: ON HOLD | COMMUNITY
End: 2024-11-24 | Stop reason: HOSPADM

## 2024-11-18 RX ORDER — BUTALBITAL, ACETAMINOPHEN AND CAFFEINE 50; 325; 40 MG/1; MG/1; MG/1
1 TABLET ORAL EVERY 6 HOURS PRN
Status: DISCONTINUED | OUTPATIENT
Start: 2024-11-18 | End: 2024-11-27 | Stop reason: HOSPADM

## 2024-11-18 RX ORDER — ONDANSETRON 2 MG/ML
4 INJECTION INTRAMUSCULAR; INTRAVENOUS EVERY 6 HOURS PRN
Status: DISCONTINUED | OUTPATIENT
Start: 2024-11-18 | End: 2024-11-27 | Stop reason: HOSPADM

## 2024-11-18 RX ORDER — INSULIN LISPRO 100 [IU]/ML
0-4 INJECTION, SOLUTION INTRAVENOUS; SUBCUTANEOUS
Status: DISCONTINUED | OUTPATIENT
Start: 2024-11-18 | End: 2024-11-27 | Stop reason: HOSPADM

## 2024-11-18 RX ORDER — CLONIDINE HYDROCHLORIDE 0.1 MG/1
0.1 TABLET ORAL 2 TIMES DAILY
Status: DISCONTINUED | OUTPATIENT
Start: 2024-11-18 | End: 2024-11-22

## 2024-11-18 RX ORDER — ASPIRIN 81 MG/1
81 TABLET ORAL DAILY
Status: DISCONTINUED | OUTPATIENT
Start: 2024-11-18 | End: 2024-11-27 | Stop reason: HOSPADM

## 2024-11-18 RX ORDER — ALBUTEROL SULFATE 90 UG/1
2 INHALANT RESPIRATORY (INHALATION) EVERY 4 HOURS PRN
Status: DISCONTINUED | OUTPATIENT
Start: 2024-11-18 | End: 2024-11-27 | Stop reason: HOSPADM

## 2024-11-18 RX ORDER — SODIUM CHLORIDE 9 MG/ML
INJECTION, SOLUTION INTRAVENOUS PRN
Status: DISCONTINUED | OUTPATIENT
Start: 2024-11-18 | End: 2024-11-27 | Stop reason: HOSPADM

## 2024-11-18 RX ORDER — GLUCAGON 1 MG/ML
1 KIT INJECTION PRN
Status: DISCONTINUED | OUTPATIENT
Start: 2024-11-18 | End: 2024-11-27 | Stop reason: HOSPADM

## 2024-11-18 RX ORDER — MAGNESIUM SULFATE IN WATER 40 MG/ML
2000 INJECTION, SOLUTION INTRAVENOUS PRN
Status: DISCONTINUED | OUTPATIENT
Start: 2024-11-18 | End: 2024-11-18

## 2024-11-18 RX ADMIN — BUTALBITAL, ACETAMINOPHEN, AND CAFFEINE 1 TABLET: 325; 50; 40 TABLET ORAL at 19:49

## 2024-11-18 RX ADMIN — ASPIRIN 81 MG: 81 TABLET, COATED ORAL at 08:46

## 2024-11-18 RX ADMIN — EMPAGLIFLOZIN 10 MG: 10 TABLET, FILM COATED ORAL at 11:05

## 2024-11-18 RX ADMIN — HYDRALAZINE HYDROCHLORIDE 25 MG: 50 TABLET ORAL at 13:09

## 2024-11-18 RX ADMIN — SODIUM CHLORIDE, PRESERVATIVE FREE 10 ML: 5 INJECTION INTRAVENOUS at 08:51

## 2024-11-18 RX ADMIN — SODIUM CHLORIDE, PRESERVATIVE FREE 10 ML: 5 INJECTION INTRAVENOUS at 19:52

## 2024-11-18 RX ADMIN — APIXABAN 2.5 MG: 2.5 TABLET, FILM COATED ORAL at 08:46

## 2024-11-18 RX ADMIN — FUROSEMIDE 40 MG: 10 INJECTION, SOLUTION INTRAMUSCULAR; INTRAVENOUS at 08:50

## 2024-11-18 RX ADMIN — ONDANSETRON 4 MG: 2 INJECTION INTRAMUSCULAR; INTRAVENOUS at 09:59

## 2024-11-18 RX ADMIN — PERFLUTREN 2 ML: 6.52 INJECTION, SUSPENSION INTRAVENOUS at 19:20

## 2024-11-18 RX ADMIN — APIXABAN 2.5 MG: 2.5 TABLET, FILM COATED ORAL at 19:51

## 2024-11-18 RX ADMIN — SODIUM CHLORIDE, PRESERVATIVE FREE 10 ML: 5 INJECTION INTRAVENOUS at 17:42

## 2024-11-18 RX ADMIN — ATORVASTATIN CALCIUM 40 MG: 40 TABLET, FILM COATED ORAL at 19:50

## 2024-11-18 RX ADMIN — FUROSEMIDE 40 MG: 10 INJECTION, SOLUTION INTRAMUSCULAR; INTRAVENOUS at 17:42

## 2024-11-18 RX ADMIN — HYDRALAZINE HYDROCHLORIDE 25 MG: 50 TABLET ORAL at 20:41

## 2024-11-18 RX ADMIN — INSULIN LISPRO 1 UNITS: 100 INJECTION, SOLUTION INTRAVENOUS; SUBCUTANEOUS at 19:50

## 2024-11-18 RX ADMIN — CLONIDINE HYDROCHLORIDE 0.1 MG: 0.1 TABLET ORAL at 08:46

## 2024-11-18 RX ADMIN — INSULIN LISPRO 2 UNITS: 100 INJECTION, SOLUTION INTRAVENOUS; SUBCUTANEOUS at 13:10

## 2024-11-18 RX ADMIN — SUMATRIPTAN SUCCINATE 50 MG: 25 TABLET ORAL at 13:07

## 2024-11-18 RX ADMIN — CLONIDINE HYDROCHLORIDE 0.1 MG: 0.1 TABLET ORAL at 19:50

## 2024-11-18 RX ADMIN — SUMATRIPTAN 6 MG: 6 INJECTION, SOLUTION SUBCUTANEOUS at 04:19

## 2024-11-18 RX ADMIN — ONDANSETRON 4 MG: 2 INJECTION INTRAMUSCULAR; INTRAVENOUS at 17:46

## 2024-11-18 ASSESSMENT — PAIN DESCRIPTION - ORIENTATION
ORIENTATION: ANTERIOR
ORIENTATION: ANTERIOR

## 2024-11-18 ASSESSMENT — PAIN DESCRIPTION - LOCATION
LOCATION: HEAD
LOCATION: HEAD

## 2024-11-18 ASSESSMENT — PAIN DESCRIPTION - DESCRIPTORS
DESCRIPTORS: ACHING
DESCRIPTORS: ACHING

## 2024-11-18 ASSESSMENT — PAIN SCALES - GENERAL
PAINLEVEL_OUTOF10: 10
PAINLEVEL_OUTOF10: 10
PAINLEVEL_OUTOF10: 0

## 2024-11-18 NOTE — H&P
Hospitalist Admission Note    NAME: Ana Rosa Bearden   :  1940   MRN:  [id number]     Date/Time:  2024 12:57 AM    Patient PCP: Beth Hardy MD    ______________________________________________________________________  Given the patient's current clinical presentation, I have a high level of concern for decompensation if discharged from the emergency department.  Complex decision making was performed, which includes reviewing the patient's available past medical records, laboratory results, and x-ray films.       My assessment of this patient's clinical condition and my plan of care is as follows.    Assessment / Plan:      Acute hypoxemic respiratory failure secondary to heart failure preserved EF 65-70% exacerbation   -Maintain oxygen saturation above 94%  -Etiology due to underdosing  -Continue IV diuresis with Lasix 40 mg twice daily  -Monitor I's and O's  -Daily weights  -Monitor potassium and magnesium  -Keep potassium over 4 and magnesium above 2  -Repeat echo, last echo was in March  -GDMT,-  beta-blocker not on board due to pauses  -Appreciate cardiology recommendation, was seen by [facility] cardiology (dr. Chairez)     S/p  TAVR  Mitral stenosis with regurgitation  Sick sinus syndrome  -Patient previously declined pacemaker  -Continue Eliquis, aspirin    Acute headache, likely migraine  Chronic headache  -Will give a dose of sumatriptan  -Continue Fioricet as needed    CASIE on CKD versus CKD  -Creatinine is 1.86, could be prerenal azotemia versus cardiorenal  -Baseline is 1.6-1.8  -Will continue to monitor  -Avoid nephrotoxins    Elevated ALP  -Could be hepatic congestion, will continue to monitor for now    Chronic anemia  -Hemoglobin is at baseline 8-10  -Likely anemia of chronic disease versus CKD    Hypertension  Hyperlipidemia  CAD  -need med rec    Code Status: Full   DVT Prophylaxis: Eliquis  GI Prophylaxis:  PPI     Medication reconciliation though EMR and/or outside facility

## 2024-11-18 NOTE — ED NOTES
ED TO INPATIENT SBAR HANDOFF    Patient Name: Ana Rosa Bearden   Preferred Name: Ana Rosa  : 1940  84 y.o.   Family/Caregiver Present: no   Code Status Order: Full Code  PO Status: NPO:No  Telemetry Order:   C-SSRS: Risk of Suicide: No Risk  Sitter no   Restraints:     Sepsis Risk Score      Situation  Chief Complaint   Patient presents with    Shortness of Breath     Brief Description of Patient's Condition: pt with CHF exacerbation   Mental Status: oriented and alert  Arrived from:Home  Imaging:   XR CHEST 1 VIEW   Final Result   Moderate interstitial pulmonary edema. Small bilateral pleural   effusions.      Electronically signed by Skip Vazquez MD        Abnormal labs:   Abnormal Labs Reviewed   CBC WITH AUTO DIFFERENTIAL - Abnormal; Notable for the following components:       Result Value    RBC 3.24 (*)     Hemoglobin 8.8 (*)     Hematocrit 29.5 (*)     MCHC 29.8 (*)     RDW 17.0 (*)     Neutrophils % 87 (*)     Lymphocytes % 6 (*)     Monocytes % 4 (*)     Immature Granulocytes % 1 (*)     Lymphocytes Absolute 0.5 (*)     Immature Granulocytes Absolute 0.1 (*)     All other components within normal limits   COMPREHENSIVE METABOLIC PANEL - Abnormal; Notable for the following components:    Glucose 303 (*)     BUN 33 (*)     Creatinine 1.86 (*)     Est, Glom Filt Rate 27 (*)     Calcium 8.1 (*)     AST 10 (*)     Alk Phosphatase 178 (*)     Albumin 2.8 (*)     Globulin 5.2 (*)     Albumin/Globulin Ratio 0.5 (*)     All other components within normal limits       Background  Allergies:   Allergies   Allergen Reactions    Azithromycin     Iodine Swelling    Levaquin [Levofloxacin]     Sdpce-Vbjvd-Ybzlgbv-Pramoxine     Penicillins Rash and Swelling     Airway swelling     History:   Past Medical History:   Diagnosis Date    Acute respiratory distress     CHF (congestive heart failure) (HCC)     CKD (chronic kidney disease)     Diabetes mellitus (HCC)     GERD (gastroesophageal reflux disease)

## 2024-11-18 NOTE — ED NOTES
Tech ambulated pt with walker.  Upon ambulation pt o2 SAT dropped to 82%.  Pt placed back to bed and md made aware.

## 2024-11-19 LAB
ALBUMIN SERPL-MCNC: 2.6 G/DL (ref 3.5–5)
ALBUMIN/GLOB SERPL: 0.5 (ref 1.1–2.2)
ALP SERPL-CCNC: 163 U/L (ref 45–117)
ALT SERPL-CCNC: 23 U/L (ref 12–78)
AST SERPL W P-5'-P-CCNC: 10 U/L (ref 15–37)
BASOPHILS # BLD: 0 K/UL (ref 0–0.1)
BASOPHILS NFR BLD: 1 % (ref 0–1)
BILIRUB DIRECT SERPL-MCNC: 0.1 MG/DL (ref 0–0.2)
BILIRUB SERPL-MCNC: 0.4 MG/DL (ref 0.2–1)
DIFFERENTIAL METHOD BLD: ABNORMAL
ECHO AO ROOT DIAM: 2.3 CM
ECHO AO ROOT INDEX: 1.25 CM/M2
ECHO AV AREA PEAK VELOCITY: 0.4 CM2
ECHO AV AREA VTI: 0.5 CM2
ECHO AV AREA/BSA PEAK VELOCITY: 0.2 CM2/M2
ECHO AV AREA/BSA VTI: 0.3 CM2/M2
ECHO AV MEAN GRADIENT: 11 MMHG
ECHO AV MEAN VELOCITY: 1.5 M/S
ECHO AV PEAK GRADIENT: 19 MMHG
ECHO AV PEAK VELOCITY: 2.2 M/S
ECHO AV VELOCITY RATIO: 0.32
ECHO AV VTI: 39.6 CM
ECHO BSA: 1.9 M2
ECHO EST RA PRESSURE: 3 MMHG
ECHO IVC EXP: 1.7 CM
ECHO LA AREA 4C: 23.7 CM2
ECHO LA DIAMETER INDEX: 2.5 CM/M2
ECHO LA DIAMETER: 4.6 CM
ECHO LA MAJOR AXIS: 6 CM
ECHO LA TO AORTIC ROOT RATIO: 2
ECHO LA VOL MOD A4C: 76 ML (ref 22–52)
ECHO LA VOLUME INDEX MOD A4C: 41 ML/M2 (ref 16–34)
ECHO LV EDV A4C: 81 ML
ECHO LV EDV INDEX A4C: 44 ML/M2
ECHO LV EJECTION FRACTION A4C: 71 %
ECHO LV EJECTION FRACTION BIPLANE: 71 % (ref 55–100)
ECHO LV ESV A4C: 24 ML
ECHO LV ESV INDEX A4C: 13 ML/M2
ECHO LV FRACTIONAL SHORTENING: 55 % (ref 28–44)
ECHO LV INTERNAL DIMENSION DIASTOLE INDEX: 2.17 CM/M2
ECHO LV INTERNAL DIMENSION DIASTOLIC: 4 CM (ref 3.9–5.3)
ECHO LV INTERNAL DIMENSION SYSTOLIC INDEX: 0.98 CM/M2
ECHO LV INTERNAL DIMENSION SYSTOLIC: 1.8 CM
ECHO LV IVSD: 1.4 CM (ref 0.6–0.9)
ECHO LV MASS 2D: 209 G (ref 67–162)
ECHO LV MASS INDEX 2D: 113.6 G/M2 (ref 43–95)
ECHO LV POSTERIOR WALL DIASTOLIC: 1.4 CM (ref 0.6–0.9)
ECHO LV RELATIVE WALL THICKNESS RATIO: 0.7
ECHO LVOT AREA: 1.3 CM2
ECHO LVOT AV VTI INDEX: 0.36
ECHO LVOT DIAM: 1.3 CM
ECHO LVOT MEAN GRADIENT: 2 MMHG
ECHO LVOT PEAK GRADIENT: 2 MMHG
ECHO LVOT PEAK VELOCITY: 0.7 M/S
ECHO LVOT STROKE VOLUME INDEX: 10.3 ML/M2
ECHO LVOT SV: 19 ML
ECHO LVOT VTI: 14.3 CM
ECHO MV AREA VTI: 0.5 CM2
ECHO MV LVOT VTI INDEX: 2.84
ECHO MV MAX VELOCITY: 2.1 M/S
ECHO MV MEAN GRADIENT: 5 MMHG
ECHO MV MEAN VELOCITY: 1 M/S
ECHO MV PEAK GRADIENT: 17 MMHG
ECHO MV REGURGITANT PEAK GRADIENT: 23 MMHG
ECHO MV REGURGITANT PEAK VELOCITY: 2.4 M/S
ECHO MV VTI: 40.6 CM
ECHO PV ACCELERATION TIME (AT): 99 MS
ECHO PV MAX VELOCITY: 1.3 M/S
ECHO PV PEAK GRADIENT: 7 MMHG
ECHO RA AREA 4C: 22.7 CM2
ECHO RA END SYSTOLIC VOLUME APICAL 4 CHAMBER INDEX BSA: 36 ML/M2
ECHO RA VOLUME: 66 ML
ECHO RIGHT VENTRICULAR SYSTOLIC PRESSURE (RVSP): 18 MMHG
ECHO RV BASAL DIMENSION: 4.1 CM
ECHO RV TAPSE: 1.3 CM (ref 1.7–?)
ECHO TV REGURGITANT MAX VELOCITY: 1.96 M/S
ECHO TV REGURGITANT PEAK GRADIENT: 15 MMHG
EKG ATRIAL RATE: 279 BPM
EKG DIAGNOSIS: NORMAL
EKG Q-T INTERVAL: 366 MS
EKG QRS DURATION: 80 MS
EKG QTC CALCULATION (BAZETT): 455 MS
EKG R AXIS: 21 DEGREES
EKG T AXIS: 95 DEGREES
EKG VENTRICULAR RATE: 93 BPM
EOSINOPHIL # BLD: 0.3 K/UL (ref 0–0.4)
EOSINOPHIL NFR BLD: 3 % (ref 0–7)
ERYTHROCYTE [DISTWIDTH] IN BLOOD BY AUTOMATED COUNT: 17.3 % (ref 11.5–14.5)
GLOBULIN SER CALC-MCNC: 5 G/DL (ref 2–4)
GLUCOSE BLD STRIP.AUTO-MCNC: 145 MG/DL (ref 65–100)
GLUCOSE BLD STRIP.AUTO-MCNC: 159 MG/DL (ref 65–100)
GLUCOSE BLD STRIP.AUTO-MCNC: 160 MG/DL (ref 65–100)
GLUCOSE BLD STRIP.AUTO-MCNC: 174 MG/DL (ref 65–100)
HCT VFR BLD AUTO: 27.9 % (ref 35–47)
HGB BLD-MCNC: 8.2 G/DL (ref 11.5–16)
IMM GRANULOCYTES # BLD AUTO: 0 K/UL (ref 0–0.04)
IMM GRANULOCYTES NFR BLD AUTO: 0 % (ref 0–0.5)
LYMPHOCYTES # BLD: 1.2 K/UL (ref 0.8–3.5)
LYMPHOCYTES NFR BLD: 16 % (ref 12–49)
MCH RBC QN AUTO: 26.7 PG (ref 26–34)
MCHC RBC AUTO-ENTMCNC: 29.4 G/DL (ref 30–36.5)
MCV RBC AUTO: 90.9 FL (ref 80–99)
MONOCYTES # BLD: 0.5 K/UL (ref 0–1)
MONOCYTES NFR BLD: 7 % (ref 5–13)
NEUTS SEG # BLD: 5.6 K/UL (ref 1.8–8)
NEUTS SEG NFR BLD: 73 % (ref 32–75)
NRBC # BLD: 0 K/UL (ref 0–0.01)
NRBC BLD-RTO: 0 PER 100 WBC
PERFORMED BY:: ABNORMAL
PLATELET # BLD AUTO: 296 K/UL (ref 150–400)
PMV BLD AUTO: 10 FL (ref 8.9–12.9)
PROT SERPL-MCNC: 7.6 G/DL (ref 6.4–8.2)
RBC # BLD AUTO: 3.07 M/UL (ref 3.8–5.2)
WBC # BLD AUTO: 7.7 K/UL (ref 3.6–11)

## 2024-11-19 PROCEDURE — 6370000000 HC RX 637 (ALT 250 FOR IP)

## 2024-11-19 PROCEDURE — 85025 COMPLETE CBC W/AUTO DIFF WBC: CPT

## 2024-11-19 PROCEDURE — 97165 OT EVAL LOW COMPLEX 30 MIN: CPT

## 2024-11-19 PROCEDURE — 2700000000 HC OXYGEN THERAPY PER DAY

## 2024-11-19 PROCEDURE — 2580000003 HC RX 258

## 2024-11-19 PROCEDURE — 2060000000 HC ICU INTERMEDIATE R&B

## 2024-11-19 PROCEDURE — 94761 N-INVAS EAR/PLS OXIMETRY MLT: CPT

## 2024-11-19 PROCEDURE — 80076 HEPATIC FUNCTION PANEL: CPT

## 2024-11-19 PROCEDURE — 97530 THERAPEUTIC ACTIVITIES: CPT

## 2024-11-19 PROCEDURE — 36415 COLL VENOUS BLD VENIPUNCTURE: CPT

## 2024-11-19 PROCEDURE — 97161 PT EVAL LOW COMPLEX 20 MIN: CPT

## 2024-11-19 PROCEDURE — 6360000002 HC RX W HCPCS

## 2024-11-19 PROCEDURE — 82962 GLUCOSE BLOOD TEST: CPT

## 2024-11-19 RX ORDER — MAGNESIUM HYDROXIDE/ALUMINUM HYDROXICE/SIMETHICONE 120; 1200; 1200 MG/30ML; MG/30ML; MG/30ML
30 SUSPENSION ORAL EVERY 6 HOURS PRN
Status: DISCONTINUED | OUTPATIENT
Start: 2024-11-19 | End: 2024-11-27 | Stop reason: HOSPADM

## 2024-11-19 RX ORDER — PANTOPRAZOLE SODIUM 40 MG/1
40 TABLET, DELAYED RELEASE ORAL
Status: DISCONTINUED | OUTPATIENT
Start: 2024-11-19 | End: 2024-11-27 | Stop reason: HOSPADM

## 2024-11-19 RX ADMIN — FUROSEMIDE 40 MG: 10 INJECTION, SOLUTION INTRAMUSCULAR; INTRAVENOUS at 08:54

## 2024-11-19 RX ADMIN — ASPIRIN 81 MG: 81 TABLET, COATED ORAL at 08:55

## 2024-11-19 RX ADMIN — HYDRALAZINE HYDROCHLORIDE 25 MG: 50 TABLET ORAL at 20:20

## 2024-11-19 RX ADMIN — ATORVASTATIN CALCIUM 40 MG: 40 TABLET, FILM COATED ORAL at 20:16

## 2024-11-19 RX ADMIN — APIXABAN 2.5 MG: 2.5 TABLET, FILM COATED ORAL at 20:30

## 2024-11-19 RX ADMIN — EMPAGLIFLOZIN 10 MG: 10 TABLET, FILM COATED ORAL at 08:55

## 2024-11-19 RX ADMIN — CLONIDINE HYDROCHLORIDE 0.1 MG: 0.1 TABLET ORAL at 20:16

## 2024-11-19 RX ADMIN — FUROSEMIDE 40 MG: 10 INJECTION, SOLUTION INTRAMUSCULAR; INTRAVENOUS at 18:01

## 2024-11-19 RX ADMIN — HYDRALAZINE HYDROCHLORIDE 25 MG: 50 TABLET ORAL at 14:36

## 2024-11-19 RX ADMIN — SODIUM CHLORIDE, PRESERVATIVE FREE 10 ML: 5 INJECTION INTRAVENOUS at 20:20

## 2024-11-19 RX ADMIN — CLONIDINE HYDROCHLORIDE 0.1 MG: 0.1 TABLET ORAL at 08:55

## 2024-11-19 RX ADMIN — APIXABAN 2.5 MG: 2.5 TABLET, FILM COATED ORAL at 08:56

## 2024-11-19 RX ADMIN — ONDANSETRON 4 MG: 2 INJECTION INTRAMUSCULAR; INTRAVENOUS at 17:56

## 2024-11-19 RX ADMIN — BUTALBITAL, ACETAMINOPHEN, AND CAFFEINE 1 TABLET: 325; 50; 40 TABLET ORAL at 17:57

## 2024-11-19 ASSESSMENT — PAIN DESCRIPTION - DESCRIPTORS: DESCRIPTORS: ACHING

## 2024-11-19 ASSESSMENT — PAIN DESCRIPTION - LOCATION: LOCATION: HEAD

## 2024-11-19 ASSESSMENT — PAIN SCALES - GENERAL: PAINLEVEL_OUTOF10: 7

## 2024-11-19 NOTE — DISCHARGE INSTR - OTHER ORDERS
-Daily weights in AM avoid using the bathroom, but before eating or drinking  -record weights    -Call Cardiologist ([facility]) with Heart Failure symptoms of           - Weight Gain of 2-3 lbs in 24 hours or 3 - 5 lbs in on week           - Increased or New Shortness of Breath           - Increased or New Lower leg, foot, or ankle swelling           - Increased or New Abdominal bloating           - Increased or New Fatigue           - Increased or New Dry Cough           - Increased or New Chest Discomfort

## 2024-11-19 NOTE — DISCHARGE INSTR - DIET
2000mg sodium restriction, No Salt Added, Heart Healthy, Low Fat, High Fiber, Low Carb, Diabetic Diet  2000mL Fluid Restriction

## 2024-11-19 NOTE — NURSE NAVIGATOR
Heart Failure Education/Evaluation/Recommendations      HIGH READMISSION RISK- 26%    Code Status: Full Code    Echo:11/18/2024      Left Ventricle: Hyperdynamic left ventricular systolic function with a visually estimated EF of 65 - 70%. Left ventricle is dilated. Increased wall thickness. EF BP is 71%. Normal wall motion.    Right Ventricle: Reduced systolic function. TAPSE is abnormal. TAPSE is 1.3 cm.    Aortic Valve: TAVR bioprosthetic valve. AV mean gradient is 11 mmHg.    Mitral Valve: Mild stenosis noted. MV Mean Gradient is 5 mmHg.    Tricuspid Valve: Mild regurgitation. The estimated RVSP is 18 mmHg.    Left Atrium: Left atrium is dilated.    Right Atrium: Right atrium is dilated.    Extracardiac: Left pleural effusion.    Image quality is adequate. Contrast used: Definity. Technically difficult study, technically difficult study due to patient's body habitus and procedure performed with the patient in a supine position.    BNP:  DATE   RESULTS  11/18/2024  3,355      HGA1C:  DATE   RESULTS  11/18/2024  7.9    Daily Weights:  Date:   Weight (kg/lbs):        Cardiology Consulted: YES    **Patient at a LTC- [facility].      Education packet brought to the patients room.     RN-NN knocks, enters the room, and performs hand hygiene/ uses proper PPE. RN-NN introduces herself and the reason for visit.    Patient agrees to Education.    Patient has a scale that works.     ** LIVES at a FACILITY**    Patient does not meet/meets criteria for outpatient follow-up (Due to)    Patient/family/care giver educated by RNJESSICA, Dietitian, COPD educator, Doctor, and/or Nurse    Low sodium Diet, reading Nutrition labels, and foods to avoid  Fluid Restriction  Smoking Cessation, ETOH abuse cessation, Drug use/abuse cessation  Daily Weights  Symptoms and Reporting symptoms to Cardiology/Nephrology  Activity/Exercise  Support Groups and family support  Local Resources    RNZariaNN defers ONGOING Education

## 2024-11-20 LAB
ALBUMIN SERPL-MCNC: 2.8 G/DL (ref 3.5–5)
ANION GAP SERPL CALC-SCNC: 5 MMOL/L (ref 2–12)
BASOPHILS # BLD: 0 K/UL (ref 0–0.1)
BASOPHILS NFR BLD: 1 % (ref 0–1)
BUN SERPL-MCNC: 36 MG/DL (ref 6–20)
BUN/CREAT SERPL: 17 (ref 12–20)
CA-I BLD-MCNC: 8.6 MG/DL (ref 8.5–10.1)
CHLORIDE SERPL-SCNC: 105 MMOL/L (ref 97–108)
CO2 SERPL-SCNC: 29 MMOL/L (ref 21–32)
CREAT SERPL-MCNC: 2.13 MG/DL (ref 0.55–1.02)
DIFFERENTIAL METHOD BLD: ABNORMAL
EOSINOPHIL # BLD: 0.3 K/UL (ref 0–0.4)
EOSINOPHIL NFR BLD: 5 % (ref 0–7)
ERYTHROCYTE [DISTWIDTH] IN BLOOD BY AUTOMATED COUNT: 16.7 % (ref 11.5–14.5)
GLUCOSE BLD STRIP.AUTO-MCNC: 139 MG/DL (ref 65–100)
GLUCOSE BLD STRIP.AUTO-MCNC: 183 MG/DL (ref 65–100)
GLUCOSE BLD STRIP.AUTO-MCNC: 191 MG/DL (ref 65–100)
GLUCOSE BLD STRIP.AUTO-MCNC: 244 MG/DL (ref 65–100)
GLUCOSE SERPL-MCNC: 131 MG/DL (ref 65–100)
HCT VFR BLD AUTO: 29.6 % (ref 35–47)
HGB BLD-MCNC: 8.9 G/DL (ref 11.5–16)
IMM GRANULOCYTES # BLD AUTO: 0 K/UL (ref 0–0.04)
IMM GRANULOCYTES NFR BLD AUTO: 1 % (ref 0–0.5)
LYMPHOCYTES # BLD: 1.3 K/UL (ref 0.8–3.5)
LYMPHOCYTES NFR BLD: 20 % (ref 12–49)
MAGNESIUM SERPL-MCNC: 2.2 MG/DL (ref 1.6–2.4)
MCH RBC QN AUTO: 27 PG (ref 26–34)
MCHC RBC AUTO-ENTMCNC: 30.1 G/DL (ref 30–36.5)
MCV RBC AUTO: 89.7 FL (ref 80–99)
MONOCYTES # BLD: 0.6 K/UL (ref 0–1)
MONOCYTES NFR BLD: 10 % (ref 5–13)
NEUTS SEG # BLD: 4.1 K/UL (ref 1.8–8)
NEUTS SEG NFR BLD: 63 % (ref 32–75)
NRBC # BLD: 0 K/UL (ref 0–0.01)
NRBC BLD-RTO: 0 PER 100 WBC
PERFORMED BY:: ABNORMAL
PHOSPHATE SERPL-MCNC: 4.9 MG/DL (ref 2.6–4.7)
PLATELET # BLD AUTO: 283 K/UL (ref 150–400)
PMV BLD AUTO: 10 FL (ref 8.9–12.9)
POTASSIUM SERPL-SCNC: 3.9 MMOL/L (ref 3.5–5.1)
RBC # BLD AUTO: 3.3 M/UL (ref 3.8–5.2)
SODIUM SERPL-SCNC: 139 MMOL/L (ref 136–145)
WBC # BLD AUTO: 6.3 K/UL (ref 3.6–11)

## 2024-11-20 PROCEDURE — 2060000000 HC ICU INTERMEDIATE R&B

## 2024-11-20 PROCEDURE — 2700000000 HC OXYGEN THERAPY PER DAY

## 2024-11-20 PROCEDURE — 6370000000 HC RX 637 (ALT 250 FOR IP): Performed by: HOSPITALIST

## 2024-11-20 PROCEDURE — 2580000003 HC RX 258

## 2024-11-20 PROCEDURE — 6370000000 HC RX 637 (ALT 250 FOR IP)

## 2024-11-20 PROCEDURE — 6360000002 HC RX W HCPCS: Performed by: INTERNAL MEDICINE

## 2024-11-20 PROCEDURE — 80069 RENAL FUNCTION PANEL: CPT

## 2024-11-20 PROCEDURE — 94761 N-INVAS EAR/PLS OXIMETRY MLT: CPT

## 2024-11-20 PROCEDURE — 85025 COMPLETE CBC W/AUTO DIFF WBC: CPT

## 2024-11-20 PROCEDURE — 97530 THERAPEUTIC ACTIVITIES: CPT

## 2024-11-20 PROCEDURE — 2580000003 HC RX 258: Performed by: INTERNAL MEDICINE

## 2024-11-20 PROCEDURE — 36415 COLL VENOUS BLD VENIPUNCTURE: CPT

## 2024-11-20 PROCEDURE — 6360000002 HC RX W HCPCS

## 2024-11-20 PROCEDURE — 82962 GLUCOSE BLOOD TEST: CPT

## 2024-11-20 PROCEDURE — 83735 ASSAY OF MAGNESIUM: CPT

## 2024-11-20 RX ADMIN — INSULIN LISPRO 1 UNITS: 100 INJECTION, SOLUTION INTRAVENOUS; SUBCUTANEOUS at 13:11

## 2024-11-20 RX ADMIN — ATORVASTATIN CALCIUM 40 MG: 40 TABLET, FILM COATED ORAL at 19:34

## 2024-11-20 RX ADMIN — FUROSEMIDE 40 MG: 10 INJECTION, SOLUTION INTRAMUSCULAR; INTRAVENOUS at 08:32

## 2024-11-20 RX ADMIN — CLONIDINE HYDROCHLORIDE 0.1 MG: 0.1 TABLET ORAL at 08:32

## 2024-11-20 RX ADMIN — APIXABAN 2.5 MG: 2.5 TABLET, FILM COATED ORAL at 20:30

## 2024-11-20 RX ADMIN — HYDRALAZINE HYDROCHLORIDE 25 MG: 50 TABLET ORAL at 13:11

## 2024-11-20 RX ADMIN — ASPIRIN 81 MG: 81 TABLET, COATED ORAL at 08:32

## 2024-11-20 RX ADMIN — CLONIDINE HYDROCHLORIDE 0.1 MG: 0.1 TABLET ORAL at 19:34

## 2024-11-20 RX ADMIN — HYDRALAZINE HYDROCHLORIDE 25 MG: 50 TABLET ORAL at 06:01

## 2024-11-20 RX ADMIN — BUTALBITAL, ACETAMINOPHEN, AND CAFFEINE 1 TABLET: 325; 50; 40 TABLET ORAL at 13:14

## 2024-11-20 RX ADMIN — HYDRALAZINE HYDROCHLORIDE 25 MG: 50 TABLET ORAL at 20:58

## 2024-11-20 RX ADMIN — FUROSEMIDE 40 MG: 10 INJECTION, SOLUTION INTRAMUSCULAR; INTRAVENOUS at 17:49

## 2024-11-20 RX ADMIN — BUTALBITAL, ACETAMINOPHEN, AND CAFFEINE 1 TABLET: 325; 50; 40 TABLET ORAL at 19:34

## 2024-11-20 RX ADMIN — ONDANSETRON 4 MG: 4 TABLET, ORALLY DISINTEGRATING ORAL at 19:34

## 2024-11-20 RX ADMIN — SODIUM CHLORIDE, PRESERVATIVE FREE 10 ML: 5 INJECTION INTRAVENOUS at 19:38

## 2024-11-20 RX ADMIN — SODIUM CHLORIDE, PRESERVATIVE FREE 10 ML: 5 INJECTION INTRAVENOUS at 08:32

## 2024-11-20 RX ADMIN — INSULIN LISPRO 1 UNITS: 100 INJECTION, SOLUTION INTRAVENOUS; SUBCUTANEOUS at 20:58

## 2024-11-20 RX ADMIN — EMPAGLIFLOZIN 10 MG: 10 TABLET, FILM COATED ORAL at 08:32

## 2024-11-20 RX ADMIN — BUTALBITAL, ACETAMINOPHEN, AND CAFFEINE 1 TABLET: 325; 50; 40 TABLET ORAL at 06:04

## 2024-11-20 RX ADMIN — APIXABAN 2.5 MG: 2.5 TABLET, FILM COATED ORAL at 08:32

## 2024-11-20 RX ADMIN — PANTOPRAZOLE SODIUM 40 MG: 40 TABLET, DELAYED RELEASE ORAL at 06:01

## 2024-11-20 RX ADMIN — SODIUM CHLORIDE 125 MG: 9 INJECTION, SOLUTION INTRAVENOUS at 17:54

## 2024-11-20 RX ADMIN — EPOETIN ALFA-EPBX 10000 UNITS: 10000 INJECTION, SOLUTION INTRAVENOUS; SUBCUTANEOUS at 17:49

## 2024-11-20 ASSESSMENT — PAIN SCALES - GENERAL
PAINLEVEL_OUTOF10: 0
PAINLEVEL_OUTOF10: 5
PAINLEVEL_OUTOF10: 8
PAINLEVEL_OUTOF10: 8
PAINLEVEL_OUTOF10: 0
PAINLEVEL_OUTOF10: 8

## 2024-11-20 ASSESSMENT — PAIN DESCRIPTION - LOCATION
LOCATION: HEAD

## 2024-11-20 ASSESSMENT — PAIN DESCRIPTION - DESCRIPTORS
DESCRIPTORS: ACHING
DESCRIPTORS: ACHING

## 2024-11-20 ASSESSMENT — PAIN DESCRIPTION - ORIENTATION: ORIENTATION: PROXIMAL

## 2024-11-20 NOTE — CONSULTS
4 L initially  Now back to nasal cannula 2 L  IV Lasix 40-minute twice a day  Resolving bilateral lower extremity swelling  On Jardiance    History of CHF  Preserved LVEF  Status post TAVR  Sick sinus syndrome  Eliquis  Cardiology on board    Anemia  Anemia chronic kidney disease  Hemoglobin 8.9  Iron saturation 9%, sending ferritin level  IV Ferrlecit 125 mg daily for 2 doses  P.o. iron sulfate  Erythropoietin subcu x 1 dose.    Thank you for the consultation.        Plan:       Signed By: Sanam Berger MD     November 20, 2024       
hours ending 11/18/24 0932  Patient Vitals for the past 120 hrs:   Weight   11/18/24 0402 82.6 kg (182 lb 3.2 oz)           Admit weight: Weight - Scale: 82.6 kg (182 lb 3.2 oz)      Exam       Constitutional Generally well without symptoms, No weight loss, no fever, chills or nausea or vominting, and Well developed, well nourished in no apparent distress   HEENT normal atraumatic, no neck masses, normal thyroid   Cardiovascular Irregular rhythm.  Normal S1/S2.  No M/R/G.both carotids normal upstroke without bruits, radial pulses normal, pedal pulses normal both DP's and PT's LE edema  1+   Pulmonary clear to auscultation bilaterally, rales R base, L base   Abnominal soft, non-tender, without masses or organomegaly, normal bowel sounds, and no masses palpated   Genitourinary Deferred   Muskuloskeletal No obvious joint deformities.  No clubbing   Neuro Alert and oriented, Non focal neurologic exam   Psychiatric Demonstrates good judgement and insight into his or her medical condition   Extremities warm, well perfused   Skin Warm and dry         Labs     Lab Results   Component Value Date/Time    WBC 8.7 11/18/2024 06:28 AM    HGB 8.9 11/18/2024 06:28 AM    HCT 30.9 11/18/2024 06:28 AM     11/18/2024 06:28 AM    MCV 94.5 11/18/2024 06:28 AM     Lab Results   Component Value Date/Time     11/18/2024 06:28 AM    K 4.2 11/18/2024 06:28 AM     11/18/2024 06:28 AM    CO2 26 11/18/2024 06:28 AM    BUN 31 11/18/2024 06:28 AM    GFRAA 50 08/11/2022 03:01 AM      Last Lipid:  No results found for: \"CHOL\", \"CHLST\", \"CHOLV\", \"HDL\", \"HDLC\", \"LDL\", \"LDLC\"  Lab Results   Component Value Date/Time     08/05/2022 04:21 PM      Lab Results   Component Value Date/Time    TSH 1.60 03/29/2024 01:42 AM          Imaging & Acillary Studies     Last EKG    Encounter Date: 08/31/24   EKG 12 Lead   Result Value    Ventricular Rate 52    Atrial Rate 288    QRS Duration 78    Q-T Interval 448    QTc Calculation (Bazett)

## 2024-11-21 LAB
ALBUMIN SERPL-MCNC: 2.6 G/DL (ref 3.5–5)
ANION GAP SERPL CALC-SCNC: 5 MMOL/L (ref 2–12)
BUN SERPL-MCNC: 39 MG/DL (ref 6–20)
BUN/CREAT SERPL: 18 (ref 12–20)
CA-I BLD-MCNC: 8.3 MG/DL (ref 8.5–10.1)
CHLORIDE SERPL-SCNC: 104 MMOL/L (ref 97–108)
CO2 SERPL-SCNC: 30 MMOL/L (ref 21–32)
CREAT SERPL-MCNC: 2.16 MG/DL (ref 0.55–1.02)
GLUCOSE BLD STRIP.AUTO-MCNC: 149 MG/DL (ref 65–100)
GLUCOSE BLD STRIP.AUTO-MCNC: 196 MG/DL (ref 65–100)
GLUCOSE BLD STRIP.AUTO-MCNC: 262 MG/DL (ref 65–100)
GLUCOSE BLD STRIP.AUTO-MCNC: 94 MG/DL (ref 65–100)
GLUCOSE SERPL-MCNC: 193 MG/DL (ref 65–100)
MAGNESIUM SERPL-MCNC: 2.3 MG/DL (ref 1.6–2.4)
PERFORMED BY:: ABNORMAL
PERFORMED BY:: NORMAL
PHOSPHATE SERPL-MCNC: 4.9 MG/DL (ref 2.6–4.7)
POTASSIUM SERPL-SCNC: 4 MMOL/L (ref 3.5–5.1)
SODIUM SERPL-SCNC: 139 MMOL/L (ref 136–145)

## 2024-11-21 PROCEDURE — 94761 N-INVAS EAR/PLS OXIMETRY MLT: CPT

## 2024-11-21 PROCEDURE — 83735 ASSAY OF MAGNESIUM: CPT

## 2024-11-21 PROCEDURE — 80069 RENAL FUNCTION PANEL: CPT

## 2024-11-21 PROCEDURE — 6370000000 HC RX 637 (ALT 250 FOR IP)

## 2024-11-21 PROCEDURE — 2580000003 HC RX 258: Performed by: INTERNAL MEDICINE

## 2024-11-21 PROCEDURE — 82962 GLUCOSE BLOOD TEST: CPT

## 2024-11-21 PROCEDURE — 2700000000 HC OXYGEN THERAPY PER DAY

## 2024-11-21 PROCEDURE — 6360000002 HC RX W HCPCS

## 2024-11-21 PROCEDURE — 6360000002 HC RX W HCPCS: Performed by: INTERNAL MEDICINE

## 2024-11-21 PROCEDURE — 2580000003 HC RX 258

## 2024-11-21 PROCEDURE — 2060000000 HC ICU INTERMEDIATE R&B

## 2024-11-21 PROCEDURE — 36415 COLL VENOUS BLD VENIPUNCTURE: CPT

## 2024-11-21 PROCEDURE — 6370000000 HC RX 637 (ALT 250 FOR IP): Performed by: HOSPITALIST

## 2024-11-21 RX ADMIN — HYDRALAZINE HYDROCHLORIDE 25 MG: 50 TABLET ORAL at 14:35

## 2024-11-21 RX ADMIN — SODIUM CHLORIDE, PRESERVATIVE FREE 10 ML: 5 INJECTION INTRAVENOUS at 21:43

## 2024-11-21 RX ADMIN — SODIUM CHLORIDE, PRESERVATIVE FREE 10 ML: 5 INJECTION INTRAVENOUS at 08:40

## 2024-11-21 RX ADMIN — APIXABAN 2.5 MG: 2.5 TABLET, FILM COATED ORAL at 08:39

## 2024-11-21 RX ADMIN — HYDRALAZINE HYDROCHLORIDE 25 MG: 50 TABLET ORAL at 21:39

## 2024-11-21 RX ADMIN — APIXABAN 2.5 MG: 2.5 TABLET, FILM COATED ORAL at 21:39

## 2024-11-21 RX ADMIN — ASPIRIN 81 MG: 81 TABLET, COATED ORAL at 08:39

## 2024-11-21 RX ADMIN — CLONIDINE HYDROCHLORIDE 0.1 MG: 0.1 TABLET ORAL at 08:39

## 2024-11-21 RX ADMIN — PANTOPRAZOLE SODIUM 40 MG: 40 TABLET, DELAYED RELEASE ORAL at 06:46

## 2024-11-21 RX ADMIN — CLONIDINE HYDROCHLORIDE 0.1 MG: 0.1 TABLET ORAL at 21:39

## 2024-11-21 RX ADMIN — ONDANSETRON 4 MG: 2 INJECTION INTRAMUSCULAR; INTRAVENOUS at 18:34

## 2024-11-21 RX ADMIN — INSULIN LISPRO 2 UNITS: 100 INJECTION, SOLUTION INTRAVENOUS; SUBCUTANEOUS at 13:30

## 2024-11-21 RX ADMIN — ATORVASTATIN CALCIUM 40 MG: 40 TABLET, FILM COATED ORAL at 21:39

## 2024-11-21 RX ADMIN — BUTALBITAL, ACETAMINOPHEN, AND CAFFEINE 1 TABLET: 325; 50; 40 TABLET ORAL at 18:36

## 2024-11-21 RX ADMIN — EMPAGLIFLOZIN 10 MG: 10 TABLET, FILM COATED ORAL at 08:39

## 2024-11-21 RX ADMIN — FUROSEMIDE 40 MG: 10 INJECTION, SOLUTION INTRAMUSCULAR; INTRAVENOUS at 18:31

## 2024-11-21 RX ADMIN — HYDRALAZINE HYDROCHLORIDE 25 MG: 50 TABLET ORAL at 06:46

## 2024-11-21 RX ADMIN — FUROSEMIDE 40 MG: 10 INJECTION, SOLUTION INTRAMUSCULAR; INTRAVENOUS at 08:39

## 2024-11-21 RX ADMIN — SODIUM CHLORIDE 125 MG: 9 INJECTION, SOLUTION INTRAVENOUS at 11:05

## 2024-11-21 ASSESSMENT — PAIN SCALES - GENERAL: PAINLEVEL_OUTOF10: 5

## 2024-11-22 LAB
ALBUMIN SERPL-MCNC: 2.6 G/DL (ref 3.5–5)
ANION GAP SERPL CALC-SCNC: 6 MMOL/L (ref 2–12)
BASOPHILS # BLD: 0 K/UL (ref 0–0.1)
BASOPHILS NFR BLD: 1 % (ref 0–1)
BUN SERPL-MCNC: 39 MG/DL (ref 6–20)
BUN/CREAT SERPL: 18 (ref 12–20)
CA-I BLD-MCNC: 9 MG/DL (ref 8.5–10.1)
CHLORIDE SERPL-SCNC: 105 MMOL/L (ref 97–108)
CO2 SERPL-SCNC: 29 MMOL/L (ref 21–32)
CREAT SERPL-MCNC: 2.17 MG/DL (ref 0.55–1.02)
DIFFERENTIAL METHOD BLD: ABNORMAL
EOSINOPHIL # BLD: 0.3 K/UL (ref 0–0.4)
EOSINOPHIL NFR BLD: 5 % (ref 0–7)
ERYTHROCYTE [DISTWIDTH] IN BLOOD BY AUTOMATED COUNT: 16.6 % (ref 11.5–14.5)
GLUCOSE BLD STRIP.AUTO-MCNC: 154 MG/DL (ref 65–100)
GLUCOSE BLD STRIP.AUTO-MCNC: 214 MG/DL (ref 65–100)
GLUCOSE BLD STRIP.AUTO-MCNC: 236 MG/DL (ref 65–100)
GLUCOSE BLD STRIP.AUTO-MCNC: 250 MG/DL (ref 65–100)
GLUCOSE SERPL-MCNC: 166 MG/DL (ref 65–100)
HCT VFR BLD AUTO: 29 % (ref 35–47)
HGB BLD-MCNC: 8.8 G/DL (ref 11.5–16)
IMM GRANULOCYTES # BLD AUTO: 0 K/UL (ref 0–0.04)
IMM GRANULOCYTES NFR BLD AUTO: 1 % (ref 0–0.5)
LYMPHOCYTES # BLD: 1.2 K/UL (ref 0.8–3.5)
LYMPHOCYTES NFR BLD: 19 % (ref 12–49)
MCH RBC QN AUTO: 26.9 PG (ref 26–34)
MCHC RBC AUTO-ENTMCNC: 30.3 G/DL (ref 30–36.5)
MCV RBC AUTO: 88.7 FL (ref 80–99)
MONOCYTES # BLD: 0.6 K/UL (ref 0–1)
MONOCYTES NFR BLD: 9 % (ref 5–13)
NEUTS SEG # BLD: 4.4 K/UL (ref 1.8–8)
NEUTS SEG NFR BLD: 65 % (ref 32–75)
NRBC # BLD: 0 K/UL (ref 0–0.01)
NRBC BLD-RTO: 0 PER 100 WBC
PERFORMED BY:: ABNORMAL
PHOSPHATE SERPL-MCNC: 4.6 MG/DL (ref 2.6–4.7)
PLATELET # BLD AUTO: 305 K/UL (ref 150–400)
PMV BLD AUTO: 10.3 FL (ref 8.9–12.9)
POTASSIUM SERPL-SCNC: 3.9 MMOL/L (ref 3.5–5.1)
RBC # BLD AUTO: 3.27 M/UL (ref 3.8–5.2)
SODIUM SERPL-SCNC: 140 MMOL/L (ref 136–145)
TROPONIN I SERPL HS-MCNC: 17 NG/L (ref 0–51)
WBC # BLD AUTO: 6.6 K/UL (ref 3.6–11)

## 2024-11-22 PROCEDURE — 36415 COLL VENOUS BLD VENIPUNCTURE: CPT

## 2024-11-22 PROCEDURE — 93005 ELECTROCARDIOGRAM TRACING: CPT | Performed by: INTERNAL MEDICINE

## 2024-11-22 PROCEDURE — 6370000000 HC RX 637 (ALT 250 FOR IP)

## 2024-11-22 PROCEDURE — 80069 RENAL FUNCTION PANEL: CPT

## 2024-11-22 PROCEDURE — 97530 THERAPEUTIC ACTIVITIES: CPT

## 2024-11-22 PROCEDURE — 94761 N-INVAS EAR/PLS OXIMETRY MLT: CPT

## 2024-11-22 PROCEDURE — 2580000003 HC RX 258

## 2024-11-22 PROCEDURE — 2060000000 HC ICU INTERMEDIATE R&B

## 2024-11-22 PROCEDURE — 85025 COMPLETE CBC W/AUTO DIFF WBC: CPT

## 2024-11-22 PROCEDURE — 82962 GLUCOSE BLOOD TEST: CPT

## 2024-11-22 PROCEDURE — 84484 ASSAY OF TROPONIN QUANT: CPT

## 2024-11-22 PROCEDURE — 6370000000 HC RX 637 (ALT 250 FOR IP): Performed by: HOSPITALIST

## 2024-11-22 PROCEDURE — 6370000000 HC RX 637 (ALT 250 FOR IP): Performed by: INTERNAL MEDICINE

## 2024-11-22 PROCEDURE — 6360000002 HC RX W HCPCS

## 2024-11-22 RX ORDER — METOPROLOL SUCCINATE 25 MG/1
25 TABLET, EXTENDED RELEASE ORAL DAILY
Status: DISCONTINUED | OUTPATIENT
Start: 2024-11-22 | End: 2024-11-22

## 2024-11-22 RX ORDER — DORZOLAMIDE HCL 20 MG/ML
1 SOLUTION/ DROPS OPHTHALMIC 2 TIMES DAILY
Status: DISCONTINUED | OUTPATIENT
Start: 2024-11-22 | End: 2024-11-22

## 2024-11-22 RX ORDER — NITROGLYCERIN 0.4 MG/1
0.4 TABLET SUBLINGUAL EVERY 5 MIN PRN
Status: DISCONTINUED | OUTPATIENT
Start: 2024-11-22 | End: 2024-11-27 | Stop reason: HOSPADM

## 2024-11-22 RX ORDER — FUROSEMIDE 40 MG/1
40 TABLET ORAL DAILY
Status: DISCONTINUED | OUTPATIENT
Start: 2024-11-23 | End: 2024-11-27 | Stop reason: HOSPADM

## 2024-11-22 RX ORDER — METOPROLOL SUCCINATE 50 MG/1
50 TABLET, EXTENDED RELEASE ORAL DAILY
Status: DISCONTINUED | OUTPATIENT
Start: 2024-11-23 | End: 2024-11-27 | Stop reason: HOSPADM

## 2024-11-22 RX ADMIN — CLONIDINE HYDROCHLORIDE 0.1 MG: 0.1 TABLET ORAL at 09:00

## 2024-11-22 RX ADMIN — INSULIN LISPRO 2 UNITS: 100 INJECTION, SOLUTION INTRAVENOUS; SUBCUTANEOUS at 18:47

## 2024-11-22 RX ADMIN — ATORVASTATIN CALCIUM 40 MG: 40 TABLET, FILM COATED ORAL at 22:33

## 2024-11-22 RX ADMIN — HYDRALAZINE HYDROCHLORIDE 25 MG: 50 TABLET ORAL at 22:33

## 2024-11-22 RX ADMIN — EMPAGLIFLOZIN 10 MG: 10 TABLET, FILM COATED ORAL at 09:00

## 2024-11-22 RX ADMIN — ONDANSETRON 4 MG: 2 INJECTION INTRAMUSCULAR; INTRAVENOUS at 10:27

## 2024-11-22 RX ADMIN — HYDRALAZINE HYDROCHLORIDE 25 MG: 50 TABLET ORAL at 14:22

## 2024-11-22 RX ADMIN — PANTOPRAZOLE SODIUM 40 MG: 40 TABLET, DELAYED RELEASE ORAL at 09:00

## 2024-11-22 RX ADMIN — FUROSEMIDE 40 MG: 10 INJECTION, SOLUTION INTRAMUSCULAR; INTRAVENOUS at 09:00

## 2024-11-22 RX ADMIN — INSULIN LISPRO 1 UNITS: 100 INJECTION, SOLUTION INTRAVENOUS; SUBCUTANEOUS at 11:45

## 2024-11-22 RX ADMIN — APIXABAN 2.5 MG: 2.5 TABLET, FILM COATED ORAL at 09:00

## 2024-11-22 RX ADMIN — APIXABAN 2.5 MG: 2.5 TABLET, FILM COATED ORAL at 22:33

## 2024-11-22 RX ADMIN — BUTALBITAL, ACETAMINOPHEN, AND CAFFEINE 1 TABLET: 325; 50; 40 TABLET ORAL at 16:47

## 2024-11-22 RX ADMIN — BUTALBITAL, ACETAMINOPHEN, AND CAFFEINE 1 TABLET: 325; 50; 40 TABLET ORAL at 10:27

## 2024-11-22 RX ADMIN — Medication 0.4 MG: at 16:45

## 2024-11-22 RX ADMIN — SODIUM CHLORIDE, PRESERVATIVE FREE 10 ML: 5 INJECTION INTRAVENOUS at 09:36

## 2024-11-22 RX ADMIN — METOPROLOL SUCCINATE 25 MG: 25 TABLET, EXTENDED RELEASE ORAL at 14:22

## 2024-11-22 RX ADMIN — SODIUM CHLORIDE, PRESERVATIVE FREE 10 ML: 5 INJECTION INTRAVENOUS at 22:35

## 2024-11-22 RX ADMIN — ASPIRIN 81 MG: 81 TABLET, COATED ORAL at 09:00

## 2024-11-23 LAB
ALBUMIN SERPL-MCNC: 2.8 G/DL (ref 3.5–5)
ANION GAP SERPL CALC-SCNC: 3 MMOL/L (ref 2–12)
BUN SERPL-MCNC: 35 MG/DL (ref 6–20)
BUN/CREAT SERPL: 17 (ref 12–20)
CA-I BLD-MCNC: 8.8 MG/DL (ref 8.5–10.1)
CHLORIDE SERPL-SCNC: 106 MMOL/L (ref 97–108)
CO2 SERPL-SCNC: 31 MMOL/L (ref 21–32)
CREAT SERPL-MCNC: 2.01 MG/DL (ref 0.55–1.02)
EKG ATRIAL RATE: 286 BPM
EKG DIAGNOSIS: NORMAL
EKG P AXIS: 86 DEGREES
EKG Q-T INTERVAL: 414 MS
EKG QRS DURATION: 80 MS
EKG QTC CALCULATION (BAZETT): 465 MS
EKG R AXIS: -2 DEGREES
EKG T AXIS: 46 DEGREES
EKG VENTRICULAR RATE: 76 BPM
GLUCOSE BLD STRIP.AUTO-MCNC: 141 MG/DL (ref 65–100)
GLUCOSE BLD STRIP.AUTO-MCNC: 156 MG/DL (ref 65–100)
GLUCOSE BLD STRIP.AUTO-MCNC: 277 MG/DL (ref 65–100)
GLUCOSE BLD STRIP.AUTO-MCNC: 299 MG/DL (ref 65–100)
GLUCOSE SERPL-MCNC: 172 MG/DL (ref 65–100)
PERFORMED BY:: ABNORMAL
PHOSPHATE SERPL-MCNC: 4.1 MG/DL (ref 2.6–4.7)
POTASSIUM SERPL-SCNC: 4.1 MMOL/L (ref 3.5–5.1)
SODIUM SERPL-SCNC: 140 MMOL/L (ref 136–145)

## 2024-11-23 PROCEDURE — 82962 GLUCOSE BLOOD TEST: CPT

## 2024-11-23 PROCEDURE — 36415 COLL VENOUS BLD VENIPUNCTURE: CPT

## 2024-11-23 PROCEDURE — 2060000000 HC ICU INTERMEDIATE R&B

## 2024-11-23 PROCEDURE — 6370000000 HC RX 637 (ALT 250 FOR IP)

## 2024-11-23 PROCEDURE — 6360000002 HC RX W HCPCS

## 2024-11-23 PROCEDURE — 2700000000 HC OXYGEN THERAPY PER DAY

## 2024-11-23 PROCEDURE — 2580000003 HC RX 258

## 2024-11-23 PROCEDURE — 94761 N-INVAS EAR/PLS OXIMETRY MLT: CPT

## 2024-11-23 PROCEDURE — 6370000000 HC RX 637 (ALT 250 FOR IP): Performed by: INTERNAL MEDICINE

## 2024-11-23 PROCEDURE — 80069 RENAL FUNCTION PANEL: CPT

## 2024-11-23 PROCEDURE — 6370000000 HC RX 637 (ALT 250 FOR IP): Performed by: HOSPITALIST

## 2024-11-23 RX ORDER — FUROSEMIDE 40 MG/1
40 TABLET ORAL DAILY
Qty: 60 TABLET | Refills: 3 | Status: SHIPPED | OUTPATIENT
Start: 2024-11-24

## 2024-11-23 RX ORDER — METOPROLOL SUCCINATE 50 MG/1
50 TABLET, EXTENDED RELEASE ORAL DAILY
Qty: 30 TABLET | Refills: 3 | Status: SHIPPED | OUTPATIENT
Start: 2024-11-24

## 2024-11-23 RX ADMIN — ASPIRIN 81 MG: 81 TABLET, COATED ORAL at 09:02

## 2024-11-23 RX ADMIN — PANTOPRAZOLE SODIUM 40 MG: 40 TABLET, DELAYED RELEASE ORAL at 06:38

## 2024-11-23 RX ADMIN — HYDRALAZINE HYDROCHLORIDE 25 MG: 50 TABLET ORAL at 22:39

## 2024-11-23 RX ADMIN — EMPAGLIFLOZIN 10 MG: 10 TABLET, FILM COATED ORAL at 09:02

## 2024-11-23 RX ADMIN — INSULIN LISPRO 2 UNITS: 100 INJECTION, SOLUTION INTRAVENOUS; SUBCUTANEOUS at 12:03

## 2024-11-23 RX ADMIN — FUROSEMIDE 40 MG: 40 TABLET ORAL at 09:03

## 2024-11-23 RX ADMIN — SODIUM CHLORIDE, PRESERVATIVE FREE 10 ML: 5 INJECTION INTRAVENOUS at 21:16

## 2024-11-23 RX ADMIN — ONDANSETRON 4 MG: 2 INJECTION INTRAMUSCULAR; INTRAVENOUS at 14:54

## 2024-11-23 RX ADMIN — APIXABAN 2.5 MG: 2.5 TABLET, FILM COATED ORAL at 09:02

## 2024-11-23 RX ADMIN — BUTALBITAL, ACETAMINOPHEN, AND CAFFEINE 1 TABLET: 325; 50; 40 TABLET ORAL at 14:54

## 2024-11-23 RX ADMIN — METOPROLOL SUCCINATE 50 MG: 50 TABLET, EXTENDED RELEASE ORAL at 09:02

## 2024-11-23 RX ADMIN — HYDRALAZINE HYDROCHLORIDE 25 MG: 50 TABLET ORAL at 06:38

## 2024-11-23 RX ADMIN — SODIUM CHLORIDE, PRESERVATIVE FREE 10 ML: 5 INJECTION INTRAVENOUS at 09:03

## 2024-11-23 RX ADMIN — ATORVASTATIN CALCIUM 40 MG: 40 TABLET, FILM COATED ORAL at 21:15

## 2024-11-23 RX ADMIN — BUTALBITAL, ACETAMINOPHEN, AND CAFFEINE 1 TABLET: 325; 50; 40 TABLET ORAL at 02:13

## 2024-11-23 RX ADMIN — BUTALBITAL, ACETAMINOPHEN, AND CAFFEINE 1 TABLET: 325; 50; 40 TABLET ORAL at 09:41

## 2024-11-23 RX ADMIN — ONDANSETRON 4 MG: 2 INJECTION INTRAMUSCULAR; INTRAVENOUS at 09:42

## 2024-11-23 RX ADMIN — APIXABAN 2.5 MG: 2.5 TABLET, FILM COATED ORAL at 21:15

## 2024-11-23 RX ADMIN — INSULIN LISPRO 2 UNITS: 100 INJECTION, SOLUTION INTRAVENOUS; SUBCUTANEOUS at 21:16

## 2024-11-23 ASSESSMENT — PAIN SCALES - GENERAL
PAINLEVEL_OUTOF10: 0
PAINLEVEL_OUTOF10: 5
PAINLEVEL_OUTOF10: 0
PAINLEVEL_OUTOF10: 8

## 2024-11-23 ASSESSMENT — PAIN SCALES - WONG BAKER: WONGBAKER_NUMERICALRESPONSE: NO HURT

## 2024-11-24 LAB
APPEARANCE UR: CLEAR
BACTERIA URNS QL MICRO: NEGATIVE /HPF
BILIRUB UR QL: NEGATIVE
COLOR UR: ABNORMAL
EPITH CASTS URNS QL MICRO: ABNORMAL /LPF
GLUCOSE BLD STRIP.AUTO-MCNC: 163 MG/DL (ref 65–100)
GLUCOSE BLD STRIP.AUTO-MCNC: 183 MG/DL (ref 65–100)
GLUCOSE BLD STRIP.AUTO-MCNC: 201 MG/DL (ref 65–100)
GLUCOSE BLD STRIP.AUTO-MCNC: 254 MG/DL (ref 65–100)
GLUCOSE UR STRIP.AUTO-MCNC: >500 MG/DL
HGB UR QL STRIP: ABNORMAL
KETONES UR QL STRIP.AUTO: NEGATIVE MG/DL
LEUKOCYTE ESTERASE UR QL STRIP.AUTO: ABNORMAL
MUCOUS THREADS URNS QL MICRO: NEGATIVE /LPF
NITRITE UR QL STRIP.AUTO: NEGATIVE
PERFORMED BY:: ABNORMAL
PH UR STRIP: 7 (ref 5–8)
PROT UR STRIP-MCNC: NEGATIVE MG/DL
RBC #/AREA URNS HPF: ABNORMAL /HPF (ref 0–5)
SP GR UR REFRACTOMETRY: 1.01 (ref 1–1.03)
URINE CULTURE IF INDICATED: ABNORMAL
UROBILINOGEN UR QL STRIP.AUTO: 0.1 EU/DL (ref 0.1–1)
WBC URNS QL MICRO: ABNORMAL /HPF (ref 0–4)

## 2024-11-24 PROCEDURE — 87086 URINE CULTURE/COLONY COUNT: CPT

## 2024-11-24 PROCEDURE — 87147 CULTURE TYPE IMMUNOLOGIC: CPT

## 2024-11-24 PROCEDURE — 6370000000 HC RX 637 (ALT 250 FOR IP): Performed by: HOSPITALIST

## 2024-11-24 PROCEDURE — 6370000000 HC RX 637 (ALT 250 FOR IP)

## 2024-11-24 PROCEDURE — 6370000000 HC RX 637 (ALT 250 FOR IP): Performed by: INTERNAL MEDICINE

## 2024-11-24 PROCEDURE — 87077 CULTURE AEROBIC IDENTIFY: CPT

## 2024-11-24 PROCEDURE — 81001 URINALYSIS AUTO W/SCOPE: CPT

## 2024-11-24 PROCEDURE — 2580000003 HC RX 258

## 2024-11-24 PROCEDURE — 2060000000 HC ICU INTERMEDIATE R&B

## 2024-11-24 PROCEDURE — 87186 SC STD MICRODIL/AGAR DIL: CPT

## 2024-11-24 PROCEDURE — 82962 GLUCOSE BLOOD TEST: CPT

## 2024-11-24 PROCEDURE — 6360000002 HC RX W HCPCS

## 2024-11-24 RX ORDER — BUTALBITAL, ACETAMINOPHEN AND CAFFEINE 50; 325; 40 MG/1; MG/1; MG/1
1 TABLET ORAL EVERY 6 HOURS PRN
Qty: 60 TABLET | Refills: 1 | Status: SHIPPED | OUTPATIENT
Start: 2024-11-24 | End: 2024-11-24

## 2024-11-24 RX ORDER — BUTALBITAL, ACETAMINOPHEN AND CAFFEINE 50; 325; 40 MG/1; MG/1; MG/1
1 TABLET ORAL EVERY 6 HOURS PRN
Qty: 60 TABLET | Refills: 1 | Status: SHIPPED | OUTPATIENT
Start: 2024-11-24

## 2024-11-24 RX ADMIN — ATORVASTATIN CALCIUM 40 MG: 40 TABLET, FILM COATED ORAL at 21:18

## 2024-11-24 RX ADMIN — INSULIN LISPRO 1 UNITS: 100 INJECTION, SOLUTION INTRAVENOUS; SUBCUTANEOUS at 21:19

## 2024-11-24 RX ADMIN — BUTALBITAL, ACETAMINOPHEN, AND CAFFEINE 1 TABLET: 325; 50; 40 TABLET ORAL at 09:47

## 2024-11-24 RX ADMIN — EMPAGLIFLOZIN 10 MG: 10 TABLET, FILM COATED ORAL at 09:15

## 2024-11-24 RX ADMIN — ASPIRIN 81 MG: 81 TABLET, COATED ORAL at 09:15

## 2024-11-24 RX ADMIN — APIXABAN 2.5 MG: 2.5 TABLET, FILM COATED ORAL at 21:18

## 2024-11-24 RX ADMIN — INSULIN LISPRO 2 UNITS: 100 INJECTION, SOLUTION INTRAVENOUS; SUBCUTANEOUS at 17:25

## 2024-11-24 RX ADMIN — ONDANSETRON 4 MG: 2 INJECTION INTRAMUSCULAR; INTRAVENOUS at 09:15

## 2024-11-24 RX ADMIN — HYDRALAZINE HYDROCHLORIDE 25 MG: 50 TABLET ORAL at 21:18

## 2024-11-24 RX ADMIN — METOPROLOL SUCCINATE 50 MG: 50 TABLET, EXTENDED RELEASE ORAL at 09:15

## 2024-11-24 RX ADMIN — APIXABAN 2.5 MG: 2.5 TABLET, FILM COATED ORAL at 09:15

## 2024-11-24 RX ADMIN — SODIUM CHLORIDE, PRESERVATIVE FREE 10 ML: 5 INJECTION INTRAVENOUS at 10:41

## 2024-11-24 RX ADMIN — HYDRALAZINE HYDROCHLORIDE 25 MG: 50 TABLET ORAL at 06:11

## 2024-11-24 RX ADMIN — FUROSEMIDE 40 MG: 40 TABLET ORAL at 10:56

## 2024-11-24 RX ADMIN — PANTOPRAZOLE SODIUM 40 MG: 40 TABLET, DELAYED RELEASE ORAL at 06:11

## 2024-11-24 ASSESSMENT — PAIN SCALES - GENERAL
PAINLEVEL_OUTOF10: 10
PAINLEVEL_OUTOF10: 0

## 2024-11-24 ASSESSMENT — PAIN DESCRIPTION - ORIENTATION: ORIENTATION: MID

## 2024-11-24 ASSESSMENT — PAIN DESCRIPTION - LOCATION: LOCATION: BACK

## 2024-11-24 ASSESSMENT — PAIN DESCRIPTION - DESCRIPTORS: DESCRIPTORS: ACHING

## 2024-11-24 NOTE — CASE COMMUNICATION
Discharge Noted.  Patient is LT care patient at [facility].  CM notified facility patient has a discharge order.  CM awaits a response for a room number.

## 2024-11-24 NOTE — CASE COMMUNICATION
Discharge Noted:  CM arranged for patient to discharge back to [facility].  CM was called into patients room where she told CM that she did not want to return to [facility].    Patient stated that [facility]  did not check on her enough and that they were short staff on Sunday's.      CM called [facility] representative on-call for the weekends.  CM called Cherry and expressed what the patient stated  about the care on the weekends.  Cherry assured CM that she would tell the staff that they would need to round on patient more often when she returned to facility.     CM informed that patient that she spoke to a representative and she assured CM that they would round more on her today and moving forward.  Patient stated that she was fine.  The nurse was in the room and aware.    CM receives a call from the nurse stating that patient does not want to leave and requested to appeal her discharge.  Transportation left before CM was aware of the situation.    Appeal # [id number].    CM will continue to follow.

## 2024-11-25 LAB
ALBUMIN SERPL-MCNC: 2.8 G/DL (ref 3.5–5)
ANION GAP SERPL CALC-SCNC: 5 MMOL/L (ref 2–12)
BUN SERPL-MCNC: 38 MG/DL (ref 6–20)
BUN/CREAT SERPL: 19 (ref 12–20)
CA-I BLD-MCNC: 9.1 MG/DL (ref 8.5–10.1)
CHLORIDE SERPL-SCNC: 106 MMOL/L (ref 97–108)
CO2 SERPL-SCNC: 30 MMOL/L (ref 21–32)
CREAT SERPL-MCNC: 1.97 MG/DL (ref 0.55–1.02)
GLUCOSE BLD STRIP.AUTO-MCNC: 155 MG/DL (ref 65–100)
GLUCOSE BLD STRIP.AUTO-MCNC: 190 MG/DL (ref 65–100)
GLUCOSE BLD STRIP.AUTO-MCNC: 210 MG/DL (ref 65–100)
GLUCOSE BLD STRIP.AUTO-MCNC: 212 MG/DL (ref 65–100)
GLUCOSE BLD STRIP.AUTO-MCNC: 254 MG/DL (ref 65–100)
GLUCOSE SERPL-MCNC: 143 MG/DL (ref 65–100)
PERFORMED BY:: ABNORMAL
PHOSPHATE SERPL-MCNC: 3.8 MG/DL (ref 2.6–4.7)
POTASSIUM SERPL-SCNC: 4.3 MMOL/L (ref 3.5–5.1)
SODIUM SERPL-SCNC: 141 MMOL/L (ref 136–145)

## 2024-11-25 PROCEDURE — 97530 THERAPEUTIC ACTIVITIES: CPT

## 2024-11-25 PROCEDURE — 1100000000 HC RM PRIVATE

## 2024-11-25 PROCEDURE — 6370000000 HC RX 637 (ALT 250 FOR IP)

## 2024-11-25 PROCEDURE — 6370000000 HC RX 637 (ALT 250 FOR IP): Performed by: INTERNAL MEDICINE

## 2024-11-25 PROCEDURE — 82962 GLUCOSE BLOOD TEST: CPT

## 2024-11-25 PROCEDURE — 36415 COLL VENOUS BLD VENIPUNCTURE: CPT

## 2024-11-25 PROCEDURE — 80069 RENAL FUNCTION PANEL: CPT

## 2024-11-25 PROCEDURE — 6370000000 HC RX 637 (ALT 250 FOR IP): Performed by: HOSPITALIST

## 2024-11-25 RX ORDER — GRANULES FOR ORAL 3 G/1
3 POWDER ORAL ONCE
Status: COMPLETED | OUTPATIENT
Start: 2024-11-25 | End: 2024-11-25

## 2024-11-25 RX ORDER — CIPROFLOXACIN 500 MG/1
500 TABLET, FILM COATED ORAL
Status: DISCONTINUED | OUTPATIENT
Start: 2024-11-25 | End: 2024-11-25

## 2024-11-25 RX ADMIN — PANTOPRAZOLE SODIUM 40 MG: 40 TABLET, DELAYED RELEASE ORAL at 06:35

## 2024-11-25 RX ADMIN — APIXABAN 2.5 MG: 2.5 TABLET, FILM COATED ORAL at 20:33

## 2024-11-25 RX ADMIN — APIXABAN 2.5 MG: 2.5 TABLET, FILM COATED ORAL at 09:12

## 2024-11-25 RX ADMIN — ONDANSETRON 4 MG: 4 TABLET, ORALLY DISINTEGRATING ORAL at 09:14

## 2024-11-25 RX ADMIN — FUROSEMIDE 40 MG: 40 TABLET ORAL at 09:12

## 2024-11-25 RX ADMIN — INSULIN LISPRO 1 UNITS: 100 INJECTION, SOLUTION INTRAVENOUS; SUBCUTANEOUS at 17:03

## 2024-11-25 RX ADMIN — INSULIN LISPRO 1 UNITS: 100 INJECTION, SOLUTION INTRAVENOUS; SUBCUTANEOUS at 12:07

## 2024-11-25 RX ADMIN — EMPAGLIFLOZIN 10 MG: 10 TABLET, FILM COATED ORAL at 09:12

## 2024-11-25 RX ADMIN — BUTALBITAL, ACETAMINOPHEN, AND CAFFEINE 1 TABLET: 325; 50; 40 TABLET ORAL at 09:12

## 2024-11-25 RX ADMIN — GRANULES FOR ORAL SOLUTION 1 PACKET: 3 POWDER ORAL at 15:45

## 2024-11-25 RX ADMIN — HYDRALAZINE HYDROCHLORIDE 25 MG: 50 TABLET ORAL at 22:32

## 2024-11-25 RX ADMIN — HYDRALAZINE HYDROCHLORIDE 25 MG: 50 TABLET ORAL at 14:23

## 2024-11-25 RX ADMIN — ASPIRIN 81 MG: 81 TABLET, COATED ORAL at 09:12

## 2024-11-25 RX ADMIN — ATORVASTATIN CALCIUM 40 MG: 40 TABLET, FILM COATED ORAL at 20:33

## 2024-11-25 RX ADMIN — INSULIN LISPRO 1 UNITS: 100 INJECTION, SOLUTION INTRAVENOUS; SUBCUTANEOUS at 20:33

## 2024-11-25 RX ADMIN — HYDRALAZINE HYDROCHLORIDE 25 MG: 50 TABLET ORAL at 06:32

## 2024-11-25 RX ADMIN — METOPROLOL SUCCINATE 50 MG: 50 TABLET, EXTENDED RELEASE ORAL at 09:12

## 2024-11-26 VITALS
HEIGHT: 62 IN | SYSTOLIC BLOOD PRESSURE: 130 MMHG | RESPIRATION RATE: 17 BRPM | HEART RATE: 94 BPM | OXYGEN SATURATION: 98 % | WEIGHT: 177.25 LBS | BODY MASS INDEX: 32.62 KG/M2 | TEMPERATURE: 98.6 F | DIASTOLIC BLOOD PRESSURE: 74 MMHG

## 2024-11-26 LAB
ALBUMIN SERPL-MCNC: 2.6 G/DL (ref 3.5–5)
ANION GAP SERPL CALC-SCNC: 5 MMOL/L (ref 2–12)
BUN SERPL-MCNC: 36 MG/DL (ref 6–20)
BUN/CREAT SERPL: 19 (ref 12–20)
CA-I BLD-MCNC: 9.2 MG/DL (ref 8.5–10.1)
CHLORIDE SERPL-SCNC: 104 MMOL/L (ref 97–108)
CO2 SERPL-SCNC: 29 MMOL/L (ref 21–32)
CREAT SERPL-MCNC: 1.91 MG/DL (ref 0.55–1.02)
GLUCOSE BLD STRIP.AUTO-MCNC: 131 MG/DL (ref 65–100)
GLUCOSE BLD STRIP.AUTO-MCNC: 167 MG/DL (ref 65–100)
GLUCOSE BLD STRIP.AUTO-MCNC: 194 MG/DL (ref 65–100)
GLUCOSE BLD STRIP.AUTO-MCNC: 260 MG/DL (ref 65–100)
GLUCOSE SERPL-MCNC: 137 MG/DL (ref 65–100)
PERFORMED BY:: ABNORMAL
PHOSPHATE SERPL-MCNC: 3.6 MG/DL (ref 2.6–4.7)
POTASSIUM SERPL-SCNC: 4.2 MMOL/L (ref 3.5–5.1)
SODIUM SERPL-SCNC: 138 MMOL/L (ref 136–145)

## 2024-11-26 PROCEDURE — 6370000000 HC RX 637 (ALT 250 FOR IP)

## 2024-11-26 PROCEDURE — 80069 RENAL FUNCTION PANEL: CPT

## 2024-11-26 PROCEDURE — 6370000000 HC RX 637 (ALT 250 FOR IP): Performed by: HOSPITALIST

## 2024-11-26 PROCEDURE — 82962 GLUCOSE BLOOD TEST: CPT

## 2024-11-26 PROCEDURE — 1100000000 HC RM PRIVATE

## 2024-11-26 PROCEDURE — 6370000000 HC RX 637 (ALT 250 FOR IP): Performed by: INTERNAL MEDICINE

## 2024-11-26 PROCEDURE — 36415 COLL VENOUS BLD VENIPUNCTURE: CPT

## 2024-11-26 RX ADMIN — HYDRALAZINE HYDROCHLORIDE 25 MG: 50 TABLET ORAL at 06:16

## 2024-11-26 RX ADMIN — INSULIN LISPRO 1 UNITS: 100 INJECTION, SOLUTION INTRAVENOUS; SUBCUTANEOUS at 18:15

## 2024-11-26 RX ADMIN — APIXABAN 2.5 MG: 2.5 TABLET, FILM COATED ORAL at 21:04

## 2024-11-26 RX ADMIN — HYDRALAZINE HYDROCHLORIDE 25 MG: 50 TABLET ORAL at 21:04

## 2024-11-26 RX ADMIN — ONDANSETRON 4 MG: 4 TABLET, ORALLY DISINTEGRATING ORAL at 09:41

## 2024-11-26 RX ADMIN — EMPAGLIFLOZIN 10 MG: 10 TABLET, FILM COATED ORAL at 08:51

## 2024-11-26 RX ADMIN — APIXABAN 2.5 MG: 2.5 TABLET, FILM COATED ORAL at 08:51

## 2024-11-26 RX ADMIN — INSULIN LISPRO 2 UNITS: 100 INJECTION, SOLUTION INTRAVENOUS; SUBCUTANEOUS at 21:02

## 2024-11-26 RX ADMIN — HYDRALAZINE HYDROCHLORIDE 25 MG: 50 TABLET ORAL at 16:04

## 2024-11-26 RX ADMIN — ATORVASTATIN CALCIUM 40 MG: 40 TABLET, FILM COATED ORAL at 21:04

## 2024-11-26 RX ADMIN — PANTOPRAZOLE SODIUM 40 MG: 40 TABLET, DELAYED RELEASE ORAL at 06:16

## 2024-11-26 RX ADMIN — FUROSEMIDE 40 MG: 40 TABLET ORAL at 08:51

## 2024-11-26 RX ADMIN — BUTALBITAL, ACETAMINOPHEN, AND CAFFEINE 1 TABLET: 325; 50; 40 TABLET ORAL at 09:45

## 2024-11-26 RX ADMIN — ASPIRIN 81 MG: 81 TABLET, COATED ORAL at 08:51

## 2024-11-26 RX ADMIN — METOPROLOL SUCCINATE 50 MG: 50 TABLET, EXTENDED RELEASE ORAL at 08:51

## 2024-11-26 ASSESSMENT — PAIN SCALES - GENERAL
PAINLEVEL_OUTOF10: 0
PAINLEVEL_OUTOF10: 0
PAINLEVEL_OUTOF10: 8
PAINLEVEL_OUTOF10: 0
PAINLEVEL_OUTOF10: 0

## 2024-11-26 ASSESSMENT — PAIN DESCRIPTION - LOCATION: LOCATION: HEAD

## 2024-11-26 NOTE — PROGRESS NOTES
Nephrology follow-up          Patient: Ana Rosa Beadren MRN: [id number]  SSN: [id number]    YOB: 1940  Age: 84 y.o.  Sex: female      Subjective:   The patient is seen at the bedside.  She looks comfortable  Blood pressures are good  On nasal cannula 2 L  Creatinine stable at 2.1  On IV Lasix 40 mg twice a day  Will switch to p.o. Lasix 40 mg daily.    Past Medical History:   Diagnosis Date    Acute respiratory distress     CHF (congestive heart failure) (HCC)     CKD (chronic kidney disease)     Diabetes mellitus (HCC)     GERD (gastroesophageal reflux disease)     Hypercholesterolemia     Hyperlipidemia     Hypertension     Migraine      Past Surgical History:   Procedure Laterality Date    HERNIA REPAIR      IR NONTUNNELED VASCULAR CATHETER  3/29/2024    IR NONTUNNELED VASCULAR CATHETER 3/29/2024 Ez Chowdhury MD [facility] IR    TUBAL LIGATION        Family History   Problem Relation Age of Onset    Hypertension Father      Social History     Tobacco Use    Smoking status: Never    Smokeless tobacco: Never   Substance Use Topics    Alcohol use: Not Currently      Current Facility-Administered Medications   Medication Dose Route Frequency Provider Last Rate Last Admin    sodium chloride (OCEAN, BABY AYR) 0.65 % nasal spray 2 spray  2 spray Each Nostril Q6H PRN Adilson Christiansen MD        metoprolol succinate (TOPROL XL) extended release tablet 25 mg  25 mg Oral Daily Aaron Staley MD   25 mg at 11/22/24 1422    aluminum & magnesium hydroxide-simethicone (MAALOX) 200-200-20 MG/5ML suspension 30 mL  30 mL Oral Q6H PRN Amor Reeder MD        pantoprazole (PROTONIX) tablet 40 mg  40 mg Oral QAM AC Amor Reeder MD   40 mg at 11/22/24 0900    butalbital-acetaminophen-caffeine (FIORICET, ESGIC) per tablet 1 tablet  1 tablet Oral Q6H PRN Vika Galvan MD   1 tablet at 11/22/24 1027    sodium chloride flush 0.9 % injection 5-40 mL  5-40 mL IntraVENous 2 times per day Vika Galvan MD  
         Nephrology follow-up          Patient: Ana Rosa Bearden MRN: [id number]  SSN: [id number]    YOB: 1940  Age: 84 y.o.  Sex: female      Subjective:   The patient is seen at the bedside.  She looks comfortable  Blood pressures are good  On nasal cannula 2 L  Creatinine stable at 1.9.    Switched IV to p.o. Lasix 40 mg daily.    Past Medical History:   Diagnosis Date    Acute respiratory distress     CHF (congestive heart failure) (HCC)     CKD (chronic kidney disease)     Diabetes mellitus (HCC)     GERD (gastroesophageal reflux disease)     Hypercholesterolemia     Hyperlipidemia     Hypertension     Migraine      Past Surgical History:   Procedure Laterality Date    HERNIA REPAIR      IR NONTUNNELED VASCULAR CATHETER  3/29/2024    IR NONTUNNELED VASCULAR CATHETER 3/29/2024 Ez Chowdhury MD [facility] IR    TUBAL LIGATION        Family History   Problem Relation Age of Onset    Hypertension Father      Social History     Tobacco Use    Smoking status: Never    Smokeless tobacco: Never   Substance Use Topics    Alcohol use: Not Currently      Current Facility-Administered Medications   Medication Dose Route Frequency Provider Last Rate Last Admin    sodium chloride (OCEAN, BABY AYR) 0.65 % nasal spray 2 spray  2 spray Each Nostril Q6H PRN Adilson Christiansen MD        metoprolol succinate (TOPROL XL) extended release tablet 50 mg  50 mg Oral Daily Sanam Berger MD   50 mg at 11/26/24 0851    furosemide (LASIX) tablet 40 mg  40 mg Oral Daily Sanam Berger MD   40 mg at 11/26/24 0851    nitroGLYCERIN (NITROSTAT) SL tablet 0.4 mg  0.4 mg SubLINGual Q5 Min PRN Aaron Staley MD   0.4 mg at 11/22/24 1645    aluminum & magnesium hydroxide-simethicone (MAALOX) 200-200-20 MG/5ML suspension 30 mL  30 mL Oral Q6H PRN Amor Reeder MD        pantoprazole (PROTONIX) tablet 40 mg  40 mg Oral QAM AC Amor Reeder MD   40 mg at 11/26/24 0616    butalbital-acetaminophen-caffeine (FIORICET, ESGIC) per 
         Nephrology follow-up          Patient: Ana Rosa Bearden MRN: [id number]  SSN: [id number]    YOB: 1940  Age: 84 y.o.  Sex: female      Subjective:   The patient is seen at the bedside.  She looks comfortable  Blood pressures are good  On nasal cannula 2 L  Creatinine stable at 1.9.    Switched IV to p.o. Lasix 40 mg daily.    Past Medical History:   Diagnosis Date    Acute respiratory distress     CHF (congestive heart failure) (HCC)     CKD (chronic kidney disease)     Diabetes mellitus (HCC)     GERD (gastroesophageal reflux disease)     Hypercholesterolemia     Hyperlipidemia     Hypertension     Migraine      Past Surgical History:   Procedure Laterality Date    HERNIA REPAIR      IR NONTUNNELED VASCULAR CATHETER  3/29/2024    IR NONTUNNELED VASCULAR CATHETER 3/29/2024 Ez Chowdhury MD [facility]    TUBAL LIGATION        Family History   Problem Relation Age of Onset    Hypertension Father      Social History     Tobacco Use    Smoking status: Never    Smokeless tobacco: Never   Substance Use Topics    Alcohol use: Not Currently      Current Facility-Administered Medications   Medication Dose Route Frequency Provider Last Rate Last Admin    sodium chloride (OCEAN, BABY AYR) 0.65 % nasal spray 2 spray  2 spray Each Nostril Q6H PRN Adilson Christiansen MD        metoprolol succinate (TOPROL XL) extended release tablet 50 mg  50 mg Oral Daily Sanam Berger MD   50 mg at 11/25/24 0912    furosemide (LASIX) tablet 40 mg  40 mg Oral Daily Sanam Berger MD   40 mg at 11/25/24 0912    nitroGLYCERIN (NITROSTAT) SL tablet 0.4 mg  0.4 mg SubLINGual Q5 Min PRN Aaron Staley MD   0.4 mg at 11/22/24 1645    aluminum & magnesium hydroxide-simethicone (MAALOX) 200-200-20 MG/5ML suspension 30 mL  30 mL Oral Q6H PRN Amor Reeder MD        pantoprazole (PROTONIX) tablet 40 mg  40 mg Oral QAM AC Amor Reeder MD   40 mg at 11/25/24 0635    butalbital-acetaminophen-caffeine (FIORICET, ESGIC) per 
        [facility] at [address] [address]  Phone: [phone]      Progress Note      11/20/2024 11:13 AM  NAME: Ana Rosa Bearden   MRN:  [id number]   Admit Diagnosis: CHF (congestive heart failure), NYHA class I, acute on chronic, combined (HCC) [I50.43]  Congestive heart failure, unspecified HF chronicity, unspecified heart failure type (HCC) [I50.9]          Assessment/Plan:     Acute on chronic HFpEF due to fluid overload.  The patient at present appears to be compensated after diuresis with IV Lasix.  Atrial flutter with variable block and underlying sick sinus syndrome.  Patient past has refused pacemaker.  Patient heart rate is fairly controlled without sarah drugs.  Continue Eliquis for anticoagulation.  History of severe aortic stenosis.  Patient is status post TAVR.  CKD  Chronically debilitated state.  Nursing home patient with limited out of bed activities.         []       High complexity decision making was performed in this patient at high risk for decompensation with multiple organ involvement.    Subjective:     Ana Rosa Bearden denies chest pain, dyspnea or heart racing.  Discussed with RN events overnight.     Review of Systems:     []         Unable to obtain  ROS due to ---   [x]         Total of 12 systems reviewed as follows:     Constitutional: negative fever, negative chills, negative weight loss  Eyes:               negative visual changes  ENT:                negative sore throat, tongue or lip swelling  Respiratory:     negative cough, negative dyspnea  Cards:             As per HPI  GI:                   negative for nausea, vomiting, diarrhea, and abdominal pain  Genitourinary: negative for frequency, dysuria  Integument:     negative for rash   Hematologic:   negative for easy bruising and gum/nose bleeding  Musculoskel:   negative for myalgias,  back pain  Neurological:   negative for headaches, dizziness, vertigo, weakness  Behavl/Psych: 
       Hospitalist Progress Note    NAME:   Ana Rosa Bearden   : 1940   MRN: [id number]     Subjective:   Daily Progress Note:  2024    Chief complaint:  Chief Complaint   Patient presents with    Shortness of Breath         Hospital course to date/HPI from H&P:  Ana Rosa Bearden is a 84 y.o.  female with PMHx significant for Heart failure with preserved EF 65 to 70%, status post TAVR, mild to moderate mitral stenosis with regurg, chronic hypoxemic respiratory failure, chronic headache/migraine, CKD, type 2 diabetes, hypertension, SSS  Presented to the ED for increased shortness of breath brought in from [place] [facility] with increased oxygen requirement.  Patient used to be on Lasix twice daily however due to increased urinary frequency her Lasix was given 1 times a day.  Worsening lower extremity edema.       24-  Patient seen for the first time, chart was reviewed.  Patient remains on nasal cannula oxygen and IV diuretics.  PT OT evaluation pending at this time.       24-patient seen and examined, chart was reviewed.  Patient comfortable in bed but needing 2 to 4 L nasal cannula oxygen.  Patient creatinine bumped up to more than 2, nephrology consulted to help manage fluid status.  Patient remains on IV diuretics.      Objective:     BP (!) 138/92   Pulse 98 Comment:   Temp 97.5 °F (36.4 °C) (Oral)   Resp 18   Ht 1.575 m (5' 2\")   Wt 81.4 kg (179 lb 6.4 oz)   SpO2 98%   BMI 32.81 kg/m²  O2 Flow Rate (L/min): 2 L/min      Temp (24hrs), Av.8 °F (36.6 °C), Min:97.3 °F (36.3 °C), Max:98.2 °F (36.8 °C)        PHYSICAL EXAM:  Gen WDWN  Neck Supple  CVS RRR  Resp Symmetric expansion  Abdomen soft, NT/ND  Ext moves all  Neuro Alert normal speech  Psych Normal Affect  Skin no visible Rash        Data Review:      Recent Labs     24  0628 24  0331 24  0209   WBC 8.7 7.7 6.3   HGB 8.9* 8.2* 8.9*   HCT 30.9* 27.9* 29.6*    296 283     Recent Labs     
       Hospitalist Progress Note    NAME:   Ana Rosa Bearden   : 1940   MRN: [id number]     Subjective:   Daily Progress Note:  2024    Chief complaint:  Chief Complaint   Patient presents with    Shortness of Breath         Hospital course to date/HPI from H&P:  Ana Rosa Bearden is a 84 y.o.  female with PMHx significant for Heart failure with preserved EF 65 to 70%, status post TAVR, mild to moderate mitral stenosis with regurg, chronic hypoxemic respiratory failure, chronic headache/migraine, CKD, type 2 diabetes, hypertension, SSS  Presented to the ED for increased shortness of breath brought in from [place] [facility] with increased oxygen requirement.  Patient used to be on Lasix twice daily however due to increased urinary frequency her Lasix was given 1 times a day.  Worsening lower extremity edema.       24-  Patient seen for the first time, chart was reviewed.  Patient remains on nasal cannula oxygen and IV diuretics.  PT OT evaluation pending at this time.           Objective:     BP (!) 162/60   Pulse 99   Temp 97.9 °F (36.6 °C) (Oral)   Resp 18   Ht 1.575 m (5' 2\")   Wt 81.4 kg (179 lb 6.4 oz)   SpO2 100%   BMI 32.81 kg/m²  O2 Flow Rate (L/min): 4 L/min      Temp (24hrs), Av.6 °F (36.4 °C), Min:97.2 °F (36.2 °C), Max:98.1 °F (36.7 °C)        PHYSICAL EXAM:  Gen WDWN  Neck Supple  CVS RRR  Resp Symmetric expansion  Abdomen soft, NT/ND  Ext moves all  Neuro Alert normal speech  Psych Normal Affect  Skin no visible Rash        Data Review:      Recent Labs     24  1548 24  0628 24  0331   WBC 8.6 8.7 7.7   HGB 8.8* 8.9* 8.2*   HCT 29.5* 30.9* 27.9*    284 296     Recent Labs     24  1548 24  0628 24  0331    142  --    K 4.6 4.2  --     109*  --    CO2 24 26  --    GLUCOSE 303* 197*  --    BUN 33* 31*  --    CREATININE 1.86* 1.71*  --    CALCIUM 8.1* 8.9  --    MG 2.0 2.0  --    PHOS  --  4.0  --    BILITOT 0.4  --  0.4 
       Hospitalist Progress Note    NAME:   Ana Rosa Beraden   : 1940   MRN: [id number]     Subjective:   Daily Progress Note:  2024    Chief complaint:  Chief Complaint   Patient presents with    Shortness of Breath         Hospital course to date/HPI from H&P:  Ana Rosa Bearden is a 84 y.o.  female with PMHx significant for Heart failure with preserved EF 65 to 70%, status post TAVR, mild to moderate mitral stenosis with regurg, chronic hypoxemic respiratory failure, chronic headache/migraine, CKD, type 2 diabetes, hypertension, SSS  Presented to the ED for increased shortness of breath brought in from [place] [facility] with increased oxygen requirement.  Patient used to be on Lasix twice daily however due to increased urinary frequency her Lasix was given 1 times a day.  Worsening lower extremity edema.       24-  Patient seen for the first time, chart was reviewed.  Patient remains on nasal cannula oxygen and IV diuretics.  PT OT evaluation pending at this time.       24-patient seen and examined, chart was reviewed.  Patient comfortable in bed but needing 2 to 4 L nasal cannula oxygen.  Patient creatinine bumped up to more than 2, nephrology consulted to help manage fluid status.  Patient remains on IV diuretics.    24-patient seen and examined, creatinine slightly bumped up to 2.16.  Appreciate nephrology input.  Continue IV diuretics.  Weaning oxygen.  Insurance authorization pending for skilled nursing placement.  No other acute issues reported to me by patient or staff at this time.  Case also discussed with cardiology.      Patient is alert and oriented x 3.  Currently requiring 2 L NC O2.  Chronically not on home oxygen.  No overnight fever/chills, chest pain, nausea surrounding or abdominal pain noted.  Counseled on continued treatment for acute heart failure along with CASIE and CKD noted.      Patient is alert and oriented x 3.  Patient currently on her baseline 
      Hospitalist Progress Note    NAME:   Ana Rosa Bearden   : 1940   MRN: [id number]     Date/Time: 2024 5:57 AM  Patient PCP: Beth Hardy MD    Estimated discharge date:  Barriers:       Assessment / Plan:    Acute hypoxemic respiratory failure   secondary to heart failure preserved EF 65-70% exacerbation   Patient is on home oxygen 2 L, however required 4 L on admission.  Was not receiving twice daily Lasix as she showed in the rehab.  Instead was given once a day  Maintain oxygen saturation above 94%  Chest x-ray with interstitial edema  - IV diuresis with Lasix 40 mg twice daily may need to hold if creatinine gets worse  -Monitor I's and O's  -Daily weights  -Repeat echo as above  -GDMT,-  beta-blocker not on board due to pauses  -Appreciate cardiology recommendation, was seen by [facility] cardiology (dr. hCairez)   added Jardiance      S/p  TAVR  Mitral stenosis with regurgitation  Sick sinus syndrome  Patient previously declined pacemaker  Continue Eliquis, aspirin     CASIE on CKD stage IIIb  Will continue to monitor creatinine which is trending up 2.17  Avoid nephrotoxins  May need to hold diuretics if creatinine gets worse  Nephrology on case appreciate input     Acute headache, likely migraine  Chronic headache  Continue Fioricet as needed     Elevated ALP--Could be hepatic congestion, will continue to monitor for now     History of atrial flutter  Continue Eliquis  Initially beta-blocker held because of pauses.  Cardiology evaluation pending.     Chronic anemia  Hemoglobin is at baseline 8-10  Likely anemia of chronic disease versus CKD     HTN   Continue metoprolol, hydralazine and Lasix.    DVT prophylaxis  Currently on Eliquis.      Medical Decision Making:   I personally reviewed labs:  I personally reviewed imaging:  I personally reviewed EKG:  Toxic drug monitoring:   Discussed case with:     Subjective:     Chief Complaint / Reason for Physician Visit  \" Shortness of breath\".  Discussed 
    Hospitalist Progress Note               Daily Progress Note: 11/18/2024      Hospital Day: 2     Chief complaint:   Chief Complaint   Patient presents with    Shortness of Breath        Subjective:   Patient is seen and examined today during bedside rounds with the nurse.  Patient states that she is still short of breath and she has chest pain on and off.  She also complains of right flank pain      Assessment and plan    Ana Rosa Bearden is a 84 y.o.  female with PMHx significant for Heart failure with preserved EF 65 to 70%, status post TAVR, mild to moderate mitral stenosis with regurg, chronic hypoxemic respiratory failure, chronic headache/migraine, CKD, type 2 diabetes, hypertension, SSS  Presented to the ED for increased shortness of breath brought in from [place] [facility] with increased oxygen requirement.  Patient used to be on Lasix twice daily however due to increased urinary frequency her Lasix was given 1 times a day.  Worsening lower extremity edema.    Acute hypoxemic respiratory failure secondary to heart failure preserved EF 65-70% exacerbation   Patient is on home oxygen 2 L, however required 4 L on admission.  Was not receiving twice daily Lasix as she showed in the rehab.  Instead was given once a day  Maintain oxygen saturation above 94%  Chest x-ray with interstitial edema  Troponin 17  Patient was given 1 dose of IV Lasix  -Continue IV diuresis with Lasix 40 mg twice daily  -Monitor I's and O's  -Daily weights  -Monitor potassium and magnesium  -Keep potassium over 4 and magnesium above 2  -Repeat echo, last echo was in March  -GDMT,-  beta-blocker not on board due to pauses  -Appreciate cardiology recommendation, was seen by [facility] cardiology (dr. Chairez)   add Jardiance, as patient has heart failure GFR around 30 is not contraindication to start     S/p  TAVR  Mitral stenosis with regurgitation  Sick sinus syndrome  -Patient previously declined pacemaker  -Continue Eliquis, aspirin   
  Physician Progress Note      PATIENT:               JOSE ROBERTO JAY  [facility] #:                  [id number]  :                       1940  ADMIT DATE:       2024 3:30 PM  DISCH DATE:  RESPONDING  PROVIDER #:        Adilson Christiansen MD          QUERY TEXT:    Patient admitted with.  Noted documentation of Acute Kidney Injury in   Nephrology consult on .  In order to support the diagnosis of CASIE, please   include additional clinical indicators in your documentation.? Or please   document if the diagnosis of CASIE has been ruled out after further study.  The medical record reflects the following:    Risk Factors:  83 yo female with hx of CKD    Clinical Indicators:   MD PN:  CKD stage IIIb     Neph cons:  Acute kidney injury on chronic kidney disease stage III  2/2 prerenal azotemia from cardiorenal  Baseline creatinine 1.7-1.8    24 02:36  Creatinine: 2.17 (H)    Treatment:  labs, daily weights, gentle IV diuresis, neph consult, card consult, telemetry   monitoring    Defined by Kidney Disease Improving Global Outcomes (KDIGO) clinical practice   guideline for acute kidney injury:  -Increase in SCr by greater than or equal to 0.3 mg/dl within 48 hours; or  -Increase or decrease in SCr to greater than or equal to 1.5 times baseline,   which is known or presumed to have occurred within the prior 7 days; or  -Urine volume < 0.5ml/kg/h for 6 hours.  Options provided:  -- Acute kidney injury evidenced by, Please document evidence as well as a   numerical baseline creatinine, if known.  -- Currently resolved acute kidney injury was evidenced by, Please document   evidence as well as a numerical baseline creatinine, if known.  -- Acute kidney injury ruled out after study  -- Other - I will add my own diagnosis  -- Disagree - Not applicable / Not valid  -- Disagree - Clinically unable to determine / Unknown  -- Refer to Clinical Documentation Reviewer    PROVIDER RESPONSE TEXT:    This patient has 
4 Eyes Skin Assessment     NAME:  Ana Rosa Bearden  YOB: 1940  MEDICAL RECORD NUMBER:  [id number]    The patient is being assessed for  Admission    I agree that at least one RN has performed a thorough Head to Toe Skin Assessment on the patient. ALL assessment sites listed below have been assessed.      Areas assessed by both nurses:    Head, Face, Ears, Shoulders, Back, Chest, Arms, Elbows, Hands, Sacrum. Buttock, Coccyx, Ischium, Legs. Feet and Heels, and Under Medical Devices         Does the Patient have a Wound? Yes wound(s) were present on assessment. LDA wound assessment was Initiated and completed by RN: blanchable redness to buttocks        Homar Prevention initiated by RN: Yes  Wound Care Orders initiated by RN: No    Pressure Injury (Stage 3,4, Unstageable, DTI, NWPT, and Complex wounds) if present, place Wound referral order by RN under : No    New Ostomies, if present place, Ostomy referral order under : No     Nurse 1 eSignature: Electronically signed by Yulissa Burns RN on 11/18/24 at 4:50 AM EST    **SHARE this note so that the co-signing nurse can place an eSignature**    Nurse 2 eSignature: Electronically signed by Kemi Lobo RN on 11/18/24 at 4:51 AM EST    
4 Eyes Skin Assessment     NAME:  Ana Rosa Bearden  YOB: 1940  MEDICAL RECORD NUMBER:  [id number]    The patient is being assessed for  Other weekly skin note    I agree that at least one RN has performed a thorough Head to Toe Skin Assessment on the patient. ALL assessment sites listed below have been assessed.      Areas assessed by both nurses:    Head, Face, Ears, Shoulders, Back, Chest, Arms, Elbows, Hands, Sacrum. Buttock, Coccyx, Ischium, Legs. Feet and Heels, and Under Medical Devices         Does the Patient have a Wound? No noted wound(s)       Homar Prevention initiated by RN: No  Wound Care Orders initiated by RN: No    Pressure Injury (Stage 3,4, Unstageable, DTI, NWPT, and Complex wounds) if present, place Wound referral order by RN under : No    New Ostomies, if present place, Ostomy referral order under : No     Nurse 1 eSignature: Electronically signed by Aura Joseph RN on 11/20/24 at 3:48 PM EST    **SHARE this note so that the co-signing nurse can place an eSignature**    Nurse 2 eSignature: Electronically signed by Leidy Ruth RN on 11/20/24 at 3:52 PM EST    
4 Eyes Skin Assessment     NAME:  Ana Rosa Bearden  YOB: 1940  MEDICAL RECORD NUMBER:  [id number]    The patient is being assessed for  Transfer to New Unit    I agree that at least one RN has performed a thorough Head to Toe Skin Assessment on the patient. ALL assessment sites listed below have been assessed.      Areas assessed by both nurses:    Head, Face, Ears, Shoulders, Back, Chest, Arms, Elbows, Hands, Sacrum. Buttock, Coccyx, Ischium, Legs. Feet and Heels, and Under Medical Devices         Does the Patient have a Wound? Yes wound(s) were present on assessment. LDA wound assessment was Initiated and completed by RN       Homar Prevention initiated by RN: Yes  Wound Care Orders initiated by RN: Yes    Pressure Injury (Stage 3,4, Unstageable, DTI, NWPT, and Complex wounds) if present, place Wound referral order by RN under : No    New Ostomies, if present place, Ostomy referral order under : No     Nurse 1 eSignature: Electronically signed by Hua Call RN on 11/26/24 at 12:29 AM EST    **SHARE this note so that the co-signing nurse can place an eSignature**    Nurse 2 eSignature: Electronically signed by Lila Michael RN on 11/26/24 at 12:30 AM EST   
CM sent updated OT notes through [facility] to get insurance auth started for patient. Auth started by facility. Ref# [id number]. Patient will return to [facility].     Barrier to discharge-  continue diuresis, wean oxygen.    
Called, [employer] ([phone]) Reference #[id number] and set up transportation for patient to go by stretcher at 5 pm . They will call with who is coming to get patient and time of . NurseHue RN was notified of the transport situation.   
DCP is back to [facility] once cleared by cardiology, nephrology.     Patient continues to diurese. Patient is on chronic oxygen at 2 LPM.   
Discharge plan of care/case management plan validated with provider discharge order.    Discharge instructions discussed with the patient. All concerns addressed at this time.     Patient waiting on transport to go to [place]  Report called to An PEREZ   
Dr. Reeder notified via perfect serve that patient refused the Fosfomycin, sates she doesn't want to take a liquid antibiotic. Patient notified options are limited due to multiple allergies and the importance of treating UTI.      1500- Per therapists,patient inquiring about antibiotic therapy. Patient states she would go ahead an take the liquid because \" I fi were to have a reaction what better place to be but in the hospital.       1549- Fosfomycin administered to patient.  
Echo completed. Report to follow.    
Handover report given to Cherry SCHAFFER RN of [facility]. Per Cherry, patient needs to come with prescription for [place], Dr. Christiansen informed via perfect serve.    1125H Transport set-up by unit secretary. Patient informed who is fully alert and oriented. Patient seen by . Guru's IV and telemetry box removed.      
Informed Dr. Christiansen about recent U/A results. Per order via perfect serve, do straight cath one time and send urine c/s- order carried out by NOD and informed laboratory staff.  
Patient complained chest pain 8/10 with headache and SOB while lying at left side. V/S taken and recorded. SPO2-99% at 2Lpm nasal prong. Informed Dr. Staley EKG and troponin stat done. Informed Dr. Staley about the latest EKG result, Nitro SL PRN for pain added and given 1 dose, Fioricet PRN given oral for the headache. Patient encouraged to reposition semi palomo's.    1700H BP rechecked. Patient stated she feels better over all. Pending troponin level.  1800H Patient was able to rest, no more pain noted latest troponin level 18.  
Patient decided that she wants to stay for another day. Appeal done by the patient and informed  Trudy via phone call. Telemetry box re-attached to the patient.   
Patient from [facility] and will return at discharge. Patient insurance auth was approved on 11/22/24, but patient appealed her discharge through [place]. Patient information requested from [facility] sent this am.     1350 pm  CM called into patient room. She says she does not want to go back to [place] due to poor care. CM offered other facilities and she agreed to have CM send referrals and then she would decide which one she wanted. Choice letter placed on chart and referral sent.     Patient accepted by [facility]. Verifying insurance and will inform of when they can accept her.     
Patient is LTC at [facility] and will return at discharge. CM awaiting PT/OT eval to see if patient can be skilled.     Patient wears oxygen at 2 LPM chronically.     Barriers to discharge- PT/OT eval and diuresis  
Patient politely declined therapy this afternoon due to fatigue. Requested therapist return tomorrow. Will follow up another time.   
Per cardiology, patient is clear for discharge.  
Progress Note      11/22/2024 1:09 PM  NAME: Ana Rosa Bearden   MRN:  [id number]   Admit Diagnosis: CHF (congestive heart failure), NYHA class I, acute on chronic, combined (HCC) [I50.43]  Congestive heart failure, unspecified HF chronicity, unspecified heart failure type (HCC) [I50.9]          Assessment/Plan:     Heart failure with preserved LV function, admitted with fluid overload, improving.  Denies shortness of breath.  Patient reports still lack of energy.    Atrial flutter with variable block/sick sinus syndrome, anticoagulated with apixaban.  Heart rate does jump to the 140s and 50s when she tries to walk around during PT.  Rapid response was called.  Heart rate lowest was at 38.  Will attempt low-dose beta-blockade therapy.  Patient had declined pacemaker in the past.  If patient develops severe bradycardia will have to discontinue Toprol.    Chronic kidney disease, relatively stable creatinine.  Will repeat.    Aortic stenosis, status post TAVR.    Anemia, monitor hemoglobin           []       High complexity decision making was performed in this patient at high risk for decompensation with multiple organ involvement.    Subjective:     Ana Rosa Bearden denies chest pain, dyspnea.  Continues to feel weak.  Discussed with RN events overnight.     Review of Systems:    Symptom Y/N Comments  Symptom Y/N Comments   Fever/Chills N   Chest Pain N    Poor Appetite N   Edema N    Cough N   Abdominal Pain N    Sputum N   Joint Pain N    SOB/GARCIA N   Pruritis/Rash N    Nausea/vomit N   Tolerating PT/OT Y    Diarrhea N   Tolerating Diet Y    Constipation N   Other       Could NOT obtain due to:      Objective:      Physical Exam:    Last 24hrs VS reviewed since prior progress note. Most recent are:    /65   Pulse 81   Temp 97.9 °F (36.6 °C) (Oral)   Resp 18   Ht 1.575 m (5' 2\")   Wt 80.4 kg (177 lb 4 oz)   SpO2 100%   BMI 32.42 kg/m²     Intake/Output Summary (Last 24 hours) at 11/22/2024 1309  Last data 
Progress Note      11/26/2024 10:57 AM  NAME: Ana Rosa Bearden   MRN:  [id number]   Admit Diagnosis: CHF (congestive heart failure), NYHA class I, acute on chronic, combined (HCC) [I50.43]  Congestive heart failure, unspecified HF chronicity, unspecified heart failure type (HCC) [I50.9]          Assessment/Plan:     Heart failure with preserved LV function, compensated       Atrial flutter with variable block/sick sinus syndrome, anticoagulated with apixaban.  Tolerating low-dose beta-blockade, with better rate control.    Chronic kidney disease, stable creatinine.      Aortic stenosis, status post TAVR.    Anemia, stable hemoglobin.           []       High complexity decision making was performed in this patient at high risk for decompensation with multiple organ involvement.    Subjective:     Ana Rosa Bearden denies chest pain, dyspnea.  Discussed with RN events overnight.     Review of Systems:    Symptom Y/N Comments  Symptom Y/N Comments   Fever/Chills N   Chest Pain N    Poor Appetite N   Edema N    Cough N   Abdominal Pain N    Sputum N   Joint Pain N    SOB/GARCIA N   Pruritis/Rash N    Nausea/vomit N   Tolerating PT/OT Y    Diarrhea N   Tolerating Diet Y    Constipation N   Other       Could NOT obtain due to:      Objective:      Physical Exam:    Last 24hrs VS reviewed since prior progress note. Most recent are:    BP (!) 143/66   Pulse 95   Temp 98.1 °F (36.7 °C) (Oral)   Resp 18   Ht 1.575 m (5' 2\")   Wt 80.4 kg (177 lb 4 oz)   SpO2 97%   BMI 32.42 kg/m²     Intake/Output Summary (Last 24 hours) at 11/26/2024 1057  Last data filed at 11/26/2024 0138  Gross per 24 hour   Intake 600 ml   Output 870 ml   Net -270 ml        General Appearance: Well developed, well nourished, alert; no acute distress.  Ears/Nose/Mouth/Throat: Hearing grossly normal; moist mucous membranes  Neck: Supple.  Chest: Lungs clear to auscultation bilaterally.  Cardiovascular: Irregular rate and rhythm, S1S2 normal, 2/6 systolic 
Received Order for Telemetry     Ana Rosa Bearden   1940   [id number]   CHF (congestive heart failure), NYHA class I, acute on chronic, combined (HCC) [I50.43]   Vika Galvan MD     Tele Box # [id number] placed on patient at  0307 am  ER Room # 28  Admitting to Room 465  Verified with Primary Nurse CHRIS CHANG at  0323 am    
Report given to Michel at 2017. Tele box removed. Pt transferred to 582. Pt son Nikhil Ko notified of room change at 5115  
Since patient had appealed, patient does not want new IV access inserted, Dr. Christiansen informed and approved patient's decision.   
Spiritual Health History and Assessment/Progress Note  [facility]    Attempted Encounter,  ,  ,      Name: Ana Rosa Bearden MRN: [id number]    Age: 84 y.o.     Sex: female   Language: English   Church: [employer]   CHF (congestive heart failure), NYHA class I, acute on chronic, combined ([facility])     Date: 11/18/2024            Total Time Calculated: 11 min              Spiritual Assessment began in [facility] [place] CARDIAC TELEMETRY        Referral/Consult From: Rounding   Encounter Overview/Reason: Attempted Encounter  Service Provided For: Patient not available    Hue, Belief, Meaning:   Patient unable to assess at this time  Family/Friends No family/friends present      Importance and Influence:  Patient unable to assess at this time  Family/Friends No family/friends present    Community:  Patient Other: Unknown  Family/Friends No family/friends present    Assessment and Plan of Care:     Patient Interventions include: Other: Ministry of presence  Family/Friends Interventions include: No family/friends present    Patient Plan of Care: Spiritual Care available upon further referral  Family/Friends Plan of Care: No family/friends present     attempted to visit pt for spiritual assessment; conferred with RNDawson. Pt is asleep.  left pastoral msg notifying pt of attempt.    Electronically signed by FELISA Chen on 11/18/2024 at 10:50 AM    
Change (Calculated): -6  Weight Adjustment For: No Adjustment                 BMI Categories: Obese Class 1 (BMI 30.0-34.9)    Estimated Daily Nutrient Needs:  Energy Requirements Based On: Formula  Weight Used for Energy Requirements: Current  Energy (kcal/day): 1719 kcal/d (MSJx1.2AFx1.2SF, wound)  Weight Used for Protein Requirements: Current  Protein (g/day): 98 gm/d (1.2 gm/kg, PI)  Method Used for Fluid Requirements: 1 ml/kcal  Fluid (ml/day): 1719 mL/d    Nutrition Diagnosis:   Increased nutrient needs related to increase demand for energy/nutrients as evidenced by wounds    Nutrition Interventions:   Food and/or Nutrient Delivery: Continue Current Diet, Start Oral Nutrition Supplement  Nutrition Education/Counseling: No recommendation at this time  Coordination of Nutrition Care: Continue to monitor while inpatient  Plan of Care discussed with: RN, Pt    Goals:  Goals: Meet at least 75% of estimated needs, PO intake 75% or greater, within 7 days, Other  Type of Goal: New goal  Previous Goal Met: New Goal    Nutrition Monitoring and Evaluation:   Behavioral-Environmental Outcomes: None Identified  Food/Nutrient Intake Outcomes: Food and Nutrient Intake, Supplement Intake, Diet Advancement/Tolerance  Physical Signs/Symptoms Outcomes: Biochemical Data, Meal Time Behavior, Weight, Skin    Discharge Planning:    Continue current diet, Continue Oral Nutrition Supplement     Carissa Mendoza RD  Contact: ext. [phone].  
auscultation bilaterally.  Cardiovascular: Regular rate and rhythm, S1S2 normal, 2/6 systolic murmur.  Abdomen: Soft, non-tender, bowel sounds are active.  Extremities: Minimal edema bilaterally.  Skin: Warm and dry.    []         Post-cath site without hematoma, bruit, tenderness, or thrill.  Distal pulses intact.    PMH/ reviewed - no change compared to H&P    Data Review    Telemetry:     EKG:   []  No new EKG for review    Lab Data Personally Reviewed:    Recent Labs     11/18/24  0628 11/19/24  0331   WBC 8.7 7.7   HGB 8.9* 8.2*   HCT 30.9* 27.9*    296     No results for input(s): \"INR\", \"APTT\" in the last 72 hours.    Invalid input(s): \"PTP\"   Recent Labs     11/17/24  1548 11/18/24  0628    142   K 4.6 4.2    109*   CO2 24 26   BUN 33* 31*   MG 2.0 2.0     No results for input(s): \"CPK\" in the last 72 hours.    Invalid input(s): \"CPKMB\", \"CKNDX\", \"TROIQ\"  No results found for: \"CHOL\", \"CHLST\", \"CHOLV\", \"HDL\", \"HDLC\", \"LDL\", \"LDLC\"    Recent Labs     11/17/24  1548 11/19/24  0331   GLOB 5.2* 5.0*     No results for input(s): \"PH\", \"PCO2\", \"PO2\" in the last 72 hours.    Medications Personally Reviewed:    Current Facility-Administered Medications   Medication Dose Route Frequency    aluminum & magnesium hydroxide-simethicone (MAALOX) 200-200-20 MG/5ML suspension 30 mL  30 mL Oral Q6H PRN    pantoprazole (PROTONIX) tablet 40 mg  40 mg Oral QAM AC    butalbital-acetaminophen-caffeine (FIORICET, ESGIC) per tablet 1 tablet  1 tablet Oral Q6H PRN    sodium chloride flush 0.9 % injection 5-40 mL  5-40 mL IntraVENous 2 times per day    sodium chloride flush 0.9 % injection 5-40 mL  5-40 mL IntraVENous PRN    0.9 % sodium chloride infusion   IntraVENous PRN    ondansetron (ZOFRAN-ODT) disintegrating tablet 4 mg  4 mg Oral Q8H PRN    Or    ondansetron (ZOFRAN) injection 4 mg  4 mg IntraVENous Q6H PRN    polyethylene glycol (GLYCOLAX) packet 17 g  17 g Oral Daily PRN    acetaminophen (TYLENOL) 
sad and shares she has concerns regarding her health condition, living conditions, and limited support system. Pt is encouraged by weekly Bible study and prayer. She expressed her appreciation for the visit.    Electronically signed by FELISA Chen on 11/19/2024 at 12:06 PM    
Xtctu-Vujor-Xpaujny-Pramoxine     Penicillins Rash and Swelling     Airway swelling       Review of Systems:  A comprehensive review of systems was negative except for that written in the History of Present Illness.    Objective:     Vitals:    11/21/24 0739 11/21/24 1001 11/21/24 1100 11/21/24 1202   BP: 129/62  131/74    Pulse: 64 87 (!) 107    Resp: 16      Temp: 97.8 °F (36.6 °C)  97.4 °F (36.3 °C)    TempSrc: Oral      SpO2: 95% 96% 97%    Weight:       Height:    1.575 m (5' 2\")        Physical Exam:  General: NAD  Eyes: sclera anicteric  Oral Cavity: No thrush or ulcers  Neck: no JVD  Chest: Fair bilateral air entry  Heart: normal sounds  Abdomen: soft and non tender   : no barrera  Lower Extremities: no edema  Skin: no rash  Neuro: intact  Psychiatric: non-depressed          Assessment/Plan:   Acute kidney injury on chronic kidney disease stage III  2/2 prerenal azotemia from cardiorenal  Creatinine peaked at 2.1.  Creatinine is 2.1 today.  Baseline creatinine 1.7-1.8  Came with volume overload  Agreed with gentle IV diuresis      Hypertension  Blood pressures are okay  Continue clonidine/hydralazine.    Acute hypoxic respiratory failure  Due to volume overload  Required nasal cannula 4 L initially  Now back to nasal cannula 2 L  IV Lasix 40 mg twice a day  Resolving bilateral lower extremity swelling  On Jardiance    History of CHF  Preserved LVEF  Status post TAVR  Sick sinus syndrome  Eliquis  Cardiology on board    Anemia  Anemia chronic kidney disease  Hemoglobin 8.9  Iron saturation 9%, sending ferritin level  IV Ferrlecit 125 mg daily for 2 doses  P.o. iron sulfate  Erythropoietin subcu x 1 dose.          Plan:       Signed By: Sanam Berger MD     November 21, 2024       
per day Ar Buchanan MD   25 mg at 11/23/24 0638    empagliflozin (JARDIANCE) tablet 10 mg  10 mg Oral Daily Ar Buchanan MD   10 mg at 11/23/24 0902        Allergies   Allergen Reactions    Azithromycin     Iodine Swelling    Levaquin [Levofloxacin]     Xigbs-Nofpg-Dgleyte-Pramoxine     Penicillins Rash and Swelling     Airway swelling       Review of Systems:  A comprehensive review of systems was negative except for that written in the History of Present Illness.    Objective:     Vitals:    11/23/24 0332 11/23/24 0638 11/23/24 1011 11/23/24 1056   BP: 120/65 132/63  (!) 123/45   Pulse: 70   69   Resp: 14   16   Temp: 98 °F (36.7 °C)   97.7 °F (36.5 °C)   TempSrc: Oral   Oral   SpO2: 100%  99% 100%   Weight:       Height:            Physical Exam:  General: NAD  Eyes: sclera anicteric  Oral Cavity: No thrush or ulcers  Neck: no JVD  Chest: Fair bilateral air entry  Heart: normal sounds  Abdomen: soft and non tender   : no barrera  Lower Extremities: no edema  Skin: no rash  Neuro: intact  Psychiatric: non-depressed          Assessment/Plan:   Acute kidney injury on chronic kidney disease stage III  2/2 prerenal azotemia from cardiorenal  Creatinine peaked at 2.1.  Creatinine is 2.0 today.  Baseline creatinine 1.7-1.8  Came with volume overload.  switched IV Lasix to p.o. Lasix 40 mg daily.      Hypertension  Blood pressures are at goal.  Continue hydralazine 25 mg 3 times a day.  Had discontinued clonidine.  increased metoprolol XL 50 mg daily.    Acute hypoxic respiratory failure  Due to volume overload  Required nasal cannula 4 L initially  Now back to nasal cannula 2 L  IV Lasix 40 mg twice a day-->po Lasix 40 mg daily.  Resolving bilateral lower extremity swelling  On Jardiance    History of CHF  Preserved LVEF  Status post TAVR  Sick sinus syndrome  Eliquis  Cardiology on board    Anemia  Anemia chronic kidney disease  Hemoglobin 8.8.  Iron saturation 9%, sending ferritin level  IV Ferrlecit 125 mg 
  pain is at a level acceptable to the patient    Activity Tolerance:   Fair  and requires rest breaks    After treatment patient left in no apparent distress:   Bed locked and returned to lowest position, Patient left in no apparent distress sitting up in chair and Call bell within reach, and nsg updated     COMMUNICATION/EDUCATION:   The patient’s plan of care was discussed with: Registered nurse    Patient Education  Education Given To: Patient  Education Provided: Role of Therapy;Plan of Care;Home Exercise Program;Energy Conservation  Education Method: Verbal  Barriers to Learning: Vision  Education Outcome: Verbalized understanding;Continued education needed    Thank you for this referral.  GAYE Brown  Minutes: 24    
for 2 doses  P.o. iron sulfate  Erythropoietin subcu x 1 dose.          Plan:       Signed By: Sanam Berger MD     November 24, 2024       
was obtained at 1619 hours hours. Artifact related to transthoracic aortic valve replacement is again shown and in unchanged position. Atherosclerotic calcifications of the thoracic aorta with mild tortuosity is again shown. Mediastinal contours are otherwise within normal limits. Cardiac size is mildly enlarged, unchanged. Pulmonary venous congestion with moderate interstitial pulmonary edema is shown. Small bilateral pleural effusions are noted. Bones are moderately osteopenic.     Moderate interstitial pulmonary edema. Small bilateral pleural effusions. Electronically signed by Skip Vazquez MD        Assessment/Plan:     Acute hypoxemic respiratory failure secondary to heart failure preserved EF 65-70% exacerbation   Patient is on home oxygen 2 L, however required 4 L on admission.  Was not receiving twice daily Lasix as she showed in the rehab.  Instead was given once a day  Maintain oxygen saturation above 94%  Chest x-ray with interstitial edema  - IV diuresis with Lasix 40 mg twice daily may need to hold if creatinine gets worse  -Monitor I's and O's  -Daily weights  -Repeat echo as above  -GDMT,-  beta-blocker not on board due to pauses  -Appreciate cardiology recommendation, was seen by [facility] cardiology (dr. Chairez)   added Jardiance      S/p  TAVR  Mitral stenosis with regurgitation  Sick sinus syndrome  -Patient previously declined pacemaker  -Continue Eliquis, aspirin    CASIE on CKD stage IIIb  -Will continue to monitor creatinine which is trending up 2.16  -Avoid nephrotoxins  -May need to hold diuretics if creatinine gets worse  -Nephrology on case appreciate input     Acute headache, likely migraine  Chronic headache  -Was given a dose of Fioricet  Continue Fioricet as needed     Elevated ALP--Could be hepatic congestion, will continue to monitor for now     History of atrial flutter-Continue Eliquis/beta-blocker held because of pauses        Chronic anemia  -Hemoglobin is at baseline 8-10  -Likely 
shown and in unchanged position. Atherosclerotic calcifications of the thoracic aorta with mild tortuosity is again shown. Mediastinal contours are otherwise within normal limits. Cardiac size is mildly enlarged, unchanged. Pulmonary venous congestion with moderate interstitial pulmonary edema is shown. Small bilateral pleural effusions are noted. Bones are moderately osteopenic.     Moderate interstitial pulmonary edema. Small bilateral pleural effusions. Electronically signed by Skip Vazquez MD        Assessment/Plan:     Abnormal UA-  Suspected UTI  Urine culture positive for Proteus  Multiple drug allergies  Discussed with pharmacy she was offered fosfomycin but she declined  Follow ID and      Acute hypoxemic respiratory failure secondary to heart failure preserved EF 65-70% exacerbation   Patient is on home oxygen 2 L, however required 4 L on admission.  Was not receiving twice daily Lasix as she showed in the rehab.  Instead was given once a day  Maintain oxygen saturation above 94%  Chest x-ray with interstitial edema  -Continue oral diuretics  -Repeat echo as above  -GDMT, low-dose beta-blocker beta-blocker per cardiology  Appreciate cardiology recommendation, was seen by [facility] cardiology (dr. Chairez)   added Jardiance      S/p  TAVR  Mitral stenosis with regurgitation  Sick sinus syndrome  -Patient previously declined pacemaker  -Continue Eliquis, aspirin    CASIE on CKD stage IIIb  -Will continue to monitor creatinine which is trending up 2.16  -Avoid nephrotoxins  -Nephrology on case appreciate input continue same meds follow-up as OP     Acute headache, likely migraine  Chronic headache  -Was given a dose of Fioricet  Continue Fioricet as needed     Elevated ALP--Could be hepatic congestion follow-up PCP after discharge     History of atrial flutter-Continue Eliquis/beta-blocker follow cardiology after discharge        Chronic anemia  -Hemoglobin is at baseline 8-10  -Likely anemia of chronic disease versus

## 2024-11-26 NOTE — WOUND CARE
IP WOUND CONSULT    Ana Rosa Bearden  MEDICAL RECORD NUMBER:  [id number]  AGE: 84 y.o.   GENDER: female  : 1940  TODAY'S DATE:  2024    GENERAL     [] Follow-up   [x] New Consult    Ana Rosa Bearden is a 84 y.o. female referred by:   [x] Physician  [] Nursing  [] Other:         PAST MEDICAL HISTORY    Past Medical History:   Diagnosis Date    Acute respiratory distress     CHF (congestive heart failure) (HCC)     CKD (chronic kidney disease)     Diabetes mellitus (HCC)     GERD (gastroesophageal reflux disease)     Hypercholesterolemia     Hyperlipidemia     Hypertension     Migraine         PAST SURGICAL HISTORY    Past Surgical History:   Procedure Laterality Date    HERNIA REPAIR      IR NONTUNNELED VASCULAR CATHETER  3/29/2024    IR NONTUNNELED VASCULAR CATHETER 3/29/2024 Ez Chowdhury MD [facility] IR    TUBAL LIGATION         FAMILY HISTORY    Family History   Problem Relation Age of Onset    Hypertension Father          ALLERGIES    Allergies   Allergen Reactions    Azithromycin     Iodine Swelling    Levaquin [Levofloxacin] Swelling    Jdyva-Tnlus-Fupufxt-Pramoxine     Ciprofloxacin Swelling and Rash    Penicillins Rash and Swelling     Airway swelling       MEDICATIONS    No current facility-administered medications on file prior to encounter.     Current Outpatient Medications on File Prior to Encounter   Medication Sig Dispense Refill    hydrALAZINE (APRESOLINE) 25 MG tablet Take 1 tablet by mouth 3 times daily Hold for SBP<120      ipratropium 0.5 mg-albuterol 2.5 mg (DUONEB) 0.5-2.5 (3) MG/3ML SOLN nebulizer solution Inhale 3 mLs into the lungs every 4 hours as needed for Shortness of Breath 360 mL 1    apixaban (ELIQUIS) 2.5 MG TABS tablet Take 1 tablet by mouth 2 times daily 60 tablet 1    albuterol sulfate HFA (PROVENTIL;VENTOLIN;PROAIR) 108 (90 Base) MCG/ACT inhaler Inhale 2 puffs into the lungs every 4 hours as needed      aspirin 81 MG EC tablet Take 1 tablet by mouth daily

## 2024-11-26 NOTE — DISCHARGE INSTR - COC
Continuity of Care Form    Patient Name: Ana Rosa Bearden   :  1940  MRN:  [id number]    Admit date:  2024  Discharge date:  2024    Code Status Order: Full Code   Advance Directives:   Advance Care Flowsheet Documentation             Admitting Physician:  Vika Galvan MD  PCP: Beth Hardy MD    Discharging Nurse: Elsa Kim  Discharging Hospital Unit/Room#: 582/01  Discharging Unit Phone Number: [phone]    Emergency Contact:   Extended Emergency Contact Information  Primary Emergency Contact: Nikhil Ko  Mobile Phone: [phone]  Relation: Child  Secondary Emergency Contact: Ana Rosa Ceja  Home Phone: [phone]  Mobile Phone: [phone]  Relation: Child    Past Surgical History:  Past Surgical History:   Procedure Laterality Date    HERNIA REPAIR      IR NONTUNNELED VASCULAR CATHETER  3/29/2024    IR NONTUNNELED VASCULAR CATHETER 3/29/2024 Ez Chowdhury MD [facility] ANGIO IR    TUBAL LIGATION         Immunization History:     There is no immunization history on file for this patient.    Active Problems:  Patient Active Problem List   Diagnosis Code    CHF (congestive heart failure) (Formerly [facility]) I50.9    Chest pain R07.9    COVID-19 virus infection U07.1    CASIE (acute kidney injury) ([facility]) N17.9    Diarrhea R19.7    Shock R57.9    Acute on chronic hypoxic respiratory failure J96.21    Acute hypoxic respiratory failure J96.01    CHF (congestive heart failure), NYHA class I, acute on chronic, combined ([facility]) I50.43       Isolation/Infection:   Isolation            No Isolation          Patient Infection Status       Infection Onset Added Last Indicated Last Indicated By Review Planned Expiration Resolved Resolved By    None active    Resolved    COVID-19 22 Conversion, Epic   22 Conversion, Epic                       Nurse Assessment:  Last Vital Signs: BP (!) 140/78   Pulse 63   Temp 97.5 °F (36.4 °C) (Oral)   Resp 18   Ht 1.575 m (5' 2\")

## 2024-11-26 NOTE — CARE COORDINATION
11/25/24 1044   Documentation for Discharge Appeal   Discharge Appealed by Patient   Date notifed by [facility] of appeal request: 11/24/24   Time notified by [facility] of appeal request: 1300   Detailed Notice of Discharge given to: Patient   Date Notice of Discharge given: 11/25/24   Time Notice of Discharge given: 1100   Date records sent to [facility]IO 11/25/24   Time records sent to [facility] 9236       
0720: Chart reviewed.    Per notes; patient on IV Lasix followed via cardiology and nephrology.    Patient is a long term resident at [facility], however they would like to skill patient upon her return.    AUTH started 11/20/2024: Reference #[id number].    CM will continue to follow patient and recs of medical team.    1605: Updates attached in [facility].  
DC Plan: [facility] on the [place]       Room# 407D       Report: [phone]       Transport: stretcher    Pt transferred from 4W to 5W.    Cm was informed dc appeal was denied.    [facility] has a bed. Cm met with pt at the bedside and notified her of dc to [facility] the [place]. Pt is agreeable with dc today.     Cm spoke with pt's son - Nikhil Ko [phone] and notified him of dc to [facility] the [place]. Cm e-mailed pt's son address, room# and phone# to [facility].    Transition of Care Plan:    RUR: 22%  Prior Level of Functioning: needs assistance  Disposition: LTC  KAUR: 11/26/24  If SNF or IPR: Date FOC offered: 11/25  Date FOC received: 11/25  Accepting facility: [facility]  Date authorization started with reference number: N/A  Date authorization received and expires: N/A  Follow up appointments: Yes  DME needed: None  Transportation at discharge: stretcher  IM/IMM Medicare/ letter given: Yes  Is patient a [place] and connected with [facility]?    If yes, was [facility] transfer form completed and [facility] notified? N/A  Caregiver Contact: Son  Discharge Caregiver contacted prior to discharge? Yes  Care Conference needed? No  Barriers to discharge: None    
Patient will discharge back to [facility]        Room number B  Call to report: [phone]        Transition of Care Plan:    RUR: 24  Prior Level of Functioning: Independent  Disposition: [place]  KAUR:   If SNF or IPR: Date FOC offered: SNF  Date FOC received: NA  Accepting facility: [facility] [place]  Date authorization started with reference number: 11/24/24  Date authorization received and expires: NA  Follow up appointments: NA  DME needed: NA  Transportation at discharge: Ambulance  IM/IMM Medicare/ letter given:   Is patient a  and connected with [facility]?    If yes, was  transfer form completed and VA notified?   Caregiver Contact:   Discharge Caregiver contacted prior to discharge? NA  Care Conference needed? NA  Barriers to discharge:  NA  
persons present: Son Nikhil ko.     Healthcare Decision Maker:   Primary Decision Maker (Active): Nikhil Ko - Child - [phone]       Content/Action Overview:  Has ACP document(s) NOT on file - requested patient to provide  Reviewed DNR/DNI and patient elects Full Code (Attempt Resuscitation)        Length of Voluntary ACP Conversation in minutes:  <16 minutes (Non-Billable)    Yancy White RN

## 2024-11-26 NOTE — DISCHARGE SUMMARY
Discharge Summary    Name: Ana Rosa Bearden  [id number]  YOB: 1940 (Age: 84 y.o.)   Date of Admission: 11/17/2024  Date of Discharge: 11/25/2024  Attending Physician: Amor Reeder MD    Discharge Diagnosis:     Consultations:  IP CONSULT TO CARDIOLOGY  IP CONSULT TO NEPHROLOGY      Brief Admission History/Reason for Admission Per Vika Galvan MD:     Chief Complaint / Reason for Physician Visit  \" Shortness of breath\".  Discussed with RN events overnight.      11/17 --   Admission H&P  84 y.o.  female with PMHx significant for Heart failure with preserved EF 65 to 70%, status post TAVR, mild to moderate mitral stenosis with regurg, chronic hypoxemic respiratory failure, chronic headache/migraine, CKD, type 2 diabetes, hypertension, SSS  Presented to the ED for increased shortness of breath brought in from [place] [facility] with increased oxygen requirement.  Patient used to be on Lasix twice daily however due to increased urinary frequency her Lasix was given 1 times a day.  Worsening lower extremity edema.         11/22  Patient is alert and oriented x 3.  Currently requiring 2 L NC O2.  This seems to be her baseline.    No overnight fever/chills, chest pain, nausea surrounding or abdominal pain noted.  Counseled on continued treatment for acute heart failure along with CASIE and CKD noted.      11/23  Patient is alert and oriented x 3.  Patient currently on her baseline 2 L NC O2.  No overnight fever/chills, chest pain, nausea surrounding or abdominal pain noted.    Stable to be discharged back to SNF, per nephrology and cardiology.  Will need follow-up with cardiology as previously has refused pacemaker.      11/24  Patient is alert and oriented x 3.  Patient currently on her baseline 2 L NC O2.  No overnight fever/chills, chest pain, nausea surrounding or abdominal pain noted.    Stable to be discharged back to SNF, per nephrology and cardiology.  Will need 
                                  Discharge Summary    Name: Ana Rosa Bearden  [id number]  YOB: 1940 (Age: 84 y.o.)   Date of Admission: 11/17/2024  Date of Discharge: 11/26/2024  Attending Physician: Amor Reeder MD    Discharge Diagnosis:     Consultations:  IP CONSULT TO CARDIOLOGY  IP CONSULT TO NEPHROLOGY  IP CONSULT TO INFECTIOUS DISEASES      Brief Admission History/Reason for Admission Per Vika Galvan MD:     Chief Complaint / Reason for Physician Visit  \" Shortness of breath\".  Discussed with RN events overnight.      11/17 --   Admission H&P  84 y.o.  female with PMHx significant for Heart failure with preserved EF 65 to 70%, status post TAVR, mild to moderate mitral stenosis with regurg, chronic hypoxemic respiratory failure, chronic headache/migraine, CKD, type 2 diabetes, hypertension, SSS  Presented to the ED for increased shortness of breath brought in from [place] [facility] with increased oxygen requirement.  Patient used to be on Lasix twice daily however due to increased urinary frequency her Lasix was given 1 times a day.  Worsening lower extremity edema.         11/22  Patient is alert and oriented x 3.  Currently requiring 2 L NC O2.  This seems to be her baseline.    No overnight fever/chills, chest pain, nausea surrounding or abdominal pain noted.  Counseled on continued treatment for acute heart failure along with CASIE and CKD noted.      11/23  Patient is alert and oriented x 3.  Patient currently on her baseline 2 L NC O2.  No overnight fever/chills, chest pain, nausea surrounding or abdominal pain noted.    Stable to be discharged back to SNF, per nephrology and cardiology.  Will need follow-up with cardiology as previously has refused pacemaker.      11/24  Patient is alert and oriented x 3.  Patient currently on her baseline 2 L NC O2.  No overnight fever/chills, chest pain, nausea surrounding or abdominal pain noted.    Stable to be discharged back to SNF, per 
                                  Discharge Summary    Name: Ana Rosa Bearden  [id number]  YOB: 1940 (Age: 84 y.o.)   Date of Admission: 11/17/2024  Date of Discharge: 11/24/2024  Attending Physician: Adilson Christiansen MD    Discharge Diagnosis:     Consultations:  IP CONSULT TO CARDIOLOGY  IP CONSULT TO NEPHROLOGY      Brief Admission History/Reason for Admission Per Vika Galvan MD:     Chief Complaint / Reason for Physician Visit  \" Shortness of breath\".  Discussed with RN events overnight.      11/17 --   Admission H&P  84 y.o.  female with PMHx significant for Heart failure with preserved EF 65 to 70%, status post TAVR, mild to moderate mitral stenosis with regurg, chronic hypoxemic respiratory failure, chronic headache/migraine, CKD, type 2 diabetes, hypertension, SSS  Presented to the ED for increased shortness of breath brought in from [place] [facility] with increased oxygen requirement.  Patient used to be on Lasix twice daily however due to increased urinary frequency her Lasix was given 1 times a day.  Worsening lower extremity edema.         11/22  Patient is alert and oriented x 3.  Currently requiring 2 L NC O2.  This seems to be her baseline.    No overnight fever/chills, chest pain, nausea surrounding or abdominal pain noted.  Counseled on continued treatment for acute heart failure along with CASIE and CKD noted.      11/23  Patient is alert and oriented x 3.  Patient currently on her baseline 2 L NC O2.  No overnight fever/chills, chest pain, nausea surrounding or abdominal pain noted.    Stable to be discharged back to SNF, per nephrology and cardiology.  Will need follow-up with cardiology as previously has refused pacemaker.      11/24  Patient is alert and oriented x 3.  Patient currently on her baseline 2 L NC O2.  No overnight fever/chills, chest pain, nausea surrounding or abdominal pain noted.    Stable to be discharged back to SNF, per nephrology and cardiology.  Will 
up in 3 week(s)          Total time in minutes spent coordinating this discharge (includes going over instructions, follow-up, prescriptions, and preparing report for sign off to her PCP) :  50 minutes

## 2024-11-27 LAB
BACTERIA SPEC CULT: ABNORMAL
BACTERIA SPEC CULT: ABNORMAL
COLONY COUNT, CNT: ABNORMAL
Lab: ABNORMAL

## 2024-11-27 NOTE — PLAN OF CARE
PHYSICAL THERAPY TREATMENT     Patient: Ana Rosa Bearden (84 y.o. female)  Date: 11/22/2024  Diagnosis: CHF (congestive heart failure), NYHA class I, acute on chronic, combined (HCC) [I50.43]  Congestive heart failure, unspecified HF chronicity, unspecified heart failure type (HCC) [I50.9] CHF (congestive heart failure), NYHA class I, acute on chronic, combined (HCC)      Precautions: Fall Risk                      Recommendations for nursing mobility: Out of bed to chair for meals, Encourage HEP in prep for ADLs/mobility; see handout for details, Frequent repositioning to prevent skin breakdown, AD and gt belt for bed to chair , Amb to bathroom with AD and gait belt, and Assist x1    In place during session: External Catheter and EKG/telemetry , Nasal canula   Chart, physical therapy assessment, plan of care and goals were reviewed.  ASSESSMENT  Patient continues with skilled PT services and is progressing towards goals. Pt sitting up in the bed upon PT arrival, agreeable to session. Pt A&O x 4. (See below for objective details and assist levels).     Overall pt tolerated session fair today. Patient was able to increase gait distance today however patient was unsteady with shuffled gait and decreased activity tolerance. Patient ambulated with slow gt speed and assisted to BSC in the bathroom. Patient  HR elevated to 150s with gait, periodically returning lower between 120s-130s. Nursing and telemetry notified and chair brought closer to limit further gait returning from bathroom. Educated on LE therex and pt verbalized understanding but held therex secondary to elevated HR. Nursing present at end of tx. Will continue to benefit from skilled PT services, and will continue to progress as tolerated. Current PT DC recommendation Moderate intensity short-term skilled physical therapy up to 5x/week once medically appropriate.     Start of Session End of Session   SPO2 (%) 96 96   Heart Rate (BPM) 85 117 
  Problem: Chronic Conditions and Co-morbidities  Goal: Patient's chronic conditions and co-morbidity symptoms are monitored and maintained or improved  11/18/2024 2149 by Yulissa Burns RN  Outcome: Progressing  11/18/2024 1011 by Jessica Mckeon RN  Outcome: Progressing  Flowsheets (Taken 11/18/2024 0759)  Care Plan - Patient's Chronic Conditions and Co-Morbidity Symptoms are Monitored and Maintained or Improved:   Monitor and assess patient's chronic conditions and comorbid symptoms for stability, deterioration, or improvement   Collaborate with multidisciplinary team to address chronic and comorbid conditions and prevent exacerbation or deterioration   Update acute care plan with appropriate goals if chronic or comorbid symptoms are exacerbated and prevent overall improvement and discharge     Problem: Discharge Planning  Goal: Discharge to home or other facility with appropriate resources  11/18/2024 2149 by Yulissa Burns RN  Outcome: Progressing  11/18/2024 1011 by Jessica Mckeon RN  Outcome: Progressing  Flowsheets (Taken 11/18/2024 0759)  Discharge to home or other facility with appropriate resources:   Identify barriers to discharge with patient and caregiver   Arrange for needed discharge resources and transportation as appropriate   Identify discharge learning needs (meds, wound care, etc)   Refer to discharge planning if patient needs post-hospital services based on physician order or complex needs related to functional status, cognitive ability or social support system     Problem: Pain  Goal: Verbalizes/displays adequate comfort level or baseline comfort level  11/18/2024 2149 by Yulissa Burns RN  Outcome: Progressing  11/18/2024 1011 by Jessica Mckeon RN  Outcome: Progressing  Flowsheets (Taken 11/18/2024 0800)  Verbalizes/displays adequate comfort level or baseline comfort level:   Encourage patient to monitor pain and request assistance   Administer 
  Problem: Chronic Conditions and Co-morbidities  Goal: Patient's chronic conditions and co-morbidity symptoms are monitored and maintained or improved  11/23/2024 0750 by Aiyana Aj RN  Outcome: Progressing  11/23/2024 0609 by Gabriella Teixeira RN  Outcome: Progressing     Problem: Discharge Planning  Goal: Discharge to home or other facility with appropriate resources  11/23/2024 0750 by Aiyana Aj RN  Outcome: Progressing  11/23/2024 0609 by Gabriella Teixeira RN  Outcome: Progressing     Problem: Pain  Goal: Verbalizes/displays adequate comfort level or baseline comfort level  11/23/2024 0750 by Aiyana Aj RN  Outcome: Progressing  11/23/2024 0609 by Gabriella Teixeira RN  Outcome: Progressing     Problem: Safety - Adult  Goal: Free from fall injury  11/23/2024 0750 by Aiyana Aj RN  Outcome: Progressing  11/23/2024 0609 by Gabriella Teixeira RN  Outcome: Progressing     Problem: Skin/Tissue Integrity  Goal: Absence of new skin breakdown  Description: 1.  Monitor for areas of redness and/or skin breakdown  2.  Assess vascular access sites hourly  3.  Every 4-6 hours minimum:  Change oxygen saturation probe site  4.  Every 4-6 hours:  If on nasal continuous positive airway pressure, respiratory therapy assess nares and determine need for appliance change or resting period.  11/23/2024 0750 by Aiyana Aj RN  Outcome: Progressing  11/23/2024 0609 by Gabriella Teixeira RN  Outcome: Progressing     
  Problem: Chronic Conditions and Co-morbidities  Goal: Patient's chronic conditions and co-morbidity symptoms are monitored and maintained or improved  11/24/2024 0739 by Aiyana Aj RN  Outcome: Progressing  11/24/2024 0054 by Luiza Aguilar RN  Outcome: Progressing     Problem: Discharge Planning  Goal: Discharge to home or other facility with appropriate resources  11/24/2024 0739 by Aiyana Aj RN  Outcome: Progressing  11/24/2024 0054 by Luiza Aguilar RN  Outcome: Progressing     Problem: Pain  Goal: Verbalizes/displays adequate comfort level or baseline comfort level  11/24/2024 0739 by Aiyana Aj RN  Outcome: Progressing  11/24/2024 0054 by Luiza Aguilar RN  Outcome: Progressing     Problem: Safety - Adult  Goal: Free from fall injury  11/24/2024 0739 by Aiyana Aj RN  Outcome: Progressing  11/24/2024 0054 by Luiza Aguilar RN  Outcome: Progressing     Problem: Skin/Tissue Integrity  Goal: Absence of new skin breakdown  Description: 1.  Monitor for areas of redness and/or skin breakdown  2.  Assess vascular access sites hourly  3.  Every 4-6 hours minimum:  Change oxygen saturation probe site  4.  Every 4-6 hours:  If on nasal continuous positive airway pressure, respiratory therapy assess nares and determine need for appliance change or resting period.  11/24/2024 0739 by Aiyana Aj RN  Outcome: Progressing  11/24/2024 0054 by Luiza Aguilar RN  Outcome: Progressing     
  Problem: Chronic Conditions and Co-morbidities  Goal: Patient's chronic conditions and co-morbidity symptoms are monitored and maintained or improved  11/25/2024 1005 by Briseyda Clark RN  Outcome: Progressing  11/24/2024 2221 by Luiza Aguilar RN  Outcome: Progressing     Problem: Discharge Planning  Goal: Discharge to home or other facility with appropriate resources  11/25/2024 1005 by Briseyda Clark RN  Outcome: Progressing  11/24/2024 2221 by Luiza Aguilar RN  Outcome: Progressing     Problem: Pain  Goal: Verbalizes/displays adequate comfort level or baseline comfort level  11/25/2024 1005 by Briseyda Clark RN  Outcome: Progressing  11/24/2024 2221 by Luiza Aguilar RN  Outcome: Progressing     Problem: Safety - Adult  Goal: Free from fall injury  11/25/2024 1005 by Briseyda Clark RN  Outcome: Progressing  11/24/2024 2221 by Luiza Aguilar RN  Outcome: Progressing     Problem: Skin/Tissue Integrity  Goal: Absence of new skin breakdown  Description: 1.  Monitor for areas of redness and/or skin breakdown  2.  Assess vascular access sites hourly  3.  Every 4-6 hours minimum:  Change oxygen saturation probe site  4.  Every 4-6 hours:  If on nasal continuous positive airway pressure, respiratory therapy assess nares and determine need for appliance change or resting period.  11/25/2024 1005 by Briseyda Clark RN  Outcome: Progressing  11/24/2024 2221 by Luiza Aguilar RN  Outcome: Progressing     
  Problem: Chronic Conditions and Co-morbidities  Goal: Patient's chronic conditions and co-morbidity symptoms are monitored and maintained or improved  11/25/2024 2328 by Hua Call RN  Outcome: Progressing  11/25/2024 2043 by Luiza Aguilar RN  Outcome: Progressing  11/25/2024 1005 by Briseyda Clark RN  Outcome: Progressing     Problem: Discharge Planning  Goal: Discharge to home or other facility with appropriate resources  11/25/2024 2328 by Hua Call RN  Outcome: Progressing  11/25/2024 2043 by Luiza Aguilar RN  Outcome: Progressing  11/25/2024 1005 by Briseyda Clark RN  Outcome: Progressing     Problem: Pain  Goal: Verbalizes/displays adequate comfort level or baseline comfort level  11/25/2024 2328 by Hua Call RN  Outcome: Progressing  11/25/2024 1005 by Briseyda Clark RN  Outcome: Progressing     Problem: Safety - Adult  Goal: Free from fall injury  11/25/2024 2328 by Hua Call RN  Outcome: Progressing  11/25/2024 1005 by Briseyda Clark RN  Outcome: Progressing     Problem: Skin/Tissue Integrity  Goal: Absence of new skin breakdown  Description: 1.  Monitor for areas of redness and/or skin breakdown  2.  Assess vascular access sites hourly  3.  Every 4-6 hours minimum:  Change oxygen saturation probe site  4.  Every 4-6 hours:  If on nasal continuous positive airway pressure, respiratory therapy assess nares and determine need for appliance change or resting period.  11/25/2024 2328 by Hua Call RN  Outcome: Progressing  11/25/2024 1005 by Briseyda Clark RN  Outcome: Progressing     Problem: Occupational Therapy - Adult  Goal: By Discharge: Performs self-care activities at highest level of function for planned discharge setting.  See evaluation for individualized goals.  Description: FUNCTIONAL STATUS PRIOR TO ADMISSION:   Pt is a resident at LT facility. Per pt report, she states she uses a rollator independently within her room. PRN assist with dressing + bathing 
  Problem: Chronic Conditions and Co-morbidities  Goal: Patient's chronic conditions and co-morbidity symptoms are monitored and maintained or improved  Outcome: Progressing     Problem: Discharge Planning  Goal: Discharge to home or other facility with appropriate resources  Outcome: Progressing     Problem: Pain  Goal: Verbalizes/displays adequate comfort level or baseline comfort level  Outcome: Progressing     Problem: Safety - Adult  Goal: Free from fall injury  Outcome: Progressing     
  Problem: Discharge Planning  Goal: Discharge to home or other facility with appropriate resources  11/26/2024 2110 by Christian Montalvo RN  Outcome: Adequate for Discharge  11/26/2024 0857 by Hue Lacy RN  Outcome: Progressing  Flowsheets (Taken 11/26/2024 0853)  Discharge to home or other facility with appropriate resources: Identify barriers to discharge with patient and caregiver     Problem: Skin/Tissue Integrity  Goal: Absence of new skin breakdown  Description: 1.  Monitor for areas of redness and/or skin breakdown  2.  Assess vascular access sites hourly  3.  Every 4-6 hours minimum:  Change oxygen saturation probe site  4.  Every 4-6 hours:  If on nasal continuous positive airway pressure, respiratory therapy assess nares and determine need for appliance change or resting period.  Outcome: Progressing     Problem: Safety - Adult  Goal: Free from fall injury  11/26/2024 2110 by Christian Montalvo RN  Outcome: Progressing  11/26/2024 0857 by Hue Lacy RN  Outcome: Progressing     Problem: Pain  Goal: Verbalizes/displays adequate comfort level or baseline comfort level  11/26/2024 2110 by Christian Montalvo RN  Outcome: Progressing  11/26/2024 0857 by Hue Lacy RN  Outcome: Progressing     Problem: Chronic Conditions and Co-morbidities  Goal: Patient's chronic conditions and co-morbidity symptoms are monitored and maintained or improved  11/26/2024 2110 by Christian Montalvo RN  Outcome: Progressing  11/26/2024 2110 by Christian Montalvo RN  Outcome: Progressing  11/26/2024 0857 by Hue Lacy RN  Outcome: Progressing  Flowsheets (Taken 11/26/2024 0853)  Care Plan - Patient's Chronic Conditions and Co-Morbidity Symptoms are Monitored and Maintained or Improved: Monitor and assess patient's chronic conditions and comorbid symptoms for stability, deterioration, or improvement     
OCCUPATIONAL THERAPY EVALUATION  Patient: Ana Rosa Bearden (84 y.o. female)  Date: 11/19/2024  Primary Diagnosis: CHF (congestive heart failure), NYHA class I, acute on chronic, combined (HCC) [I50.43]  Congestive heart failure, unspecified HF chronicity, unspecified heart failure type (HCC) [I50.9]       Precautions: Fall Risk                Recommendations for nursing mobility: Out of bed to chair for meals, Encourage HEP in prep for ADLs/mobility; see handout for details, Use of bed/chair alarm for safety, LE elevation for management of edema, AD and gt belt for bed to chair , Amb to bathroom with AD and gait belt, and Assist x1    In place during session:Nasal Cannula 4L, External Catheter, and EKG/telemetry   ASSESSMENT  Pt is a 84 y.o. female [facility] with c/o increased shortness of breath requiring increased oxygen support, admitted on 11/17/24 and currently being treated for acute hypoxemic respiratory failure secondary to heart failure preserved EF 65-70% exacerbation. Pt received sitting up in recliner chair upon arrival, AXO x4, and agreeable to OT evaluation.     Based on current observations, pt presents with decreased  functional mobility, independence in ADLs, high-level IADLs, strength, body mechanics, activity tolerance, endurance, safety awareness, balance (see below for objective details and assist levels).     Overall, pt tolerates session good with no c/o pain. Pt donning b/l socks while sitting up in recliner via leg crossing method. Pt requiring min A to thread R sock over foot,, able to assist in pulling up leg. Pt transferring into standing with min A and use of RW. Pt ambulating household distance from chair to restroom with min/CGA and directional cues, safety cues with turns using RW. Pt transferring onto commode with min A. Pt requiring mod A with oliva care in stance, then pt ambulating back to chair. Pt completing hand hygiene seated in chair following set upA. Pt left sitting 
Assess pain using appropriate pain scale   Implement non-pharmacological measures as appropriate and evaluate response   Consider cultural and social influences on pain and pain management   Notify Licensed Independent Practitioner if interventions unsuccessful or patient reports new pain  11/18/2024 0449 by Yulissa Burns RN  Outcome: Progressing     Problem: Safety - Adult  Goal: Free from fall injury  11/18/2024 1011 by Jessica Mckeon RN  Outcome: Progressing  11/18/2024 0449 by Yulissa Burns RN  Outcome: Progressing     Problem: Skin/Tissue Integrity  Goal: Absence of new skin breakdown  Description: 1.  Monitor for areas of redness and/or skin breakdown  2.  Assess vascular access sites hourly  3.  Every 4-6 hours minimum:  Change oxygen saturation probe site  4.  Every 4-6 hours:  If on nasal continuous positive airway pressure, respiratory therapy assess nares and determine need for appliance change or resting period.  Outcome: Progressing     
Level: Oriented X4  Cognition  Overall Cognitive Status: Exceptions  Arousal/Alertness: Appears intact  Following Commands: Follows one step commands consistently;Follows multistep commands with increased time;Follows multistep commands with repitition  Attention Span: Appears intact  Memory: Appears intact  Safety Judgement: Appears intact  Problem Solving: Appears intact  Insights: Fully aware of deficits  Initiation: Requires cues for some  Sequencing: Requires cues for some  Cognition Comment: Pt legally blind    Functional Mobility and Transfers for ADLs:  Bed Mobility:  Bed Mobility Training  Bed Mobility Training: Yes  Rolling: Stand-by assistance;Additional time  Supine to Sit: Stand-by assistance;Additional time  Scooting: Contact-guard assistance;Assist X1;Additional time    Transfers:  Transfer Training  Transfer Training: Yes  Sit to Stand: Minimum assistance;Assist X1;Additional time;Adaptive equipment;Contact-guard assistance  Stand to Sit: Assist X1;Additional time;Adaptive equipment;Contact-guard assistance  Bed to Chair: Contact-guard assistance;Assist X1;Additional time;Adaptive equipment      Balance:  Balance  Sitting: Intact  Standing: Impaired  Standing - Static: Good  Standing - Dynamic: Fair;Constant support      ADL Intervention:    Grooming: Setup;Independent   Grooming Skilled Clinical Factors: hand/face hygiene seated in recliner chair    Therapeutic exercise:  Pt completed seated in recliner, w/ min verbal/visual cues for proper technique and self pacing.     Exercise Sets Reps AROM AAROM PROM Self PROM Comments   Shoulder flex/ext 1 12 [x] [] [] []    Elbow flex/ext 1 12 [x] [] [] []    Wrist flex/ext 1 12 [x] [] [] []    Hand flex/ ext 1 12 [x] [] [] []      Pain Ratin/10   Pain Intervention(s):   pain is at a level acceptable to the patient    Activity Tolerance:   Fair  and requires rest breaks    After treatment patient left in no apparent distress:   Bed locked and returned to 
and self pacing.     Exercise Sets Reps AROM AAROM PROM Self PROM Comments   Shoulder flex/ext   [] [] [] []    Elbow flex/ext 1 10 [x] [] [] []    Wrist flex/ext 1 10 [x] [] [] []    Hand flex/ ext 1 10 [x] [] [] []      Pain Ratin/10 headache  Pain Intervention(s):   patient medicated for pain prior to session    Activity Tolerance:   Fair  and requires rest breaks    After treatment patient left in no apparent distress:   Bed locked and returned to lowest position, Patient left in no apparent distress in bed, Call bell within reach, Bed/ chair alarm activated, and Side rails x3, and nsg updated     COMMUNICATION/EDUCATION:   The patient’s plan of care was discussed with: Registered nurse    Patient Education  Education Given To: Patient  Education Provided: Role of Therapy;Plan of Care;Home Exercise Program;Energy Conservation  Education Method: Verbal  Barriers to Learning: None  Education Outcome: Verbalized understanding;Continued education needed    Thank you for this referral.  GAYE Brown  Minutes: 14    
Complexity : Stable, uncomplicated  Other outcome measures [facility] 6  LOW      Based on the above components, the patient evaluation is determined to be of the following complexity level: LOW    Pain Ratin/10   Pain Intervention(s):       Activity Tolerance:   Fair     After treatment patient left in no apparent distress:   Bed locked and in lowest position Patient left in no apparent distress in bed, Call bell within reach, and Heels elevated for pressure relief and nsg updated.    COMMUNICATION/EDUCATION:   The patient’s plan of care was discussed with: Occupational therapist, Registered nurse, and Case management    Patient Education  Education Given To: Patient  Education Provided: Role of Therapy;Plan of Care;Transfer Training;Energy Conservation;Home Exercise Program;Precautions;Orientation;Fall Prevention Strategies;Equipment  Education Method: Demonstration;Verbal;Teach Back  Education Outcome: Verbalized understanding;Continued education needed         Thank you for this referral.  Meaghan Hernandez, PT  Minutes: 20